# Patient Record
Sex: MALE | Race: WHITE | NOT HISPANIC OR LATINO | Employment: OTHER | URBAN - METROPOLITAN AREA
[De-identification: names, ages, dates, MRNs, and addresses within clinical notes are randomized per-mention and may not be internally consistent; named-entity substitution may affect disease eponyms.]

---

## 2017-01-06 ENCOUNTER — ALLSCRIPTS OFFICE VISIT (OUTPATIENT)
Dept: OTHER | Facility: OTHER | Age: 62
End: 2017-01-06

## 2017-01-06 DIAGNOSIS — D49.7 NEOPLASM OF UNSPECIFIED BEHAVIOR OF ENDOCRINE GLANDS AND OTHER PARTS OF NERVOUS SYSTEM: ICD-10-CM

## 2017-04-08 ENCOUNTER — HOSPITAL ENCOUNTER (EMERGENCY)
Facility: HOSPITAL | Age: 62
Discharge: HOME | End: 2017-04-08
Attending: EMERGENCY MEDICINE | Admitting: EMERGENCY MEDICINE
Payer: COMMERCIAL

## 2017-04-08 ENCOUNTER — APPOINTMENT (EMERGENCY)
Dept: CT IMAGING | Facility: HOSPITAL | Age: 62
End: 2017-04-08
Payer: COMMERCIAL

## 2017-04-08 VITALS
WEIGHT: 209 LBS | TEMPERATURE: 97.5 F | HEART RATE: 88 BPM | SYSTOLIC BLOOD PRESSURE: 157 MMHG | OXYGEN SATURATION: 96 % | DIASTOLIC BLOOD PRESSURE: 85 MMHG | RESPIRATION RATE: 16 BRPM | BODY MASS INDEX: 29.99 KG/M2

## 2017-04-08 DIAGNOSIS — K29.70 GASTRITIS: Primary | ICD-10-CM

## 2017-04-08 LAB
ANION GAP SERPL CALCULATED.3IONS-SCNC: 8 MMOL/L (ref 4–13)
BASOPHILS # BLD AUTO: 0.09 THOUSANDS/ΜL (ref 0–0.1)
BASOPHILS NFR BLD AUTO: 1 % (ref 0–1)
BUN SERPL-MCNC: 14 MG/DL (ref 5–25)
CALCIUM SERPL-MCNC: 9.6 MG/DL (ref 8.3–10.1)
CHLORIDE SERPL-SCNC: 103 MMOL/L (ref 100–108)
CO2 SERPL-SCNC: 32 MMOL/L (ref 21–32)
CREAT SERPL-MCNC: 1.28 MG/DL (ref 0.6–1.3)
DEPRECATED D DIMER PPP: 589 NG/ML (FEU) (ref 0–424)
EOSINOPHIL # BLD AUTO: 0.35 THOUSAND/ΜL (ref 0–0.61)
EOSINOPHIL NFR BLD AUTO: 2 % (ref 0–6)
ERYTHROCYTE [DISTWIDTH] IN BLOOD BY AUTOMATED COUNT: 12.9 % (ref 11.6–15.1)
GFR SERPL CREATININE-BSD FRML MDRD: 56.9 ML/MIN/1.73SQ M
GLUCOSE SERPL-MCNC: 143 MG/DL (ref 65–140)
HCT VFR BLD AUTO: 46.1 % (ref 36.5–49.3)
HGB BLD-MCNC: 16 G/DL (ref 12–17)
LYMPHOCYTES # BLD AUTO: 2.18 THOUSANDS/ΜL (ref 0.6–4.47)
LYMPHOCYTES NFR BLD AUTO: 14 % (ref 14–44)
MCH RBC QN AUTO: 30.4 PG (ref 26.8–34.3)
MCHC RBC AUTO-ENTMCNC: 34.7 G/DL (ref 31.4–37.4)
MCV RBC AUTO: 88 FL (ref 82–98)
MONOCYTES # BLD AUTO: 0.91 THOUSAND/ΜL (ref 0.17–1.22)
MONOCYTES NFR BLD AUTO: 6 % (ref 4–12)
NEUTROPHILS # BLD AUTO: 12.44 THOUSANDS/ΜL (ref 1.85–7.62)
NEUTS SEG NFR BLD AUTO: 77 % (ref 43–75)
PLATELET # BLD AUTO: 235 THOUSANDS/UL (ref 149–390)
PMV BLD AUTO: 10.3 FL (ref 8.9–12.7)
POTASSIUM SERPL-SCNC: 4.5 MMOL/L (ref 3.5–5.3)
RBC # BLD AUTO: 5.26 MILLION/UL (ref 3.88–5.62)
SODIUM SERPL-SCNC: 143 MMOL/L (ref 136–145)
TROPONIN I SERPL-MCNC: <0.02 NG/ML
WBC # BLD AUTO: 15.97 THOUSAND/UL (ref 4.31–10.16)

## 2017-04-08 PROCEDURE — 36415 COLL VENOUS BLD VENIPUNCTURE: CPT | Performed by: EMERGENCY MEDICINE

## 2017-04-08 PROCEDURE — 85379 FIBRIN DEGRADATION QUANT: CPT | Performed by: EMERGENCY MEDICINE

## 2017-04-08 PROCEDURE — 71275 CT ANGIOGRAPHY CHEST: CPT

## 2017-04-08 PROCEDURE — 99284 EMERGENCY DEPT VISIT MOD MDM: CPT

## 2017-04-08 PROCEDURE — 80048 BASIC METABOLIC PNL TOTAL CA: CPT | Performed by: EMERGENCY MEDICINE

## 2017-04-08 PROCEDURE — 84484 ASSAY OF TROPONIN QUANT: CPT | Performed by: EMERGENCY MEDICINE

## 2017-04-08 PROCEDURE — 74175 CTA ABDOMEN W/CONTRAST: CPT

## 2017-04-08 PROCEDURE — 93005 ELECTROCARDIOGRAM TRACING: CPT | Performed by: EMERGENCY MEDICINE

## 2017-04-08 PROCEDURE — 85025 COMPLETE CBC W/AUTO DIFF WBC: CPT | Performed by: EMERGENCY MEDICINE

## 2017-04-08 RX ORDER — MAGNESIUM HYDROXIDE/ALUMINUM HYDROXICE/SIMETHICONE 120; 1200; 1200 MG/30ML; MG/30ML; MG/30ML
30 SUSPENSION ORAL ONCE
Status: COMPLETED | OUTPATIENT
Start: 2017-04-08 | End: 2017-04-08

## 2017-04-08 RX ADMIN — IOHEXOL 100 ML: 350 INJECTION, SOLUTION INTRAVENOUS at 20:22

## 2017-04-08 RX ADMIN — LIDOCAINE HYDROCHLORIDE 15 ML: 20 SOLUTION ORAL; TOPICAL at 21:43

## 2017-04-08 RX ADMIN — ALUMINUM HYDROXIDE, MAGNESIUM HYDROXIDE, AND SIMETHICONE 30 ML: 200; 200; 20 SUSPENSION ORAL at 21:43

## 2017-04-10 LAB
ATRIAL RATE: 66 BPM
P AXIS: 69 DEGREES
PR INTERVAL: 186 MS
QRS AXIS: 56 DEGREES
QRSD INTERVAL: 92 MS
QT INTERVAL: 388 MS
QTC INTERVAL: 406 MS
T WAVE AXIS: 68 DEGREES
VENTRICULAR RATE: 66 BPM

## 2017-06-26 ENCOUNTER — HOSPITAL ENCOUNTER (OUTPATIENT)
Dept: MRI IMAGING | Facility: HOSPITAL | Age: 62
Discharge: HOME | End: 2017-06-26
Attending: NEUROLOGICAL SURGERY
Payer: COMMERCIAL

## 2017-06-26 DIAGNOSIS — D49.7 NEOPLASM OF UNSPECIFIED BEHAVIOR OF ENDOCRINE GLANDS AND OTHER PARTS OF NERVOUS SYSTEM: ICD-10-CM

## 2017-06-26 PROCEDURE — 72158 MRI LUMBAR SPINE W/O & W/DYE: CPT

## 2017-06-26 PROCEDURE — A9585 GADOBUTROL INJECTION: HCPCS | Performed by: NEUROLOGICAL SURGERY

## 2017-06-26 RX ADMIN — GADOBUTROL 9 ML: 604.72 INJECTION INTRAVENOUS at 15:31

## 2017-07-03 ENCOUNTER — ALLSCRIPTS OFFICE VISIT (OUTPATIENT)
Dept: OTHER | Facility: OTHER | Age: 62
End: 2017-07-03

## 2017-07-03 ENCOUNTER — TRANSCRIBE ORDERS (OUTPATIENT)
Dept: ADMINISTRATIVE | Facility: HOSPITAL | Age: 62
End: 2017-07-03

## 2017-07-03 DIAGNOSIS — D49.7 HYPERCORTISOLISM DUE TO ADRENAL NEOPLASM (HCC): Primary | ICD-10-CM

## 2017-07-03 DIAGNOSIS — E24.8 HYPERCORTISOLISM DUE TO ADRENAL NEOPLASM (HCC): Primary | ICD-10-CM

## 2017-07-03 DIAGNOSIS — D49.7 NEOPLASM OF UNSPECIFIED BEHAVIOR OF ENDOCRINE GLANDS AND OTHER PARTS OF NERVOUS SYSTEM: ICD-10-CM

## 2017-07-27 ENCOUNTER — HOSPITAL ENCOUNTER (EMERGENCY)
Facility: HOSPITAL | Age: 62
Discharge: HOME | End: 2017-07-27
Attending: EMERGENCY MEDICINE | Admitting: EMERGENCY MEDICINE
Payer: COMMERCIAL

## 2017-07-27 ENCOUNTER — APPOINTMENT (EMERGENCY)
Dept: CT IMAGING | Facility: HOSPITAL | Age: 62
End: 2017-07-27
Payer: COMMERCIAL

## 2017-07-27 ENCOUNTER — APPOINTMENT (EMERGENCY)
Dept: RADIOLOGY | Facility: HOSPITAL | Age: 62
End: 2017-07-27
Payer: COMMERCIAL

## 2017-07-27 VITALS
BODY MASS INDEX: 27.58 KG/M2 | WEIGHT: 192.24 LBS | DIASTOLIC BLOOD PRESSURE: 78 MMHG | TEMPERATURE: 97.5 F | HEART RATE: 72 BPM | SYSTOLIC BLOOD PRESSURE: 126 MMHG | RESPIRATION RATE: 18 BRPM | OXYGEN SATURATION: 98 %

## 2017-07-27 DIAGNOSIS — R06.02 SHORTNESS OF BREATH: Primary | ICD-10-CM

## 2017-07-27 DIAGNOSIS — M54.50 LOW BACK PAIN: ICD-10-CM

## 2017-07-27 DIAGNOSIS — M54.9 MID BACK PAIN: ICD-10-CM

## 2017-07-27 DIAGNOSIS — K92.2 GASTROINTESTINAL HEMORRHAGE, UNSPECIFIED GASTROINTESTINAL HEMORRHAGE TYPE: ICD-10-CM

## 2017-07-27 LAB
ANION GAP SERPL CALCULATED.3IONS-SCNC: 12 MMOL/L (ref 4–13)
BASOPHILS # BLD AUTO: 0.09 THOUSANDS/ΜL (ref 0–0.1)
BASOPHILS NFR BLD AUTO: 1 % (ref 0–1)
BUN SERPL-MCNC: 16 MG/DL (ref 5–25)
CALCIUM SERPL-MCNC: 10 MG/DL (ref 8.3–10.1)
CHLORIDE SERPL-SCNC: 104 MMOL/L (ref 100–108)
CO2 SERPL-SCNC: 24 MMOL/L (ref 21–32)
CREAT SERPL-MCNC: 1.15 MG/DL (ref 0.6–1.3)
DEPRECATED D DIMER PPP: 2543 NG/ML (FEU) (ref 0–424)
EOSINOPHIL # BLD AUTO: 0.09 THOUSAND/ΜL (ref 0–0.61)
EOSINOPHIL NFR BLD AUTO: 1 % (ref 0–6)
ERYTHROCYTE [DISTWIDTH] IN BLOOD BY AUTOMATED COUNT: 13.5 % (ref 11.6–15.1)
GFR SERPL CREATININE-BSD FRML MDRD: 68 ML/MIN/1.73SQ M
GLUCOSE SERPL-MCNC: 157 MG/DL (ref 65–140)
HCT VFR BLD AUTO: 42.6 % (ref 36.5–49.3)
HGB BLD-MCNC: 14.4 G/DL (ref 12–17)
LYMPHOCYTES # BLD AUTO: 2.6 THOUSANDS/ΜL (ref 0.6–4.47)
LYMPHOCYTES NFR BLD AUTO: 19 % (ref 14–44)
MCH RBC QN AUTO: 30 PG (ref 26.8–34.3)
MCHC RBC AUTO-ENTMCNC: 33.8 G/DL (ref 31.4–37.4)
MCV RBC AUTO: 89 FL (ref 82–98)
MONOCYTES # BLD AUTO: 0.89 THOUSAND/ΜL (ref 0.17–1.22)
MONOCYTES NFR BLD AUTO: 7 % (ref 4–12)
NEUTROPHILS # BLD AUTO: 9.79 THOUSANDS/ΜL (ref 1.85–7.62)
NEUTS SEG NFR BLD AUTO: 72 % (ref 43–75)
PLATELET # BLD AUTO: 249 THOUSANDS/UL (ref 149–390)
PMV BLD AUTO: 10.5 FL (ref 8.9–12.7)
POTASSIUM SERPL-SCNC: 4 MMOL/L (ref 3.5–5.3)
RBC # BLD AUTO: 4.8 MILLION/UL (ref 3.88–5.62)
SODIUM SERPL-SCNC: 140 MMOL/L (ref 136–145)
TROPONIN I SERPL-MCNC: <0.02 NG/ML
WBC # BLD AUTO: 13.46 THOUSAND/UL (ref 4.31–10.16)

## 2017-07-27 PROCEDURE — 93005 ELECTROCARDIOGRAM TRACING: CPT | Performed by: EMERGENCY MEDICINE

## 2017-07-27 PROCEDURE — 99284 EMERGENCY DEPT VISIT MOD MDM: CPT

## 2017-07-27 PROCEDURE — 96361 HYDRATE IV INFUSION ADD-ON: CPT

## 2017-07-27 PROCEDURE — 93005 ELECTROCARDIOGRAM TRACING: CPT

## 2017-07-27 PROCEDURE — 71010 HB CHEST X-RAY 1 VIEW FRONTAL (PORTABLE): CPT

## 2017-07-27 PROCEDURE — 96375 TX/PRO/DX INJ NEW DRUG ADDON: CPT

## 2017-07-27 PROCEDURE — 71275 CT ANGIOGRAPHY CHEST: CPT

## 2017-07-27 PROCEDURE — 80048 BASIC METABOLIC PNL TOTAL CA: CPT | Performed by: EMERGENCY MEDICINE

## 2017-07-27 PROCEDURE — 85379 FIBRIN DEGRADATION QUANT: CPT | Performed by: EMERGENCY MEDICINE

## 2017-07-27 PROCEDURE — 36415 COLL VENOUS BLD VENIPUNCTURE: CPT | Performed by: EMERGENCY MEDICINE

## 2017-07-27 PROCEDURE — 85025 COMPLETE CBC W/AUTO DIFF WBC: CPT | Performed by: EMERGENCY MEDICINE

## 2017-07-27 PROCEDURE — 84484 ASSAY OF TROPONIN QUANT: CPT | Performed by: EMERGENCY MEDICINE

## 2017-07-27 PROCEDURE — 96374 THER/PROPH/DIAG INJ IV PUSH: CPT

## 2017-07-27 RX ORDER — MORPHINE SULFATE 10 MG/ML
8 INJECTION, SOLUTION INTRAMUSCULAR; INTRAVENOUS ONCE
Status: COMPLETED | OUTPATIENT
Start: 2017-07-27 | End: 2017-07-27

## 2017-07-27 RX ORDER — LIDOCAINE 50 MG/G
2 PATCH TOPICAL ONCE
Status: DISCONTINUED | OUTPATIENT
Start: 2017-07-27 | End: 2017-07-27 | Stop reason: HOSPADM

## 2017-07-27 RX ORDER — DIPHENHYDRAMINE HYDROCHLORIDE 50 MG/ML
12.5 INJECTION INTRAMUSCULAR; INTRAVENOUS ONCE
Status: COMPLETED | OUTPATIENT
Start: 2017-07-27 | End: 2017-07-27

## 2017-07-27 RX ORDER — MORPHINE SULFATE 30 MG/1
30 TABLET ORAL EVERY 4 HOURS PRN
Qty: 10 TABLET | Refills: 0 | Status: SHIPPED | OUTPATIENT
Start: 2017-07-27 | End: 2018-01-21 | Stop reason: ALTCHOICE

## 2017-07-27 RX ADMIN — IOHEXOL 85 ML: 350 INJECTION, SOLUTION INTRAVENOUS at 15:42

## 2017-07-27 RX ADMIN — DIPHENHYDRAMINE HYDROCHLORIDE 12.5 MG: 50 INJECTION, SOLUTION INTRAMUSCULAR; INTRAVENOUS at 14:10

## 2017-07-27 RX ADMIN — MORPHINE SULFATE 8 MG: 10 INJECTION, SOLUTION INTRAMUSCULAR; INTRAVENOUS at 14:12

## 2017-07-27 RX ADMIN — SODIUM CHLORIDE 1000 ML: 0.9 INJECTION, SOLUTION INTRAVENOUS at 14:00

## 2017-07-27 RX ADMIN — LIDOCAINE 2 PATCH: 50 PATCH CUTANEOUS at 14:05

## 2017-07-28 ENCOUNTER — ALLSCRIPTS OFFICE VISIT (OUTPATIENT)
Dept: OTHER | Facility: OTHER | Age: 62
End: 2017-07-28

## 2017-07-28 ENCOUNTER — HOSPITAL ENCOUNTER (INPATIENT)
Facility: HOSPITAL | Age: 62
LOS: 4 days | Discharge: HOME | DRG: 751 | End: 2017-08-01
Attending: EMERGENCY MEDICINE | Admitting: PSYCHIATRY & NEUROLOGY
Payer: COMMERCIAL

## 2017-07-28 ENCOUNTER — GENERIC CONVERSION - ENCOUNTER (OUTPATIENT)
Dept: OTHER | Facility: OTHER | Age: 62
End: 2017-07-28

## 2017-07-28 DIAGNOSIS — F32.A DEPRESSION: ICD-10-CM

## 2017-07-28 DIAGNOSIS — R45.851 SUICIDAL IDEATION: Primary | ICD-10-CM

## 2017-07-28 DIAGNOSIS — F90.8 ADULT RESIDUAL TYPE ATTENTION DEFICIT HYPERACTIVITY DISORDER (ADHD): Chronic | ICD-10-CM

## 2017-07-28 DIAGNOSIS — F32.2 SEVERE MAJOR DEPRESSION, SINGLE EPISODE (HCC): ICD-10-CM

## 2017-07-28 LAB
ALBUMIN SERPL BCP-MCNC: 3.6 G/DL (ref 3.5–5)
ALP SERPL-CCNC: 55 U/L (ref 46–116)
ALT SERPL W P-5'-P-CCNC: 23 U/L (ref 12–78)
AMPHETAMINES SERPL QL SCN: NEGATIVE
ANION GAP SERPL CALCULATED.3IONS-SCNC: 8 MMOL/L (ref 4–13)
AST SERPL W P-5'-P-CCNC: 16 U/L (ref 5–45)
ATRIAL RATE: 66 BPM
ATRIAL RATE: 90 BPM
BARBITURATES UR QL: NEGATIVE
BASOPHILS # BLD AUTO: 0.06 THOUSANDS/ΜL (ref 0–0.1)
BASOPHILS NFR BLD AUTO: 0 % (ref 0–1)
BENZODIAZ UR QL: NEGATIVE
BILIRUB SERPL-MCNC: 0.7 MG/DL (ref 0.2–1)
BUN SERPL-MCNC: 12 MG/DL (ref 5–25)
CALCIUM SERPL-MCNC: 9.7 MG/DL (ref 8.3–10.1)
CHLORIDE SERPL-SCNC: 105 MMOL/L (ref 100–108)
CO2 SERPL-SCNC: 28 MMOL/L (ref 21–32)
COCAINE UR QL: NEGATIVE
CREAT SERPL-MCNC: 0.91 MG/DL (ref 0.6–1.3)
EOSINOPHIL # BLD AUTO: 0.23 THOUSAND/ΜL (ref 0–0.61)
EOSINOPHIL NFR BLD AUTO: 2 % (ref 0–6)
ERYTHROCYTE [DISTWIDTH] IN BLOOD BY AUTOMATED COUNT: 13.4 % (ref 11.6–15.1)
GFR SERPL CREATININE-BSD FRML MDRD: 90 ML/MIN/1.73SQ M
GLUCOSE SERPL-MCNC: 100 MG/DL (ref 65–140)
HCT VFR BLD AUTO: 39.5 % (ref 36.5–49.3)
HGB BLD-MCNC: 14 G/DL (ref 12–17)
LYMPHOCYTES # BLD AUTO: 2.07 THOUSANDS/ΜL (ref 0.6–4.47)
LYMPHOCYTES NFR BLD AUTO: 15 % (ref 14–44)
MCH RBC QN AUTO: 31.1 PG (ref 26.8–34.3)
MCHC RBC AUTO-ENTMCNC: 35.4 G/DL (ref 31.4–37.4)
MCV RBC AUTO: 88 FL (ref 82–98)
METHADONE UR QL: NEGATIVE
MONOCYTES # BLD AUTO: 1.1 THOUSAND/ΜL (ref 0.17–1.22)
MONOCYTES NFR BLD AUTO: 8 % (ref 4–12)
NEUTROPHILS # BLD AUTO: 10.59 THOUSANDS/ΜL (ref 1.85–7.62)
NEUTS SEG NFR BLD AUTO: 75 % (ref 43–75)
NRBC BLD AUTO-RTO: 0 /100 WBCS
OPIATES UR QL SCN: POSITIVE
P AXIS: 37 DEGREES
P AXIS: 75 DEGREES
PCP UR QL: NEGATIVE
PLATELET # BLD AUTO: 243 THOUSANDS/UL (ref 149–390)
PMV BLD AUTO: 10 FL (ref 8.9–12.7)
POTASSIUM SERPL-SCNC: 4.1 MMOL/L (ref 3.5–5.3)
PR INTERVAL: 152 MS
PR INTERVAL: 152 MS
PROT SERPL-MCNC: 7.7 G/DL (ref 6.4–8.2)
QRS AXIS: 51 DEGREES
QRS AXIS: 62 DEGREES
QRSD INTERVAL: 86 MS
QRSD INTERVAL: 86 MS
QT INTERVAL: 352 MS
QT INTERVAL: 400 MS
QTC INTERVAL: 419 MS
QTC INTERVAL: 430 MS
RBC # BLD AUTO: 4.5 MILLION/UL (ref 3.88–5.62)
SODIUM SERPL-SCNC: 141 MMOL/L (ref 136–145)
T WAVE AXIS: 41 DEGREES
T WAVE AXIS: 58 DEGREES
THC UR QL: POSITIVE
TSH SERPL DL<=0.05 MIU/L-ACNC: 1.68 UIU/ML (ref 0.36–3.74)
VENTRICULAR RATE: 66 BPM
VENTRICULAR RATE: 90 BPM
WBC # BLD AUTO: 14.11 THOUSAND/UL (ref 4.31–10.16)

## 2017-07-28 PROCEDURE — 86592 SYPHILIS TEST NON-TREP QUAL: CPT | Performed by: EMERGENCY MEDICINE

## 2017-07-28 PROCEDURE — 36415 COLL VENOUS BLD VENIPUNCTURE: CPT | Performed by: EMERGENCY MEDICINE

## 2017-07-28 PROCEDURE — 99285 EMERGENCY DEPT VISIT HI MDM: CPT

## 2017-07-28 PROCEDURE — 93005 ELECTROCARDIOGRAM TRACING: CPT | Performed by: EMERGENCY MEDICINE

## 2017-07-28 PROCEDURE — 85025 COMPLETE CBC W/AUTO DIFF WBC: CPT | Performed by: EMERGENCY MEDICINE

## 2017-07-28 PROCEDURE — 84443 ASSAY THYROID STIM HORMONE: CPT | Performed by: EMERGENCY MEDICINE

## 2017-07-28 PROCEDURE — 80307 DRUG TEST PRSMV CHEM ANLYZR: CPT | Performed by: EMERGENCY MEDICINE

## 2017-07-28 PROCEDURE — 80053 COMPREHEN METABOLIC PANEL: CPT | Performed by: EMERGENCY MEDICINE

## 2017-07-28 PROCEDURE — 82075 ASSAY OF BREATH ETHANOL: CPT | Performed by: EMERGENCY MEDICINE

## 2017-07-29 PROBLEM — F32.2 SEVERE MAJOR DEPRESSION, SINGLE EPISODE (HCC): Status: ACTIVE | Noted: 2017-07-29

## 2017-07-29 LAB
ANION GAP SERPL CALCULATED.3IONS-SCNC: 5 MMOL/L (ref 4–13)
BASOPHILS # BLD AUTO: 0.05 THOUSANDS/ΜL (ref 0–0.1)
BASOPHILS NFR BLD AUTO: 0 % (ref 0–1)
BUN SERPL-MCNC: 10 MG/DL (ref 5–25)
CALCIUM SERPL-MCNC: 9.5 MG/DL (ref 8.3–10.1)
CHLORIDE SERPL-SCNC: 107 MMOL/L (ref 100–108)
CHOLEST SERPL-MCNC: 114 MG/DL (ref 50–200)
CO2 SERPL-SCNC: 29 MMOL/L (ref 21–32)
CREAT SERPL-MCNC: 1 MG/DL (ref 0.6–1.3)
EOSINOPHIL # BLD AUTO: 0.4 THOUSAND/ΜL (ref 0–0.61)
EOSINOPHIL NFR BLD AUTO: 3 % (ref 0–6)
ERYTHROCYTE [DISTWIDTH] IN BLOOD BY AUTOMATED COUNT: 13.3 % (ref 11.6–15.1)
GFR SERPL CREATININE-BSD FRML MDRD: 80 ML/MIN/1.73SQ M
GLUCOSE SERPL-MCNC: 102 MG/DL (ref 65–140)
HCT VFR BLD AUTO: 40.3 % (ref 36.5–49.3)
HDLC SERPL-MCNC: 27 MG/DL (ref 40–60)
HGB BLD-MCNC: 14.1 G/DL (ref 12–17)
LDLC SERPL CALC-MCNC: 67 MG/DL (ref 0–100)
LYMPHOCYTES # BLD AUTO: 2.67 THOUSANDS/ΜL (ref 0.6–4.47)
LYMPHOCYTES NFR BLD AUTO: 22 % (ref 14–44)
MCH RBC QN AUTO: 31.2 PG (ref 26.8–34.3)
MCHC RBC AUTO-ENTMCNC: 35 G/DL (ref 31.4–37.4)
MCV RBC AUTO: 89 FL (ref 82–98)
MONOCYTES # BLD AUTO: 1 THOUSAND/ΜL (ref 0.17–1.22)
MONOCYTES NFR BLD AUTO: 8 % (ref 4–12)
NEUTROPHILS # BLD AUTO: 8.01 THOUSANDS/ΜL (ref 1.85–7.62)
NEUTS SEG NFR BLD AUTO: 67 % (ref 43–75)
NRBC BLD AUTO-RTO: 0 /100 WBCS
PLATELET # BLD AUTO: 245 THOUSANDS/UL (ref 149–390)
PMV BLD AUTO: 10 FL (ref 8.9–12.7)
POTASSIUM SERPL-SCNC: 3.8 MMOL/L (ref 3.5–5.3)
RBC # BLD AUTO: 4.52 MILLION/UL (ref 3.88–5.62)
SODIUM SERPL-SCNC: 141 MMOL/L (ref 136–145)
TRIGL SERPL-MCNC: 100 MG/DL
TSH SERPL DL<=0.05 MIU/L-ACNC: 1.5 UIU/ML (ref 0.36–3.74)
WBC # BLD AUTO: 12.2 THOUSAND/UL (ref 4.31–10.16)

## 2017-07-29 PROCEDURE — 84443 ASSAY THYROID STIM HORMONE: CPT | Performed by: PSYCHIATRY & NEUROLOGY

## 2017-07-29 PROCEDURE — 85025 COMPLETE CBC W/AUTO DIFF WBC: CPT | Performed by: PSYCHIATRY & NEUROLOGY

## 2017-07-29 PROCEDURE — 80061 LIPID PANEL: CPT | Performed by: PSYCHIATRY & NEUROLOGY

## 2017-07-29 PROCEDURE — 80048 BASIC METABOLIC PNL TOTAL CA: CPT | Performed by: PSYCHIATRY & NEUROLOGY

## 2017-07-29 RX ORDER — ACETAMINOPHEN 325 MG/1
650 TABLET ORAL EVERY 6 HOURS PRN
Status: DISCONTINUED | OUTPATIENT
Start: 2017-07-29 | End: 2017-08-01 | Stop reason: HOSPADM

## 2017-07-29 RX ORDER — ZOLPIDEM TARTRATE 5 MG/1
5 TABLET ORAL
Status: DISCONTINUED | OUTPATIENT
Start: 2017-07-29 | End: 2017-08-01 | Stop reason: HOSPADM

## 2017-07-29 RX ORDER — QUETIAPINE FUMARATE 100 MG/1
100 TABLET, FILM COATED ORAL
Status: DISCONTINUED | OUTPATIENT
Start: 2017-07-29 | End: 2017-08-01 | Stop reason: HOSPADM

## 2017-07-29 RX ORDER — MORPHINE SULFATE 15 MG/1
30 TABLET ORAL EVERY 4 HOURS PRN
Status: DISCONTINUED | OUTPATIENT
Start: 2017-07-29 | End: 2017-08-01 | Stop reason: HOSPADM

## 2017-07-29 RX ORDER — BENZTROPINE MESYLATE 1 MG/1
1 TABLET ORAL EVERY 6 HOURS PRN
Status: DISCONTINUED | OUTPATIENT
Start: 2017-07-29 | End: 2017-08-01 | Stop reason: HOSPADM

## 2017-07-29 RX ORDER — LORAZEPAM 2 MG/ML
1 INJECTION INTRAMUSCULAR EVERY 6 HOURS PRN
Status: DISCONTINUED | OUTPATIENT
Start: 2017-07-29 | End: 2017-08-01 | Stop reason: HOSPADM

## 2017-07-29 RX ORDER — BENZTROPINE MESYLATE 1 MG/ML
1 INJECTION INTRAMUSCULAR; INTRAVENOUS EVERY 6 HOURS PRN
Status: DISCONTINUED | OUTPATIENT
Start: 2017-07-29 | End: 2017-08-01 | Stop reason: HOSPADM

## 2017-07-29 RX ORDER — DULOXETIN HYDROCHLORIDE 30 MG/1
30 CAPSULE, DELAYED RELEASE ORAL DAILY
Status: DISCONTINUED | OUTPATIENT
Start: 2017-07-29 | End: 2017-08-01 | Stop reason: HOSPADM

## 2017-07-29 RX ORDER — LORAZEPAM 1 MG/1
1 TABLET ORAL EVERY 6 HOURS PRN
Status: DISCONTINUED | OUTPATIENT
Start: 2017-07-29 | End: 2017-08-01 | Stop reason: HOSPADM

## 2017-07-29 RX ORDER — OLANZAPINE 5 MG/1
5 TABLET ORAL EVERY 6 HOURS PRN
Status: DISCONTINUED | OUTPATIENT
Start: 2017-07-29 | End: 2017-08-01 | Stop reason: HOSPADM

## 2017-07-29 RX ORDER — OLANZAPINE 10 MG/1
5 INJECTION, POWDER, LYOPHILIZED, FOR SOLUTION INTRAMUSCULAR
Status: DISCONTINUED | OUTPATIENT
Start: 2017-07-29 | End: 2017-08-01 | Stop reason: HOSPADM

## 2017-07-29 RX ORDER — MAGNESIUM HYDROXIDE/ALUMINUM HYDROXICE/SIMETHICONE 120; 1200; 1200 MG/30ML; MG/30ML; MG/30ML
30 SUSPENSION ORAL EVERY 4 HOURS PRN
Status: DISCONTINUED | OUTPATIENT
Start: 2017-07-29 | End: 2017-08-01 | Stop reason: HOSPADM

## 2017-07-29 RX ADMIN — QUETIAPINE FUMARATE 100 MG: 100 TABLET, FILM COATED ORAL at 22:01

## 2017-07-29 RX ADMIN — DULOXETINE HYDROCHLORIDE 30 MG: 30 CAPSULE, DELAYED RELEASE ORAL at 14:24

## 2017-07-30 PROBLEM — F90.8 ADULT RESIDUAL TYPE ATTENTION DEFICIT HYPERACTIVITY DISORDER (ADHD): Chronic | Status: ACTIVE | Noted: 2017-07-30

## 2017-07-30 RX ORDER — ATOMOXETINE 25 MG/1
25 CAPSULE ORAL DAILY
Status: DISCONTINUED | OUTPATIENT
Start: 2017-07-30 | End: 2017-08-01 | Stop reason: HOSPADM

## 2017-07-30 RX ADMIN — DULOXETINE HYDROCHLORIDE 30 MG: 30 CAPSULE, DELAYED RELEASE ORAL at 08:38

## 2017-07-30 RX ADMIN — ATOMOXETINE HYDROCHLORIDE 25 MG: 25 CAPSULE ORAL at 13:16

## 2017-07-30 RX ADMIN — QUETIAPINE FUMARATE 100 MG: 100 TABLET, FILM COATED ORAL at 21:27

## 2017-07-31 LAB — RPR SER QL: NORMAL

## 2017-07-31 RX ADMIN — DULOXETINE HYDROCHLORIDE 30 MG: 30 CAPSULE, DELAYED RELEASE ORAL at 08:59

## 2017-07-31 RX ADMIN — ATOMOXETINE HYDROCHLORIDE 25 MG: 25 CAPSULE ORAL at 08:59

## 2017-07-31 RX ADMIN — QUETIAPINE FUMARATE 100 MG: 100 TABLET, FILM COATED ORAL at 21:15

## 2017-08-01 VITALS
DIASTOLIC BLOOD PRESSURE: 77 MMHG | WEIGHT: 189.82 LBS | OXYGEN SATURATION: 93 % | TEMPERATURE: 98.5 F | BODY MASS INDEX: 27.17 KG/M2 | HEART RATE: 82 BPM | RESPIRATION RATE: 18 BRPM | HEIGHT: 70 IN | SYSTOLIC BLOOD PRESSURE: 136 MMHG

## 2017-08-01 PROBLEM — F32.2 SEVERE MAJOR DEPRESSION, SINGLE EPISODE (HCC): Status: RESOLVED | Noted: 2017-07-29 | Resolved: 2017-08-01

## 2017-08-01 RX ORDER — QUETIAPINE FUMARATE 100 MG/1
100 TABLET, FILM COATED ORAL
Qty: 15 TABLET | Refills: 0 | Status: ON HOLD | OUTPATIENT
Start: 2017-08-01 | End: 2018-09-25

## 2017-08-01 RX ORDER — ATOMOXETINE 25 MG/1
25 CAPSULE ORAL DAILY
Qty: 15 CAPSULE | Refills: 0 | Status: SHIPPED | OUTPATIENT
Start: 2017-08-01 | End: 2018-09-20 | Stop reason: ALTCHOICE

## 2017-08-01 RX ORDER — DULOXETIN HYDROCHLORIDE 30 MG/1
30 CAPSULE, DELAYED RELEASE ORAL DAILY
Qty: 15 CAPSULE | Refills: 0 | Status: SHIPPED | OUTPATIENT
Start: 2017-08-01 | End: 2018-09-25 | Stop reason: HOSPADM

## 2017-08-01 RX ADMIN — DULOXETINE HYDROCHLORIDE 30 MG: 30 CAPSULE, DELAYED RELEASE ORAL at 08:45

## 2017-08-01 RX ADMIN — ATOMOXETINE HYDROCHLORIDE 25 MG: 25 CAPSULE ORAL at 08:45

## 2017-08-08 ENCOUNTER — APPOINTMENT (OUTPATIENT)
Dept: LAB | Facility: CLINIC | Age: 62
End: 2017-08-08
Payer: COMMERCIAL

## 2017-08-08 ENCOUNTER — ALLSCRIPTS OFFICE VISIT (OUTPATIENT)
Dept: OTHER | Facility: OTHER | Age: 62
End: 2017-08-08

## 2017-08-08 DIAGNOSIS — E61.1 IRON DEFICIENCY: ICD-10-CM

## 2017-08-08 DIAGNOSIS — M89.9 DISORDER OF BONE: ICD-10-CM

## 2017-08-08 DIAGNOSIS — G25.81 RESTLESS LEGS SYNDROME: ICD-10-CM

## 2017-08-08 DIAGNOSIS — Z13.220 ENCOUNTER FOR SCREENING FOR LIPOID DISORDERS: ICD-10-CM

## 2017-08-08 DIAGNOSIS — Z13.1 ENCOUNTER FOR SCREENING FOR DIABETES MELLITUS: ICD-10-CM

## 2017-08-08 DIAGNOSIS — D49.7 NEOPLASM OF UNSPECIFIED BEHAVIOR OF ENDOCRINE GLANDS AND OTHER PARTS OF NERVOUS SYSTEM: ICD-10-CM

## 2017-08-08 LAB
ANION GAP SERPL CALCULATED.3IONS-SCNC: 3 MMOL/L (ref 4–13)
BUN SERPL-MCNC: 10 MG/DL (ref 5–25)
CALCIUM SERPL-MCNC: 9.6 MG/DL (ref 8.3–10.1)
CHLORIDE SERPL-SCNC: 107 MMOL/L (ref 100–108)
CHOLEST SERPL-MCNC: 121 MG/DL (ref 50–200)
CO2 SERPL-SCNC: 30 MMOL/L (ref 21–32)
CREAT SERPL-MCNC: 1.06 MG/DL (ref 0.6–1.3)
ERYTHROCYTE [DISTWIDTH] IN BLOOD BY AUTOMATED COUNT: 14.1 % (ref 11.6–15.1)
EST. AVERAGE GLUCOSE BLD GHB EST-MCNC: 128 MG/DL
FERRITIN SERPL-MCNC: 232 NG/ML (ref 8–388)
GFR SERPL CREATININE-BSD FRML MDRD: 75 ML/MIN/1.73SQ M
GLUCOSE P FAST SERPL-MCNC: 113 MG/DL (ref 65–99)
HBA1C MFR BLD: 6.1 % (ref 4.2–6.3)
HCT VFR BLD AUTO: 40.4 % (ref 36.5–49.3)
HDLC SERPL-MCNC: 34 MG/DL (ref 40–60)
HGB BLD-MCNC: 13.7 G/DL (ref 12–17)
LDLC SERPL CALC-MCNC: 66 MG/DL (ref 0–100)
MCH RBC QN AUTO: 31.1 PG (ref 26.8–34.3)
MCHC RBC AUTO-ENTMCNC: 33.9 G/DL (ref 31.4–37.4)
MCV RBC AUTO: 92 FL (ref 82–98)
PLATELET # BLD AUTO: 338 THOUSANDS/UL (ref 149–390)
PMV BLD AUTO: 11.6 FL (ref 8.9–12.7)
POTASSIUM SERPL-SCNC: 4.1 MMOL/L (ref 3.5–5.3)
RBC # BLD AUTO: 4.4 MILLION/UL (ref 3.88–5.62)
SODIUM SERPL-SCNC: 140 MMOL/L (ref 136–145)
TRIGL SERPL-MCNC: 104 MG/DL
WBC # BLD AUTO: 12.91 THOUSAND/UL (ref 4.31–10.16)

## 2017-08-08 PROCEDURE — 36415 COLL VENOUS BLD VENIPUNCTURE: CPT

## 2017-08-08 PROCEDURE — 82728 ASSAY OF FERRITIN: CPT

## 2017-08-08 PROCEDURE — 85027 COMPLETE CBC AUTOMATED: CPT

## 2017-08-08 PROCEDURE — 80048 BASIC METABOLIC PNL TOTAL CA: CPT

## 2017-08-08 PROCEDURE — 80061 LIPID PANEL: CPT

## 2017-08-08 PROCEDURE — 83036 HEMOGLOBIN GLYCOSYLATED A1C: CPT

## 2017-08-29 ENCOUNTER — GENERIC CONVERSION - ENCOUNTER (OUTPATIENT)
Dept: OTHER | Facility: OTHER | Age: 62
End: 2017-08-29

## 2017-08-31 ENCOUNTER — ANESTHESIA (OUTPATIENT)
Dept: GASTROENTEROLOGY | Facility: HOSPITAL | Age: 62
End: 2017-08-31
Payer: COMMERCIAL

## 2017-08-31 ENCOUNTER — GENERIC CONVERSION - ENCOUNTER (OUTPATIENT)
Dept: OTHER | Facility: OTHER | Age: 62
End: 2017-08-31

## 2017-08-31 ENCOUNTER — ANESTHESIA EVENT (OUTPATIENT)
Dept: GASTROENTEROLOGY | Facility: HOSPITAL | Age: 62
End: 2017-08-31
Payer: COMMERCIAL

## 2017-08-31 ENCOUNTER — HOSPITAL ENCOUNTER (OUTPATIENT)
Facility: HOSPITAL | Age: 62
Setting detail: OUTPATIENT SURGERY
Discharge: HOME | End: 2017-08-31
Attending: INTERNAL MEDICINE | Admitting: INTERNAL MEDICINE
Payer: COMMERCIAL

## 2017-08-31 VITALS
WEIGHT: 184 LBS | HEIGHT: 70 IN | SYSTOLIC BLOOD PRESSURE: 126 MMHG | OXYGEN SATURATION: 97 % | TEMPERATURE: 96.7 F | HEART RATE: 59 BPM | DIASTOLIC BLOOD PRESSURE: 63 MMHG | RESPIRATION RATE: 16 BRPM | BODY MASS INDEX: 26.34 KG/M2

## 2017-08-31 DIAGNOSIS — Z12.11 ENCOUNTER FOR SCREENING FOR MALIGNANT NEOPLASM OF COLON: ICD-10-CM

## 2017-08-31 PROCEDURE — 88305 TISSUE EXAM BY PATHOLOGIST: CPT | Performed by: INTERNAL MEDICINE

## 2017-08-31 RX ORDER — PROPOFOL 10 MG/ML
INJECTION, EMULSION INTRAVENOUS AS NEEDED
Status: DISCONTINUED | OUTPATIENT
Start: 2017-08-31 | End: 2017-08-31 | Stop reason: SURG

## 2017-08-31 RX ORDER — PROPOFOL 10 MG/ML
INJECTION, EMULSION INTRAVENOUS CONTINUOUS PRN
Status: DISCONTINUED | OUTPATIENT
Start: 2017-08-31 | End: 2017-08-31 | Stop reason: SURG

## 2017-08-31 RX ORDER — SODIUM CHLORIDE 9 MG/ML
50 INJECTION, SOLUTION INTRAVENOUS CONTINUOUS
Status: DISCONTINUED | OUTPATIENT
Start: 2017-08-31 | End: 2017-08-31 | Stop reason: HOSPADM

## 2017-08-31 RX ADMIN — PROPOFOL 100 MG: 10 INJECTION, EMULSION INTRAVENOUS at 12:41

## 2017-08-31 RX ADMIN — SODIUM CHLORIDE: 0.9 INJECTION, SOLUTION INTRAVENOUS at 12:25

## 2017-08-31 RX ADMIN — PROPOFOL 30 MG: 10 INJECTION, EMULSION INTRAVENOUS at 12:48

## 2017-08-31 RX ADMIN — PROPOFOL 150 MCG/KG/MIN: 10 INJECTION, EMULSION INTRAVENOUS at 12:41

## 2017-09-08 ENCOUNTER — GENERIC CONVERSION - ENCOUNTER (OUTPATIENT)
Dept: OTHER | Facility: OTHER | Age: 62
End: 2017-09-08

## 2017-09-25 ENCOUNTER — HOSPITAL ENCOUNTER (EMERGENCY)
Facility: HOSPITAL | Age: 62
Discharge: HOME | End: 2017-09-26
Attending: EMERGENCY MEDICINE | Admitting: EMERGENCY MEDICINE
Payer: COMMERCIAL

## 2017-09-25 DIAGNOSIS — M54.50 ACUTE LOW BACK PAIN: Primary | ICD-10-CM

## 2017-09-25 DIAGNOSIS — R31.9 HEMATURIA: ICD-10-CM

## 2017-09-26 ENCOUNTER — APPOINTMENT (EMERGENCY)
Dept: CT IMAGING | Facility: HOSPITAL | Age: 62
End: 2017-09-26
Payer: COMMERCIAL

## 2017-09-26 VITALS
RESPIRATION RATE: 18 BRPM | SYSTOLIC BLOOD PRESSURE: 153 MMHG | OXYGEN SATURATION: 96 % | DIASTOLIC BLOOD PRESSURE: 86 MMHG | TEMPERATURE: 98.3 F | HEART RATE: 62 BPM

## 2017-09-26 LAB
CLARITY, POC: NORMAL
COLOR, POC: YELLOW
EXT BILIRUBIN, UA: NORMAL
EXT BLOOD URINE: NORMAL
EXT GLUCOSE, UA: NORMAL
EXT KETONES: NORMAL
EXT NITRITE, UA: NORMAL
EXT PH, UA: 6
EXT PROTEIN, UA: NORMAL
EXT SPECIFIC GRAVITY, UA: 1.02
EXT UROBILINOGEN: 0.2
WBC # BLD EST: NORMAL 10*3/UL

## 2017-09-26 PROCEDURE — 81002 URINALYSIS NONAUTO W/O SCOPE: CPT | Performed by: EMERGENCY MEDICINE

## 2017-09-26 PROCEDURE — 72131 CT LUMBAR SPINE W/O DYE: CPT

## 2017-09-26 PROCEDURE — 99284 EMERGENCY DEPT VISIT MOD MDM: CPT

## 2017-09-26 RX ORDER — IBUPROFEN 400 MG/1
400 TABLET ORAL ONCE
Status: COMPLETED | OUTPATIENT
Start: 2017-09-26 | End: 2017-09-26

## 2017-09-26 RX ORDER — HYDROCODONE BITARTRATE AND ACETAMINOPHEN 5; 325 MG/1; MG/1
1 TABLET ORAL ONCE
Status: COMPLETED | OUTPATIENT
Start: 2017-09-26 | End: 2017-09-26

## 2017-09-26 RX ORDER — HYDROCODONE BITARTRATE AND ACETAMINOPHEN 5; 325 MG/1; MG/1
1 TABLET ORAL EVERY 8 HOURS PRN
Qty: 10 TABLET | Refills: 0 | Status: SHIPPED | OUTPATIENT
Start: 2017-09-26 | End: 2018-01-21 | Stop reason: ALTCHOICE

## 2017-09-26 RX ADMIN — HYDROCODONE BITARTRATE AND ACETAMINOPHEN 1 TABLET: 5; 325 TABLET ORAL at 01:12

## 2017-09-26 RX ADMIN — IBUPROFEN 400 MG: 400 TABLET, FILM COATED ORAL at 01:12

## 2017-09-26 NOTE — ED PROVIDER NOTES
History  Chief Complaint   Patient presents with    Back Pain     hx of spinal tumor, pain worse tonight       History provided by:  Patient   used: No    56 y/o male with hx of chronic back pain, spinal tumor around T12 per records presents with moderately worse lower back pain -- sacral area  No radiation into legs, weakness, bladder/bowel dysfunction  Walking normal  No taking meds at home  No fever, chills, new injuries  No urinary sx  On exam some sacral tenderness  No stepoff  Normal neuro function  Plan spine CT, analgesics, re-eval  DDx includes back strain, tumor burden, pathologic fx  Prior to Admission Medications   Prescriptions Last Dose Informant Patient Reported? Taking? DULoxetine (CYMBALTA) 30 mg delayed release capsule   No No   Sig: Take 1 capsule by mouth daily   QUEtiapine (SEROquel) 100 mg tablet   No No   Sig: Take 1 tablet by mouth daily at bedtime   atoMOXetine (STRATTERA) 25 mg capsule   No No   Sig: Take 1 capsule by mouth daily   morphine (MSIR) 30 MG tablet   No No   Sig: Take 1 tablet by mouth every 4 (four) hours as needed for severe pain for up to 5 doses Max Daily Amount: 180 mg   naproxen sodium (ANAPROX) 550 mg tablet   No No   Sig: Take 1 tablet (550 mg total) by mouth 2 (two) times a day with meals  Patient taking differently: Take 550 mg by mouth 2 (two) times a day with meals        Facility-Administered Medications: None       Past Medical History:   Diagnosis Date    Chronic back pain     Chronic pain     Gallstones     Spinal arachnoid cyst        Past Surgical History:   Procedure Laterality Date    ID COLONOSCOPY FLX DX W/COLLJ SPEC WHEN PFRMD N/A 8/31/2017    Procedure: COLONOSCOPY;  Surgeon: Chary aSgastume MD;  Location: [facility]; Service: Gastroenterology    TUMOR REMOVAL         History reviewed  No pertinent family history  I have reviewed and agree with the history as documented      Social History   Substance Use Topics    Smoking status: Former Smoker    Smokeless tobacco: Never Used    Alcohol use No      Comment: occassional         Review of Systems   Constitutional: Negative for activity change, appetite change, fatigue and fever  Respiratory: Negative for cough, chest tightness and shortness of breath  Cardiovascular: Negative for chest pain and palpitations  Gastrointestinal: Negative for abdominal pain, nausea and vomiting  Genitourinary: Negative for decreased urine volume, difficulty urinating, dysuria and urgency  Musculoskeletal: Positive for back pain  Negative for neck pain  Skin: Negative for color change and pallor  Neurological: Negative for dizziness, facial asymmetry, weakness, light-headedness and numbness  Psychiatric/Behavioral: Negative for agitation and confusion  All other systems reviewed and are negative  Physical Exam  ED Triage Vitals [09/25/17 2200]   Temperature Pulse Respirations Blood Pressure SpO2   98 3 °F (36 8 °C) 78 20 143/88 99 %      Temp Source Heart Rate Source Patient Position - Orthostatic VS BP Location FiO2 (%)   Oral Monitor Sitting Left arm --      Pain Score       8           Physical Exam   Constitutional: He is oriented to person, place, and time  He appears well-developed and well-nourished  No distress  HENT:   Head: Normocephalic and atraumatic  Mouth/Throat: Oropharynx is clear and moist    Eyes: Conjunctivae are normal  Pupils are equal, round, and reactive to light  Neck: Normal range of motion  Neck supple  Nontender c-spine  Cardiovascular: Normal rate, regular rhythm, normal heart sounds and intact distal pulses  Abdominal: Soft  He exhibits no distension  There is no tenderness  Musculoskeletal: Normal range of motion  He exhibits no edema  Tenderness of right sacral spine, lower lumbar  Negative straight leg raise  Neurological: He is alert and oriented to person, place, and time  He displays normal reflexes  No sensory deficit   He exhibits normal muscle tone  Coordination normal    Skin: Skin is warm and dry  Psychiatric: He has a normal mood and affect  His behavior is normal    Nursing note and vitals reviewed  ED Medications  Medications   ibuprofen (MOTRIN) tablet 400 mg (400 mg Oral Given 9/26/17 0112)   HYDROcodone-acetaminophen (NORCO) 5-325 mg per tablet 1 tablet (1 tablet Oral Given 9/26/17 0112)       Diagnostic Studies  Labs Reviewed   POCT URINALYSIS DIPSTICK - Normal       Result Value Ref Range Status    Color, UA yellow   Final    Clarity, UA hazy   Final    EXT Glucose, UA neg   Final    EXT Bilirubin, UA (Ref: Negative) neg   Final    EXT Ketones, UA (Ref: Negative) NEG   Final    EXT Spec Grav, UA 1 020   Final    EXT Blood, UA (Ref: Negative) SMALL  TO MOD   Final    EXT pH, UA 6 0   Final    EXT Protein, UA (Ref: Negative) NEG   Final    EXT Urobilinogen, UA (Ref: 0 2- 1 0) 0 2   Final    EXT Leukocytes, UA (Ref: Negative) NEG   Final    EXT Nitrite, UA (Ref: Negative) NEG   Final       CT lumbar spine without contrast   Final Result      Mild annular bulging within the lumbar disc spaces without disc herniation or canal stenosis  Minimal foraminal narrowing at L3-4, left greater than right without discrete nerve impingement  Findings are consistent with the preliminary report from [facility] which was provided shortly after completion of the exam                       Workstation performed: [id number]             Procedures  Procedures      Phone Contacts  ED Phone Contact    ED Course  ED Course                                MDM  Number of Diagnoses or Management Options  Acute low back pain:   Hematuria:   Diagnosis management comments: 56 y/o male with spinal tumor, chronic back pain presented for eval of worsening pain today  Afebrile  CT unchanged  Given analgesics in ED with significant improvement in pain  Normal gait, no neuro deficits  UA remarkable for mild to moderate blood  Asymptomatic  Will have f/u with urology for further work up  Amount and/or Complexity of Data Reviewed  Clinical lab tests: ordered and reviewed  Tests in the radiology section of CPT®: ordered and reviewed  Review and summarize past medical records: yes    Patient Progress  Patient progress: improved    CritCare Time    Disposition  Final diagnoses:   Acute low back pain   Hematuria     ED Disposition     ED Disposition Condition Comment    Discharge  Yesenia Alfaro discharge to home/self care  Condition at discharge: Good        Follow-up Information     Follow up With Specialties Details Why Contact Info Additional Information    [facility] Emergency Department Emergency Medicine  If symptoms worsen [address] [id number] [phone] AN ED, [address], [place], [place], [address] for Urology [place]    [address]  [address]   [phone]         Discharge Medication List as of 9/26/2017  2:06 AM      CONTINUE these medications which have NOT CHANGED    Details   atoMOXetine (STRATTERA) 25 mg capsule Take 1 capsule by mouth daily, Starting Tue 8/1/2017, Print      DULoxetine (CYMBALTA) 30 mg delayed release capsule Take 1 capsule by mouth daily, Starting Tue 8/1/2017, Print      morphine (MSIR) 30 MG tablet Take 1 tablet by mouth every 4 (four) hours as needed for severe pain for up to 5 doses Max Daily Amount: 180 mg, Starting Thu 7/27/2017, Print      naproxen sodium (ANAPROX) 550 mg tablet Take 1 tablet (550 mg total) by mouth 2 (two) times a day with meals  , Starting 8/18/2016, Until Discontinued, Print      QUEtiapine (SEROquel) 100 mg tablet Take 1 tablet by mouth daily at bedtime, Starting Tue 8/1/2017, Print           No discharge procedures on file      ED Provider  Electronically Signed by       Kaden Hdez MD  09/26/17 8055

## 2017-09-26 NOTE — DISCHARGE INSTRUCTIONS
Acute Hematuria   WHAT YOU SHOULD KNOW:   Hematuria is blood in your urine from an injury or medical condition  Acute means the problem starts suddenly, worsens quickly, and lasts a short time  Your urine may be bright red to dark brown  Some common causes of hematuria are bladder infection, kidney stone, trauma to the kidneys or bladder, and some medications  Sometimes blood clots can block the urethra so that you cannot empty your bladder  AFTER YOU LEAVE:   Medicines:  Ask about these or other medicines you may need to treat the cause of your acute hematuria:  · Antibiotics: This medicine is given to fight or prevent an infection caused by bacteria  Always take your antibiotics exactly as ordered by your healthcare provider  Do not stop taking your medicine unless directed by your healthcare provider  Never save antibiotics or take leftover antibiotics that were given to you for another illness  · Take your medicine as directed  Call your healthcare provider if you think your medicine is not helping or if you have side effects  Tell him if you are allergic to any medicine  Keep a list of the medicines, vitamins, and herbs you take  Include the amounts, and when and why you take them  Bring the list or the pill bottles to follow-up visits  Carry your medicine list with you in case of an emergency  Increase the amount of fluid you drink:  Drink clear fluids to help flush the blood from your urinary tract  Follow instructions about how much fluid to drink  Follow up with your healthcare provider as directed: Your healthcare provider will tell you how often to come in for follow-up visits  He may refer you to a specialist, such as a urologist or nephrologist  The specialists may do tests or procedures to find more serious problems with your urinary system  Write down your questions so you remember to ask them during your visits     Contact your healthcare provider if:   · You have a fever that gets worse or does not go away with treatment  · You cannot keep liquids or medicines down  · Your urine gets darker, even after you drink extra liquids  · You have questions or concerns about your condition, treatment, or care  Seek care immediately or call 911 if:   · You have blood in your urine after a new injury, such as a fall  · You are urinating very small amounts or not at all  · You feel like you cannot empty your bladder  · You have severe back or side pain that does not go away with treatment  © 2014 [address] is for End User's use only and may not be sold, redistributed or otherwise used for commercial purposes  All illustrations and images included in CareNotes® are the copyrighted property of A D A M , Inc  or Vasiliy Agarwal  The above information is an  only  It is not intended as medical advice for individual conditions or treatments  Talk to your doctor, nurse or pharmacist before following any medical regimen to see if it is safe and effective for you  Acute Low Back Pain, Ambulatory Care   Chyna Garcia & Babar T: Lumbar stabilization: a review of core concepts and current literature, part 2  Am J Phys Med Rehabil 2007; 86(1):72-80  [place] for Clinical Systems Improvement (ICSI): Health care guideline: Adult low back pain  [place] for Clinical Systems Improvement (ICSI)  [place], [place]  2010  Available from URL: http://[employer]/  As accessed 2010-12-20  [employer] and [address] (NICE): Low back pain: early management of persistent non-specific low back pain  [employer] and [address] (NICE)  [facility]  2009  Available from URL: Security[employer] es  pdf  As accessed 2010-12-20    Toward Optimized Practice: Guideline for the evidence-informed primary care management of low back pain  Toward Optimized Practice  [address], [address] [place]  2009  Available from URL: [facility]  org/informed_practice/clinical_practice_guidelines/complete%20set/Low%20Back%20Pain/backpain_guideline pdf  As accessed 2010-12-20  Mary SAENZ, Rubinstein SM, Rubio AP, et al: Exercise therapy for chronic nonspecific low-back pain  Best Pract Res Clin Rheumatol 2010; 24(2):193-204  American Academy of Orthopaedic Surgeons : GUIDELINE: [facility] (AAOS) clinical guideline on low back pain/sciatica (acute) (phases I and II)    Phase 1 and II  American Academy of Orthopaedic Surgeons   [address] [place] [address]  2002  Available from URL: [employer]  org/wordhtml/pdfs_r/guidelin/chart_06  pdf; [employer]  org/wordhtml/pdfs_r/guidelin/chart_07  pdf; [employer]  org/wordhtml/pdfs_r/guidelin/suprt_06  pdf  As accessed December 14, 2004  Lissette Jaimes AM, et al: A comparison of osteopathic spinal manipulation with standard care for patients with low back pain    [address] [place] [address] Nw; 341(19):5806-7164  Jonathan Pizarro, et al: Delayed presentation of cauda equina syndrome secondary to lumbar disc herniation: functional outcomes and health-related quality of life  Can J Emerg Med 2001; 3(4):285-291  Berkley WANG, Maximiliano Emanuel, et al: Randomized trial comparing traditional [facility] medical acupuncture, therapeutic massage, and self-care education for chronic low back pain  Arch Intern Med 2001; 161(8):6028-5895  [place] DA: Emergency department evaluation and treatment of back pain  [address] [place] [address] South; 81(6):966-618, vi-vii  Moy RANGEL & Erin Solid: Low back pain  N Engl J Med 2001; 344(5):363-370  Tauna Nyhan, & Reina ANDREW: A survey of primary care physician practice patterns and adherence to acute low back problem guidelines  [address] [place] [address] 2000; 9(10):0514-9173    Samuel WRIGHT: Back pain and sciatica  [address] [place]; 318(5):291-300  Brandi Rocha et al: Multidisciplinary bio-psycho-social rehabilitation for chronic low back pain  [place] Database Syst Rev 2002; -(4):QA286153-  Kamila Whitley, Ivan HOLLY, et al: Bed rest for acute low back pain and sciatica  [place] Database Syst Rev 2004; -(2):IO152351  Gisela VASQUEZ & Torsten PJ: Basic pharmacology and advances in emergency medicine  [address] [place] [address] 2005; 23(2):433-465  Rene GRIMALDO & Moy RA: Diagnostic evaluation of low back pain with emphasis on imaging  Jenifer Intern Med 2002; 137(7):586-597  Heydi Estevez et al: Radiography of the lumbar spine in primary care patients with low back pain: randomised controlled trial  BMJ 2001; 322(6825):400-405  Kacey S: Evaluation and treatment of acute low back pain  Am Fam Physician 2007; 75(8):0269-9949  [address] SAENZ: Emergency department management of low back pain  [address] [facility]; 11:11-28  Jorge [employer]itchPoint [employer]pos Medical Dictionary [Internet]: [address] [Internet]  Available at: [employer] , Accessed June 13, 2007  Holly Pickard et al: Pain, disability, and satisfaction outcomes and predictors of outcomes: a practice-based study of chronic low back pain patients attending primary care and chiropractic physicians  J Manipulative Physiol Ther 2001; [id number]  Mario Alberto AT & Jeremy Duque AA: Diagnosis and management of acute low back pain  Am Fam Physician 2000; 61(6):2176-5922   Jannie SAENZ & Rex B: Low back pain (acute)  Clin Evid 2004; -(12):1976-5920  Jannie CARNEY, Radha RJ, Rex MILES, et al: Non-steroidal anti-inflammatory drugs for low back pain  [place] Database Syst Rev 2000; 2000(2):MR669169-  [employer], Department of Defense: Clinical practice guideline for the management of low back pain or sciatica in the primary care setting  Mouna (FELIPE): [address] ([place]); Various p  [253 references] Available https://muñiz info/ , May 1999  © 2014 0[address] Osorio is for End User's use only and may not be sold, redistributed or otherwise used for commercial purposes  All illustrations and images included in CareNotes® are the copyrighted property of A D A M , Inc  or [address]  The above information is an  only  It is not intended as medical advice for individual conditions or treatments  Talk to your doctor, nurse or pharmacist before following any medical regimen to see if it is safe and effective for you

## 2017-10-23 ENCOUNTER — GENERIC CONVERSION - ENCOUNTER (OUTPATIENT)
Dept: OTHER | Facility: OTHER | Age: 62
End: 2017-10-23

## 2018-01-09 ENCOUNTER — TRANSCRIBE ORDERS (OUTPATIENT)
Dept: LAB | Facility: CLINIC | Age: 63
End: 2018-01-09

## 2018-01-09 ENCOUNTER — APPOINTMENT (OUTPATIENT)
Dept: LAB | Facility: CLINIC | Age: 63
End: 2018-01-09
Payer: COMMERCIAL

## 2018-01-09 DIAGNOSIS — E24.8 HYPERCORTISOLISM DUE TO ADRENAL NEOPLASM (HCC): Primary | ICD-10-CM

## 2018-01-09 DIAGNOSIS — D49.7 HYPERCORTISOLISM DUE TO ADRENAL NEOPLASM (HCC): Primary | ICD-10-CM

## 2018-01-09 DIAGNOSIS — E24.8 HYPERCORTISOLISM DUE TO ADRENAL NEOPLASM (HCC): ICD-10-CM

## 2018-01-09 DIAGNOSIS — D49.7 HYPERCORTISOLISM DUE TO ADRENAL NEOPLASM (HCC): ICD-10-CM

## 2018-01-09 LAB
BUN SERPL-MCNC: 13 MG/DL (ref 5–25)
CREAT SERPL-MCNC: 1.05 MG/DL (ref 0.6–1.3)
GFR SERPL CREATININE-BSD FRML MDRD: 75 ML/MIN/1.73SQ M

## 2018-01-09 PROCEDURE — 84520 ASSAY OF UREA NITROGEN: CPT

## 2018-01-09 PROCEDURE — 36415 COLL VENOUS BLD VENIPUNCTURE: CPT

## 2018-01-09 PROCEDURE — 82565 ASSAY OF CREATININE: CPT

## 2018-01-10 NOTE — MISCELLANEOUS
Provider Comments  Provider Comments:   Patient with SI, likely recent attempt  911 called, patient taken to ER        Signatures   Electronically signed by : Carrie Weinberg MD PhD; Jul 28 2017  2:42PM EST                       (Author)

## 2018-01-11 NOTE — RESULT NOTES
Discussion/Summary   3 polyps removed were all precancerous  repeat colonsocpy in 3 years  enter reminder  Verified Results  (1) TISSUE EXAM [id number] 12:50PM Paula Wheeler     Test Name Result Flag Reference   LAB AP CASE REPORT (Report)     Surgical Pathology Report             Case: [id number]                   Authorizing Provider: Radha Arteaga MD       Collected:      08/31/2017 1250        Ordering Location:   [address] [place] [address]   Received:      08/31/2017 David [facility]                               Pathologist:      Jacqueline Bull MD                                 Specimens:  A) - Ileocecal valve, Ileo-cecal valve bx-cold                             B) - Large Intestine, Transverse Colon, Transverse colon dimnutive polyp-cold             C) - Large Intestine, Transverse Colon, Transverse colon polyp-cold snare               D) - Rectum, Rectal polyp-cold bx   LAB AP FINAL DIAGNOSIS (Report)     A  Ileocecal valve (biopsy):    - Colonic mucosa with no significant pathologic abnormalities  - No active inflammation or histologic evidence of a microscopic   (lymphocytic)     or collagenous colitis noted  - No granulomas, dysplasia or neoplasia identified  B  Transverse colon polyp (cold biopsy):    - Portions of polypoid colonic mucosa with focal suggestion of   adenomatous epithelium     - No high-grade dysplasia or malignancy identified  C  Transverse colon polyp (cold snare):    - Tubular adenoma  - No high-grade dysplasia or malignancy identified  D  Rectal polyp (cold biopsy):    - Portions of serrated colon polyp     - No high-grade dysplasia or malignancy identified  Comment:  Serrated polyps from the rectum are more likely to represent hyperplastic   polyp rather than sessile serrated adenoma    Electronically signed by Jacqueline Bull MD on 9/5/2017 at 12:40 PM   LAB AP NOTE      Interpretation performed at [address] [address]    LAB AP SURGICAL ADDITIONAL INFORMATION (Report)     All controls performed with the immunohistochemical stains reported above   reacted appropriately  These tests were developed and their performance   characteristics determined by [facility] or   [facility]  They may not be cleared or approved by the [place]  Food and Drug Administration  The FDA has determined that such clearance   or approval is not necessary  These tests are used for clinical purposes  They should not be regarded as investigational or for research  This   laboratory has been approved by [place] [address], designated as a high-complexity   laboratory and is qualified to perform these tests  LAB AP GROSS DESCRIPTION (Report)     A  The specimen is received in formalin, labeled with the patient's name   and hospital number, and is designated ileocecal valve biopsy  The   specimen consists of several, rubbery, tan-brown tissue fragments   measuring 0 5 x 0 5 x 0 1 cm in aggregate dimension  Entirely submitted  One cassette  B  The specimen is received in formalin, labeled with the patient's name   and hospital number, and is designated transverse colon  The specimen   consists of a single, rubbery, tan-brown tissue fragment measuring up to   0 3 cm in greatest dimension  Entirely submitted  One cassette  C  The specimen is received in formalin, labeled with the patient's name   and hospital number, and is designated transverse colon polyp  The   specimen consists of a single, rubbery, tan-brown tissue fragment   measuring up to 0 1 cm in greatest dimension  Entirely submitted  One   cassette  D  The specimen is received in formalin, labeled with the patient's name   and hospital number, and is designated rectal polyp  The specimen   consists of 2 rubbery, tan-brown tissue fragments measuring from 0 1 up to   0 2 cm in greatest dimension  Entirely submitted  One cassette      Note: The estimated total formalin fixation time based upon information   provided by the submitting clinician and the standard processing schedule   is 15 5 hours      SRS   LAB AP CLINICAL INFORMATION      Encounter for screening for malignant neoplasm of colon   Prominent ileo-cecal valve

## 2018-01-13 VITALS
HEIGHT: 70 IN | TEMPERATURE: 98.4 F | DIASTOLIC BLOOD PRESSURE: 90 MMHG | BODY MASS INDEX: 29.66 KG/M2 | HEART RATE: 90 BPM | SYSTOLIC BLOOD PRESSURE: 136 MMHG | WEIGHT: 207.19 LBS | RESPIRATION RATE: 16 BRPM

## 2018-01-14 ENCOUNTER — HOSPITAL ENCOUNTER (EMERGENCY)
Facility: HOSPITAL | Age: 63
Discharge: HOME | End: 2018-01-14
Attending: EMERGENCY MEDICINE | Admitting: EMERGENCY MEDICINE
Payer: COMMERCIAL

## 2018-01-14 VITALS
RESPIRATION RATE: 16 BRPM | TEMPERATURE: 98.9 F | BODY MASS INDEX: 26.82 KG/M2 | HEART RATE: 88 BPM | DIASTOLIC BLOOD PRESSURE: 86 MMHG | HEIGHT: 70 IN | SYSTOLIC BLOOD PRESSURE: 126 MMHG | WEIGHT: 187.38 LBS

## 2018-01-14 VITALS
RESPIRATION RATE: 18 BRPM | OXYGEN SATURATION: 98 % | WEIGHT: 196 LBS | SYSTOLIC BLOOD PRESSURE: 157 MMHG | TEMPERATURE: 98.3 F | HEART RATE: 98 BPM | DIASTOLIC BLOOD PRESSURE: 89 MMHG | BODY MASS INDEX: 28.12 KG/M2

## 2018-01-14 DIAGNOSIS — M54.50 LUMBAR BACK PAIN: Primary | ICD-10-CM

## 2018-01-14 PROCEDURE — 99283 EMERGENCY DEPT VISIT LOW MDM: CPT

## 2018-01-14 RX ORDER — HYDROCODONE BITARTRATE AND ACETAMINOPHEN 5; 325 MG/1; MG/1
2 TABLET ORAL EVERY 6 HOURS PRN
Qty: 20 TABLET | Refills: 0 | Status: SHIPPED | OUTPATIENT
Start: 2018-01-14 | End: 2018-01-29 | Stop reason: ALTCHOICE

## 2018-01-14 RX ORDER — HYDROCODONE BITARTRATE AND ACETAMINOPHEN 5; 325 MG/1; MG/1
2 TABLET ORAL ONCE
Status: COMPLETED | OUTPATIENT
Start: 2018-01-14 | End: 2018-01-14

## 2018-01-14 RX ADMIN — HYDROCODONE BITARTRATE AND ACETAMINOPHEN 2 TABLET: 5; 325 TABLET ORAL at 16:38

## 2018-01-14 NOTE — ED PROVIDER NOTES
History  Chief Complaint   Patient presents with    Back Pain     Pt  complains of back pain to lower back, states has spinal tumor  Exacerbation started last night  Has been seen previously for same and given dose of pain medication  Scheduled to see back specialist next month  History provided by:  Patient  Back Pain   Location:  Lumbar spine  Quality:  Aching  Radiates to:  Does not radiate  Pain severity:  Moderate  Onset quality:  Gradual  Timing:  Intermittent  Progression:  Waxing and waning  Chronicity:  New  Relieved by:  Nothing  Worsened by:  Nothing  Ineffective treatments:  None tried  Associated symptoms: no abdominal pain, no abdominal swelling, no bladder incontinence, no bowel incontinence, no chest pain, no dysuria, no fever, no headaches, no leg pain, no numbness, no perianal numbness, no tingling and no weakness        Prior to Admission Medications   Prescriptions Last Dose Informant Patient Reported? Taking? DULoxetine (CYMBALTA) 30 mg delayed release capsule   No No   Sig: Take 1 capsule by mouth daily   HYDROcodone-acetaminophen (NORCO) 5-325 mg per tablet   No No   Sig: Take 1 tablet by mouth every 8 (eight) hours as needed for pain Max Daily Amount: 3 tablets   QUEtiapine (SEROquel) 100 mg tablet   No No   Sig: Take 1 tablet by mouth daily at bedtime   atoMOXetine (STRATTERA) 25 mg capsule   No No   Sig: Take 1 capsule by mouth daily   morphine (MSIR) 30 MG tablet   No No   Sig: Take 1 tablet by mouth every 4 (four) hours as needed for severe pain for up to 5 doses Max Daily Amount: 180 mg   naproxen sodium (ANAPROX) 550 mg tablet   No No   Sig: Take 1 tablet (550 mg total) by mouth 2 (two) times a day with meals     Patient taking differently: Take 550 mg by mouth 2 (two) times a day with meals        Facility-Administered Medications: None       Past Medical History:   Diagnosis Date    Chronic back pain     Chronic pain     Gallstones     Spinal arachnoid cyst Past Surgical History:   Procedure Laterality Date    CO COLONOSCOPY FLX DX W/COLLJ SPEC WHEN PFRMD N/A 8/31/2017    Procedure: COLONOSCOPY;  Surgeon: Lyndsay Dorado MD;  Location: [facility]; Service: Gastroenterology    TUMOR REMOVAL         History reviewed  No pertinent family history  I have reviewed and agree with the history as documented  Social History   Substance Use Topics    Smoking status: Former Smoker    Smokeless tobacco: Never Used    Alcohol use No      Comment: occassional         Review of Systems   Constitutional: Negative for chills and fever  HENT: Negative for rhinorrhea, sore throat and trouble swallowing  Eyes: Negative for pain  Respiratory: Negative for cough, shortness of breath, wheezing and stridor  Cardiovascular: Negative for chest pain and leg swelling  Gastrointestinal: Negative for abdominal pain, bowel incontinence, diarrhea and nausea  Endocrine: Negative for polyuria  Genitourinary: Negative for bladder incontinence, dysuria, flank pain and urgency  Musculoskeletal: Positive for back pain  Negative for joint swelling, myalgias and neck stiffness  Skin: Negative for rash  Allergic/Immunologic: Negative for immunocompromised state  Neurological: Negative for dizziness, tingling, syncope, weakness, numbness and headaches  Psychiatric/Behavioral: Negative for confusion and suicidal ideas  All other systems reviewed and are negative  Physical Exam  ED Triage Vitals [01/14/18 1535]   Temperature Pulse Respirations Blood Pressure SpO2   98 3 °F (36 8 °C) 98 18 157/89 98 %      Temp Source Heart Rate Source Patient Position - Orthostatic VS BP Location FiO2 (%)   Oral Monitor Sitting Left arm --      Pain Score       6           Orthostatic Vital Signs  Vitals:    01/14/18 1535   BP: 157/89   Pulse: 98   Patient Position - Orthostatic VS: Sitting       Physical Exam   Constitutional: He is oriented to person, place, and time   He appears well-developed and well-nourished  HENT:   Head: Normocephalic and atraumatic  Eyes: EOM are normal  Pupils are equal, round, and reactive to light  Neck: Normal range of motion  Neck supple  Cardiovascular: Normal rate and regular rhythm  Exam reveals no friction rub  No murmur heard  Pulmonary/Chest: Breath sounds normal  No respiratory distress  He has no wheezes  He has no rales  Abdominal: Soft  Bowel sounds are normal  He exhibits no distension  There is no tenderness  Musculoskeletal: Normal range of motion  He exhibits tenderness  He exhibits no edema  Lumbar back: He exhibits tenderness, bony tenderness, pain and spasm  Neurological: He is alert and oriented to person, place, and time  Skin: Skin is warm  No rash noted  Psychiatric: He has a normal mood and affect  Nursing note and vitals reviewed  ED Medications  Medications   HYDROcodone-acetaminophen (NORCO) 5-325 mg per tablet 2 tablet (2 tablets Oral Given 1/14/18 1638)       Diagnostic Studies  Results Reviewed     None                 No orders to display              Procedures  Procedures       Phone Contacts  ED Phone Contact    ED Course  ED Course                                MDM  Number of Diagnoses or Management Options  Lumbar back pain: new and requires workup  Diagnosis management comments: No spinous process tenderness, hypertonicity paraspinal musculature consistent with acute muscle spasm , +2 patella and Achilles reflex bilaterally  Normal sensation normal muscle strength bilateral lower extremities    Denies history of severe or worsening lower extremity weakness/numbness  Denies history of saddle anesthesia/perineal anesthesia  Denies bowel or bladder incontinence/retention  History does not suggest diagnosis of cauda equina syndrome    Patient denies history of IVDA, denies history of fevers, no recent surgeries or any procedures to suggest a transient bacteremia leading to a diagnosis of subdural abscess  Denies history of blood thinner use with recent history of lumbar puncture or any violation of the epidural space to suggest history of epidural hematoma  Therefore these above diagnoses (cauda equina syndrome, epidural abscess, epidural hematoma) were not pursued with diagnostic imaging  Pt re-examined and evaluated after testing and treatment  Spoke with the patient and feeling improved and sxs have resolved  Will discharge home with close f/u with pcp and instructed to return to the ED if sxs worsen or continue  Pt agrees with the plan for discharge and feels comfortable to go home with proper f/u  Advised to return for worsening or additional problems  Diagnostic tests were reviewed and questions answered  Diagnosis, care plan and treatment options were discussed  The patient understand instructions and will follow up as directed  CritCare Time    Disposition  Final diagnoses:   Lumbar back pain     Time reflects when diagnosis was documented in both MDM as applicable and the Disposition within this note     Time User Action Codes Description Comment    1/14/2018  4:31 PM Bethanie Nguyen Add [M54 5] Lumbar back pain       ED Disposition     ED Disposition Condition Comment    Discharge  Nichelle Chi Sr  discharge to home/self care  Condition at discharge: Stable        Follow-up Information    None       Discharge Medication List as of 1/14/2018  4:32 PM      START taking these medications    Details   !! HYDROcodone-acetaminophen (NORCO) 5-325 mg per tablet Take 2 tablets by mouth every 6 (six) hours as needed for pain Max Daily Amount: 8 tablets, Starting Sun 1/14/2018, Print       !! - Potential duplicate medications found  Please discuss with provider        CONTINUE these medications which have NOT CHANGED    Details   atoMOXetine (STRATTERA) 25 mg capsule Take 1 capsule by mouth daily, Starting Tue 8/1/2017, Print      DULoxetine (CYMBALTA) 30 mg delayed release capsule Take 1 capsule by mouth daily, Starting Tue 8/1/2017, Print      !! HYDROcodone-acetaminophen (NORCO) 5-325 mg per tablet Take 1 tablet by mouth every 8 (eight) hours as needed for pain Max Daily Amount: 3 tablets, Starting Tue 9/26/2017, Print      morphine (MSIR) 30 MG tablet Take 1 tablet by mouth every 4 (four) hours as needed for severe pain for up to 5 doses Max Daily Amount: 180 mg, Starting Thu 7/27/2017, Print      naproxen sodium (ANAPROX) 550 mg tablet Take 1 tablet (550 mg total) by mouth 2 (two) times a day with meals  , Starting 8/18/2016, Until Discontinued, Print      QUEtiapine (SEROquel) 100 mg tablet Take 1 tablet by mouth daily at bedtime, Starting Tue 8/1/2017, Print       !! - Potential duplicate medications found  Please discuss with provider  No discharge procedures on file      ED Provider  Electronically Signed by           Venita Moyer DO  01/15/18 4387

## 2018-01-14 NOTE — ED NOTES
Inquired to Dr Marito Cardenas about two norco tabs to be given to pt  Proceed w/ order, per Dr Marito Antonio, SONG  01/14/18 79

## 2018-01-14 NOTE — DISCHARGE INSTRUCTIONS
Acute Low Back Pain, Ambulatory Care   GENERAL INFORMATION:   Acute low back pain  is discomfort in your lower back area that lasts for less than 12 weeks  The word acute is used to describe pain that starts suddenly, worsens quickly, and lasts for a short time  Common symptoms include the following:   · Back stiffness or spasms    · Pain down the back or side of one leg    · Holding yourself in an unusual position or posture to decrease your back pain    · Not being able to find a sitting position that is comfortable    · Slow increase in your pain for 24 to 48 hours after you stress your back    · Tenderness on your lower back or severe pain when you move your back  Seek immediate care for the following symptoms:   · Severe pain    · Sudden stiffness and heaviness in both buttocks down to both legs    · Numbness or weakness in one leg, or pain in both legs    · Numbness in your genital area or across your lower back    · Unable to control your urine or bowel movements  Treatment for acute low back pain  may include any of the following:  · Medicines:      ¨ NSAIDs  help decrease swelling and pain or fever  This medicine is available with or without a doctor's order  NSAIDs can cause stomach bleeding or kidney problems in certain people  If you take blood thinner medicine, always ask your healthcare provider if NSAIDs are safe for you  Always read the medicine label and follow directions  ¨ Muscle relaxers  help decrease muscle spasms pain  ¨ Prescription pain medicine  may be given  Ask how to take this medicine safely  · Surgery  may be needed if your pain is severe and other treatments do not work  Surgery may be needed for conditions of the lumbar spine, such as herniated disc or spinal stenosis  Manage your symptoms:   · Sleep on a firm mattress  If you do not have a firm mattress, have someone move your mattress to the floor for a few days   A piece of plywood under your mattress can also help make it firmer  · Apply ice  on your lower back for 15 to 20 minutes every hour or as directed  Use an ice pack, or put crushed ice in a plastic bag  Cover it with a towel  Ice helps prevent tissue damage and decreases swelling and pain  You can alternate ice and heat  · Apply heat  on your lower back for 20 to 30 minutes every 2 hours for as many days as directed  Heat helps decrease pain and muscle spasms  · Go to physical therapy  A physical therapist teaches you exercises to help improve movement and strength, and to decrease pain  Prevent acute low back pain:   · Use proper body mechanics  ¨ Bend at the hips and knees when you  objects  Do not bend from the waist  Use your leg muscles as you lift the load  Do not use your back  Keep the object close to your chest as you lift it  Try not to twist or lift anything above your waist     ¨ Change your position often when you stand for long periods of time  Rest one foot on a small box or footrest, and then switch to the other foot often  ¨ Try not to sit for long periods of time  When you do, sit in a straight-backed chair with your feet flat on the floor  Never reach, pull, or push while you are sitting  · Exercise regularly  Warm up before you exercise  Do exercises that strengthen your back muscles  Ask about the best exercise plan for you  · Maintain a healthy weight  Ask your healthcare provider how much you should weigh  Ask him to help you create a weight loss plan if you are overweight  Follow up with your healthcare provider as directed:  Return for a follow-up visit if you still have pain after 1 to 3 weeks of treatment  You may need to visit an orthopedist if your back pain lasts more than 6 to 12 weeks  Write down your questions so you remember to ask them during your visits  CARE AGREEMENT:   You have the right to help plan your care  Learn about your health condition and how it may be treated   Discuss treatment options with your caregivers to decide what care you want to receive  You always have the right to refuse treatment  The above information is an  only  It is not intended as medical advice for individual conditions or treatments  Talk to your doctor, nurse or pharmacist before following any medical regimen to see if it is safe and effective for you  © 2014 [address] is for End User's use only and may not be sold, redistributed or otherwise used for commercial purposes  All illustrations and images included in CareNotes® are the copyrighted property of [employer]  or Vasiliy Agarwal

## 2018-01-17 NOTE — MISCELLANEOUS
Assessment    1  Depression (311) (F32 9)   2  Restless leg (333 94) (G25 81)   3  Brittle nails (703 8) (L60 3)    Plan  Brittle nails    · *1 -  CLINIC PODIATRY Co-Management  *  Status: Hold For - Scheduling  Requested  for: 58Sot8212   Ordered; For: Brittle nails; Ordered By: Cornelia Guillen Performed:  Due: 07GKT3024  Care Summary provided  : Yes  Iron deficiency associated with nonfamilial restless leg syndrome    · (1) CBC/ PLT (NO DIFF); Status:Active; Requested for:52Ncs4416;    Perform:[facility]; CTM:30CQT7350; Ordered; For:Iron deficiency associated with nonfamilial restless leg syndrome; Ordered By:Bakari Garcia;  Iron deficiency associated with nonfamilial restless leg syndrome, Restless leg    · (1) FERRITIN; Status:Active; Requested for:37Vbk1480;    Perform:[facility]; [id number]; Ordered; For:Iron deficiency associated with nonfamilial restless leg syndrome, Restless leg; Ordered By:Bakari Garcia; Discussion/Summary  Discussion Summary:   Impression: Patient with recent admission to psych unit, discharged on Seroquel and Cymbalta, doing well since discharge, denies any more episodes depression, not suicidal, patient is asking for medication Strattera, medication is on his discharge summary but patient was not prescribed, patient was instructed to address this with Psychiatry in 2 weeks  Restless leg syndrome: Patient is complained about back cramps involving his hands during night denies any other symptoms, will check CBC and ferritin  Brittle nails: Patient will be referred to Podiatry  Counseling Documentation With Imm: The patient was counseled regarding importance of compliance with treatment  Medication SE Review and Pt Understands Tx: The treatment plan was reviewed with the patient/guardian   The patient/guardian understands and agrees with the treatment plan      Chief Complaint  Chief Complaint Free Text Note Form: I was admitted to the hospital for depression   Chief Complaint Chronic Condition [facility]: Patient is here today for follow up of chronic conditions described in HPI  History of Present Illness  TCM Communication [facility]: The patient is being contacted for follow-up after hospitalization and Spoke with pt  Pt  ended up in the hospital after pt  and his significant other of 40 some years were arguing and she left him and said she wasn't coming back  Hospital records were reviewed  He was hospitalized at and [address]  The date of admission: 7/28/17, date of discharge: 8/1/17  Diagnosis: Severe major depression, single episode  He was discharged to home  Medications reviewed and updated today  He scheduled a follow up appointment  Follow-up appointments with other specialists: Pt  is aware needs to f/u with [facility] on 8/10 @ 11:30 am  Pt  reports did change insurance and currently has [facility]  Per d/c instructions needed to have [place] removed in order to be seen at [facility]  Pt  did not need f/u with PCP at this time, but pt  requested f/u appt  Pt  scheduled BRAD appt with Dr Majo Arora on 8/8 @ 9:45 am  Pt  is following with neurosurgery  The patient is currently asymptomatic  Pt  denies any thoughts of hurting himself at this time  Counseling was provided to the patient  Topics counseled included instructions for management, activities of daily living and importance of compliance with treatment  Pt  told to bring medication bottles with him to appt  Pt  reports got all meds except 1 and will be going back to pharmacy today  Pt  to be taking seroquel, morphine, naproxen, cymbalta, and strattera     Communication performed and completed by Martha Forbes RN   HPI: 48-year-old male patient with recent admission to behavior Health unit after an episode of severe depression, patient was also found to have attention deficit disorder, on discharge was discharged on Seroquel and Cymbalta, Patient has been doing well after discharge, denies any suicidal ideation this point, is not feeling depressed any more, has been very active and exercising, he is asking for the medication Strattera because was not prescribed on discharge, patient will have an appointment with psych in 2 days  Patient is also complaining about leg spasm overnight, states he is moving his legs all the time   Hospital Based Practices Required Assessment:   Pain Assessment   the patient states they do not have pain  (on a scale of 0 to 10, the patient rates the pain at 0 )   Abuse And Domestic Violence Screen    Yes, the patient is safe at home  The patient states no one is hurting them  Depression And Suicide Screen  No, the patient has not had thoughts of hurting themself  No, the patient has not felt depressed in the past 7 days  Prefered Language is  english  Primary Language is  english  Readiness To Learn: Receptive  Barriers To Learning: none  Preferred Learning: demonstration   Education Completed: disease/condition   Teaching Method: verbal   Person Taught: patient   Evaluation Of Learning: verbalized/demonstrated understanding      Review of Systems  Complete-Male:   Constitutional: No fever or chills, feels well, no tiredness, no recent weight gain or weight loss  Eyes: No complaints of eye pain, no red eyes, no discharge from eyes, no itchy eyes  ENT: no complaints of earache, no hearing loss, no nosebleeds, no nasal discharge, no sore throat, no hoarseness  Cardiovascular: No complaints of slow heart rate, no fast heart rate, no chest pain, no palpitations, no leg claudication, no lower extremity  Respiratory: No complaints of shortness of breath, no wheezing, no cough, no SOB on exertion, no orthopnea or PND  Gastrointestinal: No complaints of abdominal pain, no constipation, no nausea or vomiting, no diarrhea or bloody stools     Genitourinary: No complaints of dysuria, no incontinence, no hesitancy, no nocturia, no genital lesion, no testicular pain  Musculoskeletal: limb pain, but as noted in HPI  Integumentary: No complaints of skin rash or skin lesions, no itching, no skin wound, no dry skin  Neurological: No compliants of headache, no confusion, no convulsions, no numbness or tingling, no dizziness or fainting, no limb weakness, no difficulty walking  Psychiatric: Is not suicidal, no sleep disturbances, no anxiety or depression, no change in personality, no emotional problems  Endocrine: No complaints of proptosis, no hot flashes, no muscle weakness, no erectile dysfunction, no deepening of the voice, no feelings of weakness  Hematologic/Lymphatic: No complaints of swollen glands, no swollen glands in the neck, does not bleed easily, no easy bruising  ROS Reviewed:   ROS reviewed  Active Problems    1  Chronic back pain (724 5,338 29) (M54 9,G89 29)   2  Depression (311) (F32 9)   3  Diabetes mellitus screening (V77 1) (Z13 1)   4  Encounter for screening colonoscopy (V76 51) (Z12 11)   5  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)   6  Need for influenza vaccination (V04 81) (Z23)   7  Screening for lipid disorders (V77 91) (Z13 220)    Surgical History    1  History of Incision And Drainage Of Skin Abscess, Complicated  Surgical History Reviewed: The surgical history was reviewed and updated today  Family History  Mother    1  Family history of Throat cancer  Father    2  Family history of myocardial infarction (V17 3) (Z82 49)  Sister    3  Family history of malignant neoplasm of stomach (V16 0) (Z80 0)  Brother    4  Family history of lung cancer (V16 1) (Z80 1)   5  Family history of malignant neoplasm of stomach (V16 0) (Z80 0)  Family History Reviewed: The family history was reviewed and updated today         Social History    · Alcoholism in recovery (303 90) (F10 20)   · Disabled   · Former smoker (B74 07) (F03 176)   · General equivalency diploma (GED)   · Remotely quit alcohol use   · Single   · Three children   · Uses marijuana (305 20) (F12 90)  Social History Reviewed: The social history was reviewed and updated today  The social history was reviewed and is unchanged  Current Meds   1  Atomoxetine HCl - 25 MG Oral Capsule; take 1 capsule daily; Therapy: 02Aug2017 to Recorded   2  DULoxetine HCl - 30 MG Oral Capsule Delayed Release Particles; take 1 capsule daily; Therapy: 02Aug2017 to Recorded   3  Morphine Sulfate TABS; 30 mg,  1 Q 4hrs prn; Therapy: (Recorded:14Vbo9255) to Recorded   4  Naproxen Sodium 550 MG Oral Tablet; TAKE 1 TABLET TWICE DAILY; Therapy: 02Aug2017 to Recorded   5  QUEtiapine Fumarate 100 MG Oral Tablet; TAKE 1 TABLET AT BEDTIME; Therapy: 02Aug2017 to Recorded  Medication List Reviewed: The medication list was reviewed and updated today  Allergies    1  No Known Drug Allergies    2  No Known Environmental Allergies   3  No Known Food Allergies    Vitals  Signs   Recorded: 08Aug2017 09:54AM   Temperature: 98 1 F  Heart Rate: 80  Systolic: 305  Diastolic: 78  Height: 5 ft 10 in  Weight: 186 lb 1 07 oz  BMI Calculated: 26 7  BSA Calculated: 2 02    Physical Exam    Constitutional   General appearance: No acute distress, well appearing and well nourished  Eyes   Conjunctiva and lids: No swelling, erythema, or discharge  Pupils and irises: Equal, round and reactive to light  Ears, Nose, Mouth, and Throat   External inspection of ears and nose: Normal     Oropharynx: Normal with no erythema, edema, exudate or lesions  Pulmonary   Respiratory effort: No increased work of breathing or signs of respiratory distress  Auscultation of lungs: Clear to auscultation, equal breath sounds bilaterally, no wheezes, no rales, no rhonci  Cardiovascular   Auscultation of heart: Normal rate and rhythm, normal S1 and S2, without murmurs  Abdomen   Abdomen: Non-tender, no masses      Liver and spleen: No hepatomegaly or splenomegaly  Musculoskeletal   Gait and station: Normal     Inspection/palpation of joints, bones, and muscles: Normal     Skin   Skin and subcutaneous tissue: Normal without rashes or lesions  Psychiatric   Orientation to person, place and time: Normal     Mood and affect: Normal          Attending Note  Attending Note: Attending Note: I discussed the case with the Resident and reviewed the Resident's note and I agree with the Resident management plan as it was presented to me  Message   Recorded as Task   Date: 08/01/2017 01:06 PM, Created By: System   Task Name: Hospital BRAD   Assigned To: Ruth Pabon   Regarding Patient: Quintin Matson, Status: Active   Comment:    System - 01 Aug 2017 1:06 PM     Patient discharged from hospital   Patient Name: Aly Mireles  Patient YOB: 1955  Discharge Date: 8/1/2017  Facility: [facility] - 01 Aug 2017 5:10 PM     TASK REASSIGNED: Previously Assigned To Socrates Mcgarry - 24 Aug 2017 11:00 AM     TASK EDITED  Called and spoke with pt  Pt  does not need to f/u with PCP at this time, but is requesting an appt  Scheduled BRAD appt for Tuesday 8/8 @ 9:45 am with Dr Rosana Camara       Future Appointments    Date/Time Provider Specialty Site   08/31/2017 12:30 PM Sajan Mi MD Gastroenterology Adult [address]   02/12/2018 01:00 PM Leobardo Vickers, [facility] Neurosurgery [facility]   02/12/2018 01:15 PM Oleksandr Juarez MD PhD [facility]   08/29/2017 10:00 AM Specialty Clinic, [facility]     Signatures   Electronically signed by : Raj Cardenas, ; Aug  2 2017 11:08AM EST                       (Author)    Electronically signed by : ELTTY Norman ; Aug  8 2017 10:27AM EST                       (Author)    Electronically signed by : LETTY Avila ; Aug  8 2017 10:48AM EST (Co-author)

## 2018-01-18 NOTE — PROGRESS NOTES
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)   2  Chronic back pain (724 5,338 29) (M54 9,G89 29)    Plan    · (1) BUN; Status:Active; Requested for:43Bul8199;    Perform:[facility]; ELMA:84DEW7038; Ordered; For:Intradural extramedullary spinal tumor; Ordered By:Alan Howe;   · (1) CREATININE; Status:Active; Requested for:96Cai1984;    Perform:[facility]; PQQ:77WYP5732; Ordered; For:Intradural extramedullary spinal tumor; Ordered By:Adriel Howe Person;   · * MRI LUMBAR SPINE W WO CONTRAST; Status:Need Information - Financial  Authorization; Requested for:29Pgt6463;    Perform:[facility]; Order Comments:IN 6-9 MONTHS, f/u for IDEM mass at T12 - include up to T10;; Due:52Fxk4702; Last Updated By:Marline Moya; 7/3/2017 1:40:17 PM;Ordered; For:Intradural extramedullary spinal tumor; Ordered By:Alan Howe;   · Follow Up After Tests Complete Evaluation and Treatment  Follow-up - SNPLX in 6-9 mo  after MRI LSpine  Status: Complete  Done: 15BNJ9785   Ordered; For: Intradural extramedullary spinal tumor; Ordered By: Jade Caraballo Performed:  Due: 14MXJ4058; Last Updated By: Betina Ruiz; 7/3/2017 1:44:04 PM    Discussion/Summary    58-year-old male with a T12 (or L1, depending on your counting scheme) intradural extramedullary mass consistent with schwannoma or meningioma  Patient has some very early signs and symptoms that this may be significant  Was seen 1/2017, after exacerbation of back pain  Continues to have back stiffness after walking 3-4 blocks, and he'll need 10:15 management the rest before resuming  Does not have lower extremity symptoms  Denies bladder changes  He also has back stiffness/discomfort in the morning when he wakes up  He does get some relief using his TLSO  All not definitive, this could easily be related to this IDEM mass      The mass has not grown significantly on my preliminary evaluation/comparison of his 3 MRI scans (10/2016 TSpine, 1/2017 LSpine, 6/2016 LSpine)  I reviewed with the patient options for management: Observation, lack of medical option, inappropriate for radiation/radiosurgical option, or surgery  The patient is more inclined toward observation for now  With his young age, I'm concerned will generate neurologic disability in his lifetime, and I expressed this to him  I also counseled him that should neurological disability occur, he may not recover any lost function  He would like to continue with observation  We planned for him to follow-up with a new MRI of the lumbar spine to be done in 6-9 months, which I have ordered  [id number] 05835 or 0 854  VAS 80  Chief Complaint    1  Back Pain    History of Present Illness    SHERRY CAREY presents with complaints of occasional episodes of moderate bilateral mid back no back pain (after walking for a while, takes a break and can continue with the walk)   Associated symptoms include stiffness  Review of Systems    Constitutional: No fever or chills, feels well, no tiredness, no recent weight gain or weight loss  Eyes: No complaints of eye pain, no red eyes, no discharge from eyes, no itchy eyes  ENT: tinnitus, but no complaints of earache, no hearing loss, no nosebleeds, no nasal discharge, no sore throat, no hoarseness  Cardiovascular: No complaints of slow heart rate, no fast heart rate, no chest pain, no palpitations, no leg claudication, no lower extremity  Respiratory: No complaints of shortness of breath, no wheezing, no cough, no SOB on exertion, no orthopnea or PND  Gastrointestinal: No complaints of abdominal pain, no constipation, no nausea or vomiting, no diarrhea or bloody stools  Genitourinary: No complaints of dysuria, no incontinence, no hesitancy, no nocturia, no genital lesion, no testicular pain  Musculoskeletal: No complaints of arthralgia, no myalgias, no joint swelling or stiffness, no limb pain or swelling  Integumentary: No complaints of skin rash or skin lesions, no itching, no skin wound, no dry skin  Psychiatric: Is not suicidal, no sleep disturbances, no anxiety or depression, no change in personality, no emotional problems  Endocrine: No complaints of proptosis, no hot flashes, no muscle weakness, no erectile dysfunction, no deepening of the voice, no feelings of weakness  Hematologic/Lymphatic: No complaints of swollen glands, no swollen glands in the neck, does not bleed easily, no easy bruising  Active Problems    1  Chronic back pain (724 5,338 29) (M54 9,G89 29)   2  Depression (311) (F32 9)   3  Diabetes mellitus screening (V77 1) (Z13 1)   4  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)   5  Neck pain (723 1) (M54 2)   6  Need for influenza vaccination (V04 81) (Z23)   7  Rectal bleed (569 3) (K62 5)   8  Rectal pain (569 42) (K62 89)   9  Screening for lipid disorders (V77 91) (Z13 220)   10  Tinnitus of right ear (388 30) (H93 11)    Surgical History    1  History of Incision And Drainage Of Skin Abscess, Complicated    Family History  Mother    1  Family history of Throat cancer  Father    2  Family history of myocardial infarction (V17 3) (Z82 49)  Sister    3  Family history of malignant neoplasm of stomach (V16 0) (Z80 0)  Brother    4  Family history of lung cancer (V16 1) (Z80 1)   5  Family history of malignant neoplasm of stomach (V16 0) (Z80 0)    Social History    · Alcoholism in recovery (303 90) (F10 20)   · Disabled   · Former smoker (V15 82) (W83 839)   · General equivalency diploma (GED)   · Remotely quit alcohol use   · Single   · Three children   · Uses marijuana (305 20) (F12 90)    Current Meds   1  Naproxen 375 MG Oral Tablet; TAKE 1 TABLET EVERY 12 HOURS AS NEEDED; Therapy: 99PRS7454 to (Evaluate:09Jan2017); Last Rx:26Sep2016 Ordered    Allergies    1  No Known Drug Allergies    2  No Known Environmental Allergies   3   No Known Food Allergies    Vitals  Vital Signs    Recorded: 05SRJ5112 12:54PM   Temperature 97 9 F   Heart Rate 63   Respiration 16   Systolic 005   Diastolic 57   Height 5 ft 10 in   Weight 199 lb 9 6 oz   BMI Calculated 28 64   BSA Calculated 2 09     Physical Exam     Constitutional Patient appears healthy and well developed  No signs of acute distress present  Respiratory Respiratory effort: Normal    Skin Of note, has what appears to be a sebaceous cyst centered over T7-9  Neurologic - Mental Status: Alert and Oriented x3  Mood and affect: Affect is normal   Memory is grossly intact  Attention is WNL  Ion Merle Speech is articulated and fluent  Knowledge and vocabulary consistent with education  Grossly nonfocal   Judgment and insight: Normal     Cranial Nerve Exam:  7th cranial nerve: Face symmetrical at grimace and at rest   Motor System - Lower Extremities: Normal to inspection and palpation  Strength: iliopsoas 5/5 bilaterally  Quadriceps 5/5 bilaterally  Hamstrings 5/5 bilaterally  Gastrocnemius 5/5 bilaterally  Strength examination:   Foot Plantar Flexion: normal and symmetric bilaterally  Foot Dorsiflexion: normal and symmetric bilaterally  Knee Flexion: normal and symmetric bilaterally  Knee Extension: normal and symmetric bilaterally  Hip Flexion: normal and symmetric bilaterally  Reflexes:   Reflexes: Abnormal   Deep tendon reflexes: 1+ right patella and 1+ left patellano ankle clonus on the right  Ceja sign was not present:   Coordination: Involuntary movements: None   Gait and Station: [place] with a normal gait  No aids  Results/Data  Diagnostic Studies Reviewed Neurosurger [facility]:   MRI Review STUDY AS BELOW PERSONALLY REVIEWED, AS WAS REPORT  * MRI LUMBAR SPINE W WO CONTRAST [id number] 01:34PM Matthew Hernandez Order Number: [id number]   Performing Comments: f/u study in 3-6 months; include up to T10   - Patient Instructions: To schedule this appointment, please contact Central Scheduling at [phone]  Test Name Result Flag Reference   MRI LUMBAR SPINE W [address] (Report)     This is a summary report  The complete report is available in the patient's medical record  If you cannot access the medical record, please contact the sending organization for a detailed fax or copy  MRI LUMBAR SPINE WITH AND WITHOUT CONTRAST     INDICATION: Persistent back pain     COMPARISON: MR 1/15/2016     TECHNIQUE: Sagittal T1, sagittal T2, sagittal inversion recovery, axial T1 and axial T2, coronal T2  Sagittal and axial T1 postcontrast      IV Contrast: 9 mL of gadobutrol injection (MULTI-DOSE)      IMAGE QUALITY: Diagnostic     FINDINGS:     ALIGNMENT: Minor straightening of normal lumbar lordosis  MARROW SIGNAL: Normal marrow signal is identified within the visualized bony structures  No discrete marrow lesion  DISTAL CORD AND CONUS: Stable 14 mm in greatest linear dimension dorsal intradural extra medullary mass displacing the cord anteriorly at the caudal half of the T12 vertebral body  There is a tiny enhancing cleft anteriorly extending through the cord    possibly vascular, and minor marginal enhancement of the lesion  Axial images not available from prior study but this appears overtly unchanged  Lesion stable when compared to MR of the thoracic spine 10/13/2016  Lack of contrast would be atypical for   nerve sheath tumor or meningioma yet both are still within the diagnosis  CT of the chest 11/20/2009 demonstrates no calcified mass  Atypical neurenteric cyst or dermoid could be considered as well  Cord is displaced but normal cord signal is    maintained  The conus ends at the L1 level  Segmental, very minor fatty infiltration of the filum  PARASPINAL SOFT TISSUES: Paraspinal soft tissues are unremarkable  Probable large solitary gallstone  SACRUM: Normal signal within the sacrum  No evidence of insufficiency or stress fracture       LOWER THORACIC DISC SPACES: Normal disc height and signal  No disc herniation, canal stenosis or foraminal narrowing  LUMBAR DISC SPACES:        L1-L2: Slight reduction disc height, minor bulge  Small marginal osteophytes     L2-L3: Small marginal osteophytes  L3-L4: Minor bilateral facet arthrosis,     L4-L5: Normal  Bilateral facet     L5-S1: Minor facet arthrosis  POSTCONTRAST IMAGING: No abnormal enhancement  IMPRESSION:     Stable 14 mm intradural extra medullary dorsal mass at the T12 level  Relative lack of enhancement  Etiologic considerations included above  Minor, noncompressive degenerative changes of the lumbar spine  Probable cholelithiasis  Workstation performed: [id number]     Signed by:    Michelle Silva MD   6/27/17     Future Appointments    Date/Time Provider Specialty Site   08/31/2017 12:00 PM Jeannie Oconnor MD Gastroenterology Adult [facility]     Signatures   Electronically signed by : Trent Simon MD PhD; Jul  3 2017  1:45PM EST                       (Author)

## 2018-01-21 ENCOUNTER — APPOINTMENT (EMERGENCY)
Dept: RADIOLOGY | Facility: HOSPITAL | Age: 63
End: 2018-01-21
Payer: COMMERCIAL

## 2018-01-21 ENCOUNTER — HOSPITAL ENCOUNTER (OUTPATIENT)
Facility: HOSPITAL | Age: 63
Setting detail: OBSERVATION
Discharge: HOME | End: 2018-01-23
Attending: EMERGENCY MEDICINE | Admitting: INTERNAL MEDICINE
Payer: COMMERCIAL

## 2018-01-21 DIAGNOSIS — K92.1 BLOODY STOOL: ICD-10-CM

## 2018-01-21 DIAGNOSIS — R77.8 ELEVATED TROPONIN I LEVEL: ICD-10-CM

## 2018-01-21 DIAGNOSIS — R07.9 CHEST PAIN: Primary | ICD-10-CM

## 2018-01-21 PROBLEM — I10 HTN (HYPERTENSION): Status: ACTIVE | Noted: 2018-01-21

## 2018-01-21 PROBLEM — E78.5 HLD (HYPERLIPIDEMIA): Chronic | Status: ACTIVE | Noted: 2018-01-21

## 2018-01-21 LAB
ALBUMIN SERPL BCP-MCNC: 3.6 G/DL (ref 3.5–5)
ALP SERPL-CCNC: 61 U/L (ref 46–116)
ALT SERPL W P-5'-P-CCNC: 15 U/L (ref 12–78)
ANION GAP SERPL CALCULATED.3IONS-SCNC: 5 MMOL/L (ref 4–13)
APTT PPP: 33 SECONDS (ref 23–35)
AST SERPL W P-5'-P-CCNC: 12 U/L (ref 5–45)
ATRIAL RATE: 61 BPM
BASOPHILS # BLD AUTO: 0.08 THOUSANDS/ΜL (ref 0–0.1)
BASOPHILS NFR BLD AUTO: 1 % (ref 0–1)
BILIRUB DIRECT SERPL-MCNC: 0.06 MG/DL (ref 0–0.2)
BILIRUB SERPL-MCNC: 0.4 MG/DL (ref 0.2–1)
BUN SERPL-MCNC: 17 MG/DL (ref 5–25)
CALCIUM SERPL-MCNC: 9.1 MG/DL (ref 8.3–10.1)
CHLORIDE SERPL-SCNC: 103 MMOL/L (ref 100–108)
CO2 SERPL-SCNC: 31 MMOL/L (ref 21–32)
CREAT SERPL-MCNC: 1.22 MG/DL (ref 0.6–1.3)
EOSINOPHIL # BLD AUTO: 0.57 THOUSAND/ΜL (ref 0–0.61)
EOSINOPHIL NFR BLD AUTO: 5 % (ref 0–6)
ERYTHROCYTE [DISTWIDTH] IN BLOOD BY AUTOMATED COUNT: 13.6 % (ref 11.6–15.1)
GFR SERPL CREATININE-BSD FRML MDRD: 63 ML/MIN/1.73SQ M
GLUCOSE SERPL-MCNC: 122 MG/DL (ref 65–140)
HCT VFR BLD AUTO: 45.4 % (ref 36.5–49.3)
HGB BLD-MCNC: 15.2 G/DL (ref 12–17)
INR PPP: 0.92 (ref 0.86–1.16)
LYMPHOCYTES # BLD AUTO: 2.49 THOUSANDS/ΜL (ref 0.6–4.47)
LYMPHOCYTES NFR BLD AUTO: 23 % (ref 14–44)
MCH RBC QN AUTO: 29.9 PG (ref 26.8–34.3)
MCHC RBC AUTO-ENTMCNC: 33.5 G/DL (ref 31.4–37.4)
MCV RBC AUTO: 89 FL (ref 82–98)
MONOCYTES # BLD AUTO: 0.86 THOUSAND/ΜL (ref 0.17–1.22)
MONOCYTES NFR BLD AUTO: 8 % (ref 4–12)
NEUTROPHILS # BLD AUTO: 6.91 THOUSANDS/ΜL (ref 1.85–7.62)
NEUTS SEG NFR BLD AUTO: 63 % (ref 43–75)
NT-PROBNP SERPL-MCNC: 62 PG/ML
P AXIS: 52 DEGREES
PLATELET # BLD AUTO: 240 THOUSANDS/UL (ref 149–390)
PMV BLD AUTO: 9.7 FL (ref 8.9–12.7)
POTASSIUM SERPL-SCNC: 4.6 MMOL/L (ref 3.5–5.3)
PR INTERVAL: 172 MS
PROT SERPL-MCNC: 7.5 G/DL (ref 6.4–8.2)
PROTHROMBIN TIME: 12.6 SECONDS (ref 12.1–14.4)
QRS AXIS: 58 DEGREES
QRSD INTERVAL: 82 MS
QT INTERVAL: 402 MS
QTC INTERVAL: 399 MS
RBC # BLD AUTO: 5.09 MILLION/UL (ref 3.88–5.62)
SODIUM SERPL-SCNC: 139 MMOL/L (ref 136–145)
T WAVE AXIS: 56 DEGREES
TROPONIN I SERPL-MCNC: 0.03 NG/ML
TROPONIN I SERPL-MCNC: 0.06 NG/ML
TROPONIN I SERPL-MCNC: 0.08 NG/ML
VENTRICULAR RATE: 59 BPM
WBC # BLD AUTO: 10.91 THOUSAND/UL (ref 4.31–10.16)

## 2018-01-21 PROCEDURE — 80048 BASIC METABOLIC PNL TOTAL CA: CPT | Performed by: EMERGENCY MEDICINE

## 2018-01-21 PROCEDURE — 85610 PROTHROMBIN TIME: CPT | Performed by: EMERGENCY MEDICINE

## 2018-01-21 PROCEDURE — 85025 COMPLETE CBC W/AUTO DIFF WBC: CPT | Performed by: EMERGENCY MEDICINE

## 2018-01-21 PROCEDURE — 84484 ASSAY OF TROPONIN QUANT: CPT | Performed by: EMERGENCY MEDICINE

## 2018-01-21 PROCEDURE — 85730 THROMBOPLASTIN TIME PARTIAL: CPT | Performed by: EMERGENCY MEDICINE

## 2018-01-21 PROCEDURE — 93005 ELECTROCARDIOGRAM TRACING: CPT

## 2018-01-21 PROCEDURE — 80076 HEPATIC FUNCTION PANEL: CPT | Performed by: EMERGENCY MEDICINE

## 2018-01-21 PROCEDURE — 36415 COLL VENOUS BLD VENIPUNCTURE: CPT | Performed by: EMERGENCY MEDICINE

## 2018-01-21 PROCEDURE — 83880 ASSAY OF NATRIURETIC PEPTIDE: CPT | Performed by: EMERGENCY MEDICINE

## 2018-01-21 PROCEDURE — 93005 ELECTROCARDIOGRAM TRACING: CPT | Performed by: EMERGENCY MEDICINE

## 2018-01-21 PROCEDURE — 99285 EMERGENCY DEPT VISIT HI MDM: CPT

## 2018-01-21 PROCEDURE — 71046 X-RAY EXAM CHEST 2 VIEWS: CPT

## 2018-01-21 PROCEDURE — 96360 HYDRATION IV INFUSION INIT: CPT

## 2018-01-21 RX ORDER — ONDANSETRON 2 MG/ML
4 INJECTION INTRAMUSCULAR; INTRAVENOUS EVERY 6 HOURS PRN
Status: DISCONTINUED | OUTPATIENT
Start: 2018-01-21 | End: 2018-01-23 | Stop reason: HOSPADM

## 2018-01-21 RX ORDER — QUETIAPINE FUMARATE 100 MG/1
100 TABLET, FILM COATED ORAL
Status: DISCONTINUED | OUTPATIENT
Start: 2018-01-21 | End: 2018-01-23 | Stop reason: HOSPADM

## 2018-01-21 RX ORDER — ATOMOXETINE 25 MG/1
25 CAPSULE ORAL DAILY
Status: DISCONTINUED | OUTPATIENT
Start: 2018-01-21 | End: 2018-01-21

## 2018-01-21 RX ORDER — HYDROCODONE BITARTRATE AND ACETAMINOPHEN 5; 325 MG/1; MG/1
2 TABLET ORAL EVERY 6 HOURS PRN
Status: DISCONTINUED | OUTPATIENT
Start: 2018-01-21 | End: 2018-01-23 | Stop reason: HOSPADM

## 2018-01-21 RX ORDER — ASPIRIN 81 MG/1
324 TABLET, CHEWABLE ORAL ONCE
Status: COMPLETED | OUTPATIENT
Start: 2018-01-21 | End: 2018-01-21

## 2018-01-21 RX ORDER — DULOXETIN HYDROCHLORIDE 30 MG/1
30 CAPSULE, DELAYED RELEASE ORAL DAILY
Status: DISCONTINUED | OUTPATIENT
Start: 2018-01-21 | End: 2018-01-23 | Stop reason: HOSPADM

## 2018-01-21 RX ADMIN — HYDROCODONE BITARTRATE AND ACETAMINOPHEN 2 TABLET: 5; 325 TABLET ORAL at 20:02

## 2018-01-21 RX ADMIN — NITROGLYCERIN 1 INCH: 20 OINTMENT TOPICAL at 11:48

## 2018-01-21 RX ADMIN — ASPIRIN 81 MG 324 MG: 81 TABLET ORAL at 11:48

## 2018-01-21 RX ADMIN — DULOXETINE HYDROCHLORIDE 30 MG: 30 CAPSULE, DELAYED RELEASE ORAL at 15:18

## 2018-01-21 RX ADMIN — QUETIAPINE FUMARATE 100 MG: 100 TABLET ORAL at 22:49

## 2018-01-21 RX ADMIN — SODIUM CHLORIDE 1000 ML: 0.9 INJECTION, SOLUTION INTRAVENOUS at 10:29

## 2018-01-21 NOTE — H&P
[address] [facility]  H&P- Anabelle Clark   1955, 60 y o  male MRN: [id number]    Unit/Bed#: ED 05 Encounter: [id number]    Primary Care Provider: Shakira Flor MD   Date and time admitted to hospital: 1/21/2018 10:12 AM        * Chest pain   Assessment & Plan    - Present on admission - started at 11:30 last night, relieved with nitro paste application    - Troponin 0 08 on admission trending to 0 06    - EKG WNL   - Positive risk factors for age, sex, history of HLD, and family history  - Stress test diet ordered   - Trend troponin, EKG in AM, exercise stress test in AM  Consider cardiology evaluation if troponin rises or if stress test shows signs of ischemia        HLD (hyperlipidemia)   Assessment & Plan    - As per patient has not been on statin medication in years  - Check lipid panel         Adult residual type attention deficit hyperactivity disorder (ADHD)   Assessment & Plan    - On Strattera outpatient  - Hold while inpatient, restart at discharge             VTE Prophylaxis: Enoxaparin (Lovenox)  / reason for no mechanical VTE prophylaxis not needed    Code Status: Full code   POLST: POLST form is not discussed and not completed at this time  Discussion with family: Discussed with s/o at bedside     Anticipated Length of Stay:  Patient will be admitted on an Observation basis with an anticipated length of stay of  Less than  2 midnights  Justification for Hospital Stay: ACS R/O     Total Time for Visit, including Counseling / Coordination of Care: 1 hour  Greater than 50% of this total time spent on direct patient counseling and coordination of care  Chief Complaint:   Chest pain     History of Present Illness:    Aleena Anne  is a 60 y o  male with a remote history of HLD  who presents with chest pain that started last night   Patient states that he was sitting on the couch watching TV when he suddenly felt chest pressure across the center of his chest that radiated under his rib cage  States that the pain was persistent and unchanged throughout the night and made him come to the hospital today  He states that nothing made the pain worse and that application of nitro paste made it better  States that his only other symptom was some lightheadedness on the way in  Denied radiation of his chest pain to his arm, neck, or jaw  Denies shortness of breath, coughing  Denies nausea or vomiting  Denies headaches or vision changes  He states that this happened roughly one year ago  States that he has a prior family history of his father having heart attacks at a relatively young age  Denies a smoking history  Review of Systems:    Review of Systems   Constitutional: Negative for appetite change, chills, diaphoresis, fatigue and fever  HENT: Negative for congestion, rhinorrhea and sore throat  Eyes: Negative for visual disturbance  Respiratory: Negative for cough, chest tightness, shortness of breath and wheezing  Cardiovascular: Positive for chest pain  Negative for palpitations and leg swelling  Gastrointestinal: Negative for abdominal pain, constipation, diarrhea, nausea and vomiting  Genitourinary: Negative for dysuria  Musculoskeletal: Negative for arthralgias and myalgias  Neurological: Positive for light-headedness  Negative for dizziness, syncope, weakness, numbness and headaches  All other systems reviewed and are negative  Past Medical and Surgical History:     Past Medical History:   Diagnosis Date    Chronic back pain     Chronic pain     Gallstones     Spinal arachnoid cyst        Past Surgical History:   Procedure Laterality Date    UT COLONOSCOPY FLX DX W/COLLJ SPEC WHEN PFRMD N/A 8/31/2017    Procedure: COLONOSCOPY;  Surgeon: Theresa Martinez MD;  Location: [facility]; Service: Gastroenterology    TUMOR REMOVAL         Meds/Allergies:    Prior to Admission medications    Medication Sig Start Date End Date Taking?  Authorizing Provider atoMOXetine (STRATTERA) 25 mg capsule Take 1 capsule by mouth daily 8/1/17  Yes Kamala Franklin MD   DULoxetine (CYMBALTA) 30 mg delayed release capsule Take 1 capsule by mouth daily 8/1/17  Yes Kamala Franklin MD   NON FORMULARY    Yes Historical Provider, MD   HYDROcodone-acetaminophen (NORCO) 5-325 mg per tablet Take 2 tablets by mouth every 6 (six) hours as needed for pain Max Daily Amount: 8 tablets 1/14/18   Kulwinder Nagy DO   QUEtiapine (SEROquel) 100 mg tablet Take 1 tablet by mouth daily at bedtime 8/1/17   Kamala Franklin MD   HYDROcodone-acetaminophen [facility]) 5-325 mg per tablet Take 1 tablet by mouth every 8 (eight) hours as needed for pain Max Daily Amount: 3 tablets 9/26/17 1/21/18  Brad Ivan MD   morphine (MSIR) 30 MG tablet Take 1 tablet by mouth every 4 (four) hours as needed for severe pain for up to 5 doses Max Daily Amount: 180 mg 7/27/17 1/21/18  Lissy Carvajal MD   naproxen sodium (ANAPROX) 550 mg tablet Take 1 tablet (550 mg total) by mouth 2 (two) times a day with meals  Patient taking differently: Take 550 mg by mouth 2 (two) times a day with meals   8/18/16 1/21/18  Braxton Denney MD     I have reviewed home medications with patient personally  Allergies: No Known Allergies    Social History:     Marital Status: Single   Occupation: Noncontributory  Patient Pre-hospital Living Situation: Home with significant other   Patient Pre-hospital Level of Mobility: Full - ambulatory   Patient Pre-hospital Diet Restrictions: None  Substance Use History:   History   Alcohol Use No     Comment: occassional      History   Smoking Status    Never Smoker   Smokeless Tobacco    Never Used     History   Drug Use    Types: Marijuana       Family History:    History reviewed  No pertinent family history      Physical Exam:     Vitals:   Blood Pressure: 143/87 (01/21/18 1304)  Pulse: 65 (01/21/18 1304)  Temperature: 97 8 °F (36 6 °C) (01/21/18 1153)  Temp Source: Oral (01/21/18 1153)  Respirations: 18 (01/21/18 1304)  Weight - Scale: 90 2 kg (198 lb 13 7 oz) (01/21/18 1019)  SpO2: 97 % (01/21/18 1304)    Physical Exam   Constitutional: He is oriented to person, place, and time  Vital signs are normal  He appears well-developed and well-nourished  Non-toxic appearance  No distress  HENT:   Head: Normocephalic and atraumatic  Mouth/Throat: Mucous membranes are not dry  Eyes: Conjunctivae and EOM are normal  Pupils are equal, round, and reactive to light  Right pupil is round and reactive  Left pupil is round and reactive  Pupils are equal    Neck: Neck supple  No JVD present  Cardiovascular: Normal rate, regular rhythm, S1 normal, S2 normal, normal heart sounds and intact distal pulses  Exam reveals no S3 and no S4  No murmur heard  Pulmonary/Chest: Effort normal and breath sounds normal  No accessory muscle usage  No respiratory distress  He has no decreased breath sounds  He has no wheezes  He has no rhonchi  He has no rales  He exhibits no tenderness  Abdominal: Soft  Bowel sounds are normal  He exhibits no distension and no mass  There is no tenderness  There is no rigidity, no rebound and no guarding  Neurological: He is alert and oriented to person, place, and time  He has normal strength  No cranial nerve deficit or sensory deficit  GCS eye subscore is 4  GCS verbal subscore is 5  GCS motor subscore is 6  Skin: Skin is warm and dry  Vitals reviewed  Additional Data:     Lab Results: I have personally reviewed pertinent reports          Results from last 7 days  Lab Units 01/21/18  1028   WBC Thousand/uL 10 91*   HEMOGLOBIN g/dL 15 2   HEMATOCRIT % 45 4   PLATELETS Thousands/uL 240   NEUTROS PCT % 63   LYMPHS PCT % 23   MONOS PCT % 8   EOS PCT % 5       Results from last 7 days  Lab Units 01/21/18  1028   SODIUM mmol/L 139   POTASSIUM mmol/L 4 6   CHLORIDE mmol/L 103   CO2 mmol/L 31   BUN mg/dL 17   CREATININE mg/dL 1 22   CALCIUM mg/dL 9 1   TOTAL PROTEIN g/dL 7 5   BILIRUBIN TOTAL mg/dL 0 40   ALK PHOS U/L 61   ALT U/L 15   AST U/L 12   GLUCOSE RANDOM mg/dL 122       Results from last 7 days  Lab Units 01/21/18  1028   INR  0 92       Imaging: I have personally reviewed pertinent reports  XR chest 2 views   ED Interpretation by Alexx Pizarro DO (01/21 9194)   No acute pathology      Final Result by Yennifer Roberto MD (01/21 7659)      Left basilar subsegmental atelectasis  Workstation performed: [id number]             EKG, Pathology, and Other Studies Reviewed on Admission:   · EKG: Sinus ramu, 59 BPM     Allscripts / Epic Records Reviewed: Yes     ** Please Note: This note has been constructed using a voice recognition system   **

## 2018-01-21 NOTE — ED PROVIDER NOTES
History  Chief Complaint   Patient presents with    Chest Pain     pt reports b/l lower rib pain that started around 2300 last night  History provided by:  Patient  Chest Pain   Pain location:  R chest  Pain quality: aching    Pain radiates to:  Does not radiate  Pain severity:  Moderate  Onset quality:  Gradual  Duration:  12 hours  Timing:  Intermittent  Progression:  Waxing and waning  Chronicity:  New  Context: not lifting, no movement, not raising an arm and no trauma    Relieved by:  Nothing  Worsened by:  Nothing tried (No worsening with exertion)  Ineffective treatments:  None tried  Associated symptoms: no abdominal pain, no anxiety, no back pain, no cough, no diaphoresis, no dizziness, no dysphagia, no fatigue, no fever, no headache, no heartburn, no lower extremity edema, no nausea, no numbness, no orthopnea, no palpitations, no shortness of breath, no syncope and not vomiting    Risk factors: high cholesterol, hypertension and male sex    Risk factors: no coronary artery disease, no diabetes mellitus and no smoking        Prior to Admission Medications   Prescriptions Last Dose Informant Patient Reported? Taking?    DULoxetine (CYMBALTA) 30 mg delayed release capsule   No No   Sig: Take 1 capsule by mouth daily   HYDROcodone-acetaminophen (NORCO) 5-325 mg per tablet   No No   Sig: Take 1 tablet by mouth every 8 (eight) hours as needed for pain Max Daily Amount: 3 tablets   HYDROcodone-acetaminophen (NORCO) 5-325 mg per tablet   No No   Sig: Take 2 tablets by mouth every 6 (six) hours as needed for pain Max Daily Amount: 8 tablets   QUEtiapine (SEROquel) 100 mg tablet   No No   Sig: Take 1 tablet by mouth daily at bedtime   atoMOXetine (STRATTERA) 25 mg capsule   No No   Sig: Take 1 capsule by mouth daily   morphine (MSIR) 30 MG tablet   No No   Sig: Take 1 tablet by mouth every 4 (four) hours as needed for severe pain for up to 5 doses Max Daily Amount: 180 mg   naproxen sodium (ANAPROX) 550 mg tablet   No No   Sig: Take 1 tablet (550 mg total) by mouth 2 (two) times a day with meals  Patient taking differently: Take 550 mg by mouth 2 (two) times a day with meals        Facility-Administered Medications: None       Past Medical History:   Diagnosis Date    Chronic back pain     Chronic pain     Gallstones     Spinal arachnoid cyst        Past Surgical History:   Procedure Laterality Date    GA COLONOSCOPY FLX DX W/COLLJ SPEC WHEN PFRMD N/A 8/31/2017    Procedure: COLONOSCOPY;  Surgeon: Ap Ayers MD;  Location: [facility]; Service: Gastroenterology    TUMOR REMOVAL         History reviewed  No pertinent family history  I have reviewed and agree with the history as documented  Social History   Substance Use Topics    Smoking status: Never Smoker    Smokeless tobacco: Never Used    Alcohol use No      Comment: occassional         Review of Systems   Constitutional: Negative for activity change, chills, diaphoresis, fatigue and fever  HENT: Negative for congestion, sinus pressure, sore throat and trouble swallowing  Eyes: Negative for pain and visual disturbance  Respiratory: Negative for cough, chest tightness, shortness of breath, wheezing and stridor  Cardiovascular: Positive for chest pain  Negative for palpitations, orthopnea and syncope  Gastrointestinal: Positive for blood in stool ( This is acute on chronic problem is history of hemorrhoids and states this flares every once a while noticed a moderate amount of stool in the bowl and non toilet paper)  Negative for abdominal distention, abdominal pain, constipation, diarrhea, heartburn, nausea and vomiting  Genitourinary: Negative for dysuria and frequency  Musculoskeletal: Negative for back pain, neck pain and neck stiffness  Skin: Negative for rash  Neurological: Negative for dizziness, speech difficulty, light-headedness, numbness and headaches         Physical Exam  ED Triage Vitals [01/21/18 1019]   Temperature Pulse Respirations Blood Pressure SpO2   (!) 97 3 °F (36 3 °C) 65 18 (!) 188/100 100 %      Temp Source Heart Rate Source Patient Position - Orthostatic VS BP Location FiO2 (%)   Oral Monitor -- -- --      Pain Score       6           Orthostatic Vital Signs  Vitals:    01/21/18 1019   BP: (!) 188/100   Pulse: 65       Physical Exam   Constitutional: He is oriented to person, place, and time  He appears well-developed  No distress  HENT:   Head: Normocephalic and atraumatic  Eyes: Pupils are equal, round, and reactive to light  Neck: Normal range of motion  Neck supple  No tracheal deviation present  Cardiovascular: Normal rate, regular rhythm, normal heart sounds and intact distal pulses  No murmur heard  Pulmonary/Chest: Effort normal and breath sounds normal  No stridor  No respiratory distress  Abdominal: Soft  He exhibits no distension  There is no tenderness  There is no rebound and no guarding  Musculoskeletal: Normal range of motion  Neurological: He is alert and oriented to person, place, and time  Skin: Skin is warm and dry  He is not diaphoretic  No erythema  No pallor  Psychiatric: He has a normal mood and affect  Vitals reviewed        ED Medications  Medications   aspirin chewable tablet 324 mg (not administered)   nitroglycerin (NITRO-BID) 2 % TD ointment 1 inch (not administered)   sodium chloride 0 9 % bolus 1,000 mL (1,000 mL Intravenous New Bag 1/21/18 1029)       Diagnostic Studies  Results Reviewed     Procedure Component Value Units Date/Time    Hepatic function panel [[id number]]  (Normal) Collected:  01/21/18 1028    Lab Status:  Final result Specimen:  Blood from Arm, Left Updated:  01/21/18 1101     Total Bilirubin 0 40 mg/dL      Bilirubin, Direct 0 06 mg/dL      Alkaline Phosphatase 61 U/L      AST 12 U/L      ALT 15 U/L      Total Protein 7 5 g/dL      Albumin 3 6 g/dL     B-type natriuretic peptide [[id number]]  (Normal) Collected:  01/21/18 1028    Lab Status:  Final result Specimen:  Blood from Arm, Left Updated:  01/21/18 1101     NT-proBNP 62 pg/mL     Troponin I [[id number]]  (Abnormal) Collected:  01/21/18 1028    Lab Status:  Final result Specimen:  Blood from Arm, Left Updated:  01/21/18 1056     Troponin I 0 08 (H) ng/mL     Narrative:         [employer] Chemistry analyzer 99% cutoff is > 0 04 ng/mL in network labs    o cTnI 99% cutoff is useful only when applied to patients in the clinical setting of myocardial ischemia  o cTnI 99% cutoff should be interpreted in the context of clinical history, ECG findings and possibly cardiac imaging to establish correct diagnosis  o cTnI 99% cutoff may be suggestive but clearly not indicative of a coronary event without the clinical setting of myocardial ischemia  Basic metabolic panel [[id number]] Collected:  01/21/18 1028    Lab Status:  Final result Specimen:  Blood from Arm, Left Updated:  01/21/18 1054     Sodium 139 mmol/L      Potassium 4 6 mmol/L      Chloride 103 mmol/L      CO2 31 mmol/L      Anion Gap 5 mmol/L      BUN 17 mg/dL      Creatinine 1 22 mg/dL      Glucose 122 mg/dL      Calcium 9 1 mg/dL      eGFR 63 ml/min/1 73sq m     Narrative:         National Kidney Disease Education Program recommendations are as follows:  GFR calculation is accurate only with a steady state creatinine  Chronic Kidney disease less than 60 ml/min/1 73 sq  meters  Kidney failure less than 15 ml/min/1 73 sq  meters  Protime-INR [[id number]]  (Normal) Collected:  01/21/18 1028    Lab Status:  Final result Specimen:  Blood from Arm, Left Updated:  01/21/18 1050     Protime 12 6 seconds      INR 0 92    APTT [[id number]]  (Normal) Collected:  01/21/18 1028    Lab Status:  Final result Specimen:  Blood from Arm, Left Updated:  01/21/18 1050     PTT 33 seconds     Narrative:          Therapeutic Heparin Range = 60-90 seconds    CBC and differential [[id number]]  (Abnormal) Collected:  01/21/18 1028    Lab Status:  Final result Specimen:  Blood from Arm, Left Updated:  01/21/18 1037     WBC 10 91 (H) Thousand/uL      RBC 5 09 Million/uL      Hemoglobin 15 2 g/dL      Hematocrit 45 4 %      MCV 89 fL      MCH 29 9 pg      MCHC 33 5 g/dL      RDW 13 6 %      MPV 9 7 fL      Platelets 075 Thousands/uL      Neutrophils Relative 63 %      Lymphocytes Relative 23 %      Monocytes Relative 8 %      Eosinophils Relative 5 %      Basophils Relative 1 %      Neutrophils Absolute 6 91 Thousands/µL      Lymphocytes Absolute 2 49 Thousands/µL      Monocytes Absolute 0 86 Thousand/µL      Eosinophils Absolute 0 57 Thousand/µL      Basophils Absolute 0 08 Thousands/µL                  XR chest 2 views   ED Interpretation by Claudia Adames DO (01/21 1056)   No acute pathology                 Procedures  ECG 12 Lead Documentation  Date/Time: 1/21/2018 10:21 AM  Performed by: Param Delcid  Authorized by: Param Delcid     ECG reviewed by me, the ED Provider: yes    Patient location:  ED  Previous ECG:     Previous ECG:  Compared to current    Comparison ECG info:  7 28 2017    Similarity:  No change  Rate:     ECG rate:  59    ECG rate assessment: bradycardic    Rhythm:     Rhythm: sinus bradycardia    Ectopy:     Ectopy: none    QRS:     QRS axis:  Normal    QRS intervals:  Normal  Conduction:     Conduction: normal    ST segments:     ST segments:  Normal  T waves:     T waves: normal             Phone Contacts  ED Phone Contact    ED Course  ED Course          HEART Risk Score    Flowsheet Row Most Recent Value   History  1 Filed at: 01/21/2018 1136   ECG  0 Filed at: 01/21/2018 1136   Age  1 Filed at: 01/21/2018 1136   Risk Factors  2 Filed at: 01/21/2018 1136   Troponin  1 Filed at: 01/21/2018 1136   Heart Score Risk Calculator   History  1 Filed at: 01/21/2018 1136   ECG  0 Filed at: 01/21/2018 1136   Age  1 Filed at: 01/21/2018 1136   Risk Factors  2 Filed at: 01/21/2018 1136   Troponin  1 Filed at: 01/21/2018 1136   HEART Score  5 Filed at: 01/21/2018 1136 HEART Score  5 Filed at: 01/21/2018 1136                            [facility]  Number of Diagnoses or Management Options  Bloody stool: new and requires workup  Chest pain: new and requires workup  Elevated troponin I level: new and requires workup  Diagnosis management comments: 76-year-old male presents with chest pain, nonexertional no associated diaphoresis located right lateral chest no injury  Initial DDx includes but is not limited to:   Musculoskeletal pain, less likely cardiopulmonary pathology but as patient is 76-year-old will further investigate      Initial ED Plan:   Blood work, chest x-ray, 3 hour delta troponin        Amount and/or Complexity of Data Reviewed  Clinical lab tests: ordered and reviewed  Tests in the radiology section of CPT®: ordered and reviewed  Review and summarize past medical records: yes  Independent visualization of images, tracings, or specimens: yes      CritCare Time    Disposition  Final diagnoses:   Chest pain   Elevated troponin I level   Bloody stool     Time reflects when diagnosis was documented in both MDM as applicable and the Disposition within this note     Time User Action Codes Description Comment    1/21/2018 11:40 AM Yamel Smart Add [R07 9] Chest pain     1/21/2018 11:40 AM Kvng SCOTT Add [R74 8] Elevated troponin I level     1/21/2018 11:40 AM Josue Oleary Add [K92 1] Bloody stool       ED Disposition     ED Disposition Condition Comment    Admit  Case was discussed with Dr Loulou Rizo and the patient's admission status was agreed to be Admission Status: observation status to the service of Dr Loulou Rizo   Follow-up Information    None       Patient's Medications   Discharge Prescriptions    No medications on file     No discharge procedures on file      ED Provider  Electronically Signed by           Tasha Kinney DO  01/21/18 1147

## 2018-01-21 NOTE — PLAN OF CARE
Problem: CARDIOVASCULAR - ADULT  Goal: Maintains optimal cardiac output and hemodynamic stability  INTERVENTIONS:  - Monitor I/O, vital signs and rhythm  - Monitor for S/S and trends of decreased cardiac output i e  bleeding, hypotension  - Administer and titrate ordered vasoactive medications to optimize hemodynamic stability  - Assess quality of pulses, skin color and temperature  - Assess for signs of decreased coronary artery perfusion - ex   Angina  - Instruct patient to report change in severity of symptoms  Outcome: Progressing    Goal: Absence of cardiac dysrhythmias or at baseline rhythm  INTERVENTIONS:  - Continuous cardiac monitoring, monitor vital signs, obtain 12 lead EKG if indicated  - Administer antiarrhythmic and heart rate control medications as ordered  - Monitor electrolytes and administer replacement therapy as ordered  Outcome: Progressing      Problem: GASTROINTESTINAL - ADULT  Goal: Maintains or returns to baseline bowel function  INTERVENTIONS:  - Assess bowel function  - Encourage oral fluids to ensure adequate hydration  - Administer IV fluids as ordered to ensure adequate hydration  - Administer ordered medications as needed  - Encourage mobilization and activity  - Nutrition services referral to assist patient with appropriate food choices  Outcome: Progressing    Goal: Establish and maintain optimal ostomy function  INTERVENTIONS:  - Assess bowel function  - Encourage oral fluids to ensure adequate hydration  - Administer IV fluids as ordered to ensure adequate hydration  - Administer ordered medications as needed  - Encourage mobilization and activity  - Nutrition services referral to assist patient with appropriate food choices  - Assess stoma site  Outcome: Progressing

## 2018-01-22 ENCOUNTER — APPOINTMENT (OUTPATIENT)
Dept: NON INVASIVE DIAGNOSTICS | Facility: HOSPITAL | Age: 63
End: 2018-01-22
Payer: COMMERCIAL

## 2018-01-22 VITALS
TEMPERATURE: 97.9 F | SYSTOLIC BLOOD PRESSURE: 116 MMHG | WEIGHT: 199.6 LBS | BODY MASS INDEX: 28.58 KG/M2 | DIASTOLIC BLOOD PRESSURE: 57 MMHG | RESPIRATION RATE: 16 BRPM | HEIGHT: 70 IN | HEART RATE: 63 BPM

## 2018-01-22 VITALS
DIASTOLIC BLOOD PRESSURE: 78 MMHG | BODY MASS INDEX: 27.46 KG/M2 | TEMPERATURE: 98.1 F | SYSTOLIC BLOOD PRESSURE: 122 MMHG | WEIGHT: 191.8 LBS | HEIGHT: 70 IN | HEART RATE: 80 BPM

## 2018-01-22 VITALS
WEIGHT: 186.07 LBS | SYSTOLIC BLOOD PRESSURE: 116 MMHG | TEMPERATURE: 98.1 F | DIASTOLIC BLOOD PRESSURE: 78 MMHG | BODY MASS INDEX: 26.64 KG/M2 | HEART RATE: 80 BPM | HEIGHT: 70 IN

## 2018-01-22 VITALS
BODY MASS INDEX: 26.32 KG/M2 | SYSTOLIC BLOOD PRESSURE: 122 MMHG | TEMPERATURE: 97.7 F | DIASTOLIC BLOOD PRESSURE: 80 MMHG | HEART RATE: 76 BPM | WEIGHT: 183.86 LBS | HEIGHT: 70 IN

## 2018-01-22 LAB
ANION GAP SERPL CALCULATED.3IONS-SCNC: 3 MMOL/L (ref 4–13)
BUN SERPL-MCNC: 13 MG/DL (ref 5–25)
CALCIUM SERPL-MCNC: 9.2 MG/DL (ref 8.3–10.1)
CHLORIDE SERPL-SCNC: 107 MMOL/L (ref 100–108)
CHOLEST SERPL-MCNC: 185 MG/DL (ref 50–200)
CO2 SERPL-SCNC: 29 MMOL/L (ref 21–32)
CREAT SERPL-MCNC: 1.09 MG/DL (ref 0.6–1.3)
ERYTHROCYTE [DISTWIDTH] IN BLOOD BY AUTOMATED COUNT: 13.5 % (ref 11.6–15.1)
GFR SERPL CREATININE-BSD FRML MDRD: 72 ML/MIN/1.73SQ M
GLUCOSE SERPL-MCNC: 103 MG/DL (ref 65–140)
HCT VFR BLD AUTO: 43.9 % (ref 36.5–49.3)
HDLC SERPL-MCNC: 38 MG/DL (ref 40–60)
HGB BLD-MCNC: 14.6 G/DL (ref 12–17)
LDLC SERPL CALC-MCNC: 111 MG/DL (ref 0–100)
MCH RBC QN AUTO: 29.7 PG (ref 26.8–34.3)
MCHC RBC AUTO-ENTMCNC: 33.3 G/DL (ref 31.4–37.4)
MCV RBC AUTO: 89 FL (ref 82–98)
PLATELET # BLD AUTO: 202 THOUSANDS/UL (ref 149–390)
PMV BLD AUTO: 10.3 FL (ref 8.9–12.7)
POTASSIUM SERPL-SCNC: 4.4 MMOL/L (ref 3.5–5.3)
RBC # BLD AUTO: 4.91 MILLION/UL (ref 3.88–5.62)
SODIUM SERPL-SCNC: 139 MMOL/L (ref 136–145)
TRIGL SERPL-MCNC: 180 MG/DL
WBC # BLD AUTO: 8.35 THOUSAND/UL (ref 4.31–10.16)

## 2018-01-22 PROCEDURE — 85027 COMPLETE CBC AUTOMATED: CPT | Performed by: PHYSICIAN ASSISTANT

## 2018-01-22 PROCEDURE — 80061 LIPID PANEL: CPT | Performed by: PHYSICIAN ASSISTANT

## 2018-01-22 PROCEDURE — 80048 BASIC METABOLIC PNL TOTAL CA: CPT | Performed by: PHYSICIAN ASSISTANT

## 2018-01-22 PROCEDURE — 93306 TTE W/DOPPLER COMPLETE: CPT

## 2018-01-22 RX ORDER — ASPIRIN 81 MG/1
81 TABLET ORAL DAILY
Status: DISCONTINUED | OUTPATIENT
Start: 2018-01-22 | End: 2018-01-23 | Stop reason: HOSPADM

## 2018-01-22 RX ORDER — PRAVASTATIN SODIUM 40 MG
40 TABLET ORAL
Status: DISCONTINUED | OUTPATIENT
Start: 2018-01-22 | End: 2018-01-23 | Stop reason: HOSPADM

## 2018-01-22 RX ADMIN — QUETIAPINE FUMARATE 100 MG: 100 TABLET ORAL at 22:35

## 2018-01-22 RX ADMIN — ASPIRIN 81 MG: 81 TABLET ORAL at 17:30

## 2018-01-22 RX ADMIN — DULOXETINE HYDROCHLORIDE 30 MG: 30 CAPSULE, DELAYED RELEASE ORAL at 08:35

## 2018-01-22 RX ADMIN — PRAVASTATIN SODIUM 40 MG: 40 TABLET ORAL at 16:30

## 2018-01-22 NOTE — ASSESSMENT & PLAN NOTE
· Elevated triglycerides and LDL  · Initiate pravastatin 40 mg p o  q day given CV 10 year risk assessment 10 2%  · Will need repeat lipid panel within 3 months outpatient

## 2018-01-22 NOTE — ASSESSMENT & PLAN NOTE
· Present upon admission denies chest pain, palpitations, diaphoresis nausea vomiting  ·  EKG sinus rhythm  · Troponin I 0 08, 0 06, 0 03  · ZELDA score 2  · Cardiology following appreciate recommendations plan for Myoscan pharmacological stress test in a m  and echocardiogram   · Continue nitro sublingual, EKG p r n , ASA 81 mg  · Add pravastatin due to elevated lipid panel

## 2018-01-22 NOTE — PLAN OF CARE
CARDIOVASCULAR - ADULT     Maintains optimal cardiac output and hemodynamic stability Progressing     Absence of cardiac dysrhythmias or at baseline rhythm Progressing        GASTROINTESTINAL - ADULT     Maintains or returns to baseline bowel function Progressing     Establish and maintain optimal ostomy function Progressing        Nutrition/Hydration-ADULT     Nutrient/Hydration intake appropriate for improving, restoring or maintaining nutritional needs Progressing

## 2018-01-22 NOTE — CASE MANAGEMENT
Initial Clinical Review    Admission: Date/Time/Statement: 01/21/2018 @ 11:44   Observation    Orders Placed This Encounter   Procedures    Place in Observation (expected length of stay for this patient is less than two midnights)     Standing Status:   Standing     Number of Occurrences:   1     Order Specific Question:   Admitting Physician     Answer:   Kayla Herzog     Order Specific Question:   Level of Care     Answer:   Med Surg [16]         ED: Date/Time/Mode of Arrival:   ED Arrival Information     Expected Arrival Acuity Means of Arrival Escorted By Service Admission Type    - 1/21/2018 10:06 Urgent Walk-In Self General Medicine Urgent    Arrival Complaint    Chest Pain          Chief Complaint:   Chief Complaint   Patient presents with    Chest Pain     pt reports b/l lower rib pain that started around 2300 last night  History of Illness:  60 y o  male with a remote history of HLD  who presents with chest pain that started last night  Patient states that he was sitting on the couch watching TV when he suddenly felt chest pressure across the center of his chest that radiated under his rib cage  States that the pain was persistent and unchanged throughout the night and made him come to the hospital today  He states that nothing made the pain worse and that application of nitro paste made it better  States that his only other symptom was some lightheadedness on the way in  Denied radiation of his chest pain to his arm, neck, or jaw  Denies shortness of breath, coughing  Denies nausea or vomiting  Denies headaches or vision changes  He states that this happened roughly one year ago  States that he has a prior family history of his father having heart attacks at a relatively young age   Denies a smoking history          ED Vital Signs:   ED Triage Vitals   Temperature Pulse Respirations Blood Pressure SpO2   01/21/18 1019 01/21/18 1019 01/21/18 1019 01/21/18 1019 01/21/18 1019   (!) 97 3 °F (36 3 °C) 65 18 (!) 188/100 100 %      Temp Source Heart Rate Source Patient Position - Orthostatic VS BP Location FiO2 (%)   01/21/18 1019 01/21/18 1019 01/21/18 1153 01/21/18 1153 --   Oral Monitor Sitting Right arm       Pain Score       01/21/18 1019       6        Wt Readings from Last 1 Encounters:   01/21/18 90 8 kg (200 lb 2 8 oz)       Vital Signs (abnormal):     Date and Time Temp Pulse SpO2 Resp BP   01/21/18 1520 -- 73 95 % 18 129/84   01/21/18 1215 -- 64 97 % 18 165/92   01/21/18 1153 97 8 °F (36 6 °C) 66 98 % 18  175/93       Abnormal Labs/Diagnostic Test Results: Troponin 0 08, WBC 10 91    CXR: Left basilar subsegmental atelectasis     EKG: Sinus bradycardia  ED Treatment:   Medication Administration from 01/21/2018 1006 to 01/21/2018 1618       Date/Time Order Dose Route Action Action by Comments     01/21/2018 1147 sodium chloride 0 9 % bolus 1,000 mL 0 mL Intravenous Stopped Emily Del Castillo RN      01/21/2018 1029 sodium chloride 0 9 % bolus 1,000 mL 1,000 mL Intravenous Rafael 37 Johnathan Allen RN      01/21/2018 1148 aspirin chewable tablet 324 mg 324 mg Oral Given Emily Del Castillo RN      01/21/2018 1148 nitroglycerin (NITRO-BID) 2 % TD ointment 1 inch 1 inch Topical Given Emily Del Castillo RN      01/21/2018 1600 atoMOXetine (STRATTERA) capsule 25 mg 25 mg Oral Not Given Alec Chambers RN      01/21/2018 1518 DULoxetine (CYMBALTA) delayed release capsule 30 mg 30 mg Oral Given Emily Del Castillo RN      01/21/2018 1519 enoxaparin (LOVENOX) subcutaneous injection 40 mg 40 mg Subcutaneous Not Given Emily Del Castillo RN "I don't do shots", encouraged to ambulate        Physical Exam   Constitutional: He is oriented to person, place, and time  He appears well-developed  No distress  Cardiovascular: Normal rate, regular rhythm, normal heart sounds and intact distal pulses  No murmur heard  Pulmonary/Chest: Effort normal and breath sounds normal  No stridor  No respiratory distress       Past Medical/Surgical History: Active Ambulatory Problems     Diagnosis Date Noted    Adult residual type attention deficit hyperactivity disorder (ADHD) 07/30/2017    HTN (hypertension) 01/21/2018     Resolved Ambulatory Problems     Diagnosis Date Noted    Severe major depression, single episode ([place] [address] [address] ) 07/29/2017     Past Medical History:   Diagnosis Date    Chronic back pain     Chronic pain     Gallstones     Spinal arachnoid cyst        Admitting Diagnosis: Chest pain [R07 9]  Bloody stool [K92 1]  Elevated troponin I level [R74 8]    Age/Sex: 60 y o  male    Assessment/Plan:     * Chest pain   Assessment & Plan     - Present on admission - started at 11:30 last night, relieved with nitro paste application    - Troponin 0 08 on admission trending to 0 06    - EKG WNL   - Positive risk factors for age, sex, history of HLD, and family history  - Stress test diet ordered   - Trend troponin, EKG in AM, exercise stress test in AM  Consider cardiology evaluation if troponin rises or if stress test shows signs of ischemia       HLD (hyperlipidemia)   Assessment & Plan     - As per patient has not been on statin medication in years  - Check lipid panel        Adult residual type attention deficit hyperactivity disorder (ADHD)   Assessment & Plan     - On Strattera outpatient  - Hold while inpatient, restart at discharge              VTE Prophylaxis: Enoxaparin (Lovenox)  / reason for no mechanical VTE prophylaxis not needed    Code Status: Full code   POLST: POLST form is not discussed and not completed at this time  Discussion with family: Discussed with s/o at bedside      Anticipated Length of Stay:  Patient will be admitted on an Observation basis with an anticipated length of stay of  Less than  2 midnights     Justification for Hospital Stay: ACS R/O        Admission Orders:  Observation/Tele  Continuous Cardiac Monitoring  Serial Cardiac Enzymes q3h x 3  Exercise Stress Test   Bilateral Sequential Compression Device Scheduled Meds:   DULoxetine 30 mg Oral Daily   enoxaparin 40 mg Subcutaneous Daily   QUEtiapine 100 mg Oral HS     Hydrocodone po x 1 thus far

## 2018-01-22 NOTE — PROGRESS NOTES
Progress Note - Denise Mueller Sr  1955, 60 y o  male MRN: [id number]    Unit/Bed#: -01 Encounter: [id number]    Primary Care Provider: Philomena Littlejohn MD   Date and time admitted to hospital: 1/21/2018 10:12 AM        * Chest pain   Assessment & Plan    · Present upon admission denies chest pain, palpitations, diaphoresis nausea vomiting  ·  EKG sinus rhythm  · Troponin I 0 08, 0 06, 0 03  · ZELDA score 2  · Cardiology following appreciate recommendations plan for Myoscan pharmacological stress test in a m  and echocardiogram   · Continue nitro sublingual, EKG p r n , ASA 81 mg  · Add pravastatin due to elevated lipid panel        HLD (hyperlipidemia)   Assessment & Plan    · Elevated triglycerides and LDL  · Initiate pravastatin 40 mg p o  q day given CV 10 year risk assessment 10 2%  · Will need repeat lipid panel within 3 months outpatient  Adult residual type attention deficit hyperactivity disorder (ADHD)   Assessment & Plan    · Stable moods  · Continue home medications Cymbalta, Seroquel HS  · Hold home medication-Strattera likely restart upon discharge          VTE Pharmacologic Prophylaxis:   Pharmacologic: Enoxaparin (Lovenox)  Mechanical VTE Prophylaxis in Place: Yes    Patient Centered Rounds: I have performed bedside rounds with nursing staff today  Discussions with Specialists or Other Care Team Provider:  RN, cm    Education and Discussions with Family / Patient:  Discussed with patient at the bedside in regards to stress test and echocardiogram likely follow-up outpatient with PCP and Cardiology    Time Spent for Care: 20 minutes  More than 50% of total time spent on counseling and coordination of care as described above      Current Length of Stay: 0 day(s)    Current Patient Status: Observation   Certification Statement: The patient, admitted on an observation basis, will now require > 2 midnight hospital stay due to Awaiting Myoview scan and echocardiogram    Discharge Plan:  Likely discharge within 24 hours pending stress test and echocardiogram results  Disposition-home    Code Status: Level 1 - Full Code      Subjective:   Patient was seen and examined at the bedside with RN jenny Calhoun Patient denied chest pain, shortness breath, palpitations, diaphoresis, abdominal pain, nausea vomiting or diarrhea  Patient stated following nitro patch yesterday improvement  Patient was able to tolerate all meals and able to ambulate to and from bathroom with ease  Objective:     Vitals:   Temp (24hrs), Av 1 °F (36 7 °C), Min:98 °F (36 7 °C), Max:98 4 °F (36 9 °C)    HR:  [69-79] 70  Resp:  [18] 18  BP: (106-131)/(68-88) 106/73  SpO2:  [96 %-97 %] 97 %  Body mass index is 28 72 kg/m²  Input and Output Summary (last 24 hours):     No intake or output data in the 24 hours ending 18 1607    Physical Exam:     Physical Exam   Constitutional: He is oriented to person, place, and time  He appears well-developed  No distress  Sitting upright in bed fairly comfortable in no acute distress   HENT:   Head: Normocephalic and atraumatic  Mouth/Throat: Oropharynx is clear and moist  No oropharyngeal exudate  Eyes: Conjunctivae and EOM are normal  Pupils are equal, round, and reactive to light  Right eye exhibits no discharge  Left eye exhibits no discharge  No scleral icterus  Neck: Normal range of motion  Neck supple  No JVD present  No tracheal deviation present  No thyromegaly present  Cardiovascular: Normal rate, regular rhythm and intact distal pulses  Exam reveals no gallop and no friction rub  No murmur heard  DP pulses present bilaterally, no bilateral lower extremity edema   Pulmonary/Chest: Effort normal  No respiratory distress  He has no wheezes  He has no rales  Abdominal: Bowel sounds are normal  He exhibits no distension  There is no tenderness  There is no rebound and no guarding  Musculoskeletal: Normal range of motion   He exhibits no edema, tenderness or deformity  Lymphadenopathy:     He has no cervical adenopathy  Neurological: He is alert and oriented to person, place, and time  He has normal reflexes  He displays normal reflexes  No cranial nerve deficit  He exhibits normal muscle tone  Coordination normal    Skin: Skin is warm  No rash noted  He is not diaphoretic  No erythema  No pallor  Psychiatric: His behavior is normal  Thought content normal        Additional Data:     Labs:      Results from last 7 days  Lab Units 01/22/18  0508 01/21/18  1028   WBC Thousand/uL 8 35 10 91*   HEMOGLOBIN g/dL 14 6 15 2   HEMATOCRIT % 43 9 45 4   PLATELETS Thousands/uL 202 240   NEUTROS PCT %  --  63   LYMPHS PCT %  --  23   MONOS PCT %  --  8   EOS PCT %  --  5       Results from last 7 days  Lab Units 01/22/18  0508 01/21/18  1028   SODIUM mmol/L 139 139   POTASSIUM mmol/L 4 4 4 6   CHLORIDE mmol/L 107 103   CO2 mmol/L 29 31   BUN mg/dL 13 17   CREATININE mg/dL 1 09 1 22   CALCIUM mg/dL 9 2 9 1   TOTAL PROTEIN g/dL  --  7 5   BILIRUBIN TOTAL mg/dL  --  0 40   ALK PHOS U/L  --  61   ALT U/L  --  15   AST U/L  --  12   GLUCOSE RANDOM mg/dL 103 122       Results from last 7 days  Lab Units 01/21/18  1028   INR  0 92       * I Have Reviewed All Lab Data Listed Above  * Additional Pertinent Lab Tests Reviewed: Porsha 65 Admission Reviewed    Imaging:    Imaging Reports Reviewed Today Include:  Chest x-ray  Imaging Personally Reviewed by Myself Includes:  Chest x-ray    Recent Cultures (last 7 days):           Last 24 Hours Medication List:     aspirin 81 mg Oral Daily   DULoxetine 30 mg Oral Daily   enoxaparin 40 mg Subcutaneous Daily   pravastatin 40 mg Oral Daily With Dinner   QUEtiapine 100 mg Oral HS        Today, Patient Was Seen By: Kourtney Sparks PA-C    ** Please Note: Dictation voice to text software may have been used in the creation of this document   **

## 2018-01-22 NOTE — CONSULTS
Consultation - Cardiology   Samina Gerber Sr  60 y o  male MRN: [id number]  Unit/Bed#: -01 Encounter: [id number]    Assessment/Plan     Principal Problem:    Chest pain  Active Problems:    Adult residual type attention deficit hyperactivity disorder (ADHD)    HLD (hyperlipidemia)      Assessment:  1  Midsternal chest tightness- atypical  2  Abnormal troponin  0 08, 0 06, 0 03  EKG sinus bradycardia  3  ? HLD history/family history CAD  Takes no statin  LDL 11      Plan:  1  Would change exercise stress test to exercise myoview  Pt with atypical CP but mildly abnormal troponin  Addition of nuclear images thus would be beneficial  Pt believes he will be able to ambulate on the treadmill  but at times activity is limited d/t spinal tumor and we may need to proceed with lexiscan   Stress test to be done tomorrow, unable to schedule today  History of Present Illness   Physician Requesting Consult: Leland Frost MD  Reason for Consult / Principal Problem: chest pain    HPI: Guero Gilmore  is a 60y o  year old male with no known CAD who presents with mid sternal chest tightness  2 nights ago while watching TV he had midsternal chest tightness with radiation under lower rib cages  He said the pain continued for almost 24 hours until a NTP was applied  He claims he has been painfree since  He had reproducible pain along rib cage  SLIM evaluated the patient and ordered an exercise stress test  EKG unremarkable  Troponin 0 08, 0 06, 0 03  BP intially elevated at 188/100, down to 106/73 this AM  He walks daily about 1/4-1 mile a day without CP  He said his activity is somewhat limited from his spinal tumor which is being followed by neurosurgery  He doesn't smoke cigarettes  He said his father had MI at a young age ,  in his [de-identified]  Inpatient consult to Cardiology  Consult performed by: Shen Wisdom ordered by: Silvina Grijalva          Review of Systems   Constitutional: Negative  HENT: Negative  Eyes: Negative  Respiratory: Negative for shortness of breath  Cardiovascular: Positive for chest pain  Gastrointestinal: Negative  Genitourinary: Negative  Musculoskeletal: Negative  Neurological: Negative  Hematological: Negative  Psychiatric/Behavioral: Negative  Historical Information   Past Medical History:   Diagnosis Date    Chronic back pain     Chronic pain     Gallstones     Spinal arachnoid cyst      Past Surgical History:   Procedure Laterality Date    RI COLONOSCOPY FLX DX W/COLLJ SPEC WHEN PFRMD N/A 8/31/2017    Procedure: COLONOSCOPY;  Surgeon: Tommy Guthrie MD;  Location: [facility]; Service: Gastroenterology    TUMOR REMOVAL       History   Alcohol Use No     Comment: occassional      History   Drug Use    Types: Marijuana     History   Smoking Status    Never Smoker   Smokeless Tobacco    Never Used     Family History: History reviewed  No pertinent family history  Meds/Allergies   current meds:   Current Facility-Administered Medications   Medication Dose Route Frequency    DULoxetine (CYMBALTA) delayed release capsule 30 mg  30 mg Oral Daily    enoxaparin (LOVENOX) subcutaneous injection 40 mg  40 mg Subcutaneous Daily    HYDROcodone-acetaminophen (NORCO) 5-325 mg per tablet 2 tablet  2 tablet Oral Q6H PRN    ondansetron (ZOFRAN) injection 4 mg  4 mg Intravenous Q6H PRN    pravastatin (PRAVACHOL) tablet 40 mg  40 mg Oral Daily With Dinner    QUEtiapine (SEROquel) tablet 100 mg  100 mg Oral HS    and PTA meds:  Prescriptions Prior to Admission   Medication    atoMOXetine (STRATTERA) 25 mg capsule    DULoxetine (CYMBALTA) 30 mg delayed release capsule    NON FORMULARY    HYDROcodone-acetaminophen (NORCO) 5-325 mg per tablet    QUEtiapine (SEROquel) 100 mg tablet     No Known Allergies    Objective   Vitals: Blood pressure 106/73, pulse 70, temperature 98 °F (36 7 °C), temperature source Oral, resp   rate 18, height 5' 10" (1 778 m), weight 90 8 kg (200 lb 2 8 oz), SpO2 97 %  Orthostatic Blood Pressures    Flowsheet Row Most Recent Value   Blood Pressure  106/73 filed at 01/22/2018 3091   Patient Position - Orthostatic VS  Lying filed at 01/22/2018 1050            Intake/Output Summary (Last 24 hours) at 01/22/18 1018  Last data filed at 01/21/18 1147   Gross per 24 hour   Intake             1000 ml   Output                0 ml   Net             1000 ml       Invasive Devices     Peripheral Intravenous Line            Peripheral IV 01/21/18 Left Antecubital less than 1 day                Physical Exam: /73 (BP Location: Right arm)   Pulse 70   Temp 98 °F (36 7 °C) (Oral)   Resp 18   Ht 5' 10" (1 778 m)   Wt 90 8 kg (200 lb 2 8 oz)   SpO2 97%   BMI 28 72 kg/m²   General Appearance:    Alert, cooperative, no distress, appears stated age   Head:    Normocephalic, no scleral icterus   Eyes:    PERRL   Nose:   Nares normal, septum midline, mucosa normal, no drainage    Throat:   Lips, mucosa, and tongue normal   Neck:   Supple, symmetrical, trachea midline        Back:     Symmetric   Lungs:     Clear to auscultation bilaterally, respirations unlabored   Chest Wall:    No tenderness or deformity    Heart:    Regular rate and rhythm, S1 and S2 normal, no murmur, rub   or gallop   Abdomen:     Soft, non-tender, bowel sounds active all four quadrants,     no masses, no organomegaly   Extremities:   Extremities normal, atraumatic, no cyanosis or edema   Pulses:   2+ and symmetric all extremities   Skin:   Skin color, texture, turgor normal, no rashes or lesions   Neurologic:   Alert and oriented to person place and time         Lab Results:   Recent Results (from the past 72 hour(s))   ECG 12 lead    Collection Time: 01/21/18 10:21 AM   Result Value Ref Range    Ventricular Rate 59 BPM    Atrial Rate 61 BPM    IN Interval 172 ms    QRSD Interval 82 ms    QT Interval 402 ms    QTC Interval 399 ms    P Axis 52 degrees    QRS Axis 58 degrees    T Wave Axis 56 degrees   CBC and differential    Collection Time: 01/21/18 10:28 AM   Result Value Ref Range    WBC 10 91 (H) 4 31 - 10 16 Thousand/uL    RBC 5 09 3 88 - 5 62 Million/uL    Hemoglobin 15 2 12 0 - 17 0 g/dL    Hematocrit 45 4 36 5 - 49 3 %    MCV 89 82 - 98 fL    MCH 29 9 26 8 - 34 3 pg    MCHC 33 5 31 4 - 37 4 g/dL    RDW 13 6 11 6 - 15 1 %    MPV 9 7 8 9 - 12 7 fL    Platelets 559 621 - 686 Thousands/uL    Neutrophils Relative 63 43 - 75 %    Lymphocytes Relative 23 14 - 44 %    Monocytes Relative 8 4 - 12 %    Eosinophils Relative 5 0 - 6 %    Basophils Relative 1 0 - 1 %    Neutrophils Absolute 6 91 1 85 - 7 62 Thousands/µL    Lymphocytes Absolute 2 49 0 60 - 4 47 Thousands/µL    Monocytes Absolute 0 86 0 17 - 1 22 Thousand/µL    Eosinophils Absolute 0 57 0 00 - 0 61 Thousand/µL    Basophils Absolute 0 08 0 00 - 0 10 Thousands/µL   Protime-INR    Collection Time: 01/21/18 10:28 AM   Result Value Ref Range    Protime 12 6 12 1 - 14 4 seconds    INR 0 92 0 86 - 1 16   APTT    Collection Time: 01/21/18 10:28 AM   Result Value Ref Range    PTT 33 23 - 35 seconds   Basic metabolic panel    Collection Time: 01/21/18 10:28 AM   Result Value Ref Range    Sodium 139 136 - 145 mmol/L    Potassium 4 6 3 5 - 5 3 mmol/L    Chloride 103 100 - 108 mmol/L    CO2 31 21 - 32 mmol/L    Anion Gap 5 4 - 13 mmol/L    BUN 17 5 - 25 mg/dL    Creatinine 1 22 0 60 - 1 30 mg/dL    Glucose 122 65 - 140 mg/dL    Calcium 9 1 8 3 - 10 1 mg/dL    eGFR 63 ml/min/1 73sq m   Hepatic function panel    Collection Time: 01/21/18 10:28 AM   Result Value Ref Range    Total Bilirubin 0 40 0 20 - 1 00 mg/dL    Bilirubin, Direct 0 06 0 00 - 0 20 mg/dL    Alkaline Phosphatase 61 46 - 116 U/L    AST 12 5 - 45 U/L    ALT 15 12 - 78 U/L    Total Protein 7 5 6 4 - 8 2 g/dL    Albumin 3 6 3 5 - 5 0 g/dL   B-type natriuretic peptide    Collection Time: 01/21/18 10:28 AM   Result Value Ref Range    NT-proBNP 62 <125 pg/mL   Troponin I Collection Time: 01/21/18 10:28 AM   Result Value Ref Range    Troponin I 0 08 (H) <=0 04 ng/mL   Troponin I    Collection Time: 01/21/18  1:08 PM   Result Value Ref Range    Troponin I 0 06 (H) <=0 04 ng/mL   Troponin I    Collection Time: 01/21/18  6:24 PM   Result Value Ref Range    Troponin I 0 03 <=0 04 ng/mL   Basic metabolic panel    Collection Time: 01/22/18  5:08 AM   Result Value Ref Range    Sodium 139 136 - 145 mmol/L    Potassium 4 4 3 5 - 5 3 mmol/L    Chloride 107 100 - 108 mmol/L    CO2 29 21 - 32 mmol/L    Anion Gap 3 (L) 4 - 13 mmol/L    BUN 13 5 - 25 mg/dL    Creatinine 1 09 0 60 - 1 30 mg/dL    Glucose 103 65 - 140 mg/dL    Calcium 9 2 8 3 - 10 1 mg/dL    eGFR 72 ml/min/1 73sq m   CBC (With Platelets)    Collection Time: 01/22/18  5:08 AM   Result Value Ref Range    WBC 8 35 4 31 - 10 16 Thousand/uL    RBC 4 91 3 88 - 5 62 Million/uL    Hemoglobin 14 6 12 0 - 17 0 g/dL    Hematocrit 43 9 36 5 - 49 3 %    MCV 89 82 - 98 fL    MCH 29 7 26 8 - 34 3 pg    MCHC 33 3 31 4 - 37 4 g/dL    RDW 13 5 11 6 - 15 1 %    Platelets 935 775 - 237 Thousands/uL    MPV 10 3 8 9 - 12 7 fL   Lipid panel    Collection Time: 01/22/18  5:08 AM   Result Value Ref Range    Cholesterol 185 50 - 200 mg/dL    Triglycerides 180 (H) <=150 mg/dL    HDL, Direct 38 (L) 40 - 60 mg/dL    LDL Calculated 111 (H) 0 - 100 mg/dL     Imaging: I have personally reviewed pertinent reports  EKG: sinus bradycardia  VTE Prophylaxis: Enoxaparin (Lovenox)    Code Status: Level 1 - Full Code  Advance Directive and Living Will:      Power of :    POLST:      Counseling / Coordination of Care  Total floor / unit time spent today 45 minutes  Greater than 50% of total time was spent with the patient and / or family counseling and / or coordination of care

## 2018-01-22 NOTE — ASSESSMENT & PLAN NOTE
· Stable moods  · Continue home medications Cymbalta, Seroquel HS  · Hold home medication-Strattera likely restart upon discharge

## 2018-01-23 ENCOUNTER — APPOINTMENT (OUTPATIENT)
Dept: NUCLEAR MEDICINE | Facility: HOSPITAL | Age: 63
End: 2018-01-23
Payer: COMMERCIAL

## 2018-01-23 ENCOUNTER — APPOINTMENT (OUTPATIENT)
Dept: CT IMAGING | Facility: HOSPITAL | Age: 63
End: 2018-01-23
Payer: COMMERCIAL

## 2018-01-23 ENCOUNTER — APPOINTMENT (OUTPATIENT)
Dept: NON INVASIVE DIAGNOSTICS | Facility: HOSPITAL | Age: 63
End: 2018-01-23
Payer: COMMERCIAL

## 2018-01-23 VITALS
BODY MASS INDEX: 28.66 KG/M2 | TEMPERATURE: 98.6 F | DIASTOLIC BLOOD PRESSURE: 78 MMHG | OXYGEN SATURATION: 96 % | RESPIRATION RATE: 18 BRPM | SYSTOLIC BLOOD PRESSURE: 148 MMHG | HEIGHT: 70 IN | HEART RATE: 87 BPM | WEIGHT: 200.18 LBS

## 2018-01-23 LAB
ATRIAL RATE: 96 BPM
CHEST PAIN STATEMENT: NORMAL
MAX DIASTOLIC BP: 90 MMHG
MAX HEART RATE: 160 BPM
MAX PREDICTED HEART RATE: 157 BPM
MAX. SYSTOLIC BP: 146 MMHG
P AXIS: 76 DEGREES
PR INTERVAL: 182 MS
PROTOCOL NAME: NORMAL
QRS AXIS: 58 DEGREES
QRSD INTERVAL: 86 MS
QT INTERVAL: 350 MS
QTC INTERVAL: 442 MS
REASON FOR TERMINATION: NORMAL
T WAVE AXIS: 67 DEGREES
TARGET HR FORMULA: NORMAL
TEST INDICATION: NORMAL
TIME IN EXERCISE PHASE: NORMAL
VENTRICULAR RATE: 96 BPM

## 2018-01-23 PROCEDURE — 93005 ELECTROCARDIOGRAM TRACING: CPT | Performed by: PHYSICIAN ASSISTANT

## 2018-01-23 PROCEDURE — 93017 CV STRESS TEST TRACING ONLY: CPT

## 2018-01-23 PROCEDURE — A9502 TC99M TETROFOSMIN: HCPCS

## 2018-01-23 PROCEDURE — 93005 ELECTROCARDIOGRAM TRACING: CPT

## 2018-01-23 PROCEDURE — 71275 CT ANGIOGRAPHY CHEST: CPT

## 2018-01-23 PROCEDURE — 78452 HT MUSCLE IMAGE SPECT MULT: CPT

## 2018-01-23 RX ORDER — ASPIRIN 81 MG/1
81 TABLET ORAL DAILY
Qty: 30 TABLET | Refills: 0 | Status: SHIPPED | OUTPATIENT
Start: 2018-01-24 | End: 2018-04-22

## 2018-01-23 RX ORDER — PRAVASTATIN SODIUM 40 MG
40 TABLET ORAL
Qty: 30 TABLET | Refills: 0 | Status: SHIPPED | OUTPATIENT
Start: 2018-01-23 | End: 2018-01-30

## 2018-01-23 RX ADMIN — PRAVASTATIN SODIUM 40 MG: 40 TABLET ORAL at 15:42

## 2018-01-23 RX ADMIN — ASPIRIN 81 MG: 81 TABLET ORAL at 09:45

## 2018-01-23 RX ADMIN — REGADENOSON 0.4 MG: 0.08 INJECTION, SOLUTION INTRAVENOUS at 08:49

## 2018-01-23 RX ADMIN — DULOXETINE HYDROCHLORIDE 30 MG: 30 CAPSULE, DELAYED RELEASE ORAL at 09:45

## 2018-01-23 RX ADMIN — IOHEXOL 85 ML: 350 INJECTION, SOLUTION INTRAVENOUS at 15:04

## 2018-01-23 NOTE — PROGRESS NOTES
Cardiology Progress Note - Sharon Freed Sr  60 y o  male MRN: [id number]    Unit/Bed#: -01 Encounter: [id number]    Assessment and plan  1  Atypical chest pain  2  Minimally elevated troponin  3  Family history of CAD  4  Normal left ventricular systolic function    Recommendations:  Patient underwent a Myoview stress test this morning will review if within normal limits stable for discharge with outpatient follow-up  Subjective:    No significant events overnight  Feels well with no further complaints  ROS    Objective:   Vitals: Blood pressure 121/77, pulse 82, temperature 98 5 °F (36 9 °C), temperature source Oral, resp  rate 18, height 5' 10" (1 778 m), weight 90 8 kg (200 lb 2 8 oz), SpO2 97 %  , Body mass index is 28 72 kg/m² , Orthostatic Blood Pressures    Flowsheet Row Most Recent Value   Blood Pressure  121/77 filed at 01/23/2018 0718   Patient Position - Orthostatic VS  Lying filed at 01/23/2018 8253         Systolic (07ABR), WOA:068 , Min:121 , XQJ:262     Diastolic (48KXE), FFR:00, Min:75, Max:82      Intake/Output Summary (Last 24 hours) at 01/23/18 1018  Last data filed at 01/22/18 1900   Gross per 24 hour   Intake               50 ml   Output                0 ml   Net               50 ml     Weight (last 2 days)     Date/Time   Weight    01/21/18 1620  90 8 (200 18)    01/21/18 1019  90 2 (198 86)                Telemetry Review: No significant arrhythmias seen on telemetry review  EKG personally reviewed by Dayan Ritter DO  Physical Exam   Constitutional: He is oriented to person, place, and time  He appears well-nourished  No distress  HENT:   Head: Atraumatic  Eyes: Conjunctivae are normal  Pupils are equal, round, and reactive to light  Neck: Neck supple  Cardiovascular: Normal rate, regular rhythm and normal heart sounds  Exam reveals no friction rub  No murmur heard  Pulmonary/Chest: Effort normal and breath sounds normal  No respiratory distress  He has no wheezes  He has no rales  Abdominal: Bowel sounds are normal  He exhibits no distension  There is no tenderness  There is no rebound  Musculoskeletal: Normal range of motion  He exhibits no edema  Neurological: He is alert and oriented to person, place, and time  No cranial nerve deficit  Skin: Skin is warm and dry  No erythema  Nursing note and vitals reviewed  Laboratory Results:    Results from last 7 days  Lab Units 01/21/18  1824 01/21/18  1308 01/21/18  1028   TROPONIN I ng/mL 0 03 0 06* 0 08*       CBC with diff:   Results from last 7 days  Lab Units 01/22/18  0508 01/21/18  1028   WBC Thousand/uL 8 35 10 91*   HEMOGLOBIN g/dL 14 6 15 2   HEMATOCRIT % 43 9 45 4   MCV fL 89 89   PLATELETS Thousands/uL 202 240   MCH pg 29 7 29 9   MCHC g/dL 33 3 33 5   RDW % 13 5 13 6   MPV fL 10 3 9 7         CMP:  Results from last 7 days  Lab Units 01/22/18  0508 01/21/18  1028   SODIUM mmol/L 139 139   POTASSIUM mmol/L 4 4 4 6   CHLORIDE mmol/L 107 103   CO2 mmol/L 29 31   ANION GAP mmol/L 3* 5   BUN mg/dL 13 17   CREATININE mg/dL 1 09 1 22   GLUCOSE RANDOM mg/dL 103 122   CALCIUM mg/dL 9 2 9 1   AST U/L  --  12   ALT U/L  --  15   ALK PHOS U/L  --  61   TOTAL PROTEIN g/dL  --  7 5   ALBUMIN g/dL  --  3 6   BILIRUBIN TOTAL mg/dL  --  0 40   EGFR ml/min/1 73sq m 72 63         BMP:  Results from last 7 days  Lab Units 01/22/18  0508 01/21/18  1028   SODIUM mmol/L 139 139   POTASSIUM mmol/L 4 4 4 6   CHLORIDE mmol/L 107 103   CO2 mmol/L 29 31   BUN mg/dL 13 17   CREATININE mg/dL 1 09 1 22   GLUCOSE RANDOM mg/dL 103 122   CALCIUM mg/dL 9 2 9 1       BNP: No results for input(s): BNP in the last 72 hours      Magnesium:       Coags:   Results from last 7 days  Lab Units 01/21/18  1028   PTT seconds 33   INR  0 92       TSH:        Hemoglobin A1C       Lipid Profile:   Results from last 7 days  Lab Units 01/22/18  0508   CHOLESTEROL mg/dL 185   TRIGLYCERIDES mg/dL 180*   HDL mg/dL 38*       Cardiac testing:   Results for orders placed during the hospital encounter of 18   Echo complete with contrast if indicated    Narrative [facility], [address] [place] [address]  [phone]    Transthoracic Echocardiogram  2D, M-mode, Doppler, and Color Doppler    Study date:  2018    Patient: Shell Thornton  MR number: [id number]  Account number: [de-identified]  : 1955  Age: 60 years  Gender: Male  Status: Outpatient  Location: Bedside  Height: 70 in  Weight: 200 lb  BP: 143/ 87 mmHg    Indications: Chest pain    Diagnoses: R07 9 - Chest pain, unspecified    Sonographer:  PAULETTE Galvez, RDCS  Referring Physician:  Jameson Brown DO  Group:  [id number]  Interpreting Physician:  Jameson Brown DO    SUMMARY    LEFT VENTRICLE:  Systolic function was normal  Ejection fraction was estimated in the range of 55 % to 60 %  There were no regional wall motion abnormalities  Doppler parameters were consistent with abnormal left ventricular relaxation (grade 1 diastolic dysfunction)  TRICUSPID VALVE:  There was trace regurgitation  HISTORY: PRIOR HISTORY: Chest pain; Hyperlipidemia    PROCEDURE: The procedure was performed at the bedside  This was a routine study  The transthoracic approach was used  The study included complete 2D imaging, M-mode, complete spectral Doppler, and color Doppler  The heart rate was 78 bpm,  at the start of the study  Images were obtained from the parasternal, apical, subcostal, and suprasternal notch acoustic windows  Intravenous contrast ( 1 0mL/min Definity in NSS) was administered to opacify the left ventricle  Echocardiographic views were limited due to poor acoustic window availability  This was a technically difficult study  LEFT VENTRICLE: Size was normal  Systolic function was normal  Ejection fraction was estimated in the range of 55 % to 60 %  There were no regional wall motion abnormalities  Wall thickness was normal  DOPPLER: Doppler parameters were  consistent with abnormal left ventricular relaxation (grade 1 diastolic dysfunction)  RIGHT VENTRICLE: The size was normal  Systolic function was normal  Wall thickness was normal     LEFT ATRIUM: Size was normal     RIGHT ATRIUM: Size was normal     MITRAL VALVE: Valve structure was normal  There was normal leaflet separation  DOPPLER: The transmitral velocity was within the normal range  There was no evidence for stenosis  There was no regurgitation  AORTIC VALVE: The valve was trileaflet  Leaflets exhibited normal thickness and normal cuspal separation  DOPPLER: Transaortic velocity was within the normal range  There was no evidence for stenosis  There was no regurgitation  TRICUSPID VALVE: The valve structure was normal  There was normal leaflet separation  DOPPLER: The transtricuspid velocity was within the normal range  There was no evidence for stenosis  There was trace regurgitation  PULMONIC VALVE: Not well visualized  PERICARDIUM: There was no pericardial effusion  The pericardium was normal in appearance  AORTA: The root exhibited normal size  SYSTEMIC VEINS: IVC: The inferior vena cava was not well visualized      SYSTEM MEASUREMENT TABLES    2D  %FS: 36 25 %  AV Diam: 2 91 cm  EDV(Teich): 105 04 ml  EF(Teich): 65 86 %  ESV(Teich): 35 86 ml  IVSd: 1 14 cm  LA Area: 11 44 cm2  LA Diam: 3 33 cm  LVEDV MOD A4C: 126 06 ml  LVEF MOD A4C: 63 89 %  LVESV MOD A4C: 45 52 ml  LVIDd: 4 75 cm  LVIDs: 3 03 cm  LVLd A4C: 9 3 cm  LVLs A4C: 6 99 cm  LVPWd: 1 05 cm  RA Area: 10 87 cm2  RVIDd: 2 78 cm  SV MOD A4C: 80 55 ml  SV(Teich): 69 18 ml    MM  TAPSE: 2 cm    PW  E': 0 06 m/s  E/E': 14 26  MV A Eduardo: 0 96 m/s  MV Dec Jack: 4 43 m/s2  MV DecT: 188 47 ms  MV E Eduardo: 0 83 m/s  MV E/A Ratio: 0 87  MV PHT: 54 66 ms  MVA By PHT: 4 03 cm2    [employer] Accredited Echocardiography Laboratory    Prepared and electronically signed by    Dayan Ritter DO  Signed 22-Jan-2018 16:45:13       No results found for this or any previous visit  No results found for this or any previous visit  No results found for this or any previous visit      Meds/Allergies   all current active meds have been reviewed  Prescriptions Prior to Admission   Medication    atoMOXetine (STRATTERA) 25 mg capsule    DULoxetine (CYMBALTA) 30 mg delayed release capsule    NON FORMULARY    HYDROcodone-acetaminophen (NORCO) 5-325 mg per tablet    QUEtiapine (SEROquel) 100 mg tablet          Assessment:  Principal Problem:    Chest pain  Active Problems:    Adult residual type attention deficit hyperactivity disorder (ADHD)    HLD (hyperlipidemia)

## 2018-01-23 NOTE — DISCHARGE INSTRUCTIONS
Chest Pain   WHAT YOU NEED TO KNOW:   Chest pain can be caused by a range of conditions, from not serious to life-threatening  Chest pain can be a symptom of a digestive problem, such as acid reflux or a stomach ulcer  An anxiety attack or a strong emotion, such as anger, can also cause chest pain  Infection, inflammation, or a fracture in the bones or cartilage in your chest can cause pain or discomfort  Sometimes chest pain or pressure is caused by poor blood flow to your heart (angina)  Chest pain may also be caused by life-threatening conditions such as a heart attack or blood clot in your lungs  DISCHARGE INSTRUCTIONS:   Call 911 if:   · You have any of the following signs of a heart attack:      ¨ Squeezing, pressure, or pain in your chest that lasts longer than 5 minutes or returns    ¨ Discomfort or pain in your back, neck, jaw, stomach, or arm     ¨ Trouble breathing    ¨ Nausea or vomiting    ¨ Lightheadedness or a sudden cold sweat, especially with chest pain or trouble breathing    Seek care immediately if:   · You have chest discomfort that gets worse, even with medicine  · You cough or vomit blood  · Your bowel movements are black or bloody  · You cannot stop vomiting, or it hurts to swallow  Contact your healthcare provider if:   · You have questions or concerns about your condition or care  Medicines:   · Medicines  may be given to treat the cause of your chest pain  Examples include pain medicine, anxiety medicine, or medicines to increase blood flow to your heart  · Do not take certain medicines without asking your healthcare provider first   These include NSAIDs, herbal or vitamin supplements, or hormones (estrogen or progestin)  · Take your medicine as directed  Contact your healthcare provider if you think your medicine is not helping or if you have side effects  Tell him or her if you are allergic to any medicine   Keep a list of the medicines, vitamins, and herbs you take  Include the amounts, and when and why you take them  Bring the list or the pill bottles to follow-up visits  Carry your medicine list with you in case of an emergency  Follow up with your healthcare provider within 72 hours, or as directed: You may need to return for more tests to find the cause of your chest pain  You may be referred to a specialist, such as a cardiologist or gastroenterologist  Write down your questions so you remember to ask them during your visits  Healthy living tips: The following are general healthy guidelines  If your chest pain is caused by a heart problem, your healthcare provider will give you specific guidelines to follow  · Do not smoke  Nicotine and other chemicals in cigarettes and cigars can cause lung and heart damage  Ask your healthcare provider for information if you currently smoke and need help to quit  E-cigarettes or smokeless tobacco still contain nicotine  Talk to your healthcare provider before you use these products  · Eat a variety of healthy, low-fat foods  Healthy foods include fruits, vegetables, whole-grain breads, low-fat dairy products, beans, lean meats, and fish  Ask for more information about a heart healthy diet  · Ask about activity  Your healthcare provider will tell you which activities to limit or avoid  Ask when you can drive, return to work, and have sex  Ask about the best exercise plan for you  · Maintain a healthy weight  Ask your healthcare provider how much you should weigh  Ask him or her to help you create a weight loss plan if you are overweight  © 2017 [employer] Information is for End User's use only and may not be sold, redistributed or otherwise used for commercial purposes  All illustrations and images included in CareNotes® are the copyrighted property of [employer] , [employer]  or Vasiliy Agarwal  The above information is an  only   It is not intended as medical advice for individual conditions or treatments  Talk to your doctor, nurse or pharmacist before following any medical regimen to see if it is safe and effective for you  Hyperlipidemia   WHAT YOU NEED TO KNOW:   Hyperlipidemia is a high level of lipids (fats) in your blood  These lipids include cholesterol or triglycerides  Lipids are made by your body  They also come from the foods you eat  Your body needs lipids to work properly, but high levels increase your risk for heart disease, heart attack, and stroke  DISCHARGE INSTRUCTIONS:   Call 911 for any of the following:   · You have any of the following signs of a heart attack:      ¨ Squeezing, pressure, or pain in your chest that lasts longer than 5 minutes or returns    ¨ Discomfort or pain in your back, neck, jaw, stomach, or arm     ¨ Trouble breathing    ¨ Nausea or vomiting    ¨ Lightheadedness or a sudden cold sweat, especially with chest pain or trouble breathing    · You have any of the following signs of a stroke:      ¨ Numbness or drooping on one side of your face     ¨ Weakness in an arm or leg    ¨ Confusion or difficulty speaking    ¨ Dizziness, a severe headache, or vision loss  Contact your healthcare provider if:   · You have questions or concerns about your condition or care  Medicines:   · Medicines  may be given to reduce your cholesterol or triglyceride levels  · Take your medicine as directed  Contact your healthcare provider if you think your medicine is not helping or if you have side effects  Tell him or her if you are allergic to any medicine  Keep a list of the medicines, vitamins, and herbs you take  Include the amounts, and when and why you take them  Bring the list or the pill bottles to follow-up visits  Carry your medicine list with you in case of an emergency  Follow up with your healthcare provider as directed: You may need to return for more tests  Your healthcare provider may refer you to a dietitian   Write down your questions so you remember to ask them during your visits  Manage hyperlipidemia:  Your healthcare provider may first recommend that you make lifestyle changes to help decrease your lipid levels  You may also need to take medicine to lower your lipid levels  Some of the lifestyle changes you may need to make include the following:  · Maintain a healthy weight  Ask your healthcare provider how much you should weigh  Ask him or her to help you create a weight loss plan if you are overweight  Weight loss can decrease your cholesterol and triglyceride levels  · Exercise as directed  Exercise lowers your cholesterol levels and helps you maintain a healthy weight  Get 40 minutes or more of moderate exercise 3 to 4 days each week  You can split your exercise into four 10-minute workouts instead of 40 minutes at one time  Examples of moderate exercises include walking briskly, swimming, or riding a bike  Work with your healthcare provider to plan the best exercise program for you  · Do not smoke  Nicotine and other chemicals in cigarettes and cigars can increase your risk for a heart attack and stroke  Ask your healthcare provider for information if you currently smoke and need help to quit  E-cigarettes or smokeless tobacco still contain nicotine  Talk to your healthcare provider before you use these products  · Eat heart-healthy foods  Talk to your dietitian about a heart-healthy diet  The following will help you manage hyperlipidemia:     ¨ Decrease the total amount of fat you eat  Choose lean meats, fat-free or 1% fat milk, and low-fat dairy products, such as yogurt and cheese  Limit or do not eat red meat  Red meats are high in fat and cholesterol  ¨ Replace unhealthy fats with healthy fats  Unhealthy fats include saturated fat, trans fat, and cholesterol  Choose soft margarines that are low in saturated fat and have little or no trans fat  Monounsaturated fats are healthy fats   These are found in olive oil, canola oil, avocado, and nuts  Polyunsaturated fats are also healthy  These are found in fish, flaxseed, walnuts, and soybeans  ¨ Eat fruits and vegetables every day  They are low in calories and fat and a good source of essential vitamins  Include dark green, red, and orange vegetables  Examples include spinach, kale, broccoli, and carrots  ¨ Eat foods high in fiber  Choose whole grain, high-fiber foods  Good choices include whole-wheat breads or cereals, beans, peas, fruits, and vegetables  · Ask your healthcare provider if it is safe for you to drink alcohol  Alcohol can increase your cholesterol and triglyceride levels  A drink of alcohol is 12 ounces of beer, 5 ounces of wine, or 1½ ounces of liquor  © 2017 [address] [employer] Information is for End User's use only and may not be sold, redistributed or otherwise used for commercial purposes  All illustrations and images included in CareNotes® are the copyrighted property of A D A [employer]" , [employer]  or Vasiliy Agarwal  The above information is an  only  It is not intended as medical advice for individual conditions or treatments  Talk to your doctor, nurse or pharmacist before following any medical regimen to see if it is safe and effective for you

## 2018-01-23 NOTE — PROGRESS NOTES
Pt stayed to get a CTA PE study  No PE was presented so pt ready for discharge  Maria Antonia Walker spoke with patient to follow up outpatient  Pts belongings accounted  Papers and prescriptions given  IV out  VS stable  Tele off with no recent events  NSR 80s  Pt ready for discharge

## 2018-01-26 LAB
ATRIAL RATE: 96 BPM
P AXIS: 76 DEGREES
PR INTERVAL: 182 MS
QRS AXIS: 58 DEGREES
QRSD INTERVAL: 86 MS
QT INTERVAL: 350 MS
QTC INTERVAL: 442 MS
T WAVE AXIS: 67 DEGREES
VENTRICULAR RATE: 96 BPM

## 2018-01-29 RX ORDER — LIDOCAINE HYDROCHLORIDE 30 MG/G
CREAM TOPICAL
Refills: 2 | COMMUNITY
Start: 2018-01-25 | End: 2018-03-16

## 2018-01-29 RX ORDER — ATOMOXETINE 25 MG/1
1 CAPSULE ORAL DAILY
COMMUNITY
Start: 2017-08-02 | End: 2018-01-29 | Stop reason: SDUPTHER

## 2018-01-29 RX ORDER — FLUOXETINE HYDROCHLORIDE 20 MG/1
20 CAPSULE ORAL DAILY
Refills: 1 | COMMUNITY
Start: 2018-01-10 | End: 2018-01-29 | Stop reason: CLARIF

## 2018-01-29 RX ORDER — ALPRAZOLAM 0.5 MG/1
TABLET ORAL
Refills: 1 | COMMUNITY
Start: 2017-11-16 | End: 2018-01-29 | Stop reason: CLARIF

## 2018-01-29 RX ORDER — DULOXETIN HYDROCHLORIDE 30 MG/1
1 CAPSULE, DELAYED RELEASE ORAL DAILY
COMMUNITY
Start: 2017-08-02 | End: 2018-01-29 | Stop reason: SDUPTHER

## 2018-01-29 RX ORDER — MORPHINE SULFATE 30 MG/1
TABLET ORAL
COMMUNITY
End: 2018-03-16

## 2018-01-29 RX ORDER — LORAZEPAM 1 MG/1
1 TABLET ORAL 3 TIMES DAILY
Refills: 1 | Status: ON HOLD | COMMUNITY
Start: 2018-01-10 | End: 2018-09-25

## 2018-01-29 RX ORDER — QUETIAPINE FUMARATE 100 MG/1
1 TABLET, FILM COATED ORAL
COMMUNITY
Start: 2017-08-02 | End: 2018-01-29 | Stop reason: SDUPTHER

## 2018-01-29 RX ORDER — ZOLPIDEM TARTRATE 10 MG/1
TABLET ORAL
Refills: 1 | COMMUNITY
Start: 2018-01-10 | End: 2018-04-22

## 2018-01-29 RX ORDER — NAPROXEN SODIUM 550 MG/1
1 TABLET ORAL 2 TIMES DAILY
COMMUNITY
Start: 2017-08-02 | End: 2018-01-29 | Stop reason: ALTCHOICE

## 2018-01-30 ENCOUNTER — OFFICE VISIT (OUTPATIENT)
Dept: INTERNAL MEDICINE CLINIC | Facility: CLINIC | Age: 63
End: 2018-01-30
Payer: COMMERCIAL

## 2018-01-30 VITALS
DIASTOLIC BLOOD PRESSURE: 76 MMHG | TEMPERATURE: 98 F | WEIGHT: 194.89 LBS | HEIGHT: 70 IN | BODY MASS INDEX: 27.9 KG/M2 | SYSTOLIC BLOOD PRESSURE: 130 MMHG | HEART RATE: 80 BPM

## 2018-01-30 DIAGNOSIS — Z23 NEED FOR PROPHYLACTIC VACCINATION AND INOCULATION AGAINST INFLUENZA: Primary | ICD-10-CM

## 2018-01-30 DIAGNOSIS — K62.1 SESSILE RECTAL POLYP: ICD-10-CM

## 2018-01-30 DIAGNOSIS — N52.8 OTHER MALE ERECTILE DYSFUNCTION: ICD-10-CM

## 2018-01-30 DIAGNOSIS — I10 ESSENTIAL HYPERTENSION: ICD-10-CM

## 2018-01-30 DIAGNOSIS — E78.2 MIXED HYPERLIPIDEMIA: Chronic | ICD-10-CM

## 2018-01-30 DIAGNOSIS — D12.3 ADENOMATOUS POLYP OF TRANSVERSE COLON: ICD-10-CM

## 2018-01-30 PROBLEM — R73.03 PREDIABETES: Status: ACTIVE | Noted: 2018-01-30

## 2018-01-30 PROBLEM — M54.50 CHRONIC LOW BACK PAIN: Status: ACTIVE | Noted: 2018-01-30

## 2018-01-30 PROBLEM — R07.9 CHEST PAIN: Status: RESOLVED | Noted: 2018-01-21 | Resolved: 2018-01-30

## 2018-01-30 PROBLEM — G89.29 CHRONIC LOW BACK PAIN: Status: ACTIVE | Noted: 2018-01-30

## 2018-01-30 PROBLEM — D49.2 LUMBAR SPINE TUMOR: Status: RESOLVED | Noted: 2018-01-30 | Resolved: 2018-01-30

## 2018-01-30 PROBLEM — D49.2 LUMBAR SPINE TUMOR: Status: ACTIVE | Noted: 2018-01-30

## 2018-01-30 PROBLEM — N52.9 ERECTILE DYSFUNCTION: Status: ACTIVE | Noted: 2018-01-30

## 2018-01-30 PROCEDURE — 99495 TRANSJ CARE MGMT MOD F2F 14D: CPT | Performed by: INTERNAL MEDICINE

## 2018-01-30 RX ORDER — ATORVASTATIN CALCIUM 40 MG/1
40 TABLET, FILM COATED ORAL DAILY
Qty: 90 TABLET | Refills: 3 | Status: SHIPPED | OUTPATIENT
Start: 2018-01-30 | End: 2018-04-22

## 2018-01-30 NOTE — PROGRESS NOTES
ASSESSMENT/PLAN:  Problem List Items Addressed This Visit        Digestive    Adenomatous polyp of transverse colon    Sessile rectal polyp     Scheduled for repeat colonoscopy  Cardiovascular and Mediastinum    HTN (hypertension)     Well controlled off of medication  Continue to monitor            Genitourinary    Erectile dysfunction     Possible psychiatric component with increased stress  Patient to discuss with therapist and psychiatrist     Will assess in follow up  Other    HLD (hyperlipidemia) (Chronic)     Patinet unable to use pravastatin due to insurance  ACVD of >10%  Prescribe new statin  Relevant Medications    atorvastatin (LIPITOR) 40 mg tablet      Other Visit Diagnoses     Need for prophylactic vaccination and inoculation against influenza    -  Primary          Health Maintenance:  Colonscopy - 8/2017 - 3 diminutive polyps with path: iliocecal normal, transverse colon adenoma, rectal serrated  Repeat colonoscopy in coming weeks  Influenza yearly  With <10 pack year smoking history, no AAA or CT lung screening  CHIEF COMPLAINT: Hospital follow up - chest pain  HISTORY OF PRESENT ILLNESS:  Patient presents 1 week after discharge from [facility] with chest pain and hypertensive emergency  He had a peak troponin of 0 08  Testing, including nuclear stress and CTA demonstrated no significant coronary stenosis and no pulmonary embolism  He has felt generally well  He denies symptoms of infection  Since discharge he has only had one episode of chest pain involving movment of his body and not physical exertion  Review of Systems   Constitutional: Negative for activity change, appetite change, chills, diaphoresis, fatigue and fever  Eyes: Negative for visual disturbance  Respiratory: Negative for cough, chest tightness and shortness of breath  Cardiovascular: Positive for chest pain  Negative for palpitations and leg swelling  Gastrointestinal: Negative for abdominal pain, constipation, diarrhea, nausea and vomiting  Endocrine: Negative for polyuria  Genitourinary: Negative for difficulty urinating  Skin: Negative for color change, pallor and rash  Neurological: Negative for dizziness, light-headedness and headaches  Hematological: Negative for adenopathy  Does not bruise/bleed easily  Psychiatric/Behavioral: The patient is nervous/anxious  All other systems reviewed and are negative  OBJECTIVE:  Vitals:    01/30/18 1506   BP: 130/76   Pulse: 80   Temp: 98 °F (36 7 °C)   Weight: 88 4 kg (194 lb 14 2 oz)   Height: 5' 10" (1 778 m)     Physical Exam   Constitutional: He appears well-developed and well-nourished  No distress  HENT:   Head: Normocephalic and atraumatic  Mouth/Throat: Oropharynx is clear and moist  No oropharyngeal exudate  Eyes: Conjunctivae are normal  No scleral icterus  Neck: No JVD present  No tracheal deviation present  Cardiovascular: Normal rate, regular rhythm and intact distal pulses  Pulmonary/Chest: Effort normal and breath sounds normal  No respiratory distress  Abdominal: Soft  There is no tenderness  Musculoskeletal: He exhibits no edema  Patient wearing lumbar brace     Lymphadenopathy:     He has no cervical adenopathy  Skin: Skin is warm and dry  No rash noted  He is not diaphoretic  No erythema  Psychiatric: He has a normal mood and affect  His behavior is normal    Vitals reviewed          Current Outpatient Prescriptions:     aspirin (ECOTRIN LOW STRENGTH) 81 mg EC tablet, Take 1 tablet by mouth daily for 30 days, Disp: 30 tablet, Rfl: 0    DULoxetine (CYMBALTA) 30 mg delayed release capsule, Take 1 capsule by mouth daily, Disp: 15 capsule, Rfl: 0    Lidocaine HCl 3 % CREA, USE TOPICALLY UTD, Disp: , Rfl: 2    QUEtiapine (SEROquel) 100 mg tablet, Take 1 tablet by mouth daily at bedtime, Disp: 15 tablet, Rfl: 0    zolpidem (AMBIEN) 10 mg tablet, TK 1 T PO QD HS, Disp: , Rfl: 1    atoMOXetine (STRATTERA) 25 mg capsule, Take 1 capsule by mouth daily, Disp: 15 capsule, Rfl: 0    atorvastatin (LIPITOR) 40 mg tablet, Take 1 tablet (40 mg total) by mouth daily, Disp: 90 tablet, Rfl: 3    LORazepam (ATIVAN) 1 mg tablet, Take 1 mg by mouth 3 (three) times a day, Disp: , Rfl: 1    morphine (MSIR) 30 MG tablet, Take by mouth, Disp: , Rfl:     Past Medical History:   Diagnosis Date    Chronic back pain     Chronic pain     Gallstones     Spinal arachnoid cyst      Past Surgical History:   Procedure Laterality Date    INCISION AND DRAINAGE ABSCESS / HEMATOMA OF [place] / [place] Guile / THIGH      outer right leg and center of upper back   complicated  resolved status    MS COLONOSCOPY FLX DX W/COLLJ SPEC WHEN PFRMD N/A 8/31/2017    Procedure: COLONOSCOPY;  Surgeon: Olesya Gómez MD;  Location: [facility];   Service: Gastroenterology    TUMOR REMOVAL       Social History     Social History    Marital status: Single     Spouse name: N/A    Number of children: 1    Years of education: N/A     Occupational History          disabled     Social History Main Topics    Smoking status: Never Smoker    Smokeless tobacco: Never Used      Comment: all scripts says former smoker quit at [employer] Alcohol use No      Comment: QUIT 26 SEP 2016 REMOTELY    Drug use: Yes     Types: Marijuana    Sexual activity: Yes     Other Topics Concern    Not on file     Social History Narrative    Disabled    GED     Family History   Problem Relation Age of Onset    Throat cancer Mother     Heart attack Father      at [de-identified]    Stomach cancer Sister     Lung cancer Brother     Stomach cancer Brother

## 2018-01-30 NOTE — ASSESSMENT & PLAN NOTE
· Present upon admission denies chest pain, palpitations, diaphoresis nausea vomiting atypical chest pain likely secondary to anxiety  ·  EKG sinus rhythm  · Troponin I 0 08, 0 06, 0 03  · ZELDA score 2  · Myoscan stress test negative for ischemia no reproducible chest pain  ·  [id number] LVEF with a grade 1 diastolic dysfunction  · CTA chest-negative for PE  · Cardiology recommended close follow-up outpatient   continue ASA 81 mg and pravastatin

## 2018-01-30 NOTE — PATIENT INSTRUCTIONS
Take your statin medication    Use bowel preparation for colonoscopy    Follow up in 4 months with PCP, Dr Shikha Edwards

## 2018-01-30 NOTE — DISCHARGE SUMMARY
Discharge- Chelsey Hussein   1955, 60 y o  male MRN: [id number]    Unit/Bed#: -01 Encounter: [id number]    Primary Care Provider: Kate Garcia MD   Date and time admitted to hospital: 1/21/2018 10:12 AM        * Chest pain   Assessment & Plan    · Present upon admission denies chest pain, palpitations, diaphoresis nausea vomiting atypical chest pain likely secondary to anxiety  ·  EKG sinus rhythm  · Troponin I 0 08, 0 06, 0 03  · ZELDA score 2  · Myoscan stress test negative for ischemia no reproducible chest pain  ·  [id number] LVEF with a grade 1 diastolic dysfunction  · CTA chest-negative for PE  · Cardiology recommended close follow-up outpatient  continue ASA 81 mg and pravastatin        HLD (hyperlipidemia)   Assessment & Plan    · Elevated triglycerides and LDL  · Continue pravastatin 40 mg p o  q day given CV 10 year risk assessment 10 2%  · Will need repeat lipid panel within 3 months outpatient            Adult residual type attention deficit hyperactivity disorder (ADHD)   Assessment & Plan    · Stable moods  · Continue home medications Cymbalta, Seroquel HS  · Hold home medication-Strattera likely restart upon discharge                Consultations During Hospital Stay:  · Cardiology    Procedures Performed:     · Nuclear medicine myocardial perfusion pharmacological stress test 01/23/2018-normal study after pharmacological vasodilation no ischemia scar tachycardia baseline correlating with activity    Significant Findings / Test Results:     · Echocardiogram-LVEF 55-60% with a grade 1 diastolic dysfunction  · CTA chest-no PE subsegmental atelectasis of bilateral bases cholelithiasis  · Chest x-ray-left basilar subsegmental atelectasis    Incidental Findings:   · none    Test Results Pending at Discharge (will require follow up):   · none     Outpatient Tests Requested:  · Repeat lipid panel within 3 months with PCP    Complications:  None    Reason for Admission:  Atypical chest pain secondary to anxiety    Hospital Course:     Donnald Runner Sr  is a 60 y o  male patient with past medical history of hyperlipidemia who originally presented to the hospital on 1/21/2018 due to chest pain X 1 day  Patient stated while sitting on a couch watching TV he felt a sudden onset of chest pressure across the center of his chest that radiated to bilateral rib cage persisted throughout the night and awoke with lightheadedness and proceeded to seek medical attention through the emergency department for further evaluation  Patient denied nausea, vomiting, headache or vision changes, radiation of chest pain to bilateral upper extremities neck or jaw, shortness of breath  Patient noted 1 episode of chest pain similar roughly 1 year ago  In the ED, patient was found to be hypertensive at 188/100 and chest x-ray revealed a left basilar atelectasis with a mildly elevated WBC at 10 91 initial troponin at 0 08 EKG revealing sinus bradycardia without ST elevation or depression and admitted to medical-surgical floor for a 2 day stay for further evaluation of chest pain  On the medical-surgical floor, patient remained asymptomatic without lightheadedness or dizziness chest pain or shortness of breath palpitations or diaphoresis  Troponins trended downward 0 08, 0 06, 0 03 overnight and Cardiology was consulted in regards to appropriate management recommended echocardiogram which revealed an LVEF of 55-60% and a grade 1 diastolic dysfunction in addition to of nuclear medicine myocardial perfusion pharmacological stress test performed on 01/23/2018 which revealed a normal study after pharmacological vasodilation without ischemia or scarring in addition to tachycardia baseline correlating with activity    Patient remained tachycardic 110-120 is following my 0 scan without chest pain, shortness of breath, palpitations however given no cardiac etiology noted a CTA chest was performed to rule out pulmonary embolism which was negative and revealed bibasilar atelectasis with cholelithiasis  Patient was initiated on ASA 81 mg and pravastatin 40 mg after lipid panel revealed elevations  Patient was heavily counseled in regards to close follow-up with PCP and Cardiology within 7-10 days for repeat blood work which includes a lipid panel  All questions answered to the best my abilities and patient was discharged home with wife  Please see above list of diagnoses and related plan for additional information  Condition at Discharge: good     Discharge Day Visit / Exam:     Subjective:  Patient was seen and examined at the bedside with RN jenny Calderon Patient denied chest pain, palpitations, diaphoresis, shortness of breath, felt to, nausea vomiting or diarrhea  Patient states he feels slightly anxious in asked when he would be able to be discharged later today  Vitals: Blood Pressure: 148/78 (01/23/18 1526)  Pulse: 87 (01/23/18 1526)  Temperature: 98 6 °F (37 °C) (01/23/18 1526)  Temp Source: Oral (01/23/18 1526)  Respirations: 18 (01/23/18 1526)  Height: 5' 10" (177 8 cm) (01/21/18 1620)  Weight - Scale: 90 8 kg (200 lb 2 8 oz) (01/21/18 1620)  SpO2: 96 % (01/23/18 1526)  Exam:   Physical Exam   Constitutional: He is oriented to person, place, and time  He appears well-developed  No distress  Sitting upright in chair fairly comfortable in no acute distress   HENT:   Head: Normocephalic and atraumatic  Mouth/Throat: Oropharynx is clear and moist  No oropharyngeal exudate  Eyes: Conjunctivae and EOM are normal  Pupils are equal, round, and reactive to light  Right eye exhibits no discharge  Left eye exhibits no discharge  No scleral icterus  Neck: Normal range of motion  Neck supple  No JVD present  No tracheal deviation present  No thyromegaly present  Cardiovascular: Regular rhythm  Exam reveals no gallop and no friction rub  No murmur heard    Tachycardic, DP pulses present bilaterally, no bilateral lower extremity   Pulmonary/Chest: Effort normal and breath sounds normal  No respiratory distress  He has no wheezes  He has no rales  Abdominal: Soft  Bowel sounds are normal  He exhibits no distension and no mass  There is no rebound  Musculoskeletal: Normal range of motion  He exhibits no edema, tenderness or deformity  Lymphadenopathy:     He has no cervical adenopathy  Neurological: He is alert and oriented to person, place, and time  No cranial nerve deficit  Coordination normal    Skin: Skin is warm and dry  No rash noted  He is not diaphoretic  Psychiatric: His behavior is normal  Thought content normal          Discharge instructions/Information to patient and family:   See after visit summary for information provided to patient and family  Provisions for Follow-Up Care:  See after visit summary for information related to follow-up care and any pertinent home health orders  Disposition:     Home    For Discharges to [facility]:   · Not Applicable to this Patient - Not Applicable to this Patient    Planned Readmission: None     Discharge Statement:  I spent 30 minutes discharging the patient  This time was spent on the day of discharge  I had direct contact with the patient on the day of discharge  Greater than 50% of the total time was spent examining patient, answering all patient questions, arranging and discussing plan of care with patient as well as directly providing post-discharge instructions  Additional time then spent on discharge activities  Discharge Medications:  See after visit summary for reconciled discharge medications provided to patient and family        ** Please Note: This note has been constructed using a voice recognition system **

## 2018-01-30 NOTE — ASSESSMENT & PLAN NOTE
· Elevated triglycerides and LDL  · Continue pravastatin 40 mg p o  q day given CV 10 year risk assessment 10 2%  · Will need repeat lipid panel within 3 months outpatient

## 2018-01-30 NOTE — ASSESSMENT & PLAN NOTE
Possible psychiatric component with increased stress  Patient to discuss with therapist and psychiatrist     Will assess in follow up

## 2018-02-02 ENCOUNTER — OFFICE VISIT (OUTPATIENT)
Dept: MULTI SPECIALTY CLINIC | Facility: CLINIC | Age: 63
End: 2018-02-02
Payer: COMMERCIAL

## 2018-02-02 ENCOUNTER — TRANSCRIBE ORDERS (OUTPATIENT)
Dept: INTERNAL MEDICINE CLINIC | Facility: CLINIC | Age: 63
End: 2018-02-02

## 2018-02-02 VITALS
BODY MASS INDEX: 28.03 KG/M2 | SYSTOLIC BLOOD PRESSURE: 112 MMHG | HEIGHT: 70 IN | WEIGHT: 195.77 LBS | HEART RATE: 72 BPM | DIASTOLIC BLOOD PRESSURE: 82 MMHG | TEMPERATURE: 98.4 F

## 2018-02-02 DIAGNOSIS — R07.9 CHEST PAIN, UNSPECIFIED TYPE: ICD-10-CM

## 2018-02-02 DIAGNOSIS — R07.9 CHEST PAIN, UNSPECIFIED TYPE: Primary | ICD-10-CM

## 2018-02-02 DIAGNOSIS — E78.5 HYPERLIPIDEMIA, UNSPECIFIED HYPERLIPIDEMIA TYPE: ICD-10-CM

## 2018-02-02 PROCEDURE — 99212 OFFICE O/P EST SF 10 MIN: CPT | Performed by: INTERNAL MEDICINE

## 2018-02-02 NOTE — PATIENT INSTRUCTIONS
Lifestyle Reccomendations- Please consume a diet rich in vegetables, fruits, and whole grains; including low-fat dairy products, poultry, fish, legumes, nontropical vegetable oils and nuts if not allergic; and limit intake of sweets, sugar-sweetened beverages and red meats  Aerobic physical activity 3-4 times per week lasting 40 minutes per session is recommended if tolerated

## 2018-02-02 NOTE — PROGRESS NOTES
Cardiology Follow Up    Fela Brizuela    1955  [id number]  [address] [facility] [address]    Discussion/Summary:  1  Atypical chest pain- Izzy Thacker was recently hospitalized and worked up with a pharmacological nuclear stress test which was negative for ischemia  His symptoms are mostly on palpation over his sternum  The minimally elevated troponin was in the setting of accelerated hypertension  Echocardiography was done and revealed normal LV systolic function with no regional wall motion abnormalities  He is on aspirin and atorvastatin  Would advise to continue this with no other further workup or intervention at this time     2  Hyperlipidemia-ASCVD risk is 7 6% continue atorvastatin    3  T12 extramedullary mass-follows with Neurosurgery    History of Present Illness:   Izzy Thacker is a very pleasant 77-year-old man with no significant prior cardiac history who presents to establish care after recent hospitalization to rule out acute coronary syndrome  He initially presented to the ED with persistent mid sternal chest discomfort at rest  The pain was reportedly reproducible along his rib cage  Evaluation in the hospital revealed and elevated troponin that peaked at 0 08  This was in the setting of accelerated hypertension with a blood pressure of 188/100  EKGs in the hospital were unremarkable  Given his elevated troponin a nuclear stress test was done as an inpatient which was negative for ischemia or scar  He did have a moderate size fixed perfusion defect of the inferior wall that is likely due to diaphragmatic attenuation artifact  The left ventricular ejection fraction was 75% with no regional wall motion abnormality    Additionally on echocardiography his ejection fraction was noted to be 55-60% with no regional wall motion abnormalities and grade 1 diastolic dysfunction  He was subsequently discharged and instructed to follow up as an outpatient  Today in the office he denies any acute concerns  He still has some residual chest tenderness however this is completely reproducible over his sternum  He is very active at baseline has had no exertional complaints  He denies any shortness of breath or diaphoresis  His blood pressure is also markedly better controlled  He denies any significant alcohol use but does smoke marijuana twice per day  He has a family history of coronary disease in his brother and father        Patient Active Problem List   Diagnosis    Adult residual type attention deficit hyperactivity disorder (ADHD)    HTN (hypertension)    HLD (hyperlipidemia)    Prediabetes    Chronic low back pain    Adenomatous polyp of transverse colon    Sessile rectal polyp    Erectile dysfunction     Past Medical History:   Diagnosis Date    Chronic back pain     Chronic pain     Gallstones     Spinal arachnoid cyst      Social History     Social History    Marital status: Single     Spouse name: N/A    Number of children: 3    Years of education: N/A     Occupational History          disabled     Social History Main Topics    Smoking status: Former Smoker    Smokeless tobacco: Never Used      Comment: all scripts says former smoker quit at [employer] Alcohol use Yes      Comment: once a month    Drug use: Yes     Types: Marijuana    Sexual activity: Yes     Other Topics Concern    Not on file     Social History Narrative    Disabled    GED      Family History   Problem Relation Age of Onset    Throat cancer Mother     Heart attack Father      at [de-identified]    Stomach cancer Sister     Lung cancer Brother     Stomach cancer Brother      Past Surgical History:   Procedure Laterality Date    INCISION AND DRAINAGE ABSCESS / HEMATOMA OF BURSA / Nicky Means / THIGH      outer right leg and center of upper back   complicated  resolved status    KS COLONOSCOPY FLX DX W/COLLJ SPEC WHEN PFRMD N/A 8/31/2017    Procedure: COLONOSCOPY;  Surgeon: Juan Mascorro MD;  Location: [facility];   Service: Gastroenterology    TUMOR REMOVAL         Current Outpatient Prescriptions:     aspirin (ECOTRIN LOW STRENGTH) 81 mg EC tablet, Take 1 tablet by mouth daily for 30 days, Disp: 30 tablet, Rfl: 0    atoMOXetine (STRATTERA) 25 mg capsule, Take 1 capsule by mouth daily, Disp: 15 capsule, Rfl: 0    atorvastatin (LIPITOR) 40 mg tablet, Take 1 tablet (40 mg total) by mouth daily, Disp: 90 tablet, Rfl: 3    DULoxetine (CYMBALTA) 30 mg delayed release capsule, Take 1 capsule by mouth daily, Disp: 15 capsule, Rfl: 0    Lidocaine HCl 3 % CREA, USE TOPICALLY UTD, Disp: , Rfl: 2    LORazepam (ATIVAN) 1 mg tablet, Take 1 mg by mouth 3 (three) times a day, Disp: , Rfl: 1    QUEtiapine (SEROquel) 100 mg tablet, Take 1 tablet by mouth daily at bedtime, Disp: 15 tablet, Rfl: 0    zolpidem (AMBIEN) 10 mg tablet, TK 1 T PO QD HS, Disp: , Rfl: 1    morphine (MSIR) 30 MG tablet, Take by mouth, Disp: , Rfl:   No Known Allergies    Labs:  Lab Results   Component Value Date    WBC 8 35 01/22/2018    HGB 14 6 01/22/2018    HCT 43 9 01/22/2018    MCV 89 01/22/2018     01/22/2018     Lab Results   Component Value Date    GLUCOSE 103 01/22/2018    CALCIUM 9 2 01/22/2018     01/22/2018    K 4 4 01/22/2018    CO2 29 01/22/2018     01/22/2018    BUN 13 01/22/2018    CREATININE 1 09 01/22/2018     Lab Results   Component Value Date    HGBA1C 6 1 08/08/2017     Lab Results   Component Value Date    CHOL 185 01/22/2018    CHOL 121 08/08/2017    CHOL 114 07/29/2017     Lab Results   Component Value Date    HDL 38 (L) 01/22/2018    HDL 34 (L) 08/08/2017    HDL 27 (L) 07/29/2017     Lab Results   Component Value Date    LDLCALC 111 (H) 01/22/2018    LDLCALC 66 08/08/2017    LDLCALC 67 07/29/2017     Lab Results   Component Value Date    TRIG 180 (H) 01/22/2018    TRIG 104 08/08/2017    TRIG 100 07/29/2017         Imaging:       Review of Systems:  Review of Systems   Constitutional: Negative for chills, diaphoresis, fatigue and unexpected weight change  Respiratory: Negative for cough, choking, chest tightness and wheezing  Cardiovascular: Negative for chest pain, palpitations and leg swelling  Gastrointestinal: Negative  Physical Exam:  Physical Exam   Constitutional: He is oriented to person, place, and time  He appears well-developed and well-nourished  No distress  Eyes: Conjunctivae are normal  Pupils are equal, round, and reactive to light  Neck: Normal range of motion  Neck supple  No JVD present  Cardiovascular: Normal rate, regular rhythm and intact distal pulses  Exam reveals no gallop and no friction rub  No murmur heard  Pulmonary/Chest: No respiratory distress  He has no wheezes  He has no rales  He exhibits tenderness  Abdominal: Soft  Musculoskeletal: He exhibits no edema  Neurological: He is alert and oriented to person, place, and time  Skin: Skin is warm and dry  He is not diaphoretic

## 2018-02-07 PROBLEM — K63.5 POLYP OF COLON: Status: ACTIVE | Noted: 2018-02-07

## 2018-02-20 ENCOUNTER — HOSPITAL ENCOUNTER (OUTPATIENT)
Dept: MRI IMAGING | Facility: HOSPITAL | Age: 63
Discharge: HOME | End: 2018-02-20
Attending: NEUROLOGICAL SURGERY
Payer: COMMERCIAL

## 2018-02-20 DIAGNOSIS — E24.8 HYPERCORTISOLISM DUE TO ADRENAL NEOPLASM (HCC): ICD-10-CM

## 2018-02-20 DIAGNOSIS — D49.7 HYPERCORTISOLISM DUE TO ADRENAL NEOPLASM (HCC): ICD-10-CM

## 2018-02-20 PROCEDURE — 72158 MRI LUMBAR SPINE W/O & W/DYE: CPT

## 2018-02-20 PROCEDURE — A9585 GADOBUTROL INJECTION: HCPCS | Performed by: NEUROLOGICAL SURGERY

## 2018-02-20 RX ADMIN — GADOBUTROL 9 ML: 604.72 INJECTION INTRAVENOUS at 21:08

## 2018-03-16 ENCOUNTER — TRANSCRIBE ORDERS (OUTPATIENT)
Dept: NEUROSURGERY | Facility: CLINIC | Age: 63
End: 2018-03-16

## 2018-03-16 ENCOUNTER — OFFICE VISIT (OUTPATIENT)
Dept: NEUROSURGERY | Facility: CLINIC | Age: 63
End: 2018-03-16
Payer: COMMERCIAL

## 2018-03-16 VITALS
HEART RATE: 69 BPM | RESPIRATION RATE: 14 BRPM | DIASTOLIC BLOOD PRESSURE: 75 MMHG | HEIGHT: 70 IN | SYSTOLIC BLOOD PRESSURE: 135 MMHG | BODY MASS INDEX: 27.4 KG/M2 | WEIGHT: 191.4 LBS | TEMPERATURE: 97.8 F

## 2018-03-16 DIAGNOSIS — Z01.818 PRE-PROCEDURAL EXAMINATION: Primary | ICD-10-CM

## 2018-03-16 DIAGNOSIS — G89.29 CHRONIC BILATERAL LOW BACK PAIN WITHOUT SCIATICA: ICD-10-CM

## 2018-03-16 DIAGNOSIS — M54.50 CHRONIC BILATERAL LOW BACK PAIN WITHOUT SCIATICA: ICD-10-CM

## 2018-03-16 DIAGNOSIS — D49.7 INTRADURAL EXTRAMEDULLARY SPINAL TUMOR: Primary | ICD-10-CM

## 2018-03-16 PROCEDURE — 99213 OFFICE O/P EST LOW 20 MIN: CPT | Performed by: PHYSICIAN ASSISTANT

## 2018-03-16 RX ORDER — FLUOXETINE HYDROCHLORIDE 20 MG/1
1 CAPSULE ORAL
Refills: 1 | Status: ON HOLD | COMMUNITY
Start: 2018-02-06 | End: 2018-09-25

## 2018-03-16 NOTE — PATIENT INSTRUCTIONS
Continue all usual activities without restriction  Call in the interim should he have any thoughts about surgical excision of this lesion      Follow-up in 1 year with MRI lumbar spine with without contrast, Dr Ever Lopez, [facility])

## 2018-03-16 NOTE — PROGRESS NOTES
Assessment/Plan:  Very pleasant 76-year-old male, returns for 7 month follow-up, to review MRI lumbar spine 2/20/18, 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, about T12 was again noted on the imaging, the study was compared with prior studies of 6/26/17, and 1/15/16, remains at about 13-14 mm may have had some slight increase in size perhaps 1 mm, balance of the lumbar spine study otherwise reveals chronic degenerative changes without significant central or foraminal stenosis  Clinically the patient remains asymptomatic this lesion although he does have complaint of chronic low back pain which is intermittent and related activities there are no focal neurologic deficits appreciated on examination power in the lower extremities is 5 x 5 reflexes are intact  Management options were reviewed with the patient continue to observe with serial imaging which the patient has chosen at this time verses elective resection  Further follow-up is planned in approximately 1 year with repeat MRI with and without contrast prior to visit  The patient reports he plans to discuss this with his fiancee relative to a resection at this time  And will call us with his ultimate decision  He does understand should he have progressively worsening and consistent back pain he is to return sooner for reassessment  These findings, impressions and recommendations are reviewed in great detail with the patient, he expressed understanding and agreement, his questions were answered completely and to his satisfaction  Follow up has been scheduled  Diagnoses and all orders for this visit:    Intradural extramedullary spinal tumor  -     MRI lumbar spine with and without contrast; Future    Chronic bilateral low back pain without sciatica        Subjective:      Patient ID: Katherin Betancur  is a 60 y o  male      Very pleasant 76-year-old male, returns for 7 month follow-up, history of intradural extramedullary tumor of the spine  He has had updated MRI of the lumbar spine as requested 2/20/18  At this point healing has complaint of intermittent low back pain typically activity related and not associated with leg pain  He denies gait or balance disturbance, motor or sensory difficulties in the upper lower extremities, bowel or bladder incontinence  He presents today with a knee a preemie back brace on which he reports he wears intermittently as he is more comfortable with that on  He has a history of a back injury in 2016 at which time he underwent MRI of the lumbar spine and incidental note was made of this lesion  He reports no known medication allergies  He has a negative past surgical history  He takes no regular medications for diabetes, hypertension or cardiac issues  He reports no interval changes in medical or surgical history since his last visit  The following portions of the patient's history were reviewed and updated as appropriate: allergies, current medications, past family history, past medical history, past social history and past surgical history  Review of Systems   Constitutional: Negative  HENT: Negative  Eyes: Negative  Respiratory: Negative  Cardiovascular: Negative  Gastrointestinal: Positive for constipation (almost all the time)  Negative for abdominal distention, abdominal pain, anal bleeding, blood in stool, diarrhea, nausea, rectal pain and vomiting  Endocrine: Negative  Genitourinary: Negative  Musculoskeletal: Positive for back pain and gait problem  Negative for arthralgias, joint swelling, myalgias, neck pain and neck stiffness  Skin: Negative  Allergic/Immunologic: Negative  Neurological: Positive for dizziness (from blood pressure) and light-headedness  Negative for tremors, seizures, syncope, facial asymmetry, speech difficulty, weakness, numbness and headaches  Hematological: Negative  Psychiatric/Behavioral: Negative  Objective:    Physical Exam   Constitutional: He appears well-developed and well-nourished  HENT:   Head: Normocephalic  Eyes: Pupils are equal, round, and reactive to light  Neck: Normal range of motion  Cardiovascular: Normal rate, regular rhythm and normal heart sounds  Pulmonary/Chest: Effort normal and breath sounds normal    Musculoskeletal: Normal range of motion  Back exam;    Straight leg raise 90° bilaterally negative  Full range of motion in all planes  Motor and sensory examination intact  Reflexes 2+ and symmetric (patella and Achilles)   Neurological: He is alert  He has a normal Romberg Test  Gait normal    Reflex Scores:       Patellar reflexes are 2+ on the right side and 2+ on the left side  Neurologic Exam     Mental Status   Level of consciousness: alert    Cranial Nerves     CN III, IV, VI   Pupils are equal, round, and reactive to light  Motor Exam   Muscle bulk: normal    Strength   Right quadriceps: 5/5  Left quadriceps: 5/5  Right hamstrin/5  Left hamstrin/5  Right glutei: 5/5  Left glutei: 5/5  Right anterior tibial: 5/5  Left anterior tibial: 5/5    Gait, Coordination, and Reflexes     Gait  Gait: normal    Coordination   Romberg: negative    Reflexes   Right patellar: 2+  Left patellar: 2+       MRI LUMBAR SPINE WITH AND WITHOUT CONTRAST  (18)     INDICATION: 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 8: Other Cushing's syndrome  D49 7: Neoplasm of unspecified behavior of endocrine glands and other parts of nervous system  History taken directly from the electronic ordering system      COMPARISON:  MR 2017, CT 2017     TECHNIQUE:  Sagittal T1, sagittal T2, sagittal inversion recovery, axial T1 and axial T2, coronal T2  Sagittal and axial T1 postcontrast      IV Contrast:  9 mL of gadobutrol injection (MULTI-DOSE)      IMAGE QUALITY:  Diagnostic     FINDINGS:     ALIGNMENT:  Very slight retrolisthesis of L3 on L4    No fracture      MARROW SIGNAL:  Normal marrow signal is identified within the visualized bony structures  No discrete marrow lesion      DISTAL CORD AND CONUS:  Normal size and signal of the distal cord and conus  The conus ends at the L1 level  Alignment for difference in slice selection there is no change in size of the intradural extra medullary mass noted dorsally at the mid T12   level  This displaces the cord anteriorly  Visualized cord signal remains normal   On CT this is noncalcified  Despite absence of any significant enhancement, findings most consistent with nerve sheath tumor      PARASPINAL SOFT TISSUES:  Paraspinal soft tissues are unremarkable      SACRUM:  Normal signal within the sacrum  No evidence of insufficiency or stress fracture      LOWER THORACIC DISC SPACES:  Normal disc height and signal   No disc herniation, canal stenosis or foraminal narrowing      LUMBAR DISC SPACES:     L1-L2:  Minor bulge      L2-L3:  Small marginal osteophytes, minor facet changes     L3-L4:  Circumferential bulge, minor facet changes      L4-L5:  Circumferential bulge, mild facet arthrosis     L5-S1:  Minor facet arthrosis, mildly ectatic bifid left S1 root      POSTCONTRAST IMAGING:  No abnormal enhancement      IMPRESSION:     Stable MR appearance of the lumbar spine, mild, noncompressive degenerative changes  14-16 mm intradural extra medullary mass located dorsally at the mid T12 level is unchanged  This is not calcified on CT and does not enhance to any significant   degree  Findings most consistent with nerve sheath tumor

## 2018-03-16 NOTE — LETTER
March 16, 2018     Preeti Simpler, DO  [address] [facility] [address]    Patient: Bernardo Dutton Sr  YOB: 1955   Date of Visit: 3/16/2018       Dear Dr Rylie Hernandez:    Thank you for referring Zoey Kolb to me for evaluation  Below are my notes for this consultation  If you have questions, please do not hesitate to call me  I look forward to following your patient along with you  Sincerely,        Justa Beck MS, KAROLINE for Yennifer Bowling MD, PhD        CC: No Recipients  Justa Beck PA-C  3/16/2018 11:52 AM  Sign at close encounter  Assessment/Plan:  Very pleasant 69-year-old male, returns for 7 month follow-up, to review MRI lumbar spine 2/20/18, 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, about T12 was again noted on the imaging, the study was compared with prior studies of 6/26/17, and 1/15/16, remains at about 13-14 mm may have had some slight increase in size perhaps 1 mm, balance of the lumbar spine study otherwise reveals chronic degenerative changes without significant central or foraminal stenosis  Clinically the patient remains asymptomatic this lesion although he does have complaint of chronic low back pain which is intermittent and related activities there are no focal neurologic deficits appreciated on examination power in the lower extremities is 5 x 5 reflexes are intact  Management options were reviewed with the patient continue to observe with serial imaging which the patient has chosen at this time verses elective resection  Further follow-up is planned in approximately 1 year with repeat MRI with and without contrast prior to visit  The patient reports he plans to discuss this with his fiancee relative to a resection at this time  And will call us with his ultimate decision  He does understand should he have progressively worsening and consistent back pain he is to return sooner for reassessment      These findings, impressions and recommendations are reviewed in great detail with the patient, he expressed understanding and agreement, his questions were answered completely and to his satisfaction  Follow up has been scheduled  Diagnoses and all orders for this visit:    Intradural extramedullary spinal tumor  -     MRI lumbar spine with and without contrast; Future    Chronic bilateral low back pain without sciatica        Subjective:      Patient ID: Ernst Fernandez  is a 60 y o  male  Very pleasant 48-year-old male, returns for 7 month follow-up, history of intradural extramedullary tumor of the spine  He has had updated MRI of the lumbar spine as requested 2/20/18  At this point healing has complaint of intermittent low back pain typically activity related and not associated with leg pain  He denies gait or balance disturbance, motor or sensory difficulties in the upper lower extremities, bowel or bladder incontinence  He presents today with a knee a preemie back brace on which he reports he wears intermittently as he is more comfortable with that on  He has a history of a back injury in 2016 at which time he underwent MRI of the lumbar spine and incidental note was made of this lesion  He reports no known medication allergies  He has a negative past surgical history  He takes no regular medications for diabetes, hypertension or cardiac issues  He reports no interval changes in medical or surgical history since his last visit  The following portions of the patient's history were reviewed and updated as appropriate: {history reviewed:20406}  Review of Systems   Constitutional: Negative  HENT: Negative  Eyes: Negative  Respiratory: Negative  Cardiovascular: Negative  Gastrointestinal: Positive for constipation (almost all the time)  Negative for abdominal distention, abdominal pain, anal bleeding, blood in stool, diarrhea, nausea, rectal pain and vomiting  Endocrine: Negative  Genitourinary: Negative  Musculoskeletal: Positive for back pain and gait problem  Negative for arthralgias, joint swelling, myalgias, neck pain and neck stiffness  Skin: Negative  Allergic/Immunologic: Negative  Neurological: Positive for dizziness (from blood pressure) and light-headedness  Negative for tremors, seizures, syncope, facial asymmetry, speech difficulty, weakness, numbness and headaches  Hematological: Negative  Psychiatric/Behavioral: Negative  Objective:    Physical Exam   Constitutional: He appears well-developed and well-nourished  HENT:   Head: Normocephalic  Eyes: Pupils are equal, round, and reactive to light  Neck: Normal range of motion  Cardiovascular: Normal rate, regular rhythm and normal heart sounds  Pulmonary/Chest: Effort normal and breath sounds normal    Musculoskeletal: Normal range of motion  Back exam;    Straight leg raise 90° bilaterally negative  Full range of motion in all planes  Motor and sensory examination intact  Reflexes 2+ and symmetric (patella and Achilles)   Neurological: He is alert  He has a normal Romberg Test  Gait normal    Reflex Scores:       Patellar reflexes are 2+ on the right side and 2+ on the left side  Neurologic Exam     Mental Status   Level of consciousness: alert    Cranial Nerves     CN III, IV, VI   Pupils are equal, round, and reactive to light      Motor Exam   Muscle bulk: normal    Strength   Right quadriceps: 5/5  Left quadriceps: 5/5  Right hamstrin/5  Left hamstrin/5  Right glutei: 5/5  Left glutei: 5/5  Right anterior tibial: 5/5  Left anterior tibial: 5/5    Gait, Coordination, and Reflexes     Gait  Gait: normal    Coordination   Romberg: negative    Reflexes   Right patellar: 2+  Left patellar: 2+       MRI LUMBAR SPINE WITH AND WITHOUT CONTRAST  (18)     INDICATION: 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 8: Other Cushing's syndrome  D49 7: Neoplasm of unspecified behavior of endocrine glands and other parts of nervous system  History taken directly from the electronic ordering system      COMPARISON:  MR 6/26/2017, CT 9/26/2017     TECHNIQUE:  Sagittal T1, sagittal T2, sagittal inversion recovery, axial T1 and axial T2, coronal T2  Sagittal and axial T1 postcontrast      IV Contrast:  9 mL of gadobutrol injection (MULTI-DOSE)      IMAGE QUALITY:  Diagnostic     FINDINGS:     ALIGNMENT:  Very slight retrolisthesis of L3 on L4  No fracture      MARROW SIGNAL:  Normal marrow signal is identified within the visualized bony structures  No discrete marrow lesion      DISTAL CORD AND CONUS:  Normal size and signal of the distal cord and conus  The conus ends at the L1 level  Alignment for difference in slice selection there is no change in size of the intradural extra medullary mass noted dorsally at the mid T12   level  This displaces the cord anteriorly  Visualized cord signal remains normal   On CT this is noncalcified  Despite absence of any significant enhancement, findings most consistent with nerve sheath tumor      PARASPINAL SOFT TISSUES:  Paraspinal soft tissues are unremarkable      SACRUM:  Normal signal within the sacrum  No evidence of insufficiency or stress fracture      LOWER THORACIC DISC SPACES:  Normal disc height and signal   No disc herniation, canal stenosis or foraminal narrowing      LUMBAR DISC SPACES:     L1-L2:  Minor bulge      L2-L3:  Small marginal osteophytes, minor facet changes     L3-L4:  Circumferential bulge, minor facet changes      L4-L5:  Circumferential bulge, mild facet arthrosis     L5-S1:  Minor facet arthrosis, mildly ectatic bifid left S1 root      POSTCONTRAST IMAGING:  No abnormal enhancement      IMPRESSION:     Stable MR appearance of the lumbar spine, mild, noncompressive degenerative changes  14-16 mm intradural extra medullary mass located dorsally at the mid T12 level is unchanged    This is not calcified on CT and does not enhance to any significant   degree  Findings most consistent with nerve sheath tumor

## 2018-03-16 NOTE — LETTER
March 16, 2018     Raul Stuart DO  [facility] [address]    Patient: Anabelle Clark Sr  YOB: 1955   Date of Visit: 3/16/2018       Dear Dr Parag Aparicio:    Thank you for referring Taryn Rudolph to me for evaluation  Below are my notes for this consultation  If you have questions, please do not hesitate to call me  I look forward to following your patient along with you  Sincerely,        Marj Solorzano MD        CC: No Recipients  Marie Woo PA-C  3/16/2018 11:57 AM  Sign at close encounter  Assessment/Plan:  Very pleasant 58-year-old male, returns for 7 month follow-up, to review MRI lumbar spine 2/20/18, 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, about T12 was again noted on the imaging, the study was compared with prior studies of 6/26/17, and 1/15/16, remains at about 13-14 mm may have had some slight increase in size perhaps 1 mm, balance of the lumbar spine study otherwise reveals chronic degenerative changes without significant central or foraminal stenosis  Clinically the patient remains asymptomatic this lesion although he does have complaint of chronic low back pain which is intermittent and related activities there are no focal neurologic deficits appreciated on examination power in the lower extremities is 5 x 5 reflexes are intact  Management options were reviewed with the patient continue to observe with serial imaging which the patient has chosen at this time verses elective resection  Further follow-up is planned in approximately 1 year with repeat MRI with and without contrast prior to visit  The patient reports he plans to discuss this with his fiancee relative to a resection at this time  And will call us with his ultimate decision  He does understand should he have progressively worsening and consistent back pain he is to return sooner for reassessment      These findings, impressions and recommendations are reviewed in great detail with the patient, he expressed understanding and agreement, his questions were answered completely and to his satisfaction  Follow up has been scheduled  Diagnoses and all orders for this visit:    Intradural extramedullary spinal tumor  -     MRI lumbar spine with and without contrast; Future    Chronic bilateral low back pain without sciatica        Subjective:      Patient ID: Neal Garcia  is a 60 y o  male  Very pleasant 76-year-old male, returns for 7 month follow-up, history of intradural extramedullary tumor of the spine  He has had updated MRI of the lumbar spine as requested 2/20/18  At this point healing has complaint of intermittent low back pain typically activity related and not associated with leg pain  He denies gait or balance disturbance, motor or sensory difficulties in the upper lower extremities, bowel or bladder incontinence  He presents today with a knee a preemie back brace on which he reports he wears intermittently as he is more comfortable with that on  He has a history of a back injury in 2016 at which time he underwent MRI of the lumbar spine and incidental note was made of this lesion  He reports no known medication allergies  He has a negative past surgical history  He takes no regular medications for diabetes, hypertension or cardiac issues  He reports no interval changes in medical or surgical history since his last visit  The following portions of the patient's history were reviewed and updated as appropriate: allergies, current medications, past family history, past medical history, past social history and past surgical history  Review of Systems   Constitutional: Negative  HENT: Negative  Eyes: Negative  Respiratory: Negative  Cardiovascular: Negative  Gastrointestinal: Positive for constipation (almost all the time)   Negative for abdominal distention, abdominal pain, anal bleeding, blood in stool, diarrhea, nausea, rectal pain and vomiting  Endocrine: Negative  Genitourinary: Negative  Musculoskeletal: Positive for back pain and gait problem  Negative for arthralgias, joint swelling, myalgias, neck pain and neck stiffness  Skin: Negative  Allergic/Immunologic: Negative  Neurological: Positive for dizziness (from blood pressure) and light-headedness  Negative for tremors, seizures, syncope, facial asymmetry, speech difficulty, weakness, numbness and headaches  Hematological: Negative  Psychiatric/Behavioral: Negative  Objective:    Physical Exam   Constitutional: He appears well-developed and well-nourished  HENT:   Head: Normocephalic  Eyes: Pupils are equal, round, and reactive to light  Neck: Normal range of motion  Cardiovascular: Normal rate, regular rhythm and normal heart sounds  Pulmonary/Chest: Effort normal and breath sounds normal    Musculoskeletal: Normal range of motion  Back exam;    Straight leg raise 90° bilaterally negative  Full range of motion in all planes  Motor and sensory examination intact  Reflexes 2+ and symmetric (patella and Achilles)   Neurological: He is alert  He has a normal Romberg Test  Gait normal    Reflex Scores:       Patellar reflexes are 2+ on the right side and 2+ on the left side  Neurologic Exam     Mental Status   Level of consciousness: alert    Cranial Nerves     CN III, IV, VI   Pupils are equal, round, and reactive to light      Motor Exam   Muscle bulk: normal    Strength   Right quadriceps: 5/5  Left quadriceps: 5/5  Right hamstrin/5  Left hamstrin/5  Right glutei: 5/5  Left glutei: 5/5  Right anterior tibial: 5/5  Left anterior tibial: 5/5    Gait, Coordination, and Reflexes     Gait  Gait: normal    Coordination   Romberg: negative    Reflexes   Right patellar: 2+  Left patellar: 2+       MRI LUMBAR SPINE WITH AND WITHOUT CONTRAST  (18)     INDICATION: 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 8: Other Cushing's syndrome  D49 7: Neoplasm of unspecified behavior of endocrine glands and other parts of nervous system  History taken directly from the electronic ordering system      COMPARISON:  MR 6/26/2017, CT 9/26/2017     TECHNIQUE:  Sagittal T1, sagittal T2, sagittal inversion recovery, axial T1 and axial T2, coronal T2  Sagittal and axial T1 postcontrast      IV Contrast:  9 mL of gadobutrol injection (MULTI-DOSE)      IMAGE QUALITY:  Diagnostic     FINDINGS:     ALIGNMENT:  Very slight retrolisthesis of L3 on L4  No fracture      MARROW SIGNAL:  Normal marrow signal is identified within the visualized bony structures  No discrete marrow lesion      DISTAL CORD AND CONUS:  Normal size and signal of the distal cord and conus  The conus ends at the L1 level  Alignment for difference in slice selection there is no change in size of the intradural extra medullary mass noted dorsally at the mid T12   level  This displaces the cord anteriorly  Visualized cord signal remains normal   On CT this is noncalcified  Despite absence of any significant enhancement, findings most consistent with nerve sheath tumor      PARASPINAL SOFT TISSUES:  Paraspinal soft tissues are unremarkable      SACRUM:  Normal signal within the sacrum  No evidence of insufficiency or stress fracture      LOWER THORACIC DISC SPACES:  Normal disc height and signal   No disc herniation, canal stenosis or foraminal narrowing      LUMBAR DISC SPACES:     L1-L2:  Minor bulge      L2-L3:  Small marginal osteophytes, minor facet changes     L3-L4:  Circumferential bulge, minor facet changes      L4-L5:  Circumferential bulge, mild facet arthrosis     L5-S1:  Minor facet arthrosis, mildly ectatic bifid left S1 root      POSTCONTRAST IMAGING:  No abnormal enhancement      IMPRESSION:     Stable MR appearance of the lumbar spine, mild, noncompressive degenerative changes    14-16 mm intradural extra medullary mass located dorsally at the mid T12 level is unchanged  This is not calcified on CT and does not enhance to any significant   degree  Findings most consistent with nerve sheath tumor

## 2018-03-23 ENCOUNTER — ANESTHESIA EVENT (OUTPATIENT)
Dept: GASTROENTEROLOGY | Facility: HOSPITAL | Age: 63
End: 2018-03-23
Payer: COMMERCIAL

## 2018-03-23 ENCOUNTER — ANESTHESIA (OUTPATIENT)
Dept: GASTROENTEROLOGY | Facility: HOSPITAL | Age: 63
End: 2018-03-23
Payer: COMMERCIAL

## 2018-03-23 ENCOUNTER — HOSPITAL ENCOUNTER (OUTPATIENT)
Facility: HOSPITAL | Age: 63
Setting detail: OUTPATIENT SURGERY
Discharge: HOME | End: 2018-03-23
Attending: INTERNAL MEDICINE | Admitting: INTERNAL MEDICINE
Payer: COMMERCIAL

## 2018-03-23 VITALS
WEIGHT: 191 LBS | TEMPERATURE: 97.5 F | OXYGEN SATURATION: 98 % | RESPIRATION RATE: 20 BRPM | HEIGHT: 70 IN | DIASTOLIC BLOOD PRESSURE: 67 MMHG | BODY MASS INDEX: 27.35 KG/M2 | SYSTOLIC BLOOD PRESSURE: 93 MMHG | HEART RATE: 138 BPM

## 2018-03-23 DIAGNOSIS — K63.5 POLYP OF COLON, UNSPECIFIED PART OF COLON, UNSPECIFIED TYPE: ICD-10-CM

## 2018-03-23 PROCEDURE — 45385 COLONOSCOPY W/LESION REMOVAL: CPT | Performed by: INTERNAL MEDICINE

## 2018-03-23 PROCEDURE — 45380 COLONOSCOPY AND BIOPSY: CPT | Performed by: INTERNAL MEDICINE

## 2018-03-23 PROCEDURE — 88305 TISSUE EXAM BY PATHOLOGIST: CPT | Performed by: PATHOLOGY

## 2018-03-23 RX ORDER — SODIUM CHLORIDE 9 MG/ML
50 INJECTION, SOLUTION INTRAVENOUS CONTINUOUS
Status: DISCONTINUED | OUTPATIENT
Start: 2018-03-23 | End: 2018-03-23 | Stop reason: HOSPADM

## 2018-03-23 RX ORDER — PROPOFOL 10 MG/ML
INJECTION, EMULSION INTRAVENOUS AS NEEDED
Status: DISCONTINUED | OUTPATIENT
Start: 2018-03-23 | End: 2018-03-23 | Stop reason: SURG

## 2018-03-23 RX ORDER — PROPOFOL 10 MG/ML
INJECTION, EMULSION INTRAVENOUS CONTINUOUS PRN
Status: DISCONTINUED | OUTPATIENT
Start: 2018-03-23 | End: 2018-03-23 | Stop reason: SURG

## 2018-03-23 RX ORDER — LIDOCAINE HYDROCHLORIDE 10 MG/ML
INJECTION, SOLUTION INFILTRATION; PERINEURAL AS NEEDED
Status: DISCONTINUED | OUTPATIENT
Start: 2018-03-23 | End: 2018-03-23 | Stop reason: SURG

## 2018-03-23 RX ADMIN — SODIUM CHLORIDE 50 ML/HR: 0.9 INJECTION, SOLUTION INTRAVENOUS at 10:01

## 2018-03-23 RX ADMIN — PROPOFOL 100 MCG/KG/MIN: 10 INJECTION, EMULSION INTRAVENOUS at 10:49

## 2018-03-23 RX ADMIN — LIDOCAINE HYDROCHLORIDE 50 MG: 10 INJECTION, SOLUTION INFILTRATION; PERINEURAL at 10:49

## 2018-03-23 RX ADMIN — PROPOFOL 100 MG: 10 INJECTION, EMULSION INTRAVENOUS at 10:49

## 2018-03-23 RX ADMIN — PROPOFOL 20 MG: 10 INJECTION, EMULSION INTRAVENOUS at 10:50

## 2018-03-23 NOTE — ANESTHESIA POSTPROCEDURE EVALUATION
Post-Op Assessment Note      CV Status:  Stable    Mental Status:  Lethargic    Hydration Status:  Euvolemic    PONV Controlled:  Controlled    Airway Patency:  Patent    Post Op Vitals Reviewed: Yes          Staff: CRNA           /67 (03/23/18 1123)    Temp      Pulse 60 (03/23/18 1123)   Resp 20 (03/23/18 1123)    SpO2 96 % (03/23/18 1123)

## 2018-03-23 NOTE — ANESTHESIA PREPROCEDURE EVALUATION
Review of Systems/Medical History      No history of anesthetic complications     Cardiovascular  Negative cardio ROS Exercise tolerance: good,  Hyperlipidemia, Hypertension ,    Pulmonary  Negative pulmonary ROS Smoker ex-smoker  ,        GI/Hepatic      Comment: gallstones     Negative  ROS        Endo/Other     GYN  Negative gynecology ROS          Hematology   Musculoskeletal       Neurology      Comment: Spinal tumor Psychology   Anxiety,              Physical Exam    Airway       Dental       Cardiovascular  Comment: Negative ROS,     Pulmonary      Other Findings        Anesthesia Plan  ASA Score- 3     Anesthesia Type- IV sedation with anesthesia with ASA Monitors  Additional Monitors:   Airway Plan:     Comment: IV sedation,  standard ASA monitors  Risks and benefits discussed with patient; patient consented and agrees to proceed  I saw and evaluated the patient  If seen with CRNA, we have discussed the anesthetic plan and I am in agreement that the plan is appropriate for the patient        Plan Factors-    Induction- intravenous  Postoperative Plan-     Informed Consent- Anesthetic plan and risks discussed with patient  I personally reviewed this patient with the CRNA  Discussed and agreed on the Anesthesia Plan with the CRNA  Mani Zapata

## 2018-03-23 NOTE — H&P
Progress Note - Ninoska Lopez Sr  60 y o  male MRN: [id number]    Unit/Bed#: ENDO POOL Encounter: [id number]        HPI:  Previous colon polyps with fair prep  Objective:     Vitals: Blood pressure 164/83, pulse 70, temperature 97 5 °F (36 4 °C), temperature source Tympanic, resp  rate 18, height 5' 10" (1 778 m), weight 86 6 kg (191 lb), SpO2 98 %  ,Body mass index is 27 41 kg/m²  Intake/Output Summary (Last 24 hours) at 03/23/18 1122  Last data filed at 03/23/18 1115   Gross per 24 hour   Intake              450 ml   Output                0 ml   Net              450 ml       Physical Exam:     General Appearance: Alert, appears stated age and cooperative  Lungs: Clear to auscultation bilaterally, no rales or rhonchi, no labored breathing/accessory muscle use  Heart: Regular rate and rhythm, S1, S2 normal, no murmur, click, rub or gallop  Abdomen: Soft, non-tender, non-distended; bowel sounds normal; no masses or no organomegaly  Extremities: No cyanosis, edema    Invasive Devices     Peripheral Intravenous Line            Peripheral IV 03/23/18 Right Hand less than 1 day                Lab Results:              Invalid input(s): LABALBU            Imaging Studies: I have personally reviewed pertinent imaging studies  Mri Lumbar Spine W Wo Contrast    Result Date: 2/21/2018  Impression: Stable MR appearance of the lumbar spine, mild, noncompressive degenerative changes  14-16 mm intradural extra medullary mass located dorsally at the mid T12 level is unchanged  This is not calcified on CT and does not enhance to any significant degree  Findings most consistent with nerve sheath tumor   Workstation performed: [id number]       ASSESMENT/ PLAN:   Principal Problem:    Polyp of colon      Colonoscopy today

## 2018-03-23 NOTE — OP NOTE
COLONOSCOPY    PROCEDURE: Colonoscopy/ Polypectomy (Snare Cautery)    INDICATIONS: Surveillance    POST-OP DIAGNOSIS: See the impression below    SEDATION: Monitored anesthesia care, check anesthesia records    PHYSICAL EXAM:    /67   Pulse 60   Temp 97 5 °F (36 4 °C) (Tympanic)   Resp 20   Ht 5' 10" (1 778 m)   Wt 86 6 kg (191 lb)   SpO2 96%   BMI 27 41 kg/m²   Body mass index is 27 41 kg/m²  General: NAD  Heart: S1 & S2 normal, RRR  Lungs: CTA, No rales or rhonchi  Abdomen: Soft, nontender, nondistended, good bowel sounds    CONSENT:  Informed consent was obtained for the procedure, including sedation after explaining the risks and benefits of the procedure  Risks including but not limited to bleeding, perforation, infection, aspiration were discussed in detail  Also explained about less than 100%$ sensitivity with the exam and other alternatives  PREPARATION:   EKG tracing, pulse oximetry, blood pressure were monitored throughout the procedure  Patient was identified by myself both verbally and by visual inspection of ID band  DESCRIPTION:   Patient was placed in the left lateral decubitus position and was sedated with the above medication  Digital rectal examination was performed  The colonoscope was introduced in to the anal canal and advanced up to cecum, which was identified by the appendiceal orifice and IC valve  A careful inspection was made as the colonoscope was withdrawn, including a retroflexed view of the rectum; findings and interventions are described below  Appropriate photodocumentation was obtained  The quality of the colonic preparation was fair  FINDINGS:    - One diminutive polyp was removed from the transverse colon with cold forceps biopsies   - Two small polyps measuring 5 to 10 mm was found in rectosigmoid area  The polyps were removed with snare cauterization  - 2 diminutive broad-based polyps were removed from the rectum with cold forceps biopsies    - moderate diverticulosis seen in the sigmoid colon  - small internal hemorrhoids seen during retroflexed  - solid food residue found in ascending colon  Visualization was not good  IMPRESSIONS:      Small polyps removed  RECOMMENDATIONS:    Follow up biopsy result  Repeat colonoscopy based on pathology results  COMPLICATIONS:  None; patient tolerated the procedure well      DISPOSITION: PACU           CONDITION: Stable

## 2018-04-02 ENCOUNTER — TELEPHONE (OUTPATIENT)
Dept: GASTROENTEROLOGY | Facility: MEDICAL CENTER | Age: 63
End: 2018-04-02

## 2018-04-02 NOTE — TELEPHONE ENCOUNTER
----- Message from Ana Muir MD sent at 4/1/2018  5:36 PM EDT -----  Among the 5 polyps removed, one of them was precancerous  Repeat colonoscopy in 3 years due to fair prep  Enter reminder

## 2018-04-22 ENCOUNTER — HOSPITAL ENCOUNTER (OUTPATIENT)
Facility: HOSPITAL | Age: 63
Setting detail: OBSERVATION
Discharge: HOME | End: 2018-04-23
Attending: EMERGENCY MEDICINE | Admitting: INTERNAL MEDICINE
Payer: COMMERCIAL

## 2018-04-22 ENCOUNTER — APPOINTMENT (EMERGENCY)
Dept: RADIOLOGY | Facility: HOSPITAL | Age: 63
End: 2018-04-22
Payer: COMMERCIAL

## 2018-04-22 DIAGNOSIS — K20.90 ESOPHAGITIS: ICD-10-CM

## 2018-04-22 DIAGNOSIS — R07.9 CHEST PAIN: Primary | ICD-10-CM

## 2018-04-22 DIAGNOSIS — F41.9 ANXIETY: ICD-10-CM

## 2018-04-22 PROBLEM — F31.9 BIPOLAR 1 DISORDER (HCC): Status: ACTIVE | Noted: 2018-04-22

## 2018-04-22 PROBLEM — R07.2 PRECORDIAL PAIN: Status: ACTIVE | Noted: 2018-01-21

## 2018-04-22 LAB
ALBUMIN SERPL BCP-MCNC: 4.1 G/DL (ref 3.5–5)
ALP SERPL-CCNC: 58 U/L (ref 46–116)
ALT SERPL W P-5'-P-CCNC: 21 U/L (ref 12–78)
ANION GAP SERPL CALCULATED.3IONS-SCNC: 16 MMOL/L (ref 4–13)
AST SERPL W P-5'-P-CCNC: 18 U/L (ref 5–45)
ATRIAL RATE: 89 BPM
BASOPHILS # BLD AUTO: 0.09 THOUSANDS/ΜL (ref 0–0.1)
BASOPHILS NFR BLD AUTO: 1 % (ref 0–1)
BILIRUB SERPL-MCNC: 0.9 MG/DL (ref 0.2–1)
BUN SERPL-MCNC: 14 MG/DL (ref 5–25)
CALCIUM SERPL-MCNC: 10.6 MG/DL (ref 8.3–10.1)
CHLORIDE SERPL-SCNC: 104 MMOL/L (ref 100–108)
CO2 SERPL-SCNC: 24 MMOL/L (ref 21–32)
CREAT SERPL-MCNC: 1.1 MG/DL (ref 0.6–1.3)
EOSINOPHIL # BLD AUTO: 0.41 THOUSAND/ΜL (ref 0–0.61)
EOSINOPHIL NFR BLD AUTO: 3 % (ref 0–6)
ERYTHROCYTE [DISTWIDTH] IN BLOOD BY AUTOMATED COUNT: 14.1 % (ref 11.6–15.1)
GFR SERPL CREATININE-BSD FRML MDRD: 71 ML/MIN/1.73SQ M
GLUCOSE SERPL-MCNC: 112 MG/DL (ref 65–140)
HCT VFR BLD AUTO: 44.4 % (ref 36.5–49.3)
HGB BLD-MCNC: 15.3 G/DL (ref 12–17)
LYMPHOCYTES # BLD AUTO: 3.84 THOUSANDS/ΜL (ref 0.6–4.47)
LYMPHOCYTES NFR BLD AUTO: 25 % (ref 14–44)
MCH RBC QN AUTO: 30.2 PG (ref 26.8–34.3)
MCHC RBC AUTO-ENTMCNC: 34.5 G/DL (ref 31.4–37.4)
MCV RBC AUTO: 88 FL (ref 82–98)
MONOCYTES # BLD AUTO: 1.09 THOUSAND/ΜL (ref 0.17–1.22)
MONOCYTES NFR BLD AUTO: 7 % (ref 4–12)
NEUTROPHILS # BLD AUTO: 9.75 THOUSANDS/ΜL (ref 1.85–7.62)
NEUTS SEG NFR BLD AUTO: 64 % (ref 43–75)
P AXIS: 76 DEGREES
PLATELET # BLD AUTO: 199 THOUSANDS/UL (ref 149–390)
PLATELET # BLD AUTO: 250 THOUSANDS/UL (ref 149–390)
PMV BLD AUTO: 9.8 FL (ref 8.9–12.7)
PMV BLD AUTO: 9.9 FL (ref 8.9–12.7)
POTASSIUM SERPL-SCNC: 4 MMOL/L (ref 3.5–5.3)
PR INTERVAL: 172 MS
PROT SERPL-MCNC: 8.1 G/DL (ref 6.4–8.2)
QRS AXIS: 65 DEGREES
QRSD INTERVAL: 84 MS
QT INTERVAL: 370 MS
QTC INTERVAL: 443 MS
RBC # BLD AUTO: 5.07 MILLION/UL (ref 3.88–5.62)
SODIUM SERPL-SCNC: 144 MMOL/L (ref 136–145)
T WAVE AXIS: 68 DEGREES
TROPONIN I SERPL-MCNC: <0.02 NG/ML
VENTRICULAR RATE: 86 BPM
WBC # BLD AUTO: 15.18 THOUSAND/UL (ref 4.31–10.16)

## 2018-04-22 PROCEDURE — 84484 ASSAY OF TROPONIN QUANT: CPT | Performed by: INTERNAL MEDICINE

## 2018-04-22 PROCEDURE — 36415 COLL VENOUS BLD VENIPUNCTURE: CPT | Performed by: EMERGENCY MEDICINE

## 2018-04-22 PROCEDURE — 93010 ELECTROCARDIOGRAM REPORT: CPT | Performed by: INTERNAL MEDICINE

## 2018-04-22 PROCEDURE — 99285 EMERGENCY DEPT VISIT HI MDM: CPT

## 2018-04-22 PROCEDURE — 84484 ASSAY OF TROPONIN QUANT: CPT | Performed by: EMERGENCY MEDICINE

## 2018-04-22 PROCEDURE — 71046 X-RAY EXAM CHEST 2 VIEWS: CPT

## 2018-04-22 PROCEDURE — 93005 ELECTROCARDIOGRAM TRACING: CPT

## 2018-04-22 PROCEDURE — 85049 AUTOMATED PLATELET COUNT: CPT | Performed by: INTERNAL MEDICINE

## 2018-04-22 PROCEDURE — 99219 PR INITIAL OBSERVATION CARE/DAY 50 MINUTES: CPT | Performed by: INTERNAL MEDICINE

## 2018-04-22 PROCEDURE — 80053 COMPREHEN METABOLIC PANEL: CPT | Performed by: EMERGENCY MEDICINE

## 2018-04-22 PROCEDURE — 85025 COMPLETE CBC W/AUTO DIFF WBC: CPT | Performed by: EMERGENCY MEDICINE

## 2018-04-22 RX ORDER — QUETIAPINE FUMARATE 100 MG/1
100 TABLET, FILM COATED ORAL
Status: DISCONTINUED | OUTPATIENT
Start: 2018-04-22 | End: 2018-04-23 | Stop reason: HOSPADM

## 2018-04-22 RX ORDER — MAGNESIUM HYDROXIDE/ALUMINUM HYDROXICE/SIMETHICONE 120; 1200; 1200 MG/30ML; MG/30ML; MG/30ML
30 SUSPENSION ORAL EVERY 4 HOURS PRN
Status: DISCONTINUED | OUTPATIENT
Start: 2018-04-22 | End: 2018-04-23 | Stop reason: HOSPADM

## 2018-04-22 RX ORDER — ATORVASTATIN CALCIUM 40 MG/1
40 TABLET, FILM COATED ORAL DAILY
Status: DISCONTINUED | OUTPATIENT
Start: 2018-04-23 | End: 2018-04-23 | Stop reason: HOSPADM

## 2018-04-22 RX ORDER — MAGNESIUM HYDROXIDE/ALUMINUM HYDROXICE/SIMETHICONE 120; 1200; 1200 MG/30ML; MG/30ML; MG/30ML
30 SUSPENSION ORAL ONCE
Status: COMPLETED | OUTPATIENT
Start: 2018-04-22 | End: 2018-04-22

## 2018-04-22 RX ORDER — LORAZEPAM 1 MG/1
1 TABLET ORAL 3 TIMES DAILY
Status: DISCONTINUED | OUTPATIENT
Start: 2018-04-22 | End: 2018-04-23 | Stop reason: HOSPADM

## 2018-04-22 RX ORDER — ASPIRIN 81 MG/1
81 TABLET ORAL DAILY
Status: DISCONTINUED | OUTPATIENT
Start: 2018-04-23 | End: 2018-04-23 | Stop reason: HOSPADM

## 2018-04-22 RX ORDER — ZOLPIDEM TARTRATE 5 MG/1
5 TABLET ORAL
Status: DISCONTINUED | OUTPATIENT
Start: 2018-04-22 | End: 2018-04-23 | Stop reason: HOSPADM

## 2018-04-22 RX ORDER — ACETAMINOPHEN 325 MG/1
650 TABLET ORAL EVERY 4 HOURS PRN
Status: DISCONTINUED | OUTPATIENT
Start: 2018-04-22 | End: 2018-04-23 | Stop reason: HOSPADM

## 2018-04-22 RX ORDER — DULOXETIN HYDROCHLORIDE 30 MG/1
30 CAPSULE, DELAYED RELEASE ORAL DAILY
Status: DISCONTINUED | OUTPATIENT
Start: 2018-04-23 | End: 2018-04-23 | Stop reason: HOSPADM

## 2018-04-22 RX ORDER — FLUOXETINE HYDROCHLORIDE 20 MG/1
20 CAPSULE ORAL
Status: DISCONTINUED | OUTPATIENT
Start: 2018-04-23 | End: 2018-04-22

## 2018-04-22 RX ORDER — ONDANSETRON 2 MG/ML
4 INJECTION INTRAMUSCULAR; INTRAVENOUS EVERY 6 HOURS PRN
Status: DISCONTINUED | OUTPATIENT
Start: 2018-04-22 | End: 2018-04-23 | Stop reason: HOSPADM

## 2018-04-22 RX ADMIN — ALUMINUM HYDROXIDE, MAGNESIUM HYDROXIDE, AND SIMETHICONE 30 ML: 200; 200; 20 SUSPENSION ORAL at 18:23

## 2018-04-22 RX ADMIN — QUETIAPINE FUMARATE 100 MG: 100 TABLET ORAL at 22:12

## 2018-04-22 RX ADMIN — LORAZEPAM 1 MG: 1 TABLET ORAL at 20:18

## 2018-04-22 RX ADMIN — NITROGLYCERIN 1 INCH: 20 OINTMENT TOPICAL at 16:28

## 2018-04-22 NOTE — ED NOTES
Report phoned to savanna Rafi [phone]  SONG Rodriguez, telemetry box available     Tonya Rodriguez RN  04/22/18 5030

## 2018-04-22 NOTE — H&P
H&P- Olivia Pappas Sr  1955, 60 y o  male MRN: [id number]    Unit/Bed#: ED 07 Encounter: [id number]    Primary Care Provider: Janneth Davis DO   Date and time admitted to hospital: 4/22/2018  2:39 PM        Bipolar 1 disorder [facility])   Assessment & Plan    Restart cymbalta, seroquel, prozac, ativan  Has appointment with his therapist tomorrow        Precordial pain   Assessment & Plan    Rather atypical with chest wall tenderness and dysphagia  Add maalox  Check barium esophogram  Check troponin x2, recent stress test was WNL            History of Present Illness     HPI:  Alexander Menezes  is a 60 y o  male who presents with chest pain for the past two days, continuous, states we awoke with it  Better when he walks  Pain is inferior sternal/epigastric  No shortness of breath  Mild cough with sputum  Has pain with swallowing  Denies N/V/D  States that he has not ate for multiple days due to poor appetite but is asking now for a meal stating he is very hungry  He is unsure which medications she should be taking and states he is not taking them  Reports poor sleep as well  He states he is homeless and living with friends or in a garage  Review of Systems   All other systems reviewed and are negative  Historical Information   Past Medical History:   Diagnosis Date    Anxiety     Chronic back pain     Chronic pain     Gallstones     Psychiatric disorder     bipolar disorder    Spinal arachnoid cyst      Past Surgical History:   Procedure Laterality Date    COLONOSCOPY      INCISION AND DRAINAGE ABSCESS / HEMATOMA OF BURSA / KNEE / THIGH      outer right leg and center of upper back   complicated  resolved status    [place] COLONOSCOPY FLX DX W/COLLJ SPEC WHEN PFRMD N/A 8/31/2017    Procedure: COLONOSCOPY;  Surgeon: Aurora Kohler MD;  [facility];   Service: Gastroenterology    [place] COLONOSCOPY FLX DX W/COLLJ [facility] WHEN PFRMD N/A 3/23/2018    Procedure: COLONOSCOPY;  Surgeon: Annalee Aviles Autumn Christina MD;  [facility]; Service: Gastroenterology    TUMOR REMOVAL       Social History   History   Alcohol Use    Yes     Comment: once a month     History   Drug Use    Types: Marijuana     History   Smoking Status    Former Smoker   Smokeless Tobacco    Never Used     Comment: all scripts says former smoker quit at [facility]  [employer]     Family History: non-contributory    Meds/Allergies   all medications and allergies reviewed  No Known Allergies    Objective   Vitals: Blood pressure 133/78, pulse 86, resp  rate 16, weight 83 7 kg (184 lb 8 4 oz), SpO2 97 %  No intake or output data in the 24 hours ending 04/22/18 1751    Invasive Devices     Peripheral Intravenous Line            Peripheral IV 04/22/18 Forearm less than 1 day                Physical Exam   Constitutional: He is oriented to person, place, and time  He appears well-developed and well-nourished  No distress  HENT:   Head: Normocephalic and atraumatic  Eyes: EOM are normal  Pupils are equal, round, and reactive to light  No scleral icterus  Neck: Neck supple  No JVD present  Cardiovascular: Normal rate and regular rhythm  Pulmonary/Chest: Effort normal  He has no wheezes  He has no rales  He exhibits tenderness  Abdominal: Soft  He exhibits no distension and no mass  There is tenderness  There is no rebound and no guarding  Musculoskeletal: Normal range of motion  He exhibits no edema or tenderness  Neurological: He is alert and oriented to person, place, and time  Skin: Skin is warm and dry  Lab Results: I have personally reviewed pertinent reports  Imaging: I have personally reviewed pertinent reports  EKG, Pathology, and Other Studies: I have personally reviewed pertinent films in PACS    Code Status: Prior  Advance Directive and Living Will:      Power of :    POLST:      Counseling / Coordination of Care  Total floor / unit time spent today 45 minutes    Greater than 50% of total time was spent with the patient and / or family counseling and / or coordination of care  A description of the counseling / coordination of care: discussed plan of care with the patient and his wife at the bedside

## 2018-04-22 NOTE — ED PROVIDER NOTES
History  Chief Complaint   Patient presents with    Chest Pain     chest pain X 2 days , pressure in nature, intermittant had radiation of pain down left arm and leg yesterday     Patient presents to the emergency department with intermittent chest discomfort described as heaviness over the last 2 days  He was here in January for similar presentation and a negative pharmaceutical stress test   He has not followed up with Cardiology since  He also had an normal cardiac echo  He states sometimes the pressure travels down his left arm  He states right now his pain is mild  Patient denies fever chills cough  Denies trauma injury or new activity  Denies back or belly pain  Prior to Admission Medications   Prescriptions Last Dose Informant Patient Reported? Taking?    DULoxetine (CYMBALTA) 30 mg delayed release capsule Past Week at Unknown time Self No Yes   Sig: Take 1 capsule by mouth daily   FLUoxetine (PROzac) 20 mg capsule Past Week at Unknown time  Yes Yes   Sig: Take 1 capsule by mouth daily in the early morning   LORazepam (ATIVAN) 1 mg tablet Past Week at Unknown time Self Yes Yes   Sig: Take 1 mg by mouth 3 (three) times a day   QUEtiapine (SEROquel) 100 mg tablet Past Week at Unknown time Self No Yes   Sig: Take 1 tablet by mouth daily at bedtime   atoMOXetine (STRATTERA) 25 mg capsule Past Week at Unknown time Self No Yes   Sig: Take 1 capsule by mouth daily      Facility-Administered Medications: None       Past Medical History:   Diagnosis Date    Anxiety     Chronic back pain     Chronic pain     Gallstones     Psychiatric disorder     bipolar disorder    Spinal arachnoid cyst        Past Surgical History:   Procedure Laterality Date    COLONOSCOPY      INCISION AND DRAINAGE ABSCESS / HEMATOMA OF BURSA / KNEE / THIGH      outer right leg and center of upper back   complicated  resolved status    [place] COLONOSCOPY FLX DX W/COLLJ SPEC WHEN PFRMD N/A 8/31/2017    Procedure: COLONOSCOPY;  Surgeon: Chary Sagastume MD;  [facility]; Service: Gastroenterology    [place] COLONOSCOPY FLX DX W/COLLJ [facility] REHABILIT[facility] WHEN PFRMD N/A 3/23/2018    Procedure: COLONOSCOPY;  Surgeon: Chary Sagastume MD;  [facility]; Service: Gastroenterology    TUMOR REMOVAL         Family History   Problem Relation Age of Onset    Throat cancer Mother     Heart attack Father      at [de-identified]    Stomach cancer Sister     Lung cancer Brother     Stomach cancer Brother      I have reviewed and agree with the history as documented  Social History   Substance Use Topics    Smoking status: Former Smoker    Smokeless tobacco: Never Used      Comment: [email or web address] use Yes      Comment: once a month        Review of Systems   Constitutional: Negative  Negative for activity change, appetite change, chills, diaphoresis, fatigue and fever  HENT: Negative  Negative for congestion and drooling  Eyes: Negative  Negative for photophobia and visual disturbance  Respiratory: Positive for chest tightness and shortness of breath  Negative for cough, choking, wheezing and stridor  Cardiovascular: Positive for chest pain  Negative for palpitations and leg swelling  Gastrointestinal: Negative  Negative for abdominal pain, diarrhea, nausea and vomiting  Endocrine: Negative  Genitourinary: Negative  Musculoskeletal: Negative  Negative for back pain, neck pain and neck stiffness  Skin: Negative  Negative for rash and wound  Allergic/Immunologic: Negative  Neurological: Negative  Negative for dizziness, tremors, seizures, syncope, facial asymmetry, speech difficulty, weakness, light-headedness, numbness and headaches  Hematological: Negative  Psychiatric/Behavioral: The patient is nervous/anxious          Physical Exam  ED Triage Vitals   Temp Pulse Respirations Blood Pressure SpO2   -- 04/22/18 1450 04/22/18 1450 04/22/18 1450 04/22/18 1450    89 18 132/79 100 % Temp src Heart Rate Source Patient Position - Orthostatic VS BP Location FiO2 (%)   -- 04/22/18 1800 04/22/18 1800 04/22/18 1800 --    Monitor Lying Left arm       Pain Score       04/22/18 1450       7           Orthostatic Vital Signs  Vitals:    04/22/18 1450 04/22/18 1500 04/22/18 1630 04/22/18 1800   BP: 132/79 132/79 133/78 141/79   Pulse: 89  86 79   Patient Position - Orthostatic VS:    Lying       Physical Exam   Constitutional: He is oriented to person, place, and time  He appears well-developed and well-nourished  Nontoxic appearance without respiratory distress  Patient has anxious affect but looks comfortable sitting upright on the stretcher  HENT:   Head: Normocephalic and atraumatic  Right Ear: External ear normal    Left Ear: External ear normal    Mouth/Throat: Oropharynx is clear and moist    Eyes: Conjunctivae and EOM are normal  Pupils are equal, round, and reactive to light  Neck: Normal range of motion  Neck supple  Cardiovascular: Normal rate, regular rhythm, normal heart sounds and intact distal pulses  Pulmonary/Chest: Effort normal and breath sounds normal  No respiratory distress  He has no wheezes  He has no rales  He exhibits no tenderness  Abdominal: Soft  Bowel sounds are normal  He exhibits no distension and no mass  There is no tenderness  There is no rebound and no guarding  No hernia  Musculoskeletal: Normal range of motion  He exhibits no edema or deformity  Neurological: He is alert and oriented to person, place, and time  He has normal reflexes  He displays normal reflexes  No cranial nerve deficit or sensory deficit  He exhibits normal muscle tone  Coordination normal    Skin: Skin is warm and dry  No rash noted  No erythema  No pallor  Psychiatric: He has a normal mood and affect  His behavior is normal  Judgment and thought content normal    Nursing note and vitals reviewed        ED Medications  Medications   nitroglycerin (NITRO-BID) 2 % TD ointment 1 inch (1 inch Topical Given 4/22/18 1628)   aluminum-magnesium hydroxide-simethicone (MYLANTA) 200-200-20 mg/5 mL oral suspension 30 mL (30 mL Oral Given 4/22/18 1823)       Diagnostic Studies  Results Reviewed     Procedure Component Value Units Date/Time    Troponin I [[id number]] Collected:  04/22/18 1819    Lab Status:  No result Specimen:  Blood from Arm, Right     Comprehensive metabolic panel [[id number]]  (Abnormal) Collected:  04/22/18 1515    Lab Status:  Final result Specimen:  Blood from Arm, Left Updated:  04/22/18 1557     Sodium 144 mmol/L      Potassium 4 0 mmol/L      Chloride 104 mmol/L      CO2 24 mmol/L      Anion Gap 16 (H) mmol/L      BUN 14 mg/dL      Creatinine 1 10 mg/dL      Glucose 112 mg/dL      Calcium 10 6 (H) mg/dL      AST 18 U/L      ALT 21 U/L      Alkaline Phosphatase 58 U/L      Total Protein 8 1 g/dL      Albumin 4 1 g/dL      Total Bilirubin 0 90 mg/dL      eGFR 71 ml/min/1 73sq m     Narrative:         National Kidney Disease Education Program recommendations are as follows:  GFR calculation is accurate only with a steady state creatinine  Chronic Kidney disease less than 60 ml/min/1 73 sq  meters  Kidney failure less than 15 ml/min/1 73 sq  meters  Troponin I [[id number]]  (Normal) Collected:  04/22/18 1515    Lab Status:  Final result Specimen:  Blood from Arm, Left Updated:  04/22/18 1547     Troponin I <0 02 ng/mL     Narrative:         [employer] Chemistry analyzer 99% cutoff is > 0 04 ng/mL in network labs    o cTnI 99% cutoff is useful only when applied to patients in the clinical setting of myocardial ischemia  o cTnI 99% cutoff should be interpreted in the context of clinical history, ECG findings and possibly cardiac imaging to establish correct diagnosis  o cTnI 99% cutoff may be suggestive but clearly not indicative of a coronary event without the clinical setting of myocardial ischemia      CBC and differential [[id number]]  (Abnormal) Collected:  04/22/18 1515    Lab Status:  Final result Specimen:  Blood from Arm, Left Updated:  04/22/18 1522     WBC 15 18 (H) Thousand/uL      RBC 5 07 Million/uL      Hemoglobin 15 3 g/dL      Hematocrit 44 4 %      MCV 88 fL      MCH 30 2 pg      MCHC 34 5 g/dL      RDW 14 1 %      MPV 9 9 fL      Platelets 561 Thousands/uL      Neutrophils Relative 64 %      Lymphocytes Relative 25 %      Monocytes Relative 7 %      Eosinophils Relative 3 %      Basophils Relative 1 %      Neutrophils Absolute 9 75 (H) Thousands/µL      Lymphocytes Absolute 3 84 Thousands/µL      Monocytes Absolute 1 09 Thousand/µL      Eosinophils Absolute 0 41 Thousand/µL      Basophils Absolute 0 09 Thousands/µL                  X-ray chest 2 views   ED Interpretation by Saran Taylor MD (04/22 1551)   NAD      FL barium swallow    (Results Pending)              Procedures  ECG 12 Lead Documentation  Date/Time: 4/22/2018 3:28 PM  Performed by: Antonietta Huang by: Elena Moralez     ECG reviewed by me, the ED Provider: yes    Patient location:  ED  Previous ECG:     Previous ECG:  Compared to current    Similarity:  No change  Interpretation:     Interpretation: normal    Rate:     ECG rate:  86    ECG rate assessment: normal    Rhythm:     Rhythm: sinus rhythm    Ectopy:     Ectopy: none    QRS:     QRS axis:  Normal    QRS intervals:  Normal  Conduction:     Conduction: normal    ST segments:     ST segments:  Normal  T waves:     T waves: normal             Phone Contacts  ED Phone Contact    ED Course  ED Course as of Apr 22 1833   Sun Apr 22, 2018   1624 Case discussed with Day Byrd of the hospitalist service was accepted this patient to his service  The patient has a worrisome story but with recent stress test in January unlikely that this is a coronary presentation  He still has pain and was given nitropaste  Took aspirin this morning  His troponin chest x-ray and EKG are unremarkable  -                                ROHITH Watters Time    Disposition  Final diagnoses:   Chest pain   Anxiety     Time reflects when diagnosis was documented in both MDM as applicable and the Disposition within this note     Time User Action Codes Description Comment    4/22/2018  3:53 PM Maria De Jesus Dickey Add [R07 9] Chest pain     4/22/2018  3:53 PM Fred Hernández Add [F41 9] Anxiety       ED Disposition     ED Disposition Condition Comment    Admit  Case was discussed with Dr Anayeli Calderon and the patient's admission status was agreed to be Admission Status: observation status to the service of Dr Dante Gaona    None       Patient's Medications   Discharge Prescriptions    No medications on file     No discharge procedures on file      ED Provider  Electronically Signed by           Rabia May MD  04/22/18 CLARENCE Trevizo MD  04/22/18 9837

## 2018-04-23 VITALS
RESPIRATION RATE: 16 BRPM | DIASTOLIC BLOOD PRESSURE: 74 MMHG | SYSTOLIC BLOOD PRESSURE: 125 MMHG | HEIGHT: 70 IN | WEIGHT: 184.5 LBS | HEART RATE: 74 BPM | BODY MASS INDEX: 26.41 KG/M2 | TEMPERATURE: 98.1 F | OXYGEN SATURATION: 96 %

## 2018-04-23 PROBLEM — K20.90 ESOPHAGITIS: Status: ACTIVE | Noted: 2018-01-21

## 2018-04-23 PROCEDURE — 99217 PR OBSERVATION CARE DISCHARGE MANAGEMENT: CPT | Performed by: INTERNAL MEDICINE

## 2018-04-23 RX ORDER — OMEPRAZOLE 40 MG/1
40 CAPSULE, DELAYED RELEASE ORAL DAILY
Qty: 30 CAPSULE | Refills: 0 | Status: SHIPPED | OUTPATIENT
Start: 2018-04-23 | End: 2018-06-04

## 2018-04-23 RX ADMIN — ASPIRIN 81 MG: 81 TABLET, COATED ORAL at 08:40

## 2018-04-23 RX ADMIN — ATORVASTATIN CALCIUM 40 MG: 40 TABLET, FILM COATED ORAL at 08:40

## 2018-04-23 RX ADMIN — DULOXETINE HYDROCHLORIDE 30 MG: 30 CAPSULE, DELAYED RELEASE ORAL at 08:40

## 2018-04-23 RX ADMIN — LORAZEPAM 1 MG: 1 TABLET ORAL at 08:40

## 2018-04-23 RX ADMIN — LORAZEPAM 1 MG: 1 TABLET ORAL at 16:55

## 2018-04-23 NOTE — CASE MANAGEMENT
Initial Clinical Review    Admission: Date/Time/Statement: 4/22/18 @ 1629 OBSERVATION    Orders Placed This Encounter   Procedures    Place in Observation (expected length of stay for this patient is less than two midnights)     Standing Status:   Standing     Number of Occurrences:   1     Order Specific Question:   Admitting Physician     Answer:   Supriya Urbano [[id number]]     Order Specific Question:   Level of Care     Answer:   Med Surg [16]       ED: Date/Time/Mode of Arrival:   ED Arrival Information     Expected Arrival Acuity Means of Arrival Escorted By Service Admission Type    - 4/22/2018 14:36 Emergent [facility] Emergency    Arrival Complaint    chest pain          Chief Complaint:   Chief Complaint   Patient presents with    Chest Pain     chest pain X 2 days , pressure in nature, intermittant had radiation of pain down left arm and leg yesterday       History of Illness:    Vasyl August  is a 60 y o  male who presents with chest pain for the past two days, continuous, states we awoke with it  Better when he walks  Pain is inferior sternal/epigastric  Mild cough with sputum  Has pain with swallowing  He states he is homeless and living with friends or in a garage        ED Vital Signs:   ED Triage Vitals   Temperature Pulse Respirations Blood Pressure SpO2   04/22/18 1855 04/22/18 1450 04/22/18 1450 04/22/18 1450 04/22/18 1450   98 2 °F (36 8 °C) 89 18 132/79 100 %      Temp Source Heart Rate Source Patient Position - Orthostatic VS BP Location FiO2 (%)   04/22/18 1855 04/22/18 1800 04/22/18 1800 04/22/18 1800 --   Oral Monitor Lying Left arm       Pain Score       04/22/18 1450       7        Wt Readings from Last 1 Encounters:   04/22/18 83 7 kg (184 lb 8 oz)       Vital Signs: WNL    Abnormal Labs/Diagnostic Test Results:     Troponin <0 2, <0 2, <0 2  EKG: NSR    Pending: Barium swallow      ED Treatment:   Medication Administration from 04/22/2018 1436 to 04/22/2018 1900       Date/Time Order Dose Route Action     04/22/2018 1628 nitroglycerin (NITRO-BID) 2 % TD ointment 1 inch 1 inch Topical Given     04/22/2018 1823 aluminum-magnesium hydroxide-simethicone (MYLANTA) 200-200-20 mg/5 mL oral suspension 30 mL 30 mL Oral Given          Past Medical/Surgical History: Active Ambulatory Problems     Diagnosis Date Noted    Adult residual type attention deficit hyperactivity disorder (ADHD) 07/30/2017    Precordial pain 01/21/2018    HTN (hypertension) 01/21/2018    HLD (hyperlipidemia) 01/21/2018    Prediabetes 01/30/2018    Chronic low back pain 01/30/2018    Adenomatous polyp of transverse colon 01/30/2018    Sessile rectal polyp 01/30/2018    Erectile dysfunction 01/30/2018    Polyp of colon 02/07/2018     Resolved Ambulatory Problems     Diagnosis Date Noted    Severe major depression, single episode ([facility] [address] ) 07/29/2017    Lumbar spine tumor 01/30/2018     Past Medical History:   Diagnosis Date    Anxiety     Chronic back pain     Chronic pain     Gallstones     Psychiatric disorder     Spinal arachnoid cyst        Admitting Diagnosis: Anxiety [F41 9]  Chest pain [R07 9]    Age/Sex: 60 y o  male    Assessment/Plan:     Bipolar 1 disorder (HCC)   Assessment & Plan     Restart cyanastasiya seroquel, prozac, ativan  Has appointment with his therapist tomorrow          Precordial pain   Assessment & Plan     Rather atypical with chest wall tenderness and dysphagia    Add maalox  Check barium esophogram  Check troponin x2, recent stress test was WNL          Admission Orders:    Barium swallow  Continuous cardiac monitoring  Continuous pulse oximetry  Sequential compression deivce  OOB as tolerated      Scheduled Meds:   Current Facility-Administered Medications:  acetaminophen 650 mg Oral Q4H PRN   aluminum-magnesium hydroxide-simethicone 30 mL Oral Q4H PRN   aspirin 81 mg Oral Daily   atorvastatin 40 mg Oral Daily   DULoxetine 30 mg Oral Daily   enoxaparin 40 mg Subcutaneous Daily   LORazepam 1 mg Oral TID   ondansetron 4 mg Intravenous Q6H PRN   QUEtiapine 100 mg Oral HS   zolpidem 5 mg Oral HS PRN                   Thank you,  [facility] in the [facility] by Vasiliy Agarwal for 2017  Network Utilization Review Department  Phone: [phone]; Fax [phone]  ATTENTION: The Network Utilization Review Department is now centralized for our 7 Facilities  Make a note that we have a new phone and fax numbers for our Department  Please call with any questions or concerns to [phone] and carefully follow the prompts so that you are directed to the right person  All voicemails are confidential  Fax any determinations, approvals, denials, and requests for initial or continue stay review clinical to [phone]  Due to HIGH CALL volume, it would be easier if you could please send faxed requests to expedite your requests and in part, help us provide discharge notifications faster

## 2018-04-24 NOTE — DISCHARGE SUMMARY
Discharge Summary - [facility]    Patient Information: John Jansen  60 y o  male MRN: [id number]  Unit/Bed#: -01 Encounter: [id number]    Discharging Physician / Practitioner: Liza Chaney MD  PCP: Chang Oliver DO  Admission Date: 4/22/2018  Discharge Date: 04/24/18    Disposition:     Home    Reason for Admission: chest pain    Discharge Diagnoses:     Principal Problem:    Esophagitis  Active Problems:    Bipolar 1 disorder ([address] )  Resolved Problems:    * No resolved hospital problems  *      Consultations During Hospital Stay:  · None    Procedures Performed:     · None    Significant Findings / Test Results:     · None    Incidental Findings:   · None     Test Results Pending at Discharge (will require follow up): · None     Outpatient Tests Requested:  · None    Complications:  None    Hospital Course:     Carmine Villanueva Sr  is a 60 y o  male patient who originally presented to the hospital on 4/22/2018 due to persistent chest pain for 2 days  Upon arrival EKG was nonischemic and troponin was negative  Despite a recent normal stress test he was referral for admission by the Er  He was observed on telemetry and serial cardiac enzymes were negative  He was treated with maalox with improvement of his pain  He later tolerated his diet and was discharged with a PPI  HE was counseled to avoid alcohol NSAIDS, spicy foods, fatty foods, peppermint, chocolate, and caffeine  He was counseled to follow up with his PCP upon discharge  Condition at Discharge: stable     Discharge Day Visit / Exam:     Subjective:  Chest pain resolved     Vitals: Blood Pressure: 125/74 (04/23/18 1456)  Pulse: 74 (04/23/18 1456)  Temperature: 98 1 °F (36 7 °C) (04/23/18 1456)  Temp Source: Oral (04/23/18 1456)  Respirations: 16 (04/23/18 1456)  Height: 5' 10" (177 8 cm) (04/22/18 1855)  Weight - Scale: 83 7 kg (184 lb 8 oz) (04/22/18 1855)  SpO2: 96 % (04/23/18 1456)  Exam:   Physical Exam Constitutional: He appears well-developed and well-nourished  Cardiovascular: Normal rate and regular rhythm  Pulmonary/Chest: Effort normal  He has no wheezes  He has no rales  Abdominal: Soft  He exhibits no distension  There is no tenderness  Discharge instructions/Information to patient and family:   See after visit summary for information provided to patient and family  Provisions for Follow-Up Care:  See after visit summary for information related to follow-up care and any pertinent home health orders  Planned Readmission: None     Discharge Statement:  I spent 35 minutes discharging the patient  This time was spent on the day of discharge  I had direct contact with the patient on the day of discharge  Greater than 50% of the total time was spent examining patient, answering all patient questions, arranging and discussing plan of care with patient as well as directly providing post-discharge instructions  Additional time then spent on discharge activities  Discharge Medications:  See after visit summary for reconciled discharge medications provided to patient and family        ** Please Note: This note has been constructed using a voice recognition system **

## 2018-04-30 ENCOUNTER — OFFICE VISIT (OUTPATIENT)
Dept: INTERNAL MEDICINE CLINIC | Facility: CLINIC | Age: 63
End: 2018-04-30
Payer: COMMERCIAL

## 2018-04-30 VITALS
TEMPERATURE: 97.9 F | BODY MASS INDEX: 27.21 KG/M2 | WEIGHT: 190.04 LBS | SYSTOLIC BLOOD PRESSURE: 140 MMHG | HEIGHT: 70 IN | DIASTOLIC BLOOD PRESSURE: 70 MMHG | HEART RATE: 72 BPM

## 2018-04-30 DIAGNOSIS — D12.6 TUBULAR ADENOMA OF COLON: ICD-10-CM

## 2018-04-30 DIAGNOSIS — R73.03 PREDIABETES: ICD-10-CM

## 2018-04-30 DIAGNOSIS — Z23 NEED FOR TDAP VACCINATION: Primary | ICD-10-CM

## 2018-04-30 DIAGNOSIS — F31.9 BIPOLAR 1 DISORDER (HCC): ICD-10-CM

## 2018-04-30 DIAGNOSIS — I10 ESSENTIAL HYPERTENSION: ICD-10-CM

## 2018-04-30 DIAGNOSIS — K20.90 ESOPHAGITIS: ICD-10-CM

## 2018-04-30 PROCEDURE — 99213 OFFICE O/P EST LOW 20 MIN: CPT | Performed by: INTERNAL MEDICINE

## 2018-04-30 PROCEDURE — 3725F SCREEN DEPRESSION PERFORMED: CPT | Performed by: INTERNAL MEDICINE

## 2018-04-30 NOTE — PROGRESS NOTES
CLINIC VISIT NOTE  Jai Ordonez Sr  60 y o  male   MRN: [id number]    ASSESSMENT/PLAN:  Problem List Items Addressed This Visit        Digestive    Esophagitis    Tubular adenoma of colon       Cardiovascular and Mediastinum    HTN (hypertension)       Other    Prediabetes    Bipolar 1 disorder ([facility] )      Other Visit Diagnoses     Need for Tdap vaccination    -  Primary        Bipolar disease -patient follows up with therapist and psychiatrist, continue psych meds    Chest pain -recent hospitalization for chest pain 4/23/2018, noncardiac etiology, etiology most likely related to acid reflux  -continue omeprazole 40 mg daily  -patient is currently denying acid reflux symptoms  -agent was counseled on avoiding alcohol, NSAIDs, spicy foods, fried food, peppermint, chocolate, caffeine    Pre diabetes-patient was advised on diabetic diet and exercise    Hypertension-stable, currently off any medications    Health Maintenance:  Patient is up-to-date with colonoscopy, patient had colonoscopy in March 2018 finding of tubular adenoma -follow-up in 3 years   -up-to-date with immunizations    Schedule a follow-up appointment in 3 months    Chief Complaint:  None    History of Present Illness:  Patient is a 60years-old male who comes for follow-up after hospitalization on 04/22/2018 for persistent chest pain for 2 days  Patient had cardiac evaluation that was negative  Patient's chest pain was most likely related to acid reflux as his symptoms improved with maalox  Currently patient has no acute complaints  Review of Systems   Constitutional: Negative for chills, fatigue and fever  HENT: Negative for congestion, postnasal drip and sore throat  Eyes: Negative for pain  Respiratory: Negative for cough and shortness of breath  Cardiovascular: Negative for chest pain and palpitations     Gastrointestinal: Negative for abdominal distention, abdominal pain, blood in stool, constipation, diarrhea, nausea and vomiting  Genitourinary: Negative for dysuria  Musculoskeletal: Negative for arthralgias and back pain  Neurological: Negative for dizziness, light-headedness and headaches  Psychiatric/Behavioral: Negative for confusion  The patient is not nervous/anxious and is not hyperactive  Objective:  Vitals:    04/30/18 1526   BP: 140/70   BP Location: Left arm   Patient Position: Sitting   Cuff Size: Adult   Pulse: 72   Temp: 97 9 °F (36 6 °C)   TempSrc: Oral   Weight: 86 2 kg (190 lb 0 6 oz)   Height: 5' 10" (1 778 m)     Physical Exam   Constitutional: He appears well-developed and well-nourished  HENT:   Head: Normocephalic and atraumatic  Mouth/Throat: No oropharyngeal exudate  Eyes: Conjunctivae are normal    Neck: Neck supple  Cardiovascular: Normal rate and regular rhythm  No murmur heard  Pulmonary/Chest: Effort normal and breath sounds normal  No respiratory distress  Abdominal: Soft  He exhibits no distension  There is no tenderness  Musculoskeletal: He exhibits no edema  Neurological: He is alert  Skin: Skin is warm and dry  Psychiatric: He has a normal mood and affect           Current Outpatient Prescriptions:     atoMOXetine (STRATTERA) 25 mg capsule, Take 1 capsule by mouth daily, Disp: 15 capsule, Rfl: 0    DULoxetine (CYMBALTA) 30 mg delayed release capsule, Take 1 capsule by mouth daily, Disp: 15 capsule, Rfl: 0    FLUoxetine (PROzac) 20 mg capsule, Take 1 capsule by mouth daily in the early morning, Disp: , Rfl: 1    LORazepam (ATIVAN) 1 mg tablet, Take 1 mg by mouth 3 (three) times a day, Disp: , Rfl: 1    omeprazole (PriLOSEC) 40 MG capsule, Take 1 capsule (40 mg total) by mouth daily, Disp: 30 capsule, Rfl: 0    QUEtiapine (SEROquel) 100 mg tablet, Take 1 tablet by mouth daily at bedtime, Disp: 15 tablet, Rfl: 0    Past Medical History:   Diagnosis Date    Anxiety     Chronic back pain     Chronic pain     Gallstones     Psychiatric disorder     bipolar disorder    Spinal arachnoid cyst      Past Surgical History:   Procedure Laterality Date    COLONOSCOPY      INCISION AND DRAINAGE ABSCESS / HEMATOMA OF BURSA / KNEE / THIGH      outer right leg and center of upper back   complicated  resolved status    MI COLONOSCOPY FLX DX W/COLLJ SPEC WHEN PFRMD N/A 8/31/2017    Procedure: COLONOSCOPY;  Surgeon: Barby Fontana MD;  [facility]; Service: Gastroenterology    MI COLONOSCOPY FLX DX W/COLLJ [facility] WHEN PFRMD N/A 3/23/2018    Procedure: COLONOSCOPY;  Surgeon: Barby Fontana MD;  [facility]; Service: Gastroenterology    TUMOR REMOVAL       Social History     Social History    Marital status: Single     Spouse name: N/A    Number of children: 3    Years of education: N/A     Occupational History          disabled     Social History Main Topics    Smoking status: Former Smoker    Smokeless tobacco: Never Used      Comment: all scripts says former smoker quit at [employer] Alcohol use Yes      Comment: pt "sometimes; not like before"    Drug use: Yes     Types: Marijuana    Sexual activity: Yes     Partners: Female     Birth control/ protection: None     Other Topics Concern    Not on file     Social History Narrative    Disabled    GED     Family History   Problem Relation Age of Onset    Throat cancer Mother     Heart attack Father      at [de-identified]    Stomach cancer Sister     Lung cancer Brother     Stomach cancer Brother        ==  Lou Samson MD  PGY2    [facility]  [address] [facility] , [address] [address] [facility] [address] [facility]  Office: [phone]  Fax: [phone]

## 2018-05-29 ENCOUNTER — APPOINTMENT (EMERGENCY)
Dept: RADIOLOGY | Facility: HOSPITAL | Age: 63
End: 2018-05-29
Payer: COMMERCIAL

## 2018-05-29 ENCOUNTER — HOSPITAL ENCOUNTER (EMERGENCY)
Facility: HOSPITAL | Age: 63
Discharge: HOME | End: 2018-05-29
Attending: EMERGENCY MEDICINE | Admitting: EMERGENCY MEDICINE
Payer: COMMERCIAL

## 2018-05-29 VITALS
RESPIRATION RATE: 18 BRPM | BODY MASS INDEX: 27.98 KG/M2 | OXYGEN SATURATION: 97 % | SYSTOLIC BLOOD PRESSURE: 132 MMHG | WEIGHT: 195 LBS | HEART RATE: 59 BPM | DIASTOLIC BLOOD PRESSURE: 82 MMHG | TEMPERATURE: 98.1 F

## 2018-05-29 DIAGNOSIS — M72.2 PLANTAR FASCIITIS: Primary | ICD-10-CM

## 2018-05-29 PROCEDURE — 73630 X-RAY EXAM OF FOOT: CPT

## 2018-05-29 PROCEDURE — 99283 EMERGENCY DEPT VISIT LOW MDM: CPT

## 2018-05-29 RX ORDER — PREDNISONE 20 MG/1
60 TABLET ORAL DAILY
Qty: 15 TABLET | Refills: 0 | Status: SHIPPED | OUTPATIENT
Start: 2018-05-29 | End: 2018-06-03

## 2018-05-29 RX ORDER — HYDROCODONE BITARTRATE AND ACETAMINOPHEN 5; 325 MG/1; MG/1
1 TABLET ORAL ONCE
Status: COMPLETED | OUTPATIENT
Start: 2018-05-29 | End: 2018-05-29

## 2018-05-29 RX ORDER — HYDROCODONE BITARTRATE AND ACETAMINOPHEN 5; 325 MG/1; MG/1
1 TABLET ORAL EVERY 6 HOURS PRN
Qty: 15 TABLET | Refills: 0 | Status: SHIPPED | OUTPATIENT
Start: 2018-05-29 | End: 2018-09-20 | Stop reason: ALTCHOICE

## 2018-05-29 RX ORDER — PREDNISONE 20 MG/1
60 TABLET ORAL ONCE
Status: COMPLETED | OUTPATIENT
Start: 2018-05-29 | End: 2018-05-29

## 2018-05-29 RX ADMIN — HYDROCODONE BITARTRATE AND ACETAMINOPHEN 1 TABLET: 5; 325 TABLET ORAL at 12:46

## 2018-05-29 RX ADMIN — PREDNISONE 60 MG: 20 TABLET ORAL at 12:46

## 2018-05-29 NOTE — ED NOTES
D/C instructions discussed  Educated on f/u, medication, surgical shoe use, and s/s of when to return to the ED  All questions answered  Ambulatory off unit with steady gait and no s/s of acute distress        Carlie Canales RN  05/29/18 8176

## 2018-05-29 NOTE — ED NOTES
Pt understands he may not drive  Family at bedside to drive pt home        Liborio Funez RN  05/29/18 0385

## 2018-05-29 NOTE — DISCHARGE INSTRUCTIONS
Plantar Fasciitis   WHAT YOU NEED TO KNOW:   Rest, ice, and foot supports can help your plantar fascia heal  You may need medicines to decrease swelling and pain  A physical therapist can teach you exercises to help improve movement and strength, and to decrease pain  DISCHARGE INSTRUCTIONS:   Contact your healthcare provider or podiatrist if:   · Your pain and swelling increase  · You develop knee, hip, or back pain  · You have questions or concerns about your condition or care  Medicines: You may  need any of the following:  · Acetaminophen  decreases pain and fever  It is available without a doctor's order  Ask how much to give your child and how often to give it  Follow directions  Read the labels of all other medicines your child uses to see if they also contain acetaminophen, or ask your child's doctor or pharmacist  Acetaminophen can cause liver damage if not taken correctly  · NSAIDs , such as ibuprofen, help decrease swelling, pain, and fever  NSAIDs can cause stomach bleeding or kidney problems in certain people  If you take blood thinner medicine, always ask your healthcare provider if NSAIDs are safe for you  Always read the medicine label and follow directions  · Take your medicine as directed  Contact your healthcare provider if you think your medicine is not helping or if you have side effects  Tell him of her if you are allergic to any medicine  Keep a list of the medicines, vitamins, and herbs you take  Include the amounts, and when and why you take them  Bring the list or the pill bottles to follow-up visits  Carry your medicine list with you in case of an emergency  Self-care:   · Wear your splint or shoe inserts as directed  You may need to wear a splint at night to keep your foot stretched while you sleep  This will help prevent sharp pain first thing in the morning  Shoe inserts will help decrease stress on your plantar fascia when you walk or exercise       · Rest as directed  Rest as much as possible to decrease swelling and prevent more damage  Ask your healthcare provider when you can return to your normal activities  · Apply ice on your plantar fascia  Ice helps prevent tissue damage and decreases swelling and pain  Fill a water bottle with water and freeze it  Wrap a towel around the bottle or cover it with a pillow case  Roll the water bottle under your foot for 10 minutes in the morning and after work  · Massage your plantar fascia as directed  This may help decrease swelling and pain  Roll a golf ball under your foot for 10 minutes  Repeat 3 times each day  · Go to physical therapy as directed  A physical therapist teaches you exercises to help improve movement and strength, and to decrease pain  Prevent plantar fasciitis:   · Maintain a healthy weight  This will help decrease stress on your feet  Ask your healthcare provider how much you should weigh  Ask him to help you create a weight loss plan if you are overweight  · Do low-impact exercises  Low-impact exercises decrease stress on your plantar fascia  Examples include swimming or bicycling  · Start new activities slowly  Increase the intensity and time gradually  · Wear shoes that fit well and support your arch  Replace your shoes before the padding or shock absorption wears out  Do not walk or  bare feet or sandals for long periods of time  · Stretch before you exercise  Ask your healthcare provider how to stretch your plantar fascia and calf muscles  Follow up with your healthcare provider or podiatrist as directed:  Write down your questions so you remember to ask them during your visits  © 2017 [address] Information is for End User's use only and may not be sold, redistributed or otherwise used for commercial purposes  All illustrations and images included in CareNotes® are the copyrighted property of A [employer]  or Vasiliy Agarwal    The above information is an  only  It is not intended as medical advice for individual conditions or treatments  Talk to your doctor, nurse or pharmacist before following any medical regimen to see if it is safe and effective for you

## 2018-05-29 NOTE — ED PROVIDER NOTES
History  Chief Complaint   Patient presents with    Foot Pain     pt c/o right heel pain, started yesterday, denies any injury to area  69-year-old male presents with chief complaint of nontraumatic right heel pain  Started yesterday afternoon  Patient reports he was raking leaves when he 1st felt it  No significant deformity or swelling  No skin color changes  No changes in sensation  No similar symptoms in the past   X-ray done prior to my evaluation reveals no significant pathology  History provided by:  Patient   used: No    Medical Problem   Location:  Right heel   Quality:  Pain  Severity:  Moderate  Onset quality:  Gradual  Duration:  2 days  Timing:  Constant  Progression:  Worsening  Chronicity:  New  Context:  Spontaneous, first felt it while raking leaves  Relieved by:  Rest  Worsened by:  Palpation, ambulation  Ineffective treatments:  Nothing tried   Associated symptoms: rash (chronic left anterior shin)    Associated symptoms: no chest pain, no diarrhea, no fever, no nausea, no shortness of breath and no vomiting        Prior to Admission Medications   Prescriptions Last Dose Informant Patient Reported? Taking?    DULoxetine (CYMBALTA) 30 mg delayed release capsule  Self No Yes   Sig: Take 1 capsule by mouth daily   FLUoxetine (PROzac) 20 mg capsule  Self Yes Yes   Sig: Take 1 capsule by mouth daily in the early morning   LORazepam (ATIVAN) 1 mg tablet  Self Yes Yes   Sig: Take 1 mg by mouth 3 (three) times a day   QUEtiapine (SEROquel) 100 mg tablet  Self No Yes   Sig: Take 1 tablet by mouth daily at bedtime   atoMOXetine (STRATTERA) 25 mg capsule  Self No Yes   Sig: Take 1 capsule by mouth daily   omeprazole (PriLOSEC) 40 MG capsule  Self No Yes   Sig: Take 1 capsule (40 mg total) by mouth daily      Facility-Administered Medications: None       Past Medical History:   Diagnosis Date    Anxiety     Chronic back pain     Chronic pain     Gallstones     Psychiatric disorder     bipolar disorder    Spinal arachnoid cyst        Past Surgical History:   Procedure Laterality Date    COLONOSCOPY      INCISION AND DRAINAGE ABSCESS / HEMATOMA OF BURSA / KNEE / THIGH      outer right leg and center of upper back   complicated  resolved status    RI COLONOSCOPY FLX DX W/COLLJ SPEC WHEN PFRMD N/A 8/31/2017    Procedure: COLONOSCOPY;  Surgeon: Henry Valdez MD;  [facility]; Service: Gastroenterology    RI COLONOSCOPY FLX DX W/COLLJ [facility] WHEN PFRMD N/A 3/23/2018    Procedure: COLONOSCOPY;  Surgeon: Henry Valdez MD;  [facility]; Service: Gastroenterology    TUMOR REMOVAL         Family History   Problem Relation Age of Onset    Throat cancer Mother     Heart attack Father      at [de-identified]    Stomach cancer Sister     Lung cancer Brother     Stomach cancer Brother      I have reviewed and agree with the history as documented  Social History   Substance Use Topics    Smoking status: Former Smoker    Smokeless tobacco: Never Used      Comment: all scripts says former smoker quit at [employer] Alcohol use Yes      Comment: pt "sometimes; not like before"        Review of Systems   Constitutional: Negative for chills, diaphoresis and fever  Respiratory: Negative for shortness of breath  Cardiovascular: Negative for chest pain and palpitations  Gastrointestinal: Negative for diarrhea, nausea and vomiting  Genitourinary: Negative for dysuria and frequency  Skin: Positive for rash (chronic left anterior shin)  All other systems reviewed and are negative  Physical Exam  Physical Exam   Constitutional: He is oriented to person, place, and time  He appears well-developed and well-nourished  He appears distressed (mild)  HENT:   Head: Normocephalic and atraumatic  Right Ear: External ear normal    Left Ear: External ear normal    Nose: Nose normal    Eyes: EOM and lids are normal  Pupils are equal, round, and reactive to light     Neck: Normal range of motion  Neck supple  No JVD present  Cardiovascular: Normal rate, regular rhythm, normal heart sounds and intact distal pulses  Exam reveals no gallop and no friction rub  No murmur heard  Pulmonary/Chest: Effort normal and breath sounds normal  No respiratory distress  He has no wheezes  He has no rales  He exhibits no tenderness  Musculoskeletal: Normal range of motion  He exhibits tenderness (right heel)  He exhibits no deformity  onychomycosis of toenails      Neurological: He is alert and oriented to person, place, and time  Skin: Skin is warm and dry  Rash (dry scaling rash to lateral aspect of left shin) noted  Psychiatric: He has a normal mood and affect  His behavior is normal  Judgment and thought content normal    Nursing note and vitals reviewed  Vital Signs  ED Triage Vitals [05/29/18 1153]   Temperature Pulse Respirations Blood Pressure SpO2   98 1 °F (36 7 °C) 73 18 132/80 98 %      Temp Source Heart Rate Source Patient Position - Orthostatic VS BP Location FiO2 (%)   Oral Monitor Sitting Left arm --      Pain Score       9           Vitals:    05/29/18 1153 05/29/18 1304   BP: 132/80 132/82   Pulse: 73 59   Patient Position - Orthostatic VS: Sitting Sitting       Visual Acuity      ED Medications  Medications   HYDROcodone-acetaminophen (NORCO) 5-325 mg per tablet 1 tablet (1 tablet Oral Given 5/29/18 1246)   predniSONE tablet 60 mg (60 mg Oral Given 5/29/18 1246)       Diagnostic Studies  Results Reviewed     None                 XR foot 3+ views RIGHT   ED Interpretation by Jaun Cody MD (05/29 1225)   This film was interpreted independently by me  No fracture or dislocation           by Carmine Sanders (05/29 1214)                 Procedures  Procedures       Phone Contacts  ED Phone Contact    ED Course                               MDM  Number of Diagnoses or Management Options  Plantar fasciitis: new and requires workup  Diagnosis management comments: Background: 60 y o  male presents with foot pain    Differential DX includes but is not limited to: plantar fasciitis, contusion, less likely calcaneus fracture    Plan: xray, symptom control          Amount and/or Complexity of Data Reviewed  Tests in the radiology section of CPT®: ordered and reviewed  Independent visualization of images, tracings, or specimens: yes    Patient Progress  Patient progress: stable    CritCare Time    Disposition  Final diagnoses:   Plantar fasciitis - acute suspected right foot     Time reflects when diagnosis was documented in both MDM as applicable and the Disposition within this note     Time User Action Codes Description Comment    5/29/2018 12:51 PM Yuniel CHAVEZ Add [M72 2] Plantar fasciitis     5/29/2018 12:51 PM Yuniel CHAVEZ Modify [M72 2] Plantar fasciitis acute suspected right foot      ED Disposition     ED Disposition Condition Comment    Discharge  [address]  discharge to home/self care  Condition at discharge: Good        Follow-up Information     Follow up With Specialties Details Why [facility] Medicine Call for podiatry appointment  [address] [place], [place] [id number] [phone]            Patient's Medications   Discharge Prescriptions    HYDROCODONE-ACETAMINOPHEN (NORCO) 5-325 MG PER TABLET    Take 1 tablet by mouth every 6 (six) hours as needed for pain for up to 15 doses Max Daily Amount: 4 tablets       Start Date: 5/29/2018 End Date: --       Order Dose: 1 tablet       Quantity: 15 tablet    Refills: 0    PREDNISONE 20 MG TABLET    Take 3 tablets (60 mg total) by mouth daily for 5 days       Start Date: 5/29/2018 End Date: 6/3/2018       Order Dose: 60 mg       Quantity: 15 tablet    Refills: 0     No discharge procedures on file      ED Provider  Electronically Signed by           Eriberto Jenkins MD  05/29/18 [id number] Alexi Canas MD  05/29/18 5969

## 2018-06-04 ENCOUNTER — OFFICE VISIT (OUTPATIENT)
Dept: INTERNAL MEDICINE CLINIC | Facility: CLINIC | Age: 63
End: 2018-06-04
Payer: COMMERCIAL

## 2018-06-04 VITALS
HEIGHT: 70 IN | SYSTOLIC BLOOD PRESSURE: 108 MMHG | TEMPERATURE: 98.1 F | BODY MASS INDEX: 28.09 KG/M2 | WEIGHT: 196.21 LBS | HEART RATE: 72 BPM | DIASTOLIC BLOOD PRESSURE: 78 MMHG

## 2018-06-04 DIAGNOSIS — D12.6 TUBULAR ADENOMA OF COLON: ICD-10-CM

## 2018-06-04 DIAGNOSIS — R42 DIZZINESS: Primary | ICD-10-CM

## 2018-06-04 DIAGNOSIS — G89.29 CHRONIC LOW BACK PAIN, UNSPECIFIED BACK PAIN LATERALITY, WITH SCIATICA PRESENCE UNSPECIFIED: ICD-10-CM

## 2018-06-04 DIAGNOSIS — M54.5 CHRONIC LOW BACK PAIN, UNSPECIFIED BACK PAIN LATERALITY, WITH SCIATICA PRESENCE UNSPECIFIED: ICD-10-CM

## 2018-06-04 PROCEDURE — 99214 OFFICE O/P EST MOD 30 MIN: CPT | Performed by: INTERNAL MEDICINE

## 2018-06-04 RX ORDER — MECLIZINE HCL 12.5 MG/1
12.5 TABLET ORAL EVERY 8 HOURS PRN
Qty: 30 TABLET | Refills: 0 | Status: SHIPPED | OUTPATIENT
Start: 2018-06-04 | End: 2018-09-20 | Stop reason: ALTCHOICE

## 2018-06-04 NOTE — ASSESSMENT & PLAN NOTE
Patient had a recent colonoscopy in March found 1 tubular adenoma less than 1 cm, rest of that polyps were hyperplastic, patient need colonoscopy in 5-10 years, will monitor

## 2018-06-04 NOTE — ASSESSMENT & PLAN NOTE
Patient currently complain about dizziness that started 1 week ago after starting steroids, dizziness is related this switching positions, with no associated motor or sensory deficit, will prescribe meclizine and monitor

## 2018-06-04 NOTE — PROGRESS NOTES
INTERNAL MEDICINE FOLLOW-UP OFFICE VISIT  [facility]  [address] [place] [address]    NAME: Gregory Delgado Sr   AGE: 60 y o  SEX: male    DATE OF ENCOUNTER: 6/4/2018    Assessment and Plan     Problem List Items Addressed This Visit        Digestive    Tubular adenoma of colon     Patient had a recent colonoscopy in March found 1 tubular adenoma less than 1 cm, rest of that polyps were hyperplastic, patient need colonoscopy in 5-10 years, will monitor            Other    Chronic low back pain     Patient chronic opiates, has a schwannoma, currently following with Neurosurgery, in the process of scheduling resection         Dizziness - Primary     Patient currently complain about dizziness that started 1 week ago after starting steroids, dizziness is related this switching positions, with no associated motor or sensory deficit, will prescribe meclizine and monitor         Relevant Medications    meclizine (ANTIVERT) 12 5 MG tablet          No orders of the defined types were placed in this encounter       - Counseling Documentation: patient was counseled regarding: diagnostic results, instructions for management, risk factor reductions, prognosis, patient and family education, impressions, risks and benefits of treatment options and importance of compliance with treatment  - Medication Side Effects: Adverse side effects of medications were reviewed with the patient/guardian today      Chief Complaint     Chief Complaint   Patient presents with    Follow-up    Dizziness     about one week       History of Present Illness     HPI  28-year-old male patient with past medical history significant for ADD, low back pain on chronic opiates, patient is currently complain about dizziness that started around the time he started steroids, patient complaining about dizziness that is more pronounced after switching positions, denies any weakness or abnormal sensation, patient also has a history of schwannoma on the lumbar spine and is following with Neurosurgery     The following portions of the patient's history were reviewed and updated as appropriate: allergies, current medications, past family history, past medical history, past social history, past surgical history and problem list     Review of Systems     Review of Systems   Constitutional: Negative  Negative for chills and fever  HENT: Negative  Eyes: Negative  Respiratory: Negative  Negative for cough and shortness of breath  Cardiovascular: Negative  Negative for chest pain  Gastrointestinal: Negative  Negative for abdominal pain  Endocrine: Negative  Genitourinary: Negative  Musculoskeletal: Negative  Skin: Negative  Allergic/Immunologic: Negative  Neurological: Positive for dizziness  Negative for weakness and headaches  Hematological: Negative  Psychiatric/Behavioral: Negative  All other systems reviewed and are negative  Active Problem List     Patient Active Problem List   Diagnosis    Adult residual type attention deficit hyperactivity disorder (ADHD)    Esophagitis    HTN (hypertension)    HLD (hyperlipidemia)    Prediabetes    Chronic low back pain    Adenomatous polyp of transverse colon    Sessile rectal polyp    Erectile dysfunction    Polyp of colon    Bipolar 1 disorder (HCC)    Tubular adenoma of colon    Dizziness       Objective     /78   Pulse 72   Temp 98 1 °F (36 7 °C)   Ht 5' 10" (1 778 m)   Wt 89 kg (196 lb 3 4 oz)   BMI 28 15 kg/m²     Physical Exam   Constitutional: He is oriented to person, place, and time  He appears well-developed and well-nourished  HENT:   Head: Normocephalic and atraumatic  Mouth/Throat: Oropharynx is clear and moist    Eyes: EOM are normal  Pupils are equal, round, and reactive to light  Neck: Normal range of motion  Neck supple  Cardiovascular: Normal rate and regular rhythm      Pulmonary/Chest: Effort normal and breath sounds normal    Abdominal: Soft  Bowel sounds are normal  He exhibits no mass  There is no tenderness  There is no rebound, no guarding and no CVA tenderness  Neurological: He is alert and oriented to person, place, and time  Skin: Skin is warm and dry  Psychiatric: He has a normal mood and affect  His behavior is normal    Nursing note and vitals reviewed        Pertinent Laboratory/Diagnostic Studies:  CBC:   Lab Results   Component Value Date/Time    WBC 15 18 (H) 04/22/2018 03:15 PM    RBC 5 07 04/22/2018 03:15 PM    HGB 15 3 04/22/2018 03:15 PM    HCT 44 4 04/22/2018 03:15 PM    MCV 88 04/22/2018 03:15 PM    MCH 30 2 04/22/2018 03:15 PM    MCHC 34 5 04/22/2018 03:15 PM    RDW 14 1 04/22/2018 03:15 PM    MPV 9 8 04/22/2018 09:20 PM     04/22/2018 09:20 PM    NRBC 0 07/29/2017 06:21 AM    NEUTOPHILPCT 64 04/22/2018 03:15 PM    LYMPHOPCT 25 04/22/2018 03:15 PM    MONOPCT 7 04/22/2018 03:15 PM    EOSPCT 3 04/22/2018 03:15 PM    BASOPCT 1 04/22/2018 03:15 PM    NEUTROABS 9 75 (H) 04/22/2018 03:15 PM    LYMPHSABS 3 84 04/22/2018 03:15 PM    MONOSABS 1 09 04/22/2018 03:15 PM    EOSABS 0 41 04/22/2018 03:15 PM     Chemistry Profile:   Lab Results   Component Value Date/Time     04/22/2018 03:15 PM     09/30/2016 11:06 AM    K 4 0 04/22/2018 03:15 PM    K 4 4 09/30/2016 11:06 AM     04/22/2018 03:15 PM     09/30/2016 11:06 AM    CO2 24 04/22/2018 03:15 PM    CO2 28 09/30/2016 11:06 AM    ANIONGAP 16 (H) 04/22/2018 03:15 PM    BUN 14 04/22/2018 03:15 PM    BUN 17 09/30/2016 11:06 AM    CREATININE 1 10 04/22/2018 03:15 PM    CREATININE 1 20 09/30/2016 11:06 AM    GLUCOSE 112 04/22/2018 03:15 PM    CALCIUM 10 6 (H) 04/22/2018 03:15 PM    CALCIUM 9 7 09/30/2016 11:06 AM    AST 18 04/22/2018 03:15 PM    ALT 21 04/22/2018 03:15 PM    ALKPHOS 58 04/22/2018 03:15 PM    PROT 8 1 04/22/2018 03:15 PM    BILITOT 0 90 04/22/2018 03:15 PM    EGFR 71 04/22/2018 03:15 PM Coagulation Studies:   Lab Results   Component Value Date/Time    PROTIME 12 6 01/21/2018 10:28 AM    INR 0 92 01/21/2018 10:28 AM    PTT 33 01/21/2018 10:28 AM     Cardiac Studies:   Lab Results   Component Value Date/Time    NTBNP 62 01/21/2018 10:28 AM    TROPONINI <0 02 04/22/2018 09:20 PM     Hepatology: No results found for: LIPASE, AMMONIA, AFP  Endocrine Studies:   Lab Results   Component Value Date/Time    HGBA1C 6 1 08/08/2017 10:40 AM    HGBA1C 6 4 (H) 09/30/2016 11:06 AM    [id number] 1 500 07/29/2017 06:21 AM    TRIG 180 (H) 01/22/2018 05:08 AM    TRIG 246 (H) 09/30/2016 11:06 AM    CHOL 185 01/22/2018 05:08 AM    CHOL 150 09/30/2016 11:06 AM    HDL 38 (L) 01/22/2018 05:08 AM    HDL 22 (L) 09/30/2016 11:06 AM    LDLCALC 111 (H) 01/22/2018 05:08 AM    LDLCHOLEST 79 09/30/2016 11:06 AM    NONHDLC 128 09/30/2016 11:06 AM     CBC:     Results from last 6 Months  Lab Units 04/22/18  2120 04/22/18  1515   WBC Thousand/uL  --  15 18*   RBC Million/uL  --  5 07   HEMOGLOBIN g/dL  --  15 3   HEMATOCRIT %  --  44 4   MCV fL  --  88   MCH pg  --  30 2   MCHC g/dL  --  34 5   RDW %  --  14 1   MPV fL 9 8 9 9   PLATELETS Thousands/uL 199 250   NEUTROS PCT %  --  64   LYMPHS PCT %  --  25   MONOS PCT %  --  7   EOS PCT %  --  3   BASOS PCT %  --  1   NEUTROS ABS Thousands/µL  --  9 75*   LYMPHS ABS Thousands/µL  --  3 84   MONOS ABS Thousand/µL  --  1 09   EOS ABS Thousand/µL  --  0 41     Chemistry Profile:     Results from last 6 Months  Lab Units 04/22/18  1515   SODIUM mmol/L 144   POTASSIUM mmol/L 4 0   CHLORIDE mmol/L 104   CO2 mmol/L 24   ANION GAP mmol/L 16*   BUN mg/dL 14   CREATININE mg/dL 1 10   GLUCOSE RANDOM mg/dL 112   CALCIUM mg/dL 10 6*   AST U/L 18   ALT U/L 21   ALK PHOS U/L 58   TOTAL PROTEIN g/dL 8 1   BILIRUBIN TOTAL mg/dL 0 90   EGFR ml/min/1 73sq m 71     Coagulation Studies:     Results from last 6 Months  Lab Units 01/21/18  1028   PROTIME seconds 12 6   INR  0 92   PTT seconds 33 Cardiac Studies:   Results from last 6 Months  Lab Units 04/22/18 2120  01/21/18  1028   NT-PRO BNP pg/mL  --   --  62   TROPONIN I ng/mL <0 02  < > 0 08*   < > = values in this interval not displayed  Hepatology:     Endocrine Studies:     Results from last 6 Months  Lab Units 01/22/18  0508   TRIGLYCERIDES mg/dL 180*   CHOLESTEROL mg/dL 185   HDL mg/dL 38*   LDL CALC mg/dL 111*       Current Medications     Current Outpatient Prescriptions:     atoMOXetine (STRATTERA) 25 mg capsule, Take 1 capsule by mouth daily, Disp: 15 capsule, Rfl: 0    DULoxetine (CYMBALTA) 30 mg delayed release capsule, Take 1 capsule by mouth daily, Disp: 15 capsule, Rfl: 0    FLUoxetine (PROzac) 20 mg capsule, Take 1 capsule by mouth daily in the early morning, Disp: , Rfl: 1    HYDROcodone-acetaminophen (NORCO) 5-325 mg per tablet, Take 1 tablet by mouth every 6 (six) hours as needed for pain for up to 15 doses Max Daily Amount: 4 tablets, Disp: 15 tablet, Rfl: 0    LORazepam (ATIVAN) 1 mg tablet, Take 1 mg by mouth 3 (three) times a day, Disp: , Rfl: 1    omeprazole (PriLOSEC) 40 MG capsule, Take 1 capsule (40 mg total) by mouth daily, Disp: 30 capsule, Rfl: 0    QUEtiapine (SEROquel) 100 mg tablet, Take 1 tablet by mouth daily at bedtime, Disp: 15 tablet, Rfl: 0    meclizine (ANTIVERT) 12 5 MG tablet, Take 1 tablet (12 5 mg total) by mouth every 8 (eight) hours as needed for dizziness, Disp: 30 tablet, Rfl: 0    Health Maintenance     Health Maintenance   Topic Date Due    HIV SCREENING  1955    Hepatitis C Screening  1955    INFLUENZA VACCINE  01/30/2019 (Originally 9/1/2018)    DTaP,Tdap,and Td Vaccines (1 - Tdap) 04/30/2019 (Originally 12/23/1975)    Depression Screening PHQ-9  04/30/2019    CRC Screening: Colonoscopy  03/23/2028       There is no immunization history on file for this patient      Lorena Babin MD  Internal Medicine PGY-3  6/4/2018 2:11 PM

## 2018-06-04 NOTE — ASSESSMENT & PLAN NOTE
Patient chronic opiates, has a schwannoma, currently following with Neurosurgery, in the process of scheduling resection

## 2018-06-18 ENCOUNTER — OFFICE VISIT (OUTPATIENT)
Dept: MULTI SPECIALTY CLINIC | Facility: CLINIC | Age: 63
End: 2018-06-18
Payer: COMMERCIAL

## 2018-06-18 VITALS
DIASTOLIC BLOOD PRESSURE: 68 MMHG | HEIGHT: 70 IN | WEIGHT: 190.92 LBS | SYSTOLIC BLOOD PRESSURE: 112 MMHG | TEMPERATURE: 98.2 F | HEART RATE: 64 BPM | BODY MASS INDEX: 27.33 KG/M2

## 2018-06-18 DIAGNOSIS — B35.1 ONYCHOMYCOSIS: Primary | ICD-10-CM

## 2018-06-18 PROCEDURE — 99213 OFFICE O/P EST LOW 20 MIN: CPT | Performed by: PODIATRIST

## 2018-06-18 RX ORDER — ZOLPIDEM TARTRATE 10 MG/1
TABLET ORAL
Refills: 1 | COMMUNITY
Start: 2018-05-22 | End: 2018-09-25 | Stop reason: HOSPADM

## 2018-06-18 RX ORDER — ATORVASTATIN CALCIUM 40 MG/1
40 TABLET, FILM COATED ORAL
Refills: 3 | Status: ON HOLD | COMMUNITY
Start: 2018-05-14 | End: 2018-09-25

## 2018-06-18 NOTE — PROGRESS NOTES
Podiatry Clinic Visit  Bridgette Woodard Sr  60 y o  male MRN: [id number]  Encounter: [id number]    Assessment/Plan     Assessment:  1  Onychomycosis    Plan:  - Patient was seen/examined  All questions and concerns addressed  - bilateral hallucal nails were sharply trimmed to normal length and thickness with a large nail Nipper  -RTC PRN      History of Present Illness     HPI:  Bridgette Woodard Sr  is a 60 y o  male who presents with fungal toenails bilaterally  He states that he went to the emergency room a couple weeks ago with right heel pain, but he states the right heel pain has completely resolved  He states that the right heel pain resolved after taking oral steroids that was given to him by the emergency department  He states that his bilateral 1st digit toenails are elongated and causing discomfort when wearing sneakers  The patient denies any nausea, vomiting, fever, chills, shortness of breath, or chest pains  Consults  Review of Systems   Constitutional: Negative  HENT: Negative  Eyes: Negative  Respiratory: Negative  Cardiovascular: Negative  Gastrointestinal: Negative  Musculoskeletal: Negative   Skin: elongated toenails  Neurological: Negative  Historical Information   Past Medical History:   Diagnosis Date    Anxiety     Chronic back pain     Chronic pain     Gallstones     Psychiatric disorder     bipolar disorder    Spinal arachnoid cyst      Past Surgical History:   Procedure Laterality Date    COLONOSCOPY      INCISION AND DRAINAGE ABSCESS / HEMATOMA OF BURSA / KNEE / THIGH      outer right leg and center of upper back   complicated  resolved status    [place] COLONOSCOPY FLX DX W/COLLJ SPEC WHEN PFRMD N/A 8/31/2017    Procedure: COLONOSCOPY;  Surgeon: Charlene Avila MD;  [facility];   Service: Gastroenterology    [place] COLONOSCOPY FLX DX W/COLLJ [facility] WHEN PFRMD N/A 3/23/2018    Procedure: COLONOSCOPY;  Surgeon: Charlene Avila MD;  [facility]; Service: Gastroenterology    TUMOR REMOVAL       Social History   History   Alcohol Use    Yes     Comment: pt "sometimes; not like before"     History   Drug Use    Types: Marijuana     History   Smoking Status    Former Smoker   Smokeless Tobacco    Never Used     Comment: all scripts says former smoker quit at `16     Family History:   Family History   Problem Relation Age of Onset    Throat cancer Mother     Heart attack Father         at [de-identified]    Stomach cancer Sister     Lung cancer Brother     Stomach cancer Brother        Meds/Allergies     (Not in a hospital admission)  No Known Allergies    Objective     Current Vitals:   Blood Pressure: 112/68 (06/18/18 1357)  Pulse: 64 (06/18/18 1357)  Temperature: 98 2 °F (36 8 °C) (06/18/18 1357)  Temp Source: Oral (06/18/18 1357)  Height: 5' 9 75" (177 2 cm) (06/18/18 1357)  Weight - Scale: 86 6 kg (190 lb 14 7 oz) (06/18/18 1357)        /68 (BP Location: Right arm, Patient Position: Sitting, Cuff Size: Standard)   Pulse 64   Temp 98 2 °F (36 8 °C) (Oral)   Ht 5' 9 75" (1 772 m)   Wt 86 6 kg (190 lb 14 7 oz)   BMI 27 59 kg/m²       Lower Extremity Exam:    Musculoskeletal:  MMT is 5/5 to all compartments of the LE, +0/4 edema B/L, Digital ROM is intact, no pain on palpation noted bilaterally  No pain noted on the right heel  Pulses:   R DP is +2/4, R PT is +2/4, L DP is +2/4, L PT is +2/4, CFT< 3sec to all digits  Pedal hair is Present     Skin:  Bilateral hallucal nails are thickened, and elongated  No open Lesions  Skin of the LE is normal texture, turgor  Neurologic:  Gross sensation is intact   Protective sensation is Intact

## 2018-08-01 ENCOUNTER — APPOINTMENT (EMERGENCY)
Dept: RADIOLOGY | Facility: HOSPITAL | Age: 63
End: 2018-08-01
Payer: MEDICARE

## 2018-08-01 ENCOUNTER — HOSPITAL ENCOUNTER (EMERGENCY)
Facility: HOSPITAL | Age: 63
Discharge: HOME | End: 2018-08-01
Attending: EMERGENCY MEDICINE | Admitting: EMERGENCY MEDICINE
Payer: MEDICARE

## 2018-08-01 VITALS
HEART RATE: 84 BPM | TEMPERATURE: 98.3 F | WEIGHT: 195 LBS | SYSTOLIC BLOOD PRESSURE: 110 MMHG | DIASTOLIC BLOOD PRESSURE: 71 MMHG | BODY MASS INDEX: 28.88 KG/M2 | RESPIRATION RATE: 18 BRPM | HEIGHT: 69 IN | OXYGEN SATURATION: 96 %

## 2018-08-01 DIAGNOSIS — S39.012A STRAIN OF LUMBAR PARASPINAL MUSCLE, INITIAL ENCOUNTER: Primary | ICD-10-CM

## 2018-08-01 LAB
BACTERIA UR QL AUTO: ABNORMAL /HPF
BILIRUB UR QL STRIP: ABNORMAL
CAOX CRY URNS QL MICRO: ABNORMAL /HPF
CLARITY UR: CLEAR
COLOR UR: ABNORMAL
GLUCOSE UR STRIP-MCNC: NEGATIVE MG/DL
HGB UR QL STRIP.AUTO: ABNORMAL
KETONES UR STRIP-MCNC: NEGATIVE MG/DL
LEUKOCYTE ESTERASE UR QL STRIP: NEGATIVE
MUCOUS THREADS UR QL AUTO: ABNORMAL
NITRITE UR QL STRIP: NEGATIVE
NON-SQ EPI CELLS URNS QL MICRO: ABNORMAL /HPF
PH UR STRIP.AUTO: 6 [PH] (ref 4.5–8)
PROT UR STRIP-MCNC: ABNORMAL MG/DL
RBC #/AREA URNS AUTO: ABNORMAL /HPF
SP GR UR STRIP.AUTO: >=1.03 (ref 1–1.03)
UROBILINOGEN UR QL STRIP.AUTO: 1 E.U./DL
WBC #/AREA URNS AUTO: ABNORMAL /HPF

## 2018-08-01 PROCEDURE — 72100 X-RAY EXAM L-S SPINE 2/3 VWS: CPT

## 2018-08-01 PROCEDURE — 99283 EMERGENCY DEPT VISIT LOW MDM: CPT

## 2018-08-01 PROCEDURE — 81001 URINALYSIS AUTO W/SCOPE: CPT

## 2018-08-01 RX ORDER — METHOCARBAMOL 500 MG/1
1000 TABLET, FILM COATED ORAL 3 TIMES DAILY PRN
Qty: 20 TABLET | Refills: 0 | Status: SHIPPED | OUTPATIENT
Start: 2018-08-01 | End: 2018-09-20 | Stop reason: ALTCHOICE

## 2018-08-01 RX ORDER — DIAZEPAM 5 MG/1
5 TABLET ORAL ONCE
Status: COMPLETED | OUTPATIENT
Start: 2018-08-01 | End: 2018-08-01

## 2018-08-01 RX ORDER — KETOROLAC TROMETHAMINE 30 MG/ML
30 INJECTION, SOLUTION INTRAMUSCULAR; INTRAVENOUS ONCE
Status: DISCONTINUED | OUTPATIENT
Start: 2018-08-01 | End: 2018-08-01 | Stop reason: HOSPADM

## 2018-08-01 RX ADMIN — DIAZEPAM 5 MG: 5 TABLET ORAL at 13:16

## 2018-08-01 NOTE — ED PROVIDER NOTES
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 and monitored by neurosurgery  He denies lower extremity weakness  Denies bowel or bladder dysfunction  Is able to ambulate  History provided by:  Patient  Back Pain   Location:  Lumbar spine  Quality:  Aching  Radiates to: right buttock  Pain severity:  Severe  Pain is: Worse during the day  Onset quality:  Gradual  Duration:  5 days  Progression:  Waxing and waning  Chronicity:  Recurrent  Relieved by:  None tried  Worsened by: Movement  Ineffective treatments:  None tried  Associated symptoms: no abdominal pain, no bladder incontinence, no bowel incontinence, no leg pain, no paresthesias, no perianal numbness, no weakness and no weight loss    Risk factors: no hx of osteoporosis and no steroid use        Prior to Admission Medications   Prescriptions Last Dose Informant Patient Reported? Taking?    DULoxetine (CYMBALTA) 30 mg delayed release capsule  Self No No   Sig: Take 1 capsule by mouth daily   FLUoxetine (PROzac) 20 mg capsule  Self Yes No   Sig: Take 1 capsule by mouth daily in the early morning   HYDROcodone-acetaminophen (NORCO) 5-325 mg per tablet   No No   Sig: Take 1 tablet by mouth every 6 (six) hours as needed for pain for up to 15 doses Max Daily Amount: 4 tablets   LORazepam (ATIVAN) 1 mg tablet  Self Yes No   Sig: Take 1 mg by mouth 3 (three) times a day   QUEtiapine (SEROquel) 100 mg tablet Self No No   Sig: Take 1 tablet by mouth daily at bedtime   atoMOXetine (STRATTERA) 25 mg capsule  Self No No   Sig: Take 1 capsule by mouth daily   atorvastatin (LIPITOR) 40 mg tablet   Yes No   meclizine (ANTIVERT) 12 5 MG tablet   No No   Sig: Take 1 tablet (12 5 mg total) by mouth every 8 (eight) hours as needed for dizziness   zolpidem (AMBIEN) 10 mg tablet   Yes No   Sig: TK 1 T PO QHS      Facility-Administered Medications: None       Past Medical History:   Diagnosis Date    Anxiety     Chronic back pain     Chronic pain     Gallstones     Psychiatric disorder     bipolar disorder    Spinal arachnoid cyst        Past Surgical History:   Procedure Laterality Date    COLONOSCOPY      INCISION AND DRAINAGE ABSCESS / HEMATOMA OF BURSA / KNEE / THIGH      outer right leg and center of upper back   complicated  resolved status    [place] COLONOSCOPY FLX DX W/COLLJ SPEC WHEN PFRMD N/A 8/31/2017    Procedure: COLONOSCOPY;  Surgeon: Michael Bashir MD;  [facility]; Service: Gastroenterology    [place] COLONOSCOPY FLX DX W/COLLJ [facility]IL[facility] WHEN PFRMD N/A 3/23/2018    Procedure: COLONOSCOPY;  Surgeon: Michael Bashir MD;  [facility]; Service: Gastroenterology    TUMOR REMOVAL         Family History   Problem Relation Age of Onset    Throat cancer Mother     Heart attack Father         at [de-identified]    Stomach cancer Sister     Lung cancer Brother     Stomach cancer Brother      I have reviewed and agree with the history as documented  Social History   Substance Use Topics    Smoking status: Former Smoker    Smokeless tobacco: Never Used      Comment: [facility]      Comment: pt "sometimes; not like before"        Review of Systems   Constitutional: Negative for chills, diaphoresis, fatigue, unexpected weight change and weight loss  HENT: Negative for congestion  Eyes: Negative for visual disturbance     Respiratory: Negative for chest tightness and shortness of breath  Gastrointestinal: Negative for abdominal pain and bowel incontinence  Genitourinary: Negative for bladder incontinence, difficulty urinating and flank pain  Musculoskeletal: Positive for back pain  Skin: Negative for pallor, rash and wound  Allergic/Immunologic: Negative for immunocompromised state  Neurological: Negative for weakness and paresthesias  Psychiatric/Behavioral: Negative for confusion  Physical Exam  Physical Exam   Constitutional: He is oriented to person, place, and time  He appears well-developed and well-nourished  Appears uncomfortable, lying on left side  Is able to sit up on his own without assistance  HENT:   Head: Normocephalic and atraumatic  Eyes: EOM are normal  Pupils are equal, round, and reactive to light  Neck: Normal range of motion  Neck supple  Cardiovascular: Normal rate and regular rhythm  Pulmonary/Chest: Effort normal and breath sounds normal    Abdominal: Soft  Bowel sounds are normal  There is no tenderness  There is no rebound and no guarding  Musculoskeletal: Normal range of motion  He exhibits no edema  Lumbar back: He exhibits spasm (moderate, right paralumbar musculature)  He exhibits no bony tenderness  Neurological: He is alert and oriented to person, place, and time  No neurologic deficits  Speech is clear and not slurred  No word finding issues  Strength equal upper extremities b/l  Strength equal in lower extremities b/l  Able to hold extremities against gravity x 10 seconds without drift x 4  No pronator drift  No sensory deficit  Skin: Skin is warm and dry  Capillary refill takes less than 2 seconds  No rash noted  Psychiatric: He has a normal mood and affect  Nursing note and vitals reviewed        Vital Signs  ED Triage Vitals [08/01/18 1233]   Temperature Pulse Respirations Blood Pressure SpO2   98 3 °F (36 8 °C) 84 18 110/71 96 %      Temp Source Heart Rate Source Patient Position - Orthostatic VS BP Location FiO2 (%)   Oral Monitor Sitting Right arm --      Pain Score       9           Vitals:    08/01/18 1233   BP: 110/71   Pulse: 84   Patient Position - Orthostatic VS: Sitting       Visual Acuity      ED Medications  Medications   ketorolac (TORADOL) injection 30 mg (30 mg Intramuscular Not Given 8/1/18 1315)   diazepam (VALIUM) tablet 5 mg (5 mg Oral Given 8/1/18 1316)       Diagnostic Studies  Results Reviewed     Procedure Component Value Units Date/Time    Urine Microscopic [[id number]] Collected:  08/01/18 1410    Lab Status: In process Specimen:  Urine Updated:  08/01/18 1407    ED Urine Macroscopic [[id number]]  (Abnormal) Collected:  08/01/18 1410    Lab Status:  Final result Specimen:  Urine Updated:  08/01/18 1402     Color, UA Kendra     Clarity, UA Clear     pH, UA 6 0     Leukocytes, UA Negative     Nitrite, UA Negative     Protein, UA 30 (1+) (A) mg/dl      Glucose, UA Negative mg/dl      Ketones, UA Negative mg/dl      Urobilinogen, UA 1 0 E U /dl      Bilirubin, UA Interference- unable to analyze (A)     Blood, UA Trace (A)     Specific Gravity, UA >=1 030    Narrative:       CLINITEK RESULT                 XR lumbar spine 2 or 3 views   Final Result by Ana Tillman MD (08/01 1344)      No acute findings  Patient appears to have 6 lumbar vertebrae         Workstation performed: [id number]                    Procedures  Procedures       Phone Contacts  ED Phone Contact    ED Course                               MDM  Number of Diagnoses or Management Options  Strain of lumbar paraspinal muscle, initial encounter: new and requires workup  Diagnosis management comments: 1:15 PM  Inquiry completed on State of [address] [place] [address] prescription monitoring database  Patient information reveals several controlled substance prescriptions written by 2 providers over the preceding 6 months     Given this information in combination with the clinical scenario, I opted not to prescribe controlled substances taking into account an abundance of concern for the patient's safety and appropriateness of the clinical management  He states he's had vicodin before which didn't help  I suspect he will do better with an antiinflammatory and a muscle relaxant  Recommend f/u with PCP and neurosurgery as outpatient  RTED instructions reviewed  1:57 PM  Xray negative by my read  Waiting for urine  Feeling better after valium  Likely muscle spasm from lumbar spine osteoarthritis  Amount and/or Complexity of Data Reviewed  Clinical lab tests: ordered and reviewed  Tests in the radiology section of CPT®: ordered and reviewed  Review and summarize past medical records: yes    Risk of Complications, Morbidity, and/or Mortality  Presenting problems: moderate  Diagnostic procedures: moderate  Management options: moderate    Patient Progress  Patient progress: stable    CritCare Time    Disposition  Final diagnoses:   Strain of lumbar paraspinal muscle, initial encounter     Time reflects when diagnosis was documented in both MDM as applicable and the Disposition within this note     Time User Action Codes Description Comment    8/1/2018  1:58 PM Cordell Mir Add [[id number] 012A] Strain of lumbar paraspinal muscle, initial encounter       ED Disposition     ED Disposition Condition Comment    Discharge  [address]  discharge to home/self care      Condition at discharge: Stable        Follow-up Information     Follow up With Specialties Details Why Contact Info Additional [address], DO Internal Medicine   [facility]       Devorah Hayden MD Neurosurgery Schedule an appointment as soon as possible for a visit  [address] [address] [phone]       [place] [address] Emergency Department Emergency Medicine  If symptoms worsen [address] [address]  [phone] [facility], [address], [facility], [address]          Patient's Medications   Discharge Prescriptions    METHOCARBAMOL (ROBAXIN) 500 MG TABLET    Take 2 tablets (1,000 mg total) by mouth 3 (three) times a day as needed for muscle spasms       Start Date: 8/1/2018  End Date: --       Order Dose: 1,000 mg       Quantity: 20 tablet    Refills: 0     No discharge procedures on file      ED Provider  Electronically Signed by           Kam Bennett DO  08/01/18 2400

## 2018-08-01 NOTE — DISCHARGE INSTRUCTIONS
Low Back Strain   WHAT YOU NEED TO KNOW:   Low back strain is an injury to your lower back muscles or tendons  Tendons are strong tissues that connect muscles to bones  The lower back supports most of your body weight and helps you move, twist, and bend  DISCHARGE INSTRUCTIONS:   Return to the emergency department if:   · You hear or feel a pop in your lower back  · You have increased swelling or pain in your lower back  · You have trouble moving your legs  · Your legs are numb  Contact your healthcare provider if:   · You have a fever  · Your pain does not go away, even after treatment  · You have questions or concerns about your condition or care  Medicines: The following medicines may be ordered by your healthcare provider:  · Acetaminophen decreases pain and fever  It is available without a doctor's order  Ask how much to take and how often to take it  Follow directions  Acetaminophen can cause liver damage if not taken correctly  · NSAIDs , such as ibuprofen, help decrease swelling, pain, and fever  This medicine is available with or without a doctor's order  NSAIDs can cause stomach bleeding or kidney problems in certain people  If you take blood thinner medicine, always ask your healthcare provider if NSAIDs are safe for you  Always read the medicine label and follow directions  · Muscle relaxers  help decrease pain and muscle spasms  · Prescription pain medicine  may be given  Ask how to take this medicine safely  · Take your medicine as directed  Contact your healthcare provider if you think your medicine is not helping or if you have side effects  Tell him or her if you are allergic to any medicine  Keep a list of the medicines, vitamins, and herbs you take  Include the amounts, and when and why you take them  Bring the list or the pill bottles to follow-up visits  Carry your medicine list with you in case of an emergency  Self-care:   · Rest  as directed   You may need to rest in bed for a period of time after your injury  Do not lift heavy objects  · Apply ice  on your back for 15 to 20 minutes every hour or as directed  Use an ice pack, or put crushed ice in a plastic bag  Cover it with a towel  Ice helps prevent tissue damage and decreases swelling and pain  · Apply heat  on your lower back for 20 to 30 minutes every 2 hours for as many days as directed  Heat helps decrease pain and muscle spasms  · Slowly start to increase your activity  as the pain decreases, or as directed  Prevent another low back strain:   · Use correct body movements  ¨ Bend at the hips and knees when you  objects  Do not bend from the waist  Use your leg muscles as you lift the load  Do not use your back  Keep the object close to your chest as you lift it  Try not to twist or lift anything above your waist     ¨ Change your position often when you stand for long periods of time  Rest one foot on a small box or footrest, and then switch to the other foot often  ¨ Try not to sit for long periods of time  When you do, sit in a straight-backed chair with your feet flat on the floor  ¨ Never reach, pull, or push while you are sitting  · Warm up before you exercise  Do exercises that strengthen your back muscles  Ask your healthcare provider about the best exercise plan for you  · Maintain a healthy weight  Ask your healthcare provider how much you should weigh  Ask him to help you create a weight loss plan if you are overweight  © 2017 [employer] Information is for End User's use only and may not be sold, redistributed or otherwise used for commercial purposes  All illustrations and images included in CareNotes® are the copyrighted property of [employer]  or Vasiliy Agarwal  The above information is an  only  It is not intended as medical advice for individual conditions or treatments   Talk to your doctor, nurse or pharmacist before following any medical regimen to see if it is safe and effective for you

## 2018-09-19 ENCOUNTER — HOSPITAL ENCOUNTER (EMERGENCY)
Facility: HOSPITAL | Age: 63
End: 2018-09-20
Attending: EMERGENCY MEDICINE | Admitting: EMERGENCY MEDICINE
Payer: MEDICARE

## 2018-09-19 ENCOUNTER — APPOINTMENT (EMERGENCY)
Dept: ULTRASOUND IMAGING | Facility: HOSPITAL | Age: 63
End: 2018-09-19
Payer: MEDICARE

## 2018-09-19 DIAGNOSIS — R45.851 SUICIDAL IDEATION: Primary | ICD-10-CM

## 2018-09-19 DIAGNOSIS — M54.9 CHRONIC MIDLINE BACK PAIN, UNSPECIFIED BACK LOCATION: ICD-10-CM

## 2018-09-19 DIAGNOSIS — G89.29 CHRONIC MIDLINE BACK PAIN, UNSPECIFIED BACK LOCATION: ICD-10-CM

## 2018-09-19 DIAGNOSIS — R10.11 RIGHT UPPER QUADRANT ABDOMINAL PAIN: ICD-10-CM

## 2018-09-19 LAB
ALBUMIN SERPL BCP-MCNC: 4.2 G/DL (ref 3.5–5)
ALP SERPL-CCNC: 81 U/L (ref 46–116)
ALT SERPL W P-5'-P-CCNC: 22 U/L (ref 12–78)
AMPHETAMINES SERPL QL SCN: NEGATIVE
ANION GAP SERPL CALCULATED.3IONS-SCNC: 10 MMOL/L (ref 4–13)
AST SERPL W P-5'-P-CCNC: 15 U/L (ref 5–45)
BARBITURATES UR QL: NEGATIVE
BASOPHILS # BLD AUTO: 0.09 THOUSANDS/ΜL (ref 0–0.1)
BASOPHILS NFR BLD AUTO: 1 % (ref 0–1)
BENZODIAZ UR QL: NEGATIVE
BILIRUB SERPL-MCNC: 1 MG/DL (ref 0.2–1)
BUN SERPL-MCNC: 17 MG/DL (ref 5–25)
CALCIUM SERPL-MCNC: 10 MG/DL (ref 8.3–10.1)
CHLORIDE SERPL-SCNC: 102 MMOL/L (ref 100–108)
CO2 SERPL-SCNC: 27 MMOL/L (ref 21–32)
COCAINE UR QL: NEGATIVE
CREAT SERPL-MCNC: 1.21 MG/DL (ref 0.6–1.3)
EOSINOPHIL # BLD AUTO: 0.22 THOUSAND/ΜL (ref 0–0.61)
EOSINOPHIL NFR BLD AUTO: 2 % (ref 0–6)
ERYTHROCYTE [DISTWIDTH] IN BLOOD BY AUTOMATED COUNT: 12.7 % (ref 11.6–15.1)
ETHANOL EXG-MCNC: 0 MG/DL
GFR SERPL CREATININE-BSD FRML MDRD: 63 ML/MIN/1.73SQ M
GLUCOSE SERPL-MCNC: 79 MG/DL (ref 65–140)
HCT VFR BLD AUTO: 44.1 % (ref 36.5–49.3)
HGB BLD-MCNC: 15.1 G/DL (ref 12–17)
IMM GRANULOCYTES # BLD AUTO: 0.06 THOUSAND/UL (ref 0–0.2)
IMM GRANULOCYTES NFR BLD AUTO: 1 % (ref 0–2)
LIPASE SERPL-CCNC: 147 U/L (ref 73–393)
LYMPHOCYTES # BLD AUTO: 2.76 THOUSANDS/ΜL (ref 0.6–4.47)
LYMPHOCYTES NFR BLD AUTO: 21 % (ref 14–44)
MCH RBC QN AUTO: 30.9 PG (ref 26.8–34.3)
MCHC RBC AUTO-ENTMCNC: 34.2 G/DL (ref 31.4–37.4)
MCV RBC AUTO: 90 FL (ref 82–98)
METHADONE UR QL: NEGATIVE
MONOCYTES # BLD AUTO: 0.83 THOUSAND/ΜL (ref 0.17–1.22)
MONOCYTES NFR BLD AUTO: 6 % (ref 4–12)
NEUTROPHILS # BLD AUTO: 9.09 THOUSANDS/ΜL (ref 1.85–7.62)
NEUTS SEG NFR BLD AUTO: 69 % (ref 43–75)
NRBC BLD AUTO-RTO: 0 /100 WBCS
OPIATES UR QL SCN: NEGATIVE
PCP UR QL: NEGATIVE
PLATELET # BLD AUTO: 261 THOUSANDS/UL (ref 149–390)
PMV BLD AUTO: 9.9 FL (ref 8.9–12.7)
POTASSIUM SERPL-SCNC: 3.8 MMOL/L (ref 3.5–5.3)
PROT SERPL-MCNC: 8.2 G/DL (ref 6.4–8.2)
RBC # BLD AUTO: 4.88 MILLION/UL (ref 3.88–5.62)
SODIUM SERPL-SCNC: 139 MMOL/L (ref 136–145)
THC UR QL: POSITIVE
TSH SERPL DL<=0.05 MIU/L-ACNC: 0.69 UIU/ML (ref 0.36–3.74)
WBC # BLD AUTO: 13.05 THOUSAND/UL (ref 4.31–10.16)

## 2018-09-19 PROCEDURE — 80307 DRUG TEST PRSMV CHEM ANLYZR: CPT | Performed by: EMERGENCY MEDICINE

## 2018-09-19 PROCEDURE — 76705 ECHO EXAM OF ABDOMEN: CPT

## 2018-09-19 PROCEDURE — 83690 ASSAY OF LIPASE: CPT | Performed by: EMERGENCY MEDICINE

## 2018-09-19 PROCEDURE — 84443 ASSAY THYROID STIM HORMONE: CPT | Performed by: EMERGENCY MEDICINE

## 2018-09-19 PROCEDURE — 82075 ASSAY OF BREATH ETHANOL: CPT | Performed by: EMERGENCY MEDICINE

## 2018-09-19 PROCEDURE — 85025 COMPLETE CBC W/AUTO DIFF WBC: CPT | Performed by: EMERGENCY MEDICINE

## 2018-09-19 PROCEDURE — 36415 COLL VENOUS BLD VENIPUNCTURE: CPT | Performed by: EMERGENCY MEDICINE

## 2018-09-19 PROCEDURE — 93005 ELECTROCARDIOGRAM TRACING: CPT

## 2018-09-19 PROCEDURE — 80053 COMPREHEN METABOLIC PANEL: CPT | Performed by: EMERGENCY MEDICINE

## 2018-09-19 NOTE — ED PROVIDER NOTES
History  Chief Complaint   Patient presents with    Psychiatric Evaluation     pt here with multitple medical complaints was with his therapist today  pt was talking about suicidal ideations due to being overwhelemd with some of his medical issues (gallstone and spinal tumor) and therapist wanted to 36 him  Told therapist to call EMS and pt will volunteering  HPI  This patient is a 70-year-old man sent in by his therapist after making suicidal statements  The patient states that he has been dealing with chronic pain issues due to a spinal tumor as well as gallstones, and that he is so tired of the pain that he sometimes just wants to end it  He does endorse that this refers to him committing suicide  He has been having suicidal thoughts today but does not have a specific plan  Has a prior history of hanging attempt  The patient states that he has a history of gallstones but that over the last 2-3 weeks his right upper quadrant abdominal pain has been worsening, it is associated with nausea and decreased appetite but no vomiting  No diarrhea  No fever or chills  The pain is intermittent, worse with eating, and sometimes radiates to the right lower quadrant  The patient also complains of his chronic mid to lower back pain and has a history of spinal tumor  He states he is following with Neurosurgery for this and there is plan for surgery in the future  He denies any worsening of his back pain today  Denies any weakness, numbness, tingling in his legs  Denies urinary retention or incontinence  Denies saddle anesthesia  Prior to Admission Medications   Prescriptions Last Dose Informant Patient Reported? Taking?    DULoxetine (CYMBALTA) 30 mg delayed release capsule  Self No No   Sig: Take 1 capsule by mouth daily   FLUoxetine (PROzac) 20 mg capsule 9/19/2018 at Unknown time Self Yes Yes   Sig: Take 1 capsule by mouth daily in the early morning   HYDROcodone-acetaminophen (NORCO) 5-325 mg per tablet   No No   Sig: Take 1 tablet by mouth every 6 (six) hours as needed for pain for up to 15 doses Max Daily Amount: 4 tablets   LORazepam (ATIVAN) 1 mg tablet  Self Yes No   Sig: Take 1 mg by mouth 3 (three) times a day   QUEtiapine (SEROquel) 100 mg tablet  Self No No   Sig: Take 1 tablet by mouth daily at bedtime   atoMOXetine (STRATTERA) 25 mg capsule  Self No No   Sig: Take 1 capsule by mouth daily   atorvastatin (LIPITOR) 40 mg tablet   Yes No   meclizine (ANTIVERT) 12 5 MG tablet   No No   Sig: Take 1 tablet (12 5 mg total) by mouth every 8 (eight) hours as needed for dizziness   methocarbamol (ROBAXIN) 500 mg tablet   No No   Sig: Take 2 tablets (1,000 mg total) by mouth 3 (three) times a day as needed for muscle spasms   zolpidem (AMBIEN) 10 mg tablet   Yes No   Sig: TK 1 T PO QHS      Facility-Administered Medications: None       Past Medical History:   Diagnosis Date    Anxiety     Chronic back pain     Chronic pain     Gallstones     Psychiatric disorder     bipolar disorder    Spinal arachnoid cyst        Past Surgical History:   Procedure Laterality Date    COLONOSCOPY      INCISION AND DRAINAGE ABSCESS / HEMATOMA OF BURSA / KNEE / THIGH      outer right leg and center of upper back   complicated  resolved status    WI COLONOSCOPY FLX DX W/COLLJ SPEC WHEN PFRMD N/A 8/31/2017    Procedure: COLONOSCOPY;  Surgeon: Chary Sagastume MD;  [facility]; Service: Gastroenterology    WI COLONOSCOPY FLX DX W/COLLJ [facility] WHEN PFRMD N/A 3/23/2018    Procedure: COLONOSCOPY;  Surgeon: Chary Sagastume MD;  [facility]; Service: Gastroenterology    TUMOR REMOVAL         Family History   Problem Relation Age of Onset    Throat cancer Mother     Heart attack Father         at [de-identified]    Stomach cancer Sister     Lung cancer Brother     Stomach cancer Brother      I have reviewed and agree with the history as documented      Social History   Substance Use Topics    Smoking status: Former Smoker    Smokeless tobacco: Never Used      Comment: all scripts says former smoker quit at `16    Alcohol use Yes      Comment: pt "sometimes; not like before"        Review of Systems   Constitutional: Negative for chills and fever  HENT: Negative  Eyes: Negative  Respiratory: Negative for cough and shortness of breath  Cardiovascular: Negative for chest pain  Gastrointestinal: Positive for abdominal pain and nausea  Negative for diarrhea and vomiting  Genitourinary: Negative for difficulty urinating and dysuria  Musculoskeletal:        Per HPI   Skin: Negative for rash  Neurological: Negative for headaches  Psychiatric/Behavioral: Positive for suicidal ideas  Physical Exam  Physical Exam   Constitutional: He appears well-developed and well-nourished  No distress  HENT:   Head: Normocephalic and atraumatic  Mouth/Throat: Oropharynx is clear and moist    Eyes: Conjunctivae are normal  Pupils are equal, round, and reactive to light  Neck: Neck supple  Cardiovascular: Normal rate, regular rhythm and normal heart sounds  Exam reveals no gallop and no friction rub  No murmur heard  Pulmonary/Chest: Effort normal and breath sounds normal  No respiratory distress  Abdominal: Soft  He exhibits no distension    + RUQ tenderness  No rebound or guarding  Musculoskeletal: Normal range of motion  He exhibits no edema  Neurological: He is alert  No focal deficit   Skin: Skin is warm and dry  Psychiatric: He has a normal mood and affect  Nursing note and vitals reviewed        Vital Signs  ED Triage Vitals [09/19/18 1703]   Temperature Pulse Respirations Blood Pressure SpO2   98 1 °F (36 7 °C) (!) 118 18 113/79 97 %      Temp Source Heart Rate Source Patient Position - Orthostatic VS BP Location FiO2 (%)   Oral Monitor Lying Right arm --      Pain Score       5           Vitals:    09/19/18 1703   BP: 113/79   Pulse: (!) 118   Patient Position - Orthostatic VS: Lying       Visual Acuity      ED Medications  Medications - No data to display    Diagnostic Studies  Results Reviewed     Procedure Component Value Units Date/Time    CBC and differential [[id number]]  (Abnormal) Collected:  09/19/18 1903    Lab Status:  Final result Specimen:  Blood from Arm, Left Updated:  09/19/18 1913     WBC 13 05 (H) Thousand/uL      RBC 4 88 Million/uL      Hemoglobin 15 1 g/dL      Hematocrit 44 1 %      MCV 90 fL      MCH 30 9 pg      MCHC 34 2 g/dL      RDW 12 7 %      MPV 9 9 fL      Platelets 249 Thousands/uL      nRBC 0 /100 WBCs      Neutrophils Relative 69 %      Immat GRANS % 1 %      Lymphocytes Relative 21 %      Monocytes Relative 6 %      Eosinophils Relative 2 %      Basophils Relative 1 %      Neutrophils Absolute 9 09 (H) Thousands/µL      Immature Grans Absolute 0 06 Thousand/uL      Lymphocytes Absolute 2 76 Thousands/µL      Monocytes Absolute 0 83 Thousand/µL      Eosinophils Absolute 0 22 Thousand/µL      Basophils Absolute 0 09 Thousands/µL     Comprehensive metabolic panel [[id number]] Collected:  09/19/18 1903    Lab Status: In process Specimen:  Blood from Arm, Left Updated:  09/19/18 1910    Lipase [[id number]] Collected:  09/19/18 1903    Lab Status: In process Specimen:  Blood from Arm, Left Updated:  09/19/18 1910    TSH [[id number]] Collected:  09/19/18 1903    Lab Status: In process Specimen:  Blood from Arm, Left Updated:  09/19/18 1910    Rapid drug screen, urine [[id number]]  (Abnormal) Collected:  09/19/18 1827    Lab Status:  Final result Specimen:  Urine from Urine, Clean Catch Updated:  09/19/18 1847     Amph/Meth UR Negative     Barbiturate Ur Negative     Benzodiazepine Urine Negative     Cocaine Urine Negative     Methadone Urine Negative     Opiate Urine Negative     PCP Ur Negative     THC Urine Positive (A)    Narrative:         Presumptive report  If requested, specimen will be sent to reference lab for confirmation  FOR MEDICAL PURPOSES ONLY     IF CONFIRMATION NEEDED PLEASE CONTACT THE LAB WITHIN 5 DAYS  Drug Screen Cutoff Levels:  AMPHETAMINE/METHAMPHETAMINES  1000 ng/mL  BARBITURATES     200 ng/mL  BENZODIAZEPINES     200 ng/mL  COCAINE      300 ng/mL  METHADONE      300 ng/mL  OPIATES      300 ng/mL  PHENCYCLIDINE     25 ng/mL  THC       50 ng/mL    POCT alcohol breath test [[id number]]  (Normal) Resulted:  09/19/18 1706    Lab Status:  Final result Updated:  09/19/18 1801     EXTBreath Alcohol 0 000                 US gallbladder    (Results Pending)              Procedures  ECG 12 Lead Documentation  Date/Time: 9/19/2018 6:22 PM  Performed by: Tawana Mathew  Authorized by: Tawana Mathew     Indications / Diagnosis:  Medical clearance  ECG reviewed by me, the ED Provider: yes    Patient location:  ED  Previous ECG:     Previous ECG:  Compared to current    Similarity:  No change  Interpretation:     Interpretation comment:  Normal sinus rhythm  No ectopy  Normal axis  Normal intervals  No acute ST abnormality  No significant change when compared to prior EKG on April 22, 2018  Phone Contacts  ED Phone Contact    ED Course                               MDM  Number of Diagnoses or Management Options  Chronic midline back pain, unspecified back location:   Right upper quadrant abdominal pain:   Suicidal ideation:   Diagnosis management comments: 27-year-old male presents for psychiatric evaluation after making suicidal statements to his therapist   The patient does endorse suicidal ideation to me  He has chronic back pain secondary to a tumor and is being followed by Neurosurgery, he does not have any new back pain or any symptoms or exam findings concerning for cord compression  Patient has had worsening right upper quadrant pain over the past few weeks and has right upper quadrant tenderness  Abdominal labs and right upper quadrant ultrasound pending  When the patient is able to be medically cleared he will be evaluated by crisis    Signed out pending US results and labs  Amount and/or Complexity of Data Reviewed  Clinical lab tests: ordered  Tests in the radiology section of CPT®: ordered      CritCare Time    Disposition  Final diagnoses:   Right upper quadrant abdominal pain   Suicidal ideation   Chronic midline back pain, unspecified back location     Time reflects when diagnosis was documented in both MDM as applicable and the Disposition within this note     Time User Action Codes Description Comment    9/19/2018  7:12 PM Wing Pry A Add [R10 11] Right upper quadrant abdominal pain     9/19/2018  7:13 PM Wing Pry A Add [Y48 792] Suicidal ideation     9/19/2018  7:13 PM Wing Pry A Modify [R10 11] Right upper quadrant abdominal pain     9/19/2018  7:13 PM Wing Pry A Modify [L11 072] Suicidal ideation     9/19/2018  7:13 PM Wing Pry A Add [M54 9,  G89 29] Chronic midline back pain, unspecified back location       ED Disposition     None      Follow-up Information    None         Patient's Medications   Discharge Prescriptions    No medications on file     No discharge procedures on file      ED Provider  Electronically Signed by           Jeanne Frias MD  09/19/18 8126

## 2018-09-19 NOTE — ED NOTES
Pt changed into blues no issues or concern pt belongings are logged bagged in locker [address] [facility] [address]  09/19/18 8969

## 2018-09-19 NOTE — ED NOTES
Belongings have been moved to nurses station by room [address]  09/19/18 5621

## 2018-09-19 NOTE — ED NOTES
Pt laying in bed awaiting arrival of significant other  Pt remains calm and collected  Will continue to monitor         Vivien Goodson  09/19/18 1951

## 2018-09-19 NOTE — ED NOTES
Attempted to call significant other at patients request, left message  I took over 1-1 care from [facility] - kadeem BURLESON calm, cooperative and lying on stretcher at this time        Freddy Hancock RN  09/19/18 8862

## 2018-09-20 ENCOUNTER — HOSPITAL ENCOUNTER (INPATIENT)
Facility: HOSPITAL | Age: 63
LOS: 5 days | Discharge: HOME | DRG: 885 | End: 2018-09-25
Attending: PSYCHIATRY & NEUROLOGY | Admitting: PSYCHIATRY & NEUROLOGY
Payer: MEDICARE

## 2018-09-20 VITALS
BODY MASS INDEX: 26.63 KG/M2 | OXYGEN SATURATION: 99 % | WEIGHT: 186 LBS | DIASTOLIC BLOOD PRESSURE: 75 MMHG | SYSTOLIC BLOOD PRESSURE: 126 MMHG | HEART RATE: 91 BPM | TEMPERATURE: 98.4 F | RESPIRATION RATE: 18 BRPM | HEIGHT: 70 IN

## 2018-09-20 DIAGNOSIS — F31.9 BIPOLAR DEPRESSION (HCC): ICD-10-CM

## 2018-09-20 DIAGNOSIS — G89.29 CHRONIC LOW BACK PAIN, UNSPECIFIED BACK PAIN LATERALITY, WITH SCIATICA PRESENCE UNSPECIFIED: Primary | ICD-10-CM

## 2018-09-20 DIAGNOSIS — M54.5 CHRONIC LOW BACK PAIN, UNSPECIFIED BACK PAIN LATERALITY, WITH SCIATICA PRESENCE UNSPECIFIED: Primary | ICD-10-CM

## 2018-09-20 DIAGNOSIS — E78.2 MIXED HYPERLIPIDEMIA: Chronic | ICD-10-CM

## 2018-09-20 DIAGNOSIS — F32.2 SEVERE MAJOR DEPRESSION, SINGLE EPISODE (HCC): ICD-10-CM

## 2018-09-20 DIAGNOSIS — G25.81 RESTLESS LEG SYNDROME: ICD-10-CM

## 2018-09-20 PROBLEM — R00.0 SINUS TACHYCARDIA: Status: ACTIVE | Noted: 2018-09-20

## 2018-09-20 LAB
ATRIAL RATE: 77 BPM
P AXIS: 83 DEGREES
PR INTERVAL: 178 MS
QRS AXIS: 68 DEGREES
QRSD INTERVAL: 84 MS
QT INTERVAL: 368 MS
QTC INTERVAL: 409 MS
T WAVE AXIS: 76 DEGREES
VENTRICULAR RATE: 74 BPM

## 2018-09-20 PROCEDURE — 99221 1ST HOSP IP/OBS SF/LOW 40: CPT | Performed by: INTERNAL MEDICINE

## 2018-09-20 PROCEDURE — 99285 EMERGENCY DEPT VISIT HI MDM: CPT

## 2018-09-20 PROCEDURE — 93010 ELECTROCARDIOGRAM REPORT: CPT | Performed by: INTERNAL MEDICINE

## 2018-09-20 RX ORDER — QUETIAPINE FUMARATE 100 MG/1
100 TABLET, FILM COATED ORAL
Status: DISCONTINUED | OUTPATIENT
Start: 2018-09-20 | End: 2018-09-25 | Stop reason: HOSPADM

## 2018-09-20 RX ORDER — DULOXETIN HYDROCHLORIDE 30 MG/1
30 CAPSULE, DELAYED RELEASE ORAL DAILY
Status: DISCONTINUED | OUTPATIENT
Start: 2018-09-21 | End: 2018-09-21

## 2018-09-20 RX ORDER — QUETIAPINE FUMARATE 25 MG/1
100 TABLET, FILM COATED ORAL
Status: DISCONTINUED | OUTPATIENT
Start: 2018-09-20 | End: 2018-09-20 | Stop reason: HOSPADM

## 2018-09-20 RX ORDER — PRAMIPEXOLE DIHYDROCHLORIDE 0.25 MG/1
0.25 TABLET ORAL
Status: DISCONTINUED | OUTPATIENT
Start: 2018-09-20 | End: 2018-09-25 | Stop reason: HOSPADM

## 2018-09-20 RX ORDER — LORAZEPAM 1 MG/1
1 TABLET ORAL 3 TIMES DAILY
Status: DISCONTINUED | OUTPATIENT
Start: 2018-09-20 | End: 2018-09-25 | Stop reason: HOSPADM

## 2018-09-20 RX ORDER — ZOLPIDEM TARTRATE 10 MG/1
10 TABLET ORAL
Status: DISCONTINUED | OUTPATIENT
Start: 2018-09-20 | End: 2018-09-25 | Stop reason: HOSPADM

## 2018-09-20 RX ORDER — FLUOXETINE HYDROCHLORIDE 20 MG/1
20 CAPSULE ORAL DAILY
Status: DISCONTINUED | OUTPATIENT
Start: 2018-09-21 | End: 2018-09-25 | Stop reason: HOSPADM

## 2018-09-20 RX ORDER — ATORVASTATIN CALCIUM 40 MG/1
40 TABLET, FILM COATED ORAL
Status: DISCONTINUED | OUTPATIENT
Start: 2018-09-20 | End: 2018-09-20 | Stop reason: HOSPADM

## 2018-09-20 RX ORDER — OLANZAPINE 10 MG/1
5 INJECTION, POWDER, LYOPHILIZED, FOR SOLUTION INTRAMUSCULAR EVERY 4 HOURS PRN
Status: DISCONTINUED | OUTPATIENT
Start: 2018-09-20 | End: 2018-09-25 | Stop reason: HOSPADM

## 2018-09-20 RX ORDER — ZOLPIDEM TARTRATE 5 MG/1
10 TABLET ORAL
Status: DISCONTINUED | OUTPATIENT
Start: 2018-09-20 | End: 2018-09-20 | Stop reason: HOSPADM

## 2018-09-20 RX ORDER — OLANZAPINE 5 MG/1
5 TABLET ORAL EVERY 4 HOURS PRN
Status: DISCONTINUED | OUTPATIENT
Start: 2018-09-20 | End: 2018-09-25 | Stop reason: HOSPADM

## 2018-09-20 RX ORDER — LANOLIN ALCOHOL/MO/W.PET/CERES
6 CREAM (GRAM) TOPICAL
Status: DISCONTINUED | OUTPATIENT
Start: 2018-09-20 | End: 2018-09-25 | Stop reason: HOSPADM

## 2018-09-20 RX ORDER — LORAZEPAM 1 MG/1
1 TABLET ORAL 3 TIMES DAILY
Status: DISCONTINUED | OUTPATIENT
Start: 2018-09-20 | End: 2018-09-20 | Stop reason: HOSPADM

## 2018-09-20 RX ORDER — FLUOXETINE 10 MG/1
20 CAPSULE ORAL DAILY
Status: DISCONTINUED | OUTPATIENT
Start: 2018-09-20 | End: 2018-09-20 | Stop reason: HOSPADM

## 2018-09-20 RX ORDER — IBUPROFEN 400 MG/1
400 TABLET ORAL EVERY 6 HOURS PRN
Status: DISCONTINUED | OUTPATIENT
Start: 2018-09-20 | End: 2018-09-25 | Stop reason: HOSPADM

## 2018-09-20 RX ORDER — ATORVASTATIN CALCIUM 40 MG/1
40 TABLET, FILM COATED ORAL
Status: DISCONTINUED | OUTPATIENT
Start: 2018-09-20 | End: 2018-09-25 | Stop reason: HOSPADM

## 2018-09-20 RX ADMIN — FLUOXETINE 20 MG: 10 CAPSULE ORAL at 08:35

## 2018-09-20 RX ADMIN — LORAZEPAM 1 MG: 1 TABLET ORAL at 21:20

## 2018-09-20 RX ADMIN — LORAZEPAM 1 MG: 1 TABLET ORAL at 17:56

## 2018-09-20 RX ADMIN — QUETIAPINE FUMARATE 100 MG: 100 TABLET ORAL at 21:20

## 2018-09-20 RX ADMIN — ATORVASTATIN CALCIUM 40 MG: 40 TABLET, FILM COATED ORAL at 17:56

## 2018-09-20 RX ADMIN — PRAMIPEXOLE DIHYDROCHLORIDE 0.25 MG: 0.25 TABLET ORAL at 21:20

## 2018-09-20 RX ADMIN — ZOLPIDEM TARTRATE 10 MG: 10 TABLET, FILM COATED ORAL at 21:20

## 2018-09-20 RX ADMIN — LORAZEPAM 1 MG: 1 TABLET ORAL at 08:35

## 2018-09-20 NOTE — ED NOTES
Being reviewed at [facility] due to no return call from [facility]  CM will wait for puja to make contact with [facility] regarding information  201 faxed for review    [phone]

## 2018-09-20 NOTE — PLAN OF CARE
Alteration in Thoughts and Perception     Treatment Goal: Gain control of psychotic behaviors/thinking, reduce/eliminate presenting symptoms and demonstrate improved reality functioning upon discharge Progressing     Verbalize thoughts and feelings Progressing     Refrain from acting on delusional thinking/internal stimuli Progressing     Agree to be compliant with medication regime, as prescribed and report medication side effects Progressing     Attend and participate in unit activities, including therapeutic, recreational, and educational groups Progressing     Recognize dysfunctional thoughts, communicate reality-based thoughts at the time of discharge Progressing     Complete daily ADLs, including personal hygiene independently, as able Progressing        Depression     Treatment Goal: Demonstrate behavioral control of depressive symptoms, verbalize feelings of improved mood/affect, and adopt new coping skills prior to discharge Progressing     Verbalize thoughts and feelings Progressing     Refrain from harming self Progressing     Refrain from [address] from self-neglect Progressing     Attend and participate in unit activities, including therapeutic, recreational, and educational groups Progressing     Complete daily ADLs, including personal hygiene independently, as able Progressing        Ineffective Coping     Cooperates with admission process Progressing     Identifies ineffective coping skills Progressing     Identifies healthy coping skills Progressing     Demonstrates healthy coping skills Progressing     Participates in unit activities Progressing     Patient/Family participate in treatment and DC plans Progressing     Patient/Family verbalizes awareness of resources Progressing     Understands least restrictive measures Progressing     Free from restraint events Progressing        Nutrition/Hydration-ADULT     Nutrient/Hydration intake appropriate for improving, restoring or maintaining nutritional needs Progressing        Risk for Self Injury/Neglect     Treatment Goal: Remain safe during length of stay, learn and adopt new coping skills, and be free of self-injurious ideation, impulses and acts at the time of discharge Progressing     Verbalize thoughts and feelings Progressing     Refrain from harming self Progressing     Attend and participate in unit activities, including therapeutic, recreational, and educational groups Progressing     Recognize maladaptive responses and adopt new coping mechanisms Progressing     Complete daily ADLs, including personal hygiene independently, as able Progressing

## 2018-09-20 NOTE — ED NOTES
CM spoke with admission and they may have a bed later today  CM was asked to fax clinical information to [phone] which is [facility]  CM will continue to follow patient for possible discharge later today  CM will also work on additional bed search for older adult bed pending availability  CM will follow patient

## 2018-09-20 NOTE — ED NOTES
Patient has been accept by dr borges  Patient accepted for floor 6  CM will let intake know about transport time  Cm spoke spoke with Kendal Acevedo from [address] who is working on transport time and will call back  No auth need due to medicare

## 2018-09-20 NOTE — ED NOTES
Patient resting comfortably at this time  No new c/o  Ordered patient meal tray       [facility], RN  09/20/18 3692

## 2018-09-20 NOTE — ED NOTES
Patient setup for transport from [facility] to [facility] at 3pm  Everyone aware

## 2018-09-20 NOTE — ED NOTES
Crisis Worker did intake and safety assessment  Patient (Pt) admits to suicidal ideation with plan to hang self  Pt denies homicidal ideation as well as psychosis  Pt admitted to past attempt of hanging several months ago  Pt stated he has increased anxiety and that his current medications are not helping him  Pt is demanding to talk with his girlfriend and stated that she knows he is here as he spoke to his son earlier and he told her  Pt not cooperative and stated that he is not signing 12 until he speaks with his girlfriend  Crisis Worker (CW) discussed patient's rights to him and stated that the DR felt he needs inpatient treament and if he does not sign in then he will be involuntarlily committed  Pt then stated that " the Dr better have a lot of f*cking help to get me out of here then "   Pt became beligerent to CW and stated that everybody if lying to him

## 2018-09-20 NOTE — ED NOTES
Patient w/o needs at this time   Will be picked up at 3pm today for transfer     [facility], [facility]  09/20/18 8077

## 2018-09-20 NOTE — ED NOTES
Reviewed medications list with pt as best as I could  Pt reports taking 5 different pills a day, but unable to confirm if taking Strattera or Cymbalta        [place] SONG Luz  09/20/18 6979

## 2018-09-20 NOTE — ED NOTES
Patient left [facility] area with PERLA Harris  EMS with all belongings from locker and [employer]       Alfred Bhat  09/20/18 8991

## 2018-09-20 NOTE — ASSESSMENT & PLAN NOTE
Likely secondary to underlying anxiety  TSH within limits    EKG performed yesterday normal sinus rhythm

## 2018-09-20 NOTE — ED NOTES
Pt laying in bed lights out door closed by pt pt received his food tray will continue to monitor     Aeltha [facility]  09/20/18 7442

## 2018-09-20 NOTE — EMTALA/ACUTE CARE TRANSFER
[address] [place] [address],[address] [place] [address]  Dept: [phone]      EMTALA TRANSFER CONSENT    NAME Leila Woodson Sr   1955                              MRN [id number]    I have been informed of my rights regarding examination, treatment, and transfer   by Dr Reji Canales MD    Benefits: Specialized equipment and/or services available at the receiving facility (Include comment)________________________    Risks: Potential for delay in receiving treatment      Transfer Request   I acknowledge that my medical condition has been evaluated and explained to me by the emergency department physician or other qualified medical person and/or my attending physician who has recommended and offered to me further medical examination and treatment  I understand the Hospital's obligation with respect to the treatment and stabilization of my emergency medical condition  I nevertheless request to be transferred  I release the Hospital, the doctor, and any other persons caring for me from all responsibility or liability for any injury or ill effects that may result from my transfer and agree to accept all responsibility for the consequences of my choice to transfer, rather than receive stabilizing treatment at the Hospital  I understand that because the transfer is my request, my insurance may not provide reimbursement for the services  The Hospital will assist and direct me and my family in how to make arrangements for transfer, but the hospital is not liable for any fees charged by the transport service  In spite of this understanding, I refuse to consent to further medical examination and treatment which has been offered to me, and request transfer to  Leilani Borjas Name, [address] : [address]    I authorize the performance of emergency medical procedures and treatments upon me in both transit and upon arrival at the receiving facility  Additionally, I authorize the release of any and all medical records to the receiving facility and request they be transported with me, if possible  I authorize the performance of emergency medical procedures and treatments upon me in both transit and upon arrival at the receiving facility  Additionally, I authorize the release of any and all medical records to the receiving facility and request they be transported with me, if possible  I understand that the safest mode of transportation during a medical emergency is an ambulance and that the Hospital advocates the use of this mode of transport  Risks of traveling to the receiving facility by car, including absence of medical control, life sustaining equipment, such as oxygen, and medical personnel has been explained to me and I fully understand them  (RUBY CORRECT BOX BELOW)  [place]  ]  I consent to the stated transfer and to be transported by ambulance/helicopter  [  ]  I consent to the stated transfer, but refuse transportation by ambulance and accept full responsibility for my transportation by car  I understand the risks of non-ambulance transfers and I exonerate the Hospital and its staff from any deterioration in my condition that results from this refusal     X___________________________________________    DATE  18  TIME________  Signature of patient or legally responsible individual signing on patient behalf           RELATIONSHIP TO PATIENT_________________________          Provider Certification    NAME Malon Claude    1955                              MRN [id number]    A medical screening exam was performed on the above named patient  Based on the examination:    Condition Necessitating Transfer The primary encounter diagnosis was Suicidal ideation   Diagnoses of Right upper quadrant abdominal pain and Chronic midline back pain, unspecified back location were also pertinent to this visit  Patient Condition: The patient has been stabilized such that within reasonable medical probability, no material deterioration of the patient condition or the condition of the unborn child(christy) is likely to result from the transfer    Reason for Transfer: Level of Care needed not available at this facility    Transfer Requirements: [facility]    · Space available and qualified personnel available for treatment as acknowledged by PACS  · Agreed to accept transfer and to provide appropriate medical treatment as acknowledged by       DR BREWSTER  · Appropriate medical records of the examination and treatment of the patient are provided at the time of transfer   [facility], [address] _______  · Transfer will be performed by qualified personnel from [address] [place] [address]  and appropriate transfer equipment as required, including the use of necessary and appropriate life support measures  Provider Certification: I have examined the patient and explained the following risks and benefits of being transferred/refusing transfer to the patient/family:  General risk, such as traffic hazards, adverse weather conditions, rough terrain or turbulence, possible failure of equipment (including vehicle or aircraft), or consequences of actions of persons outside the control of the transport personnel      Based on these reasonable risks and benefits to the patient and/or the unborn child(christy), and based upon the information available at the time of the patients examination, I certify that the medical benefits reasonably to be expected from the provision of appropriate medical treatments at another medical facility outweigh the increasing risks, if any, to the individuals medical condition, and in the case of labor to the unborn child, from effecting the transfer      X____________________________________________ DATE 09/20/18        TIME_______      ORIGINAL - SEND TO MEDICAL RECORDS   COPY - SEND WITH PATIENT DURING TRANSFER

## 2018-09-20 NOTE — ED NOTES
Pt laying in bed speaking to girlfriend on the phone  Light on and TV off  Will continue to monitor pt        Shawnee Tolentino  09/19/18 3813

## 2018-09-20 NOTE — PLAN OF CARE
Problem: DISCHARGE PLANNING - CARE MANAGEMENT  Goal: Discharge to post-acute care or home with appropriate resources  INTERVENTIONS:  - Conduct assessment to determine patient/family and health care team treatment goals, and need for post-acute services based on payer coverage, community resources, and patient preferences, and barriers to discharge  - Address psychosocial, clinical, and financial barriers to discharge as identified in assessment in conjunction with the patient/family and health care team  - Arrange appropriate level of post-acute services according to patients   needs and preference and payer coverage in collaboration with the physician and health care team  - Communicate with and update the patient/family, physician, and health care team regarding progress on the discharge plan  - Arrange appropriate transportation to post-acute venues  Outcome: Progressing  Being reviewed at [facility] due to no return call from [facility]  CM will wait for puja to make contact with [facility] regarding information  201 faxed for review    [phone]

## 2018-09-20 NOTE — ED NOTES
Patient resting comfortably on bed with lights and tv off in room  No wants or complaints at this time  Will continue to monitor       Stephanie Ion  09/19/18 1766

## 2018-09-20 NOTE — ED NOTES
Pt  Resting in bed, calm and cooperative  Updated pt  About awaiting bed placement today  Pt  With no questions at this time  Pt  Requested door to be propped at this time  No distress  Will continue monitoring pt   On 1:1       Sandor Del Cid RN  09/20/18 3683

## 2018-09-20 NOTE — PROGRESS NOTES
Patient transfer from [facility] under 201 status  Suicidal ideations d/t being overwhelmed with medical issues, gallstones and spinal tumor diagnosed in 2016  Plan to hang himself, increased anxiety  Lives alone in his garage for a year  Girlfriend left him and he couldn't keep place up by himself  Independent with ADLs and ambulation  Signed 72 hour notice @ 1723  Brightens upon approach  Medication compliant, refused dinner  C/o lower back pain 3/10, didn't want anything for it  Isolative and withdrawn to self in room  No behavior issues  Maintained on SP1 precautions and Q15 min checks  Will continue to monitor for safety and support

## 2018-09-20 NOTE — ED NOTES
Asked pt if he wanted breakfast  Pt stated he does not  Told him to let me know if he changes his mind  Pt laying in bed resting  Will continue to monitor pt        Flavio Coy  09/20/18 7310

## 2018-09-20 NOTE — ED NOTES
Pt continues to be resting in room, no distress noted  ED tech, Aishwarya Escoto, will continue to monitor pt        Jie Arevalo RN  09/20/18 5350

## 2018-09-20 NOTE — ED CARE HANDOFF
Emergency Department Sign Out Note        Sign out and transfer of care from Dr Mya Thacker  al  See Separate Emergency Department note  The patient, Gregory Delgado Sr , was evaluated by the previous provider for suicidal ideation  Workup Completed:  yes    ED Course / Workup Pending (followup):  201 placement as per crisis  Signed out to Dr Lady Seth this AM                           Procedures  MDM  CritCare Time      Disposition  Final diagnoses:   Right upper quadrant abdominal pain   Suicidal ideation   Chronic midline back pain, unspecified back location     Time reflects when diagnosis was documented in both MDM as applicable and the Disposition within this note     Time User Action Codes Description Comment    9/19/2018  7:12 PM Lucius [place] A Add [R10 11] Right upper quadrant abdominal pain     9/19/2018  7:13 PM Lucius [place] A Add [C60 879] Suicidal ideation     9/19/2018  7:13 PM Lucius [place] A Modify [R10 11] Right upper quadrant abdominal pain     9/19/2018  7:13 PM Lucius [place] A Modify [D41 947] Suicidal ideation     9/19/2018  7:13 PM Lucius [place] A Add [M54 9,  G89 29] Chronic midline back pain, unspecified back location       ED Disposition     None      MD Documentation      Most Recent Value   Sending MD Dr Angela Peters    None       Patient's Medications   Discharge Prescriptions    No medications on file     No discharge procedures on file         ED Provider  Electronically Signed by     Andrew Jc MD  09/20/18 0025       Andrew Jc MD  09/20/18 5955

## 2018-09-20 NOTE — ED NOTES
Pt got off the phone with girlfriend  Pt willing to sign 201 at this time  Currently CW speaking with pt        Brett Coats  09/19/18 1644

## 2018-09-20 NOTE — ED NOTES
Pt laying on bed sleeping  No signs of distress at this time  Will continue to monitor pt        Scarlett Gregory  09/20/18 0038

## 2018-09-20 NOTE — CONSULTS
Consult- Blanco Ashby Sr  1955, 60 y o  male MRN: [id number]    Unit/Bed#: [facility] 889-07 Encounter: [id number]    Primary Care Provider: Tramaine Rodriguez DO   Date and time admitted to hospital: 9/20/2018  4:09 PM      Inpatient consult for Medical Clearance for [facility] patient  Consult performed by: Carl Lennon ordered by: Ernestina Nagel PLAN  * Bipolar depression ([facility] [address] )   Assessment & Plan    Suicidal thoughts  Continue duloxetine fluoxetine quetiapine lorazepam per psychiatry  Sinus tachycardia   Assessment & Plan    Likely secondary to underlying anxiety  TSH within limits  EKG performed yesterday normal sinus rhythm        Borderline diabetes mellitus   Assessment & Plan    Diet controlled  Will not need accucheks during hospitalization        Restless leg syndrome   Assessment & Plan    Will trial mirapex q h s  Thank you for this consultation, medicine will sign off  Reconsult if necessary  _____________________________________________________________________________    HPI: Blanco Ashby Sr  is a 60y o  year old male who presents under admission with psychiatry  The patient had told his therapist he was having suicide ideations and overwhelmed with current medical comorbidities including gallstone attacks and spinal tumor  He is scheduled for resection of tumor March 2019  He does have a history of suicide attempt by hanging himself  Currently denies any chest pain or shortness of breath  ALLERGIES  No Known Allergies  HOME MEDICATIONS  Prior to Admission Medications   Prescriptions Last Dose Informant Patient Reported? Taking?    DULoxetine (CYMBALTA) 30 mg delayed release capsule  Self No No   Sig: Take 1 capsule by mouth daily   Patient taking differently: Take 30 mg by mouth daily     FLUoxetine (PROzac) 20 mg capsule  Self Yes No   Sig: Take 1 capsule by mouth daily in the early morning   LORazepam (ATIVAN) 1 mg tablet  Self Yes No   Sig: Take 1 mg by mouth 3 (three) times a day   QUEtiapine (SEROquel) 100 mg tablet  Self No No   Sig: Take 1 tablet by mouth daily at bedtime   atorvastatin (LIPITOR) 40 mg tablet   Yes No   Si mg daily at bedtime     zolpidem (AMBIEN) 10 mg tablet   Yes No   Sig: TK 1 T PO QHS      Facility-Administered Medications: None     CURRENT MEDICATIONS  Current Facility-Administered Medications   Medication Dose Route Frequency Provider Last Rate Last Dose    atorvastatin (LIPITOR) tablet 40 mg  40 mg Oral Daily With   Nehalem Jr  [employer]AROLINE        [START ON 2018] DULoxetine (CYMBALTA) delayed release capsule 30 mg  30 mg Oral Daily Michael Ruiz PA-C        [START ON 2018] FLUoxetine (PROzac) capsule 20 mg  20 mg Oral Daily Michael Ruiz PA-C        ibuprofen (MOTRIN) tablet 400 mg  400 mg Oral Q6H PRN Michael Ruiz PA-C        LORazepam (ATIVAN) tablet 1 mg  1 mg Oral TID Michael Ruiz PA-C        melatonin tablet 6 mg  6 mg Oral HS PRN Michael Ruiz PA-C        nicotine polacrilex (NICORETTE) gum 2 mg  2 mg Oral Q4H PRN Michael Ruiz PA-C        OLANZapine (ZyPREXA) IM injection 5 mg  5 mg Intramuscular Q4H PRN Michael Ruiz PA-C        OLANZapine (ZyPREXA) tablet 5 mg  5 mg Oral Q4H PRN Michael Ruiz PA-C        QUEtiapine (SEROquel) tablet 100 mg  100 mg Oral HS Michael Ruiz PA-C        zolpidem (AMBIEN) tablet 10 mg  10 mg Oral HS Michael Ruiz PA-C         PAST HISTORY  Past Medical History:   Diagnosis Date    Anxiety     Bipolar depression ([facility] [address] )     bipolar disorder    Borderline diabetes mellitus     Chronic back pain     Chronic pain     Gallstones     Restless leg syndrome     Spinal arachnoid cyst      Past Surgical History:   Procedure Laterality Date    COLONOSCOPY      INCISION AND DRAINAGE ABSCESS / HEMATOMA OF BURSA / KNEE / THIGH      outer right leg and center of upper back   complicated  resolved status    OR COLONOSCOPY FLX DX W/COLLJ SPEC WHEN PFRMD N/A 8/31/2017    Procedure: COLONOSCOPY;  Surgeon: Ana Muir MD;  [facility]; Service: Gastroenterology    OR COLONOSCOPY FLX DX W/COLLJ [facility] WHEN PFRMD N/A 3/23/2018    Procedure: COLONOSCOPY;  Surgeon: Ana Muir MD;  Location: [facility];   Service: Gastroenterology    TUMOR REMOVAL       SOCIAL HISTORY  Social History     Social History    Marital status: Single     Spouse name: N/A    Number of children: 3    Years of education: N/A     Occupational History          disabled     Social History Main Topics    Smoking status: Former Smoker    Smokeless tobacco: Never Used      Comment: all scripts says former smoker quit at [employer] Alcohol use Yes      Comment: pt "sometimes; not like before"    Drug use: Yes     Types: Marijuana    Sexual activity: Yes     Partners: Female     Birth control/ protection: None     Other Topics Concern    Not on file     Social History Narrative    Disabled    GED     FAMILY HISTORY  Family History   Problem Relation Age of Onset    Throat cancer Mother     Heart attack Father         at [de-identified]    Stomach cancer Sister     Lung cancer Brother     Stomach cancer Brother        REVIEW OF SYSTEMS  History obtained from chart review and the patient  General ROS: negative for - chills or fever  Psychological ROS: positive for - depression  Ophthalmic ROS: negative for - blurry vision or dry eyes  Respiratory ROS: negative for - cough, pleuritic pain or shortness of breath  Cardiovascular ROS: negative for - chest pain or palpitations  Gastrointestinal ROS: negative for - blood in stools or diarrhea  Genito-Urinary ROS: negative for - dysuria  Musculoskeletal ROS: positive for - pain in lower back  Neurological ROS: positive for - restless leg, insomnia  Otherwise, all other 12 point review of systems normal     OBJECTIVE  Temp:  [96 3 °F (35 7 °C)-98 4 °F (36 9 °C)] 96 3 °F (35 7 °C)  HR:  [] 110  Resp: [18-20] 20  BP: (120-126)/(67-78) 126/78    PHYSICAL EXAM  General appearance: alert, appears stated age and cooperative  Skin: Skin color, texture, turgor normal  No rashes or lesions  Head: Normocephalic, without obvious abnormality, atraumatic  Eyes: conjunctivae/corneas clear  PERRL, EOM's intact  Lungs: clear to auscultation bilaterally  Heart: tachycardic but regular no murmurs  Abdomen: soft, non-tender; bowel sounds normal; no masses,  no organomegaly  Back: range of motion normal, lumbar paraspinal tenderness to palpation  Extremities: extremities normal, atraumatic, no cyanosis or edema  Neurologic: Grossly normal    Lab Results: I have personally reviewed pertinent reports  Labs:    Results from last 7 days  Lab Units 09/19/18  1903   WBC Thousand/uL 13 05*   HEMOGLOBIN g/dL 15 1   HEMATOCRIT % 44 1   MCV fL 90   PLATELETS Thousands/uL 261             Results from last 7 days  Lab Units 09/19/18  1903   SODIUM mmol/L 139   POTASSIUM mmol/L 3 8   CHLORIDE mmol/L 102   CO2 mmol/L 27   ANION GAP mmol/L 10   BUN mg/dL 17   CREATININE mg/dL 1 21   CALCIUM mg/dL 10 0   ALBUMIN g/dL 4 2   TOTAL BILIRUBIN mg/dL 1 00   ALK PHOS U/L 81   ALT U/L 22   AST U/L 15   EGFR ml/min/1 73sq m 63   GLUCOSE RANDOM mg/dL 79                        Results from last 7 days  Lab Units 09/19/18  1903   TSH 3RD GENERATON uIU/mL 0 686                   Cultures:         Invalid input(s): Darlean Forward                Results from last 7 days  Lab Units 09/19/18  1827   AMPH/METH  Negative   BARBITURATE UR  Negative   BENZODIAZEPINE UR  Negative   COCAINE UR  Negative   METHADONE URINE  Negative   OPIATE UR  Negative   PCP UR  Negative   THC UR  Positive*       Imaging: I have personally reviewed pertinent films in PACS  Us Gallbladder  Result Date: 9/19/2018  Impression: Pancreatic tail partially obscured by overlying bowel gas  Cholelithiasis without sonographic evidence of cholecystitis   Small right side simple renal cyst  Workstation performed: [id number]       Total Time for Visit, including Counseling / Coordination of Care: 30 mins  Greater than 50% of this total time spent on direct patient counseling and coordination of care  ** Please Note: This note has been constructed using a voice recognition system   **

## 2018-09-21 PROBLEM — F33.2 SEVERE EPISODE OF RECURRENT MAJOR DEPRESSIVE DISORDER, WITHOUT PSYCHOTIC FEATURES (HCC): Chronic | Status: ACTIVE | Noted: 2018-09-21

## 2018-09-21 PROCEDURE — 97166 OT EVAL MOD COMPLEX 45 MIN: CPT

## 2018-09-21 RX ORDER — DULOXETIN HYDROCHLORIDE 60 MG/1
60 CAPSULE, DELAYED RELEASE ORAL DAILY
Status: DISCONTINUED | OUTPATIENT
Start: 2018-09-21 | End: 2018-09-25 | Stop reason: HOSPADM

## 2018-09-21 RX ADMIN — QUETIAPINE FUMARATE 100 MG: 100 TABLET ORAL at 21:58

## 2018-09-21 RX ADMIN — PRAMIPEXOLE DIHYDROCHLORIDE 0.25 MG: 0.25 TABLET ORAL at 21:58

## 2018-09-21 RX ADMIN — LORAZEPAM 1 MG: 1 TABLET ORAL at 09:13

## 2018-09-21 RX ADMIN — ZOLPIDEM TARTRATE 10 MG: 10 TABLET, FILM COATED ORAL at 21:58

## 2018-09-21 RX ADMIN — ATORVASTATIN CALCIUM 40 MG: 40 TABLET, FILM COATED ORAL at 16:05

## 2018-09-21 RX ADMIN — LORAZEPAM 1 MG: 1 TABLET ORAL at 21:59

## 2018-09-21 RX ADMIN — LORAZEPAM 1 MG: 1 TABLET ORAL at 16:05

## 2018-09-21 RX ADMIN — DULOXETINE HYDROCHLORIDE 60 MG: 60 CAPSULE, DELAYED RELEASE ORAL at 09:13

## 2018-09-21 RX ADMIN — FLUOXETINE 20 MG: 20 CAPSULE ORAL at 09:12

## 2018-09-21 NOTE — PROGRESS NOTES
Patient currently in bed, appears to be sleeping  No signs of distress noted  Respirations even and unlabored  Not voicing any SI's at this time  Maintained on SP1 and fall precautions and Q15 min checks  Will continue to monitor for safety and support

## 2018-09-21 NOTE — OCCUPATIONAL THERAPY NOTE
Occupational Therapy Evaluation      Uatsdindeepa Delgado      9/21/2018    Patient Active Problem List   Diagnosis    Adult residual type attention deficit hyperactivity disorder (ADHD)    Esophagitis    HTN (hypertension)    HLD (hyperlipidemia)    Prediabetes    Chronic low back pain    Adenomatous polyp of transverse colon    Sessile rectal polyp    Erectile dysfunction    Polyp of colon    Bipolar 1 disorder (HCC)    Tubular adenoma of colon    Dizziness    Restless leg syndrome    Bipolar depression (HCC)    Borderline diabetes mellitus    Sinus tachycardia    Severe episode of recurrent major depressive disorder, without psychotic features ([facility] [address] )       Past Medical History:   Diagnosis Date    Anxiety     Bipolar depression (HCC)     bipolar disorder    Borderline diabetes mellitus     Chronic back pain     Chronic pain     Gallstones     Restless leg syndrome     Spinal arachnoid cyst        Past Surgical History:   Procedure Laterality Date    COLONOSCOPY      INCISION AND DRAINAGE ABSCESS / HEMATOMA OF BURSA / KNEE / THIGH      outer right leg and center of upper back   complicated  resolved status    OK COLONOSCOPY FLX DX W/COLLJ SPEC WHEN PFRMD N/A 8/31/2017    Procedure: COLONOSCOPY;  Surgeon: Michael Bashir MD;  [facility]; Service: Gastroenterology    OK COLONOSCOPY FLX DX W/COLLJ [facility] WHEN PFRMD N/A 3/23/2018    Procedure: COLONOSCOPY;  Surgeon: Michael Bashir MD;  [facility];   Service: Gastroenterology    TUMOR REMOVAL          09/21/18 1289   Note Type   Note type Eval/Treat   Restrictions/Precautions   Other Precautions Suicidal;Fall Risk  (behavioral health 15 minute checks)   Pain Assessment   Pain Assessment 0-10   Pain Score 4   Pain Type Chronic pain   Pain Location Back   Pain Orientation Lower   Pain Descriptors Aching   Pain Frequency Constant/continuous   Pain Onset Ongoing   Hospital Pain Intervention(s) Declines  (stated pain is from gall stones, he does not take anything)   Home Living   Type of Home Other (Comment)  (garage)   Additional Comments He stated that he stays in a garage that he rents  He stated that there is no electricity nor running water  He did state that he and his girlfriend are saving up for a place together (his record stated that he and girlfriend had been broken up for a year)  He stated that he does have a friend who lives nearby who lets him use his bathroom as needed  Prior Function   Level of [place] Independent with ADLs and functional mobility; Needs assistance with IADLs  (stated he does laundry, still drives, has a car)   Lives With Alone   Receives Help From Friend(s)  (he stated girlfriend in involved, another friend also helps)   ADL Assistance Independent   IADLs Needs assistance  (he stated he is generally I, but gf assists with money mgmt)   Falls in the last 6 months 1 to 4  (he stated that he tripped over the sidewalk 1 X, fell )   Vocational (he receives social security ([employer] most of this))   Comments He stated that he has been staying in the garage in order that he and his girl friend can save up money to get a place  He stated that she has been handling his money, she brings him meals  He stated that he gets the money at the beginning of the month, it's gone within a week  He did admit that he came to the hospital cue to threats to hurt himself  He stated that he takes his medications as prescribed, but once in awhile they do not work  He did admit to signing a 72 hour notice earlier, he stated that the doctor wanted him to rescind this  Per his record, he had been noted to tell his therapist that he planned to hang himself  He was noted to have increased depression, increased worry over his medical issues  He also admitted to passive suicidal ideation that comes and goes, previous attempts at hanging himself   During the interview with this writer, he stated that on one occasion his girlfriend walked in on him and stopped him in the act  Lifestyle   Autonomy He is currently living in a garage in order to save up money to get a place with his girlfriend   Reciprocal Relationships he stated that his girlfriend is involved  He stated that he also has a friend who lets him use his place as he wants to carry out personal care, laundry needs   Intrinsic Gratification He stated that his hobby is fishing, He also stated that he hates crowds  Psychosocial   Psychosocial (WDL) X   Patient Behaviors/Mood Brightens with approach; Cooperative;Depressed;Pleasant;Verbal  (he was initially somewhat passively resistive to interview)   Needs Expressed (stated that he is planning to get a place with his girlfrien)   Ability to Express Feelings Able to express   Ability to Express Needs Able to express   Ability to Express Thoughts Able to express   Ability to Understand Others Understands   Subjective   Subjective When asked about reason for hospitalization, he stated, "I was threatening to hurt myself  Once in awhile my meds are not working "   ADL   Additional Comments He reports independence in personal care  He has been noted to have refused lunch, but per daily care he is noted to be able to feed himself independently and is independent in hygiene needs   Transfers   Additional Comments he ambulates, transfers independently   Functional Mobility   Additional Comments he ambualtes independently   Activity Tolerance   Activity Tolerance Patient tolerated treatment well  (he did remain resting in bed throughout the interview)   RUE Assessment   RUE Assessment WFL   LUE Assessment   LUE Assessment WFL   Hand Function   Gross Motor Coordination Functional   Fine Motor Coordination Functional   Vision-Basic Assessment   Current Vision Wears glasses all the time   Cognition   Overall Cognitive Status [facility]   Arousal/Participation Alert; Responsive;Arousable; Cooperative   Attention Within functional limits   Orientation Level Oriented X4   Memory Within functional limits   Following Commands Follows multistep commands inconsistently   Comments He was easily able to carry out 1-2 step whip stitch task  He was willing to try multistep single cordovan lace task  He did not notice errors  His overall frustration tolerance was at least fair  Assessment   Limitation Mood limitation  (coping skills, life management skills)   Prognosis Good   Assessment Pt is a 60 y o  male seen for OT evaluation s/p admit to [facility] on [facility]/20/2018 w/ Severe episode of recurrent major depressive disorder, without psychotic features ([facility] [address] )  Comorbidities affecting pt's functional performance at time of assessment include: HTN and sinus tachycardia, chronic low back pain, borderline DM, restless leg syndrome, anxiety, gall stones, ADHD, HLD, spinal arachnoid cyst  Personal factors affecting pt at time of IE include:limited home support, behavioral pattern, limited insight into deficits, health management , environment and decreased coping skills, decreased life management skills  Prior to admission, pt was living in a rented garage  Upon evaluation: Pt requires treatment with consideration of the following deficits impacting occupational performance: impaired problem solving, impulsivity, impaired interpersonal skills, decreased coping skills and decreased life management skills  Pt to benefit from continued skilled OT tx while in the hospital to address deficits as defined above and maximize level of functional independence w ADL's and functional mobility  Occupational Performance areas to address include: medication management, socialization, health maintenance, social participation and coping skills instruction, life management instruction, consistent cooperation with treatment  From OT standpoint, recommendation at time of d/c would be to independent living where he has regular access to electricity, water, to meet basic health needs when he is stable       Goals Patient Goals He stated that he does not have a specific goal while at the hospital  He did state that he does want to eventually get a place to live with his girlfriend   LTG Time Frame (30 days)   Long Term Goal #1 Attend/ participate in 1 OT group offered on the unit to offset admitting behaviors/ symptoms  Long Term Goal #2 Identify and explore strategies to promote improved functioning and wellness  Long Term Goal #3 Identify and explore positive qualities of self  #4 Consistently utilize positive coping skills to deal with daily life stressors without resorting to threats/ attempts at self harm 100% of the time  #5 Consistently utilize positive life management strategies to promote optimum health and wellness 100% of the time  Plan   Treatment Interventions ADL retraining;Continued evaluation; Activityengagement  (coping skills instruction, life management instruction)   Goal Expiration Date 10/21/18   Treatment Day 1   OT Frequency 5x/wk   Marni Harper, OT

## 2018-09-21 NOTE — NURSING NOTE
Patient is compliant with medications and cooperative with staff  Patient reported no pain or discomfort this morning  Patient refused breakfast and is isolative to room  Patient reports still having suicidal thoughts but no plan or intent to act on them  Patient did not want to discuss his issues anymore this morning  Patient advised to notify staff if he wants to talk or to attend group as part of his treatment plan  Patient remains on suicide and fall precautions with q 15 min checks as ordered  No other issues noted at this time

## 2018-09-21 NOTE — NURSING NOTE
Patient remains compliant with medications  Appetite remains poor despite staff encouragement to complete meals and come out of room more frequently  New order from Psychiatry to increase Cymbalta to 60 mg PO daily to assist with current depressive symptoms  Patient denies suicidal ideation and no attempts at self harm noted  No other issues noted at this time

## 2018-09-21 NOTE — PROGRESS NOTES
Patient currently resting in bed, no signs of distress noted  Respirations even and unlabored  Not voicing any SI's at this time  Maintained on SP1 and fall precautions and Q15 min checks  Will continue to monitor for safety and support

## 2018-09-21 NOTE — SOCIAL WORK
YURIY, IMM, and treatment plan signed by patient  1:1 psychosocial assessment completed with patient  Patient is alert and oriented x4  Pleasant and cooperative on approach  Admitted with increased depression and making suicidal statements  Patient currently denies SI/HI, AH/VH  He stated that a fight with his girlfriend was his recent stressor causing him to feel depressed and suicidal  He said he often gets irritable when he is depressed and that he has mood swings often  He also states that he has a tumor in his back that causes him extreme pain that also leads to his depression  Patient has a previous suicide attempt by hanging about a year ago that was not completed because his girlfriend Ulysses Carter found him  He has been with girlfriend Ulysses Carter for 40 years and has three children with her  [facility] and [place]  He states that he lives alone in a rented garage until it gets cold then he goes to a friends house on [address] in [place]  He said that he does not stay there all the time because his friend has three little children  He states that he has no current legal issues  He denies drinking or smoking cigarettes but does admit to smoking marijuana to help his appetite (he says he only eats once every few days d/t lack of money) and ease his pain  He was positive on admission for THC  He states that he receives SSD in the amount of $1,000 00/month and that up until this tumor on his back was discovered in 2013 he worked driving a van delivering magazines  Prior to the discovery of the tumor he states that he had no depressive symptoms or previous psych history  Patient initially signed a 72 hour notice but has since rescinded it and agreed to continue treatment  SW will continue to folllow-up as necessary

## 2018-09-21 NOTE — H&P
Initial Psychiatric Evaluation    Medical Record Number: [id number]  Encounter: [id number]    CC:  "I said something dumb to my therapist"    History:     Vickie Justice  is an 60 y o , male, admitted to the psychiatric unit under a 201 status to Dr Jorge Vila' service with the chief complaint of   increased depression with suicidal ideation with a plan to hang himself  Patient admits that he told his therapist that he was experiencing suicidal ideations with a plan to hang himself  Patient reports that his depression has been worsening over the past several weeks and he has been increasingly worried about his medical conditions  Patient states that he does have passive suicidal ideations that come and go frequently  He feels that it was mistake to tell his therapist   The patient made of previous suicide attempts several months ago via attempting to hang himself  Despite having worsening depression and a previous suicide attempt the patient believes that he is okay for discharge and has signed a 72 hour notice indicating his intent to leave  During the initial evaluation the patient presents as aloof and irritable  He is showing very poor insight and judgment into his current situation  He has been demanding and irritable with staff  While in the emergency department at [facility] the patient became belligerent with the crisis worker and started yelling  In the emergency department the patient told crisis worker if he was committed involuntarily that [facility] doctor better have a lot of fucking help to get me out of here then"  This morning the patient continues with his irritable edge  Patient reports his multiple medical conditions have led to his worsening depression  Patient currently has been living alone in his garage for approximately a year  His girlfriend left him and he could not afford the place by himself    Patient has been focused on having gallstones as well as having a spinal tumor that was diagnosed in 2016  Patient's current symptoms include increased depression and suicidal ideation  His sleep has been disrupted  He is less interested in activities he used to enjoy  He has been quite irritable and restless at times  Becomes agitated easily  He has a very low threshold for frustration  He is demonstrating very poor insight and poor judgment  He expressed suicidal ideations and continues to have suicidal thoughts  Because of the patient's increased depression and suicidal ideation was necessary to keep him in the hospital for further evaluation and treatment  Past Medical History:   Diagnosis Date    Anxiety     Bipolar depression ([facility] [address] )     bipolar disorder    Borderline diabetes mellitus     Chronic back pain     Chronic pain     Gallstones     Restless leg syndrome     Spinal arachnoid cyst        Past surgical history:  Past Surgical History:   Procedure Laterality Date    COLONOSCOPY      INCISION AND DRAINAGE ABSCESS / HEMATOMA OF BURSA / KNEE / THIGH      outer right leg and center of upper back   complicated  resolved status    [place] COLONOSCOPY FLX DX W/COLLJ SPEC WHEN PFRMD N/A 8/31/2017    Procedure: COLONOSCOPY;  Surgeon: Duglas Castro MD;  [facility]; Service: Gastroenterology    [place] COLONOSCOPY FLX DX W/COLLJ [facility]IL[facility] WHEN PFRMD N/A 3/23/2018    Procedure: COLONOSCOPY;  Surgeon: Duglas Castro MD;  [facility];   Service: Gastroenterology    TUMOR REMOVAL         Family history:  Family History   Problem Relation Age of Onset    Throat cancer Mother     Heart attack Father         at [de-identified]    Stomach cancer Sister     Lung cancer Brother     Stomach cancer Brother        Current medications:    Current Facility-Administered Medications:     atorvastatin (LIPITOR) tablet 40 mg, 40 mg, Oral, Daily With Dinner, Steve Godwin PA-C, 40 mg at 09/20/18 6979    DULoxetine (CYMBALTA) delayed release capsule 60 mg, 60 mg, Oral, Daily, MARY ANN WesleyC    FLUoxetine (PROzac) capsule 20 mg, 20 mg, Oral, Daily, CIERRA Chavez-C    ibuprofen (MOTRIN) tablet 400 mg, 400 mg, Oral, Q6H PRN, Oral Nick PA-C    LORazepam (ATIVAN) tablet 1 mg, 1 mg, Oral, TID, Oral Nick, PA-C, 1 mg at 09/20/18 2120    melatonin tablet 6 mg, 6 mg, Oral, HS PRN, Oral Nick PA-C    nicotine polacrilex (NICORETTE) gum 2 mg, 2 mg, Oral, Q4H PRN, CIERRA Chavez-C    OLANZapine (ZyPREXA) IM injection 5 mg, 5 mg, Intramuscular, Q4H PRN, Oral Nick, PA-C    OLANZapine (ZyPREXA) tablet 5 mg, 5 mg, Oral, Q4H PRN, Oral Nick PA-C    pramipexole (MIRAPEX) tablet 0 25 mg, 0 25 mg, Oral, HS, Garrick Sergio, DO, 0 25 mg at 09/20/18 2120    QUEtiapine (SEROquel) tablet 100 mg, 100 mg, Oral, HS, Ethtaia Sandoval, PA-C, 100 mg at 09/20/18 2120    zolpidem (AMBIEN) tablet 10 mg, 10 mg, Oral, HS, Ethelda Sandoval, PA-C, 10 mg at 09/20/18 2120      Allergies:  No Known Allergies    Social History:  Social History     Social History    Marital status: Single     Spouse name: N/A    Number of children: 3    Years of education: N/A     Occupational History          disabled     Social History Main Topics    Smoking status: Former Smoker    Smokeless tobacco: Never Used      Comment: all scripts says former smoker quit at [employer] Alcohol use Yes      Comment: pt "sometimes; not like before"    Drug use: Yes     Types: Marijuana    Sexual activity: Yes     Partners: Female     Birth control/ protection: None     Other Topics Concern    Not on file     Social History Narrative    Disabled    GED         Physical Examination:     Vital Signs:  Vitals:    09/20/18 1623 09/20/18 1640 09/21/18 0727   BP: 126/78  103/64   BP Location: Right arm  Left arm   Pulse: (!) 116 (!) 110 67   Resp: 20  20   Temp: (!) 96 3 °F (35 7 °C)  (!) 97 4 °F (36 3 °C)   TempSrc: Temporal  Temporal   SpO2: 96%  95%   Weight: 79 kg (174 lb 2 6 oz)     Height: 5' 10" (1 778 m)           Appearance:  age appropriate and casually dressed   Behavior:  normal   Speech:  normal volume   Mood:  anxious and depressed   Affect:  constricted   Thought Process:  circumstantial and illogical   Thought Content:  normal   Perceptual Disturbances: None   Risk Potential: Suicidal Ideations with plan to hang himself   Sensorium:  person, place, situation and time   Cognition:  intact   Consciousness:  alert and awake    Attention: attention span and concentration were age appropriate   Intellect: average   Insight:  poor   Judgment: poor      Motor Activity: no abnormal movements           Diagnostic Studies:     Recent Labs:  Results Reviewed     None          I/O Past 24 hours:  No intake/output data recorded  No intake/output data recorded  Impression / Plan:     Se[facility] of recurrent major depressive disorder[facility]    Recommended Treatment:      Medications  1) plan to increase Cymbalta from 30 mg to 60 mg daily  Non-pharmacological treatments  1) Continue with group therapy, milieu therapy and occupational therapy  2) Medical will be consulted to help manage comorbid conditions    Safety  1) Safety/communication plan established targeting dynamic risk factors above  Counseling / Coordination of Care    Total floor / unit time spent today 50 minutes  Greater than 50% of total time was spent with the patient and / or family counseling and / or coordination of care  A description of the counseling / coordination of care  Patient's Rights, confidentiality and exceptions to confidentiality, use of automated medical record, De[facility] Elmore staff access to medical record, and consent to treatment reviewed        Carla Ch PA-C

## 2018-09-21 NOTE — OCCUPATIONAL THERAPY NOTE
Occupational Therapy  Crow Marquez was approached for OT evaluation  He was resting in his bed  He declined interview at this time, he did agree to being approached later at another time  I will again attempt to interview him at a later time as he is receptive to this    Cameron Guidry, OT

## 2018-09-21 NOTE — NURSING NOTE
Patient continues with poor appetite and refusal of lunch noted  Patient encouraged to eat and educated on importance of nutrition and getting OOB and attending group  Patient states "that's not going to happen"  Patient reports he eats every 4-5 days when not in the hospital   Patient was compliant with medications and was smiling during afternoon assessment  Patient denies suicidal or homicidal thoughts this afternoon  No complaints of pain or discomfort  No other issues noted at this time

## 2018-09-21 NOTE — PLAN OF CARE
Problem: OCCUPATIONAL THERAPY ADULT  Goal: Performs self-care activities at highest level of function for planned discharge setting  See evaluation for individualized goals  Treatment Interventions: ADL retraining, Continued evaluation, Activityengagement (coping skills instruction, life management instruction)          See flowsheet documentation for full assessment, interventions and recommendations  Outcome: Progressing  Limitation: Mood limitation (coping skills, life management skills)  Prognosis: Good  Assessment: Pt is a 60 y o  male seen for OT evaluation s/p admit to [facility] on 9/20/2018 w/ Severe episode of recurrent major depressive disorder, without psychotic features ([facility] [address] )  Comorbidities affecting pt's functional performance at time of assessment include: HTN and sinus tachycardia, chronic low back pain, borderline DM, restless leg syndrome, anxiety, gall stones, ADHD, HLD, spinal arachnoid cyst  Personal factors affecting pt at time of IE include:limited home support, behavioral pattern, limited insight into deficits, health management , environment and decreased coping skills, decreased life management skills  Prior to admission, pt was living in a rented garage  Upon evaluation: Pt requires treatment with consideration of the following deficits impacting occupational performance: impaired problem solving, impulsivity, impaired interpersonal skills, decreased coping skills and decreased life management skills  Pt to benefit from continued skilled OT tx while in the hospital to address deficits as defined above and maximize level of functional independence w ADL's and functional mobility  Occupational Performance areas to address include: medication management, socialization, health maintenance, social participation and coping skills instruction, life management instruction, consistent cooperation with treatment   From OT standpoint, recommendation at time of d/c would be to independent living where he has regular access to electricity, water, to meet basic health needs when he is stable

## 2018-09-22 RX ADMIN — QUETIAPINE FUMARATE 100 MG: 100 TABLET ORAL at 22:10

## 2018-09-22 RX ADMIN — LORAZEPAM 1 MG: 1 TABLET ORAL at 22:10

## 2018-09-22 RX ADMIN — FLUOXETINE 20 MG: 20 CAPSULE ORAL at 08:43

## 2018-09-22 RX ADMIN — LORAZEPAM 1 MG: 1 TABLET ORAL at 08:43

## 2018-09-22 RX ADMIN — DULOXETINE HYDROCHLORIDE 60 MG: 60 CAPSULE, DELAYED RELEASE ORAL at 08:43

## 2018-09-22 RX ADMIN — ATORVASTATIN CALCIUM 40 MG: 40 TABLET, FILM COATED ORAL at 15:59

## 2018-09-22 RX ADMIN — LORAZEPAM 1 MG: 1 TABLET ORAL at 15:59

## 2018-09-22 RX ADMIN — PRAMIPEXOLE DIHYDROCHLORIDE 0.25 MG: 0.25 TABLET ORAL at 22:10

## 2018-09-22 RX ADMIN — ZOLPIDEM TARTRATE 10 MG: 10 TABLET, FILM COATED ORAL at 22:10

## 2018-09-22 NOTE — NURSING NOTE
Patient continues to be compliant with medications but isolative to his room  No complaints of pain or discomfort noted  Patient denies suicidal ideation and no attempts at self harm noted  Patient refused dinner despite offering it to him in his room  New order from Dr Ruchi Boland for Ensure Supplements with meals  No other issues noted at this time

## 2018-09-22 NOTE — NURSING NOTE
Patient encouraged this morning to get OOB and come to dayroom for breakfast   Patient refused stating he was not hungry  Patient educated on importance of nutrition and complying with treatment plan and unit routine with no agreement noted  Patient states he only eats every 4-5 days  No complaints of pain or discomfort and patient denies suicidal ideation  Patient agrees he is depressed and does take his medication as ordered  Patient remains on suicide and fall precautions at this time  No other issues noted at this time

## 2018-09-22 NOTE — PROGRESS NOTES
Psychiatry Progress Note    Subjective: Interval History     Patient continues to be quite depressed and withdrawn  Very slow in his verbal responses  Patient continues to be guarded and somewhat evasive during the interview  Patient reports that he tolerated the medication adjustments well without side effect    Feels that his depression anxiety have remained approximately the same      Current medications:    Current Facility-Administered Medications:     atorvastatin (LIPITOR) tablet 40 mg, 40 mg, Oral, Daily With Dinner, Adama Gum, PA-C, 40 mg at 09/21/18 1605    DULoxetine (CYMBALTA) delayed release capsule 60 mg, 60 mg, Oral, Daily, Nurys Shaggy, PA-C, 60 mg at 09/22/18 0843    FLUoxetine (PROzac) capsule 20 mg, 20 mg, Oral, Daily, Adama Gum, PA-C, 20 mg at 09/22/18 0843    ibuprofen (MOTRIN) tablet 400 mg, 400 mg, Oral, Q6H PRN, Adama Gum, PA-C    LORazepam (ATIVAN) tablet 1 mg, 1 mg, Oral, TID, Adama Gum, PA-C, 1 mg at 09/22/18 0843    melatonin tablet 6 mg, 6 mg, Oral, HS PRN, Adama Gum, PA-C    nicotine polacrilex (NICORETTE) gum 2 mg, 2 mg, Oral, Q4H PRN, Adama Gum, PA-C    OLANZapine (ZyPREXA) IM injection 5 mg, 5 mg, Intramuscular, Q4H PRN, Adama Gum, PA-C    OLANZapine (ZyPREXA) tablet 5 mg, 5 mg, Oral, Q4H PRN, Adama Gum, PA-C    pramipexole (MIRAPEX) tablet 0 25 mg, 0 25 mg, Oral, HS, Garrick Sergio, DO, 0 25 mg at 09/21/18 2158    QUEtiapine (SEROquel) tablet 100 mg, 100 mg, Oral, HS, Adama Gum, PA-C, 100 mg at 09/21/18 2158    zolpidem (AMBIEN) tablet 10 mg, 10 mg, Oral, HS, Adama Gum, PA-C, 10 mg at 09/21/18 2158      Objective:     Vital Signs:  Vitals:    09/20/18 1623 09/20/18 1640 09/21/18 0727 09/21/18 1546   BP: 126/78  103/64 106/67   BP Location: Right arm  Left arm Right arm   Pulse: (!) 116 (!) 110 67 66   Resp: 20  20 16   Temp: (!) 96 3 °F (35 7 °C)  (!) 97 4 °F (36 3 °C) (!) 96 8 °F (36 °C)   TempSrc: Temporal  Temporal Temporal   SpO2: 96%  95% 93%   Weight: 79 kg (174 lb 2 6 oz)      Height: 5' 10" (1 778 m)            Appearance:  age appropriate and casually dressed   Behavior:  evasive, guarded and psychomotor retardation   Speech:  normal volume   Mood:  anxious and depressed   Affect:  blunted, constricted and mood-incongruent   Thought Process:  normal   Thought Content:  normal   Perceptual Disturbances: None   Risk Potential: none   Sensorium:  person, place, situation and time   Cognition:  intact   Consciousness:  alert and awake    Attention: attention span and concentration were age appropriate   Intellect: average   Insight:  poor   Judgment: poor      Motor Activity: no abnormal movements           Recent Labs:  Results Reviewed     None          I/O Past 24 hours:  I/O last 3 completed shifts: In: 0   Out: 1 [Stool:1]  No intake/output data recorded  Assessment / Plan:     Severe episode of recurrent major depressive disorder, without psychotic features ([facility] )    Recommended Treatment:      Medication changes:  1) continue current medications    Non-pharmacological treatments  1) Continue with group therapy, milieu therapy and occupational therapy  Safety  1) Safety/communication plan established targeting dynamic risk factors above  2) Risks, benefits, and possible side effects of medications explained to patient and patient verbalizes understanding  Counseling / Coordination of Care    Total floor / unit time spent today 20 minutes  Greater than 50% of total time was spent with the patient and / or family counseling and / or coordination of care  A description of the counseling / coordination of care  Patient's Rights, confidentiality and exceptions to confidentiality, use of automated medical record, [facility] staff access to medical record, and consent to treatment reviewed      Katarina Leiva PA-C

## 2018-09-22 NOTE — PROGRESS NOTES
Patient appears to be sleeping in his bed  When woken, He is easily arousable  He is alert and oriented  He shows signs of harmful behaviors  He remains sp1 for safety and support  He denies any plan at this time  He has flat affect and is depressed  Will continue to monitor for safety and support

## 2018-09-22 NOTE — PROGRESS NOTES
Notified by nursing regarding poor intake over last 6 meals  When approached patient he states that he does not like to eat in large crowds  Would not benefit from appetite stimulant at this point  Nursing to bring food tray into the patient's room offer lunch  Patient is agreeable to ensure strawberry or chocolate not vanilla  Reviewed admission labs with adequate albumin

## 2018-09-22 NOTE — PROGRESS NOTES
Patient appears to be sleeping in his bed  Patient shows no signs of distress or harmful behaviors  Patient remains sp1 for safety  Will continue to monitor for safety and support

## 2018-09-22 NOTE — PROGRESS NOTES
Patient appears to be sleeping in his bed  Patient shows no signs of distress or harmful behaviors  Patient remains sp1 for safety and support  Will continue to monitor for safety and support

## 2018-09-22 NOTE — NURSING NOTE
Patient's appetite improved after speaking with MD about eating in his room  Patient given over bed table and then ate 100% of lunch  No complaints of pain or discomfort  Patient denies suicidal ideation and no attempts at self harm noted  Patient continues to isolate to his room despite staff encouragement to come out of room  No other issues noted at this time

## 2018-09-23 RX ADMIN — DULOXETINE HYDROCHLORIDE 60 MG: 60 CAPSULE, DELAYED RELEASE ORAL at 08:36

## 2018-09-23 RX ADMIN — LORAZEPAM 1 MG: 1 TABLET ORAL at 21:09

## 2018-09-23 RX ADMIN — ATORVASTATIN CALCIUM 40 MG: 40 TABLET, FILM COATED ORAL at 15:32

## 2018-09-23 RX ADMIN — LORAZEPAM 1 MG: 1 TABLET ORAL at 15:32

## 2018-09-23 RX ADMIN — FLUOXETINE 20 MG: 20 CAPSULE ORAL at 08:36

## 2018-09-23 RX ADMIN — ZOLPIDEM TARTRATE 10 MG: 10 TABLET, FILM COATED ORAL at 21:09

## 2018-09-23 RX ADMIN — QUETIAPINE FUMARATE 100 MG: 100 TABLET ORAL at 21:09

## 2018-09-23 RX ADMIN — LORAZEPAM 1 MG: 1 TABLET ORAL at 08:36

## 2018-09-23 RX ADMIN — PRAMIPEXOLE DIHYDROCHLORIDE 0.25 MG: 0.25 TABLET ORAL at 21:09

## 2018-09-23 NOTE — PROGRESS NOTES
Patient appears to be sleeping in bed  Patient shows no signs of harmful behaviors or distress  Patient appears calm and relaxed  Will continue with q15 and sp1

## 2018-09-23 NOTE — PROGRESS NOTES
Patient is isolative to his room  Patient is alert and oriented  He is cooperative with medications  Patient did have a visitor and did seem brighter after visit  Patient denies any si thoughts or intentions  Patient remains sp1 for safety and support

## 2018-09-23 NOTE — PROGRESS NOTES
Psychiatry Progress Note    Subjective: Interval History     Patient continues to be withdrawn isolative and manipulative  Patient recent to 72 hour notice and stated I am going to sleep until i'm discharged  Patient has been refusing meals  He has been withdrawn to his room  He interacts minimally with peers  He continues to minimize his depressive symptoms and his need for being in the hospital   Is currently denying any suicidal ideations however he has been minimizing his symptomatology since he was admitted  Patient's behaviors are not indicative of improvement      Current medications:    Current Facility-Administered Medications:     atorvastatin (LIPITOR) tablet 40 mg, 40 mg, Oral, Daily With Dinner, Shikha Teo, PA-C, 40 mg at 09/22/18 1559    DULoxetine (CYMBALTA) delayed release capsule 60 mg, 60 mg, Oral, Daily, Bria Espino, PA-C, 60 mg at 09/23/18 0836    FLUoxetine (PROzac) capsule 20 mg, 20 mg, Oral, Daily, Shikha Teo, PA-C, 20 mg at 09/23/18 0836    ibuprofen (MOTRIN) tablet 400 mg, 400 mg, Oral, Q6H PRN, Shikha Teo, PA-C    LORazepam (ATIVAN) tablet 1 mg, 1 mg, Oral, TID, Shikha Teo, PA-C, 1 mg at 09/23/18 0836    melatonin tablet 6 mg, 6 mg, Oral, HS PRN, Shikha Teo, PA-C    nicotine polacrilex (NICORETTE) gum 2 mg, 2 mg, Oral, Q4H PRN, Shikha Teo, PA-C    OLANZapine (ZyPREXA) IM injection 5 mg, 5 mg, Intramuscular, Q4H PRN, Shikha Teo, PA-C    OLANZapine (ZyPREXA) tablet 5 mg, 5 mg, Oral, Q4H PRN, Shikha Teo, PA-C    pramipexole (MIRAPEX) tablet 0 25 mg, 0 25 mg, Oral, HS, Garrick Hill DO, 0 25 mg at 09/22/18 2210    QUEtiapine (SEROquel) tablet 100 mg, 100 mg, Oral, HS, Shikha Teo, PA-C, 100 mg at 09/22/18 2210    zolpidem (AMBIEN) tablet 10 mg, 10 mg, Oral, HS, Shikha Teo, PA-C, 10 mg at 09/22/18 2210      Objective:     Vital Signs:  Vitals:    09/21/18 0533 09/21/18 1546 09/22/18 0720 09/23/18 3278 BP: 103/64 106/67 97/64 121/74   BP Location: Left arm Right arm Left arm Left arm   Pulse: 67 66 69 84   Resp: 20 16 17 17   Temp: (!) 97 4 °F (36 3 °C) (!) 96 8 °F (36 °C) (!) 96 5 °F (35 8 °C) (!) 96 8 °F (36 °C)   TempSrc: Temporal Temporal Temporal Temporal   SpO2: 95% 93% 95% 95%   Weight:       Height:             Appearance:  age appropriate and casually dressed   Behavior:  evasive, guarded and psychomotor retardation   Speech:  normal volume   Mood:  anxious and depressed   Affect:  blunted, constricted and mood-incongruent   Thought Process:  normal   Thought Content:  normal   Perceptual Disturbances: None   Risk Potential: none   Sensorium:  person, place, situation and time   Cognition:  intact   Consciousness:  alert and awake    Attention: attention span and concentration were age appropriate   Intellect: average   Insight:  poor   Judgment: poor      Motor Activity: no abnormal movements           Recent Labs:  Results Reviewed     None          I/O Past 24 hours:  I/O last 3 completed shifts: In: 5 [P O :840]  Out: -   I/O this shift:  In: 120 [P O :120]  Out: -         Assessment / Plan:     Severe episode of recurrent major depressive disorder, without psychotic features ([facility] )    Recommended Treatment:      Medication changes:  1) continue current medications    Non-pharmacological treatments  1) Continue with group therapy, milieu therapy and occupational therapy  Safety  1) Safety/communication plan established targeting dynamic risk factors above  2) Risks, benefits, and possible side effects of medications explained to patient and patient verbalizes understanding  Counseling / Coordination of Care    Total floor / unit time spent today 20 minutes  Greater than 50% of total time was spent with the patient and / or family counseling and / or coordination of care  A description of the counseling / coordination of care       Patient's Rights, confidentiality and exceptions to confidentiality, use of automated medical record, [facility] staff access to medical record, and consent to treatment reviewed      Moshe Garcias PA-C

## 2018-09-23 NOTE — NURSING NOTE
Patient remains isolative to his room but did come out and get a shower with staff assist   Patient also did eat 100% of lunch after refusing breakfast   No complaints of pain or discomfort  Patient denies suicidal ideation and no attempts at self harm noted  No other issues noted at this time

## 2018-09-23 NOTE — NURSING NOTE
Patient continues to isolate in his room and refuse meals  Appetite is poor and patient refused breakfast   CIERRA Martinez notified of same  No complaints of pain or discomfort and patient denies suicidal ideation and no attempts at self harm noted  Patient remains on suicide and fall precautions at this time  No new issues noted

## 2018-09-23 NOTE — PLAN OF CARE
Alteration in Thoughts and Perception     Treatment Goal: Gain control of psychotic behaviors/thinking, reduce/eliminate presenting symptoms and demonstrate improved reality functioning upon discharge Progressing     Verbalize thoughts and feelings Progressing     Refrain from acting on delusional thinking/internal stimuli Progressing     Agree to be compliant with medication regime, as prescribed and report medication side effects Progressing     Attend and participate in unit activities, including therapeutic, recreational, and educational groups Progressing     Recognize dysfunctional thoughts, communicate reality-based thoughts at the time of discharge Progressing     Complete daily ADLs, including personal hygiene independently, as able Progressing        Depression     Treatment Goal: Demonstrate behavioral control of depressive symptoms, verbalize feelings of improved mood/affect, and adopt new coping skills prior to discharge Progressing     Verbalize thoughts and feelings Progressing     Refrain from harming self Progressing     Refrain from [address] from self-neglect Progressing     Attend and participate in unit activities, including therapeutic, recreational, and educational groups Progressing     Complete daily ADLs, including personal hygiene independently, as able Progressing        Ineffective Coping     Cooperates with admission process Progressing     Identifies ineffective coping skills Progressing     Identifies healthy coping skills Progressing     Demonstrates healthy coping skills Progressing     Participates in unit activities Progressing     Patient/Family participate in treatment and DC plans Progressing     Patient/Family verbalizes awareness of resources Progressing     Understands least restrictive measures Progressing     Free from restraint events Progressing        Nutrition/Hydration-ADULT     Nutrient/Hydration intake appropriate for improving, restoring or maintaining nutritional needs Progressing        Nutrition/Hydration-ADULT     Nutrient/Hydration intake appropriate for improving, restoring or maintaining nutritional needs Progressing        Prexisting or High Potential for Compromised Skin Integrity     Skin integrity is maintained or improved Progressing        Risk for Self Injury/Neglect     Treatment Goal: Remain safe during length of stay, learn and adopt new coping skills, and be free of self-injurious ideation, impulses and acts at the time of discharge Progressing     Verbalize thoughts and feelings Progressing     Refrain from harming self Progressing     Attend and participate in unit activities, including therapeutic, recreational, and educational groups Progressing     Recognize maladaptive responses and adopt new coping mechanisms Progressing     Complete daily ADLs, including personal hygiene independently, as able Progressing

## 2018-09-23 NOTE — PROGRESS NOTES
Patient appears to be sleeping  No signs of distress noted  l patient remains sp1 for safety  Will continue to monitor

## 2018-09-23 NOTE — NURSING NOTE
Patient continues to isolate to his room and will not come out for meals  Patient given lunch in his room and ate 100% of same but then refused dinner  No complaints of pain or discomfort and patient denies suicidal ideation but admits to being depressed  Patient continue to be complaint with all medications including his anti-depressants  No other issues noted at this time

## 2018-09-24 PROBLEM — D72.829 LEUKOCYTOSIS: Status: ACTIVE | Noted: 2018-09-24

## 2018-09-24 PROCEDURE — 99232 SBSQ HOSP IP/OBS MODERATE 35: CPT | Performed by: NURSE PRACTITIONER

## 2018-09-24 RX ADMIN — DULOXETINE HYDROCHLORIDE 60 MG: 60 CAPSULE, DELAYED RELEASE ORAL at 08:53

## 2018-09-24 RX ADMIN — LORAZEPAM 1 MG: 1 TABLET ORAL at 17:27

## 2018-09-24 RX ADMIN — QUETIAPINE FUMARATE 100 MG: 100 TABLET ORAL at 21:40

## 2018-09-24 RX ADMIN — LORAZEPAM 1 MG: 1 TABLET ORAL at 08:53

## 2018-09-24 RX ADMIN — PRAMIPEXOLE DIHYDROCHLORIDE 0.25 MG: 0.25 TABLET ORAL at 21:40

## 2018-09-24 RX ADMIN — ZOLPIDEM TARTRATE 10 MG: 10 TABLET, FILM COATED ORAL at 21:40

## 2018-09-24 RX ADMIN — FLUOXETINE 20 MG: 20 CAPSULE ORAL at 08:53

## 2018-09-24 RX ADMIN — LORAZEPAM 1 MG: 1 TABLET ORAL at 21:40

## 2018-09-24 RX ADMIN — ATORVASTATIN CALCIUM 40 MG: 40 TABLET, FILM COATED ORAL at 17:27

## 2018-09-24 NOTE — NURSING NOTE
Patient is currently in bed with eyes closed, respirations even and non-labored  No s/s of self-harm or distress noted  SP1 precautions maintained

## 2018-09-24 NOTE — PROGRESS NOTES
Progress Note - Junior Núñez Sr  1955, 60 y o  male MRN: [id number]    Unit/Bed#: [facility]86 Encounter: [id number]    Primary Care Provider: Josue Benton MD   Date and time admitted to hospital: 9/20/2018  4:09 PM        Bipolar depression [facility])   Assessment & Plan    Suicidal thoughts  Management per psychiatry  * Severe episode of recurrent major depressive disorder, without psychotic features [facility])   Assessment & Plan    Management per psych  Currently denies SI and states he feels better  Wants to be discharged  Leukocytosis   Assessment & Plan    No obvious source of infection  No fever, chills  Will repeat cbc in AM         Sinus tachycardia   Assessment & Plan    Likely secondary to underlying anxiety  Resolved  TSH WNL  EKG NSR  Borderline diabetes mellitus   Assessment & Plan    Will check A1c in AM  May benefit from metformin  Restless leg syndrome   Assessment & Plan    Will continue mirapex q h s  HLD (hyperlipidemia)   Assessment & Plan    Will continue statin  HTN (hypertension)   Assessment & Plan    Currently controlled without medications  VTE Pharmacologic Prophylaxis:   Pharmacologic: Enoxaparin (Lovenox)  Mechanical VTE Prophylaxis in Place: No    Patient Centered Rounds: I have performed bedside rounds with nursing staff today  Discussions with Specialists or Other Care Team Provider:     Education and Discussions with Family / Patient: Plan of care discussed with pt  Time Spent for Care: 20 minutes  More than 50% of total time spent on counseling and coordination of care as described above      Current Length of Stay: 4 day(s)    Current Patient Status: Inpatient Psych   Certification Statement: The patient will continue to require additional inpatient hospital stay due to psychiatric illness    Discharge Plan: Per primary team when stable    Code Status: Level 1 - Full Code      Subjective:   States he wants to be discharged  No CP, SOB, N/V/D, constipation, dysuria, dizziness, light-headedness  Denies SI  Wants to go home  Objective:     Vitals:   Temp (24hrs), Av 2 °F (36 8 °C), Min:98 2 °F (36 8 °C), Max:98 2 °F (36 8 °C)    HR:  [81] 81  Resp:  [16] 16  BP: (100)/(70) 100/70  SpO2:  [94 %] 94 %  Body mass index is 24 99 kg/m²  Input and Output Summary (last 24 hours): Intake/Output Summary (Last 24 hours) at 18 1536  Last data filed at 18 1200   Gross per 24 hour   Intake              480 ml   Output                0 ml   Net              480 ml       Physical Exam:     Physical Exam   Constitutional: He is oriented to person, place, and time  He appears well-developed and well-nourished  No distress  HENT:   Head: Normocephalic and atraumatic  Eyes: Right eye exhibits no discharge  Left eye exhibits no discharge  Neck: No JVD present  Cardiovascular: Normal rate, regular rhythm and intact distal pulses  Exam reveals no gallop and no friction rub  No murmur heard  Pulmonary/Chest: Effort normal and breath sounds normal  No stridor  No respiratory distress  He has no wheezes  He has no rales  He exhibits no tenderness  Abdominal: Soft  Bowel sounds are normal  He exhibits no distension  There is no tenderness  There is no rebound and no guarding  Musculoskeletal: He exhibits no edema  Neurological: He is alert and oriented to person, place, and time  Skin: Skin is warm and dry  He is not diaphoretic  Psychiatric: He has a normal mood and affect           Additional Data:     Labs:      Results from last 7 days  Lab Units 18  1903   WBC Thousand/uL 13 05*   HEMOGLOBIN g/dL 15 1   HEMATOCRIT % 44 1   PLATELETS Thousands/uL 261   NEUTROS PCT % 69   LYMPHS PCT % 21   MONOS PCT % 6   EOS PCT % 2       Results from last 7 days  Lab Units 18  1903   SODIUM mmol/L 139   POTASSIUM mmol/L 3 8   CHLORIDE mmol/L 102   CO2 mmol/L 27   BUN mg/dL 17   CREATININE mg/dL 1 21 CALCIUM mg/dL 10 0   ALK PHOS U/L 81   ALT U/L 22   AST U/L 15                     * I Have Reviewed All Lab Data Listed Above  * Additional Pertinent Lab Tests Reviewed:  Most recent labs reviewed    Imaging:    Imaging Reports Reviewed Today Include: none  Imaging Personally Reviewed by Myself Includes:  none    Recent Cultures (last 7 days):           Last 24 Hours Medication List:     Current Facility-Administered Medications:  atorvastatin 40 mg Oral Daily With Dinner Steve Godwin PA-C   DULoxetine 60 mg Oral Daily Chio Bradley PA-C   enoxaparin 40 mg Subcutaneous Q24H [address] JULIO C Greer   FLUoxetine 20 mg Oral Daily Steve Godwin PA-C   ibuprofen 400 mg Oral Q6H PRN Steve Godwin PA-C   LORazepam 1 mg Oral TID Steve Godwin PA-C   melatonin 6 mg Oral HS PRN Steve Godwin PA-C   nicotine polacrilex 2 mg Oral Q4H PRN Steve Godwin PA-C   OLANZapine 5 mg Intramuscular Q4H PRN Steve Godwin PA-C   OLANZapine 5 mg Oral Q4H PRN Steve Godiwn PA-C   pramipexole 0 25 mg Oral HS Garrick Hill DO   QUEtiapine 100 mg Oral HS Steve Godwin PA-C   zolpidem 10 mg Oral HS Steve Godwin PA-C        Today, Patient Was Seen By: JULIO C House

## 2018-09-24 NOTE — NURSING NOTE
Patient is awake, alert, and oriented  Patient is compliant with medications  Patient stated, "Some girl refused to give me my dinner tray in my room that's why I did not eat "  Upon approach, patient was irritable and agitated  Redirection and reassurance provided  Patient denies SI/VH/AHs at this time  SP1 precautions maintained for safety and support

## 2018-09-24 NOTE — PLAN OF CARE
Alteration in Thoughts and Perception     Attend and participate in unit activities, including therapeutic, recreational, and educational groups Not Progressing     Complete daily ADLs, including personal hygiene independently, as able Not Progressing        Depression     Refrain from isolation Not Progressing     Complete daily ADLs, including personal hygiene independently, as able Not Progressing        Ineffective Coping     Participates in unit activities Not Progressing        Nutrition/Hydration-ADULT     Nutrient/Hydration intake appropriate for improving, restoring or maintaining nutritional needs Not Progressing        Nutrition/Hydration-ADULT     Nutrient/Hydration intake appropriate for improving, restoring or maintaining nutritional needs Not Progressing        Risk for Self Injury/Neglect     Attend and participate in unit activities, including therapeutic, recreational, and educational groups Not Progressing     Complete daily ADLs, including personal hygiene independently, as able Not Progressing

## 2018-09-24 NOTE — PROGRESS NOTES
Psychiatry Progress Note    Subjective: Interval History     The patient continues to be very isolative to his room  Patient states he does not like to be out in crowds  He states he does not usually get up for breakfast and just eats lunch and dinner  Patient states he feels less depressed and less anxious with the medication changes  He states he is sleeping much better at night  Patient ate 0, 100, and 0% of meals yesterday  He denies any suicidal or homicidal ideation  He denies any hallucinations      Current medications:    Current Facility-Administered Medications:     atorvastatin (LIPITOR) tablet 40 mg, 40 mg, Oral, Daily With Dinner, Tere Grade, PA-C, 40 mg at 09/23/18 1532    DULoxetine (CYMBALTA) delayed release capsule 60 mg, 60 mg, Oral, Daily, Tadeo [place], PA-C, 60 mg at 09/24/18 0853    FLUoxetine (PROzac) capsule 20 mg, 20 mg, Oral, Daily, Tere Grade, PA-C, 20 mg at 09/24/18 0853    ibuprofen (MOTRIN) tablet 400 mg, 400 mg, Oral, Q6H PRN, [place] Grade, PA-C    LORazepam (ATIVAN) tablet 1 mg, 1 mg, Oral, TID, [place] Grade, PA-C, 1 mg at 09/24/18 0853    melatonin tablet 6 mg, 6 mg, Oral, HS PRN, [place] Grade, PA-C    nicotine polacrilex (NICORETTE) gum 2 mg, 2 mg, Oral, Q4H PRN, Tere Grade, PA-C    OLANZapine (ZyPREXA) IM injection 5 mg, 5 mg, Intramuscular, Q4H PRN, Tere Grade, PA-C    OLANZapine (ZyPREXA) tablet 5 mg, 5 mg, Oral, Q4H PRN, Tere Grade, PA-C    pramipexole (MIRAPEX) tablet 0 25 mg, 0 25 mg, Oral, HS, Garrick [place], DO, 0 25 mg at 09/23/18 2109    QUEtiapine (SEROquel) tablet 100 mg, 100 mg, Oral, HS, [place] Grade, PA-C, 100 mg at 09/23/18 2109    zolpidem (AMBIEN) tablet 10 mg, 10 mg, Oral, HS, [place] Grade, PA-C, 10 mg at 09/23/18 2109      Objective:     Vital Signs:  Vitals:    09/22/18 0720 09/23/18 0737 09/23/18 1523 09/24/18 0709   BP: 97/64 121/74 107/63 100/70   BP Location: Left arm Left arm Left arm Left arm   Pulse: 69 84 87 81   Resp: 17 17 18 16   Temp: (!) 96 5 °F (35 8 °C) (!) 96 8 °F (36 °C) (!) 97 °F (36 1 °C) 98 2 °F (36 8 °C)   TempSrc: Temporal Temporal Temporal Temporal   SpO2: 95% 95% 95% 94%   Weight:       Height:             Appearance:  age appropriate and casually dressed   Behavior:  guarded   Speech:  normal volume   Mood:  anxious and depressed   Affect:  constricted   Thought Process:  normal   Thought Content:  normal   Perceptual Disturbances: None   Risk Potential: none   Sensorium:  person, place, situation and time   Cognition:  intact   Consciousness:  alert and awake    Attention: attention span and concentration were age appropriate   Intellect: average   Insight:  fair   Judgment: fair      Motor Activity: no abnormal movements           Recent Labs:  Results Reviewed     None          I/O Past 24 hours:  I/O last 3 completed shifts: In: 1080 [P O :1080]  Out: -   No intake/output data recorded  Assessment / Plan:     Severe episode of recurrent major depressive disorder, without psychotic features [facility]    Recommended Treatment:      Medication changes:  1) Continue current medication regimen  Non-pharmacological treatments  1) Continue with group therapy, milieu therapy and occupational therapy  Safety  1) Safety/communication plan established targeting dynamic risk factors above  2) Risks, benefits, and possible side effects of medications explained to patient and patient verbalizes understanding  Counseling / Coordination of Care    Total floor / unit time spent today 20 minutes  Greater than 50% of total time was spent with the patient and / or family counseling and / or coordination of care  A description of the counseling / coordination of care       Patient's Rights, confidentiality and exceptions to confidentiality, use of automated medical record, [facility] staff access to medical record, and consent to treatment reviewed      Bridgette Reyes PA-C

## 2018-09-24 NOTE — PLAN OF CARE
Alteration in Thoughts and Perception     Attend and participate in unit activities, including therapeutic, recreational, and educational groups Not Progressing     Complete daily ADLs, including personal hygiene independently, as able Not Progressing        Depression     Refrain from isolation Not Progressing     Attend and participate in unit activities, including therapeutic, recreational, and educational groups Not Progressing     Complete daily ADLs, including personal hygiene independently, as able Not Progressing        Ineffective Coping     Participates in unit activities Not Progressing        Nutrition/Hydration-ADULT     Nutrient/Hydration intake appropriate for improving, restoring or maintaining nutritional needs Not Progressing        Risk for Self Injury/Neglect     Attend and participate in unit activities, including therapeutic, recreational, and educational groups Not Progressing     Complete daily ADLs, including personal hygiene independently, as able Not Progressing          Alteration in Thoughts and Perception     Treatment Goal: Gain control of psychotic behaviors/thinking, reduce/eliminate presenting symptoms and demonstrate improved reality functioning upon discharge Progressing     Verbalize thoughts and feelings Progressing     Refrain from acting on delusional thinking/internal stimuli Progressing     Agree to be compliant with medication regime, as prescribed and report medication side effects Progressing     Recognize dysfunctional thoughts, communicate reality-based thoughts at the time of discharge Progressing        Depression     Treatment Goal: Demonstrate behavioral control of depressive symptoms, verbalize feelings of improved mood/affect, and adopt new coping skills prior to discharge Progressing     Verbalize thoughts and feelings Progressing     Refrain from harming self Progressing     Refrain from self-neglect Progressing        Ineffective Coping     Cooperates with admission process Progressing     Identifies ineffective coping skills Progressing     Identifies healthy coping skills Progressing     Demonstrates healthy coping skills Progressing     Patient/Family participate in treatment and DC plans Progressing     Patient/Family verbalizes awareness of resources Progressing     Understands least restrictive measures Progressing     Free from restraint events Progressing        Nutrition/Hydration-ADULT     Nutrient/Hydration intake appropriate for improving, restoring or maintaining nutritional needs Progressing        Prexisting or High Potential for Compromised Skin Integrity     Skin integrity is maintained or improved Progressing        Risk for Self Injury/Neglect     Treatment Goal: Remain safe during length of stay, learn and adopt new coping skills, and be free of self-injurious ideation, impulses and acts at the time of discharge Progressing     Verbalize thoughts and feelings Progressing     Refrain from harming self Progressing     Recognize maladaptive responses and adopt new coping mechanisms Progressing

## 2018-09-24 NOTE — PROGRESS NOTES
Patient meliza  suicidal ideation  Very isolative to self  states '' I don't eat breakfast , I don't like crowds , I usually eats lunch and dinner ''  Reassurance and mental counseling  provided

## 2018-09-24 NOTE — PROGRESS NOTES
Patient ambulating out from room, irritable, states '' if I didnt go tomorrow I will call my  '' patient made aware to talk to dr borges in the am and agreed  Patient refused Lovenox   States  '' I don't get blood clots '',

## 2018-09-24 NOTE — PLAN OF CARE
Problem: Nutrition/Hydration-ADULT  Goal: Nutrient/Hydration intake appropriate for improving, restoring or maintaining nutritional needs  Monitor and assess patient's nutrition/hydration status for malnutrition (ex- brittle hair, bruises, dry skin, pale skin and conjunctiva, muscle wasting, smooth red tongue, and disorientation)  Collaborate with interdisciplinary team and initiate plan and interventions as ordered  Monitor patient's weight and dietary intake as ordered or per policy  Utilize nutrition screening tool and intervene per policy  Determine patient's food preferences and provide high-protein, high-caloric foods as appropriate       INTERVENTIONS:  - Monitor oral intake, urinary output, labs, and treatment plans  - Assess nutrition and hydration status and recommend course of action  - Evaluate amount of meals eaten  - Assist patient with eating if necessary   - Allow adequate time for meals  - Recommend/ encourage appropriate diets, oral nutritional supplements, and vitamin/mineral supplements  - Order, calculate, and assess calorie counts as needed  - Recommend, monitor, and adjust tube feedings and TPN/PPN based on assessed needs  - Assess need for intravenous fluids  - Provide specific nutrition/hydration education as appropriate  - Include patient/family/caregiver in decisions related to nutrition   Outcome: Progressing      Problem: Alteration in Thoughts and Perception  Goal: Treatment Goal: Gain control of psychotic behaviors/thinking, reduce/eliminate presenting symptoms and demonstrate improved reality functioning upon discharge  Outcome: Progressing    Goal: Verbalize thoughts and feelings  Interventions:  - Promote a nonjudgmental and trusting relationship with the patient through active listening and therapeutic communication  - Assess patient's level of functioning, behavior and potential for risk  - Engage patient in 1 on 1 interactions for a minimum of 15 minutes each session  - Encourage patient to express fears, feelings, frustrations, and discuss symptoms    - [place] patient to reality, help patient recognize reality-based thinking   - Administer medications as ordered and assess for potential side effects  - Provide the patient education related to the signs and symptoms of the illness and desired effects of prescribed medications   Outcome: Progressing    Goal: Refrain from acting on delusional thinking/internal stimuli  Interventions:  - Monitor patient closely, per order   - Utilize least restrictive measures   - Set reasonable limits, give positive feedback for acceptable   - Administer medications as ordered and monitor of potential side effects   Outcome: Progressing    Goal: Agree to be compliant with medication regime, as prescribed and report medication side effects  Interventions:  - Offer appropriate PRN medication and supervise ingestion; conduct aims, as needed    Outcome: Progressing    Goal: Attend and participate in unit activities, including therapeutic, recreational, and educational groups  Interventions:  - Provide therapeutic and educational activities daily, encourage attendance and participation, and document same in the medical record    Outcome: Progressing    Goal: Recognize dysfunctional thoughts, communicate reality-based thoughts at the time of discharge  Interventions:  - Provide medication and psycho-education to assist patient in compliance and developing insight into his/her illness    Outcome: Progressing    Goal: Complete daily ADLs, including personal hygiene independently, as able  Interventions:  - Observe, teach, and assist patient with ADLS  - Monitor and promote a balance of rest/activity, with adequate nutrition and elimination    Outcome: Progressing      Problem: Ineffective Coping  Goal: Cooperates with admission process  Interventions:   - Complete admission process   Outcome: Progressing    Goal: Identifies ineffective coping skills  Outcome: Progressing    Goal: Identifies healthy coping skills  Outcome: Progressing    Goal: Demonstrates healthy coping skills  Outcome: Progressing    Goal: Participates in unit activities  Interventions:  - Provide therapeutic environment   - Provide required programming   - Redirect inappropriate behaviors    Outcome: Progressing    Goal: Patient/Family participate in treatment and DC plans  Interventions:  - Provide therapeutic environment   Outcome: Progressing    Goal: Patient/Family verbalizes awareness of resources  Outcome: Progressing    Goal: Understands least restrictive measures  Interventions:  - Utilize least restrictive behavior   Outcome: Progressing    Goal: Free from restraint events  - Utilize least restrictive measures   - Provide behavioral interventions   - Redirect inappropriate behaviors    Outcome: Progressing      Problem: Risk for Self Injury/Neglect  Goal: Treatment Goal: Remain safe during length of stay, learn and adopt new coping skills, and be free of self-injurious ideation, impulses and acts at the time of discharge  Outcome: Progressing    Goal: Verbalize thoughts and feelings  Interventions:  - Assess and re-assess patient's lethality and potential for self-injury  - Engage patient in 1:1 interactions, daily, for a minimum of 15 minutes  - Encourage patient to express feelings, fears, frustrations, hopes  - Establish rapport/trust with patient    Outcome: Progressing    Goal: Refrain from harming self  Interventions:  - Monitor patient closely, per order  - Develop a trusting relationship  - Supervise medication ingestion, monitor effects and side effects    Outcome: Progressing    Goal: Attend and participate in unit activities, including therapeutic, recreational, and educational groups  Interventions:  - Provide therapeutic and educational activities daily, encourage attendance and participation, and document same in the medical record  - Obtain collateral information, encourage visitation and family involvement in care    Outcome: Progressing    Goal: Recognize maladaptive responses and adopt new coping mechanisms  Outcome: Progressing    Goal: Complete daily ADLs, including personal hygiene independently, as able  Interventions:  - Observe, teach, and assist patient with ADLS  - Monitor and promote a balance of rest/activity, with adequate nutrition and elimination   Outcome: Progressing      Problem: Depression  Goal: Treatment Goal: Demonstrate behavioral control of depressive symptoms, verbalize feelings of improved mood/affect, and adopt new coping skills prior to discharge  Outcome: Progressing    Goal: Verbalize thoughts and feelings  Interventions:  - Assess and re-assess patient's level of risk   - Engage patient in 1:1 interactions, daily, for a minimum of 15 minutes   - Encourage patient to express feelings, fears, frustrations, hopes    Outcome: Progressing    Goal: Refrain from harming self  Interventions:  - Monitor patient closely, per order   - Supervise medication ingestion, monitor effects and side effects    Outcome: Progressing    Goal: Refrain from isolation  Interventions:  - Develop a trusting relationship   - Encourage socialization    Outcome: Progressing    Goal: Refrain from self-neglect  Outcome: Progressing    Goal: Attend and participate in unit activities, including therapeutic, recreational, and educational groups  Interventions:  - Provide therapeutic and educational activities daily, encourage attendance and participation, and document same in the medical record    Outcome: Progressing    Goal: Complete daily ADLs, including personal hygiene independently, as able  Interventions:  - Observe, teach, and assist patient with ADLS  -  Monitor and promote a balance of rest/activity, with adequate nutrition and elimination    Outcome: Progressing      Problem: Nutrition/Hydration-ADULT  Goal: Nutrient/Hydration intake appropriate for improving, restoring or maintaining nutritional needs  Monitor and assess patient's nutrition/hydration status for malnutrition (ex- brittle hair, bruises, dry skin, pale skin and conjunctiva, muscle wasting, smooth red tongue, and disorientation)  Collaborate with interdisciplinary team and initiate plan and interventions as ordered  Monitor patient's weight and dietary intake as ordered or per policy  Utilize nutrition screening tool and intervene per policy  Determine patient's food preferences and provide high-protein, high-caloric foods as appropriate       INTERVENTIONS:  - Monitor oral intake, urinary output, labs, and treatment plans  - Assess nutrition and hydration status and recommend course of action  - Evaluate amount of meals eaten  - Assist patient with eating if necessary   - Allow adequate time for meals  - Recommend/ encourage appropriate diets, oral nutritional supplements, and vitamin/mineral supplements  - Order, calculate, and assess calorie counts as needed  - Recommend, monitor, and adjust tube feedings and TPN/PPN based on assessed needs  - Assess need for intravenous fluids  - Provide specific nutrition/hydration education as appropriate  - Include patient/family/caregiver in decisions related to nutrition   Outcome: Progressing      Problem: Prexisting or High Potential for Compromised Skin Integrity  Goal: Skin integrity is maintained or improved  INTERVENTIONS:  - Identify patients at risk for skin breakdown  - Assess and monitor skin integrity  - Assess and monitor nutrition and hydration status  - Monitor labs (i e  albumin)  - Assess for incontinence   - Turn and reposition patient  - Assist with mobility/ambulation  - Relieve pressure over bony prominences  - Avoid friction and shearing  - Provide appropriate hygiene as needed including keeping skin clean and dry  - Evaluate need for skin moisturizer/barrier cream  - Collaborate with interdisciplinary team (i e  Nutrition, Rehabilitation, etc )   - Patient/family teaching   Outcome: Progressing

## 2018-09-24 NOTE — PLAN OF CARE
Problem: Nutrition/Hydration-ADULT  Goal: Nutrient/Hydration intake appropriate for improving, restoring or maintaining nutritional needs  Monitor and assess patient's nutrition/hydration status for malnutrition (ex- brittle hair, bruises, dry skin, pale skin and conjunctiva, muscle wasting, smooth red tongue, and disorientation)  Collaborate with interdisciplinary team and initiate plan and interventions as ordered  Monitor patient's weight and dietary intake as ordered or per policy  Utilize nutrition screening tool and intervene per policy  Determine patient's food preferences and provide high-protein, high-caloric foods as appropriate       INTERVENTIONS:  - Monitor oral intake, urinary output, labs, and treatment plans  - Assess nutrition and hydration status and recommend course of action  - Evaluate amount of meals eaten  - Assist patient with eating if necessary   - Allow adequate time for meals  - Recommend/ encourage appropriate diets, oral nutritional supplements, and vitamin/mineral supplements  - Order, calculate, and assess calorie counts as needed  - Recommend, monitor, and adjust tube feedings and TPN/PPN based on assessed needs  - Assess need for intravenous fluids  - Provide specific nutrition/hydration education as appropriate  - Include patient/family/caregiver in decisions related to nutrition   Outcome: Progressing      Problem: Alteration in Thoughts and Perception  Goal: Treatment Goal: Gain control of psychotic behaviors/thinking, reduce/eliminate presenting symptoms and demonstrate improved reality functioning upon discharge  Outcome: Progressing    Goal: Verbalize thoughts and feelings  Interventions:  - Promote a nonjudgmental and trusting relationship with the patient through active listening and therapeutic communication  - Assess patient's level of functioning, behavior and potential for risk  - Engage patient in 1 on 1 interactions for a minimum of 15 minutes each session  - Encourage patient to express fears, feelings, frustrations, and discuss symptoms    - [place] patient to reality, help patient recognize reality-based thinking   - Administer medications as ordered and assess for potential side effects  - Provide the patient education related to the signs and symptoms of the illness and desired effects of prescribed medications   Outcome: Progressing    Goal: Refrain from acting on delusional thinking/internal stimuli  Interventions:  - Monitor patient closely, per order   - Utilize least restrictive measures   - Set reasonable limits, give positive feedback for acceptable   - Administer medications as ordered and monitor of potential side effects   Outcome: Progressing    Goal: Agree to be compliant with medication regime, as prescribed and report medication side effects  Interventions:  - Offer appropriate PRN medication and supervise ingestion; conduct aims, as needed    Outcome: Progressing    Goal: Attend and participate in unit activities, including therapeutic, recreational, and educational groups  Interventions:  - Provide therapeutic and educational activities daily, encourage attendance and participation, and document same in the medical record    Outcome: Progressing    Goal: Recognize dysfunctional thoughts, communicate reality-based thoughts at the time of discharge  Interventions:  - Provide medication and psycho-education to assist patient in compliance and developing insight into his/her illness    Outcome: Progressing    Goal: Complete daily ADLs, including personal hygiene independently, as able  Interventions:  - Observe, teach, and assist patient with ADLS  - Monitor and promote a balance of rest/activity, with adequate nutrition and elimination    Outcome: Progressing      Problem: Ineffective Coping  Goal: Cooperates with admission process  Interventions:   - Complete admission process   Outcome: Progressing    Goal: Identifies ineffective coping skills  Outcome: Progressing    Goal: Identifies healthy coping skills  Outcome: Progressing    Goal: Demonstrates healthy coping skills  Outcome: Progressing    Goal: Participates in unit activities  Interventions:  - Provide therapeutic environment   - Provide required programming   - Redirect inappropriate behaviors    Outcome: Progressing    Goal: Patient/Family participate in treatment and DC plans  Interventions:  - Provide therapeutic environment   Outcome: Progressing    Goal: Patient/Family verbalizes awareness of resources  Outcome: Progressing    Goal: Understands least restrictive measures  Interventions:  - Utilize least restrictive behavior   Outcome: Progressing    Goal: Free from restraint events  - Utilize least restrictive measures   - Provide behavioral interventions   - Redirect inappropriate behaviors    Outcome: Progressing      Problem: Risk for Self Injury/Neglect  Goal: Treatment Goal: Remain safe during length of stay, learn and adopt new coping skills, and be free of self-injurious ideation, impulses and acts at the time of discharge  Outcome: Progressing    Goal: Verbalize thoughts and feelings  Interventions:  - Assess and re-assess patient's lethality and potential for self-injury  - Engage patient in 1:1 interactions, daily, for a minimum of 15 minutes  - Encourage patient to express feelings, fears, frustrations, hopes  - Establish rapport/trust with patient    Outcome: Progressing    Goal: Refrain from harming self  Interventions:  - Monitor patient closely, per order  - Develop a trusting relationship  - Supervise medication ingestion, monitor effects and side effects    Outcome: Progressing    Goal: Attend and participate in unit activities, including therapeutic, recreational, and educational groups  Interventions:  - Provide therapeutic and educational activities daily, encourage attendance and participation, and document same in the medical record  - Obtain collateral information, encourage visitation and family involvement in care    Outcome: Progressing    Goal: Recognize maladaptive responses and adopt new coping mechanisms  Outcome: Progressing    Goal: Complete daily ADLs, including personal hygiene independently, as able  Interventions:  - Observe, teach, and assist patient with ADLS  - Monitor and promote a balance of rest/activity, with adequate nutrition and elimination   Outcome: Progressing      Problem: Depression  Goal: Treatment Goal: Demonstrate behavioral control of depressive symptoms, verbalize feelings of improved mood/affect, and adopt new coping skills prior to discharge  Outcome: Progressing    Goal: Verbalize thoughts and feelings  Interventions:  - Assess and re-assess patient's level of risk   - Engage patient in 1:1 interactions, daily, for a minimum of 15 minutes   - Encourage patient to express feelings, fears, frustrations, hopes    Outcome: Progressing    Goal: Refrain from harming self  Interventions:  - Monitor patient closely, per order   - Supervise medication ingestion, monitor effects and side effects    Outcome: Progressing    Goal: Refrain from isolation  Interventions:  - Develop a trusting relationship   - Encourage socialization    Outcome: Progressing    Goal: Refrain from self-neglect  Outcome: Progressing    Goal: Attend and participate in unit activities, including therapeutic, recreational, and educational groups  Interventions:  - Provide therapeutic and educational activities daily, encourage attendance and participation, and document same in the medical record    Outcome: Progressing    Goal: Complete daily ADLs, including personal hygiene independently, as able  Interventions:  - Observe, teach, and assist patient with ADLS  -  Monitor and promote a balance of rest/activity, with adequate nutrition and elimination    Outcome: Progressing      Problem: Nutrition/Hydration-ADULT  Goal: Nutrient/Hydration intake appropriate for improving, restoring or maintaining nutritional needs  Monitor and assess patient's nutrition/hydration status for malnutrition (ex- brittle hair, bruises, dry skin, pale skin and conjunctiva, muscle wasting, smooth red tongue, and disorientation)  Collaborate with interdisciplinary team and initiate plan and interventions as ordered  Monitor patient's weight and dietary intake as ordered or per policy  Utilize nutrition screening tool and intervene per policy  Determine patient's food preferences and provide high-protein, high-caloric foods as appropriate       INTERVENTIONS:  - Monitor oral intake, urinary output, labs, and treatment plans  - Assess nutrition and hydration status and recommend course of action  - Evaluate amount of meals eaten  - Assist patient with eating if necessary   - Allow adequate time for meals  - Recommend/ encourage appropriate diets, oral nutritional supplements, and vitamin/mineral supplements  - Order, calculate, and assess calorie counts as needed  - Recommend, monitor, and adjust tube feedings and TPN/PPN based on assessed needs  - Assess need for intravenous fluids  - Provide specific nutrition/hydration education as appropriate  - Include patient/family/caregiver in decisions related to nutrition   Outcome: Progressing      Problem: Prexisting or High Potential for Compromised Skin Integrity  Goal: Skin integrity is maintained or improved  INTERVENTIONS:  - Identify patients at risk for skin breakdown  - Assess and monitor skin integrity  - Assess and monitor nutrition and hydration status  - Monitor labs (i e  albumin)  - Assess for incontinence   - Turn and reposition patient  - Assist with mobility/ambulation  - Relieve pressure over bony prominences  - Avoid friction and shearing  - Provide appropriate hygiene as needed including keeping skin clean and dry  - Evaluate need for skin moisturizer/barrier cream  - Collaborate with interdisciplinary team (i e  Nutrition, Rehabilitation, etc )   - Patient/family teaching   Outcome: Progressing

## 2018-09-25 VITALS
BODY MASS INDEX: 24.65 KG/M2 | OXYGEN SATURATION: 94 % | WEIGHT: 172.18 LBS | HEART RATE: 84 BPM | DIASTOLIC BLOOD PRESSURE: 73 MMHG | HEIGHT: 70 IN | SYSTOLIC BLOOD PRESSURE: 106 MMHG | TEMPERATURE: 96.5 F | RESPIRATION RATE: 18 BRPM

## 2018-09-25 LAB
BASOPHILS # BLD AUTO: 0.1 THOUSANDS/ΜL (ref 0–0.1)
BASOPHILS NFR BLD AUTO: 1 % (ref 0–1)
EOSINOPHIL # BLD AUTO: 0.5 THOUSAND/ΜL (ref 0–0.4)
EOSINOPHIL NFR BLD AUTO: 6 % (ref 0–6)
ERYTHROCYTE [DISTWIDTH] IN BLOOD BY AUTOMATED COUNT: 13.5 %
EST. AVERAGE GLUCOSE BLD GHB EST-MCNC: 114 MG/DL
HBA1C MFR BLD: 5.6 % (ref 4.2–6.3)
HCT VFR BLD AUTO: 43.8 % (ref 41–53)
HGB BLD-MCNC: 14.9 G/DL (ref 13.5–17.5)
LYMPHOCYTES # BLD AUTO: 2.6 THOUSANDS/ΜL (ref 0.5–4)
LYMPHOCYTES NFR BLD AUTO: 31 % (ref 20–50)
MCH RBC QN AUTO: 31.6 PG (ref 26–34)
MCHC RBC AUTO-ENTMCNC: 34 G/DL (ref 31–36)
MCV RBC AUTO: 93 FL (ref 80–100)
MONOCYTES # BLD AUTO: 0.6 THOUSAND/ΜL (ref 0.2–0.9)
MONOCYTES NFR BLD AUTO: 7 % (ref 1–10)
NEUTROPHILS # BLD AUTO: 4.6 THOUSANDS/ΜL (ref 1.8–7.8)
NEUTS SEG NFR BLD AUTO: 55 % (ref 45–65)
PLATELET # BLD AUTO: 195 THOUSANDS/UL (ref 150–450)
PMV BLD AUTO: 10 FL (ref 8.9–12.7)
RBC # BLD AUTO: 4.72 MILLION/UL (ref 4.5–5.9)
WBC # BLD AUTO: 8.5 THOUSAND/UL (ref 4.5–11)

## 2018-09-25 PROCEDURE — 85025 COMPLETE CBC W/AUTO DIFF WBC: CPT | Performed by: NURSE PRACTITIONER

## 2018-09-25 PROCEDURE — 83036 HEMOGLOBIN GLYCOSYLATED A1C: CPT | Performed by: NURSE PRACTITIONER

## 2018-09-25 RX ORDER — FLUOXETINE HYDROCHLORIDE 20 MG/1
20 CAPSULE ORAL
Qty: 30 CAPSULE | Refills: 0 | Status: SHIPPED | OUTPATIENT
Start: 2018-09-25 | End: 2019-04-29 | Stop reason: ALTCHOICE

## 2018-09-25 RX ORDER — LORAZEPAM 1 MG/1
1 TABLET ORAL 3 TIMES DAILY
Qty: 30 TABLET | Refills: 0 | Status: SHIPPED | OUTPATIENT
Start: 2018-09-25 | End: 2019-04-29 | Stop reason: ALTCHOICE

## 2018-09-25 RX ORDER — PRAMIPEXOLE DIHYDROCHLORIDE 0.25 MG/1
0.25 TABLET ORAL
Qty: 30 TABLET | Refills: 0 | Status: SHIPPED | OUTPATIENT
Start: 2018-09-25 | End: 2019-04-29 | Stop reason: ALTCHOICE

## 2018-09-25 RX ORDER — DULOXETIN HYDROCHLORIDE 60 MG/1
60 CAPSULE, DELAYED RELEASE ORAL DAILY
Qty: 30 CAPSULE | Refills: 0 | Status: SHIPPED | OUTPATIENT
Start: 2018-09-26 | End: 2019-04-29 | Stop reason: ALTCHOICE

## 2018-09-25 RX ORDER — ATORVASTATIN CALCIUM 40 MG/1
40 TABLET, FILM COATED ORAL
Qty: 30 TABLET | Refills: 0 | Status: SHIPPED | OUTPATIENT
Start: 2018-09-25 | End: 2019-04-29 | Stop reason: ALTCHOICE

## 2018-09-25 RX ORDER — ZOLPIDEM TARTRATE 10 MG/1
10 TABLET ORAL
Qty: 10 TABLET | Refills: 0 | Status: SHIPPED | OUTPATIENT
Start: 2018-09-25 | End: 2019-04-29 | Stop reason: ALTCHOICE

## 2018-09-25 RX ORDER — QUETIAPINE FUMARATE 100 MG/1
100 TABLET, FILM COATED ORAL
Qty: 30 TABLET | Refills: 0 | Status: SHIPPED | OUTPATIENT
Start: 2018-09-25 | End: 2019-04-29 | Stop reason: ALTCHOICE

## 2018-09-25 RX ADMIN — LORAZEPAM 1 MG: 1 TABLET ORAL at 08:35

## 2018-09-25 RX ADMIN — FLUOXETINE 20 MG: 20 CAPSULE ORAL at 08:35

## 2018-09-25 RX ADMIN — DULOXETINE HYDROCHLORIDE 60 MG: 60 CAPSULE, DELAYED RELEASE ORAL at 08:35

## 2018-09-25 NOTE — PLAN OF CARE
Alteration in Thoughts and Perception     Attend and participate in unit activities, including therapeutic, recreational, and educational groups Not Progressing        Depression     Attend and participate in unit activities, including therapeutic, recreational, and educational groups Not Progressing        Ineffective Coping     Participates in unit activities Not Progressing        Risk for Self Injury/Neglect     Attend and participate in unit activities, including therapeutic, recreational, and educational groups Not Progressing          Alteration in Thoughts and Perception     Treatment Goal: Gain control of psychotic behaviors/thinking, reduce/eliminate presenting symptoms and demonstrate improved reality functioning upon discharge Progressing     Verbalize thoughts and feelings Progressing     Refrain from acting on delusional thinking/internal stimuli Progressing     Agree to be compliant with medication regime, as prescribed and report medication side effects Progressing     Recognize dysfunctional thoughts, communicate reality-based thoughts at the time of discharge Progressing     Complete daily ADLs, including personal hygiene independently, as able Progressing        Depression     Treatment Goal: Demonstrate behavioral control of depressive symptoms, verbalize feelings of improved mood/affect, and adopt new coping skills prior to discharge Progressing     Verbalize thoughts and feelings Progressing     Refrain from harming self Progressing     Refrain from isolation Progressing     Refrain from self-neglect Progressing     Complete daily ADLs, including personal hygiene independently, as able Progressing        Ineffective Coping     Cooperates with admission process Progressing     Identifies ineffective coping skills Progressing     Identifies healthy coping skills Progressing     Demonstrates healthy coping skills Progressing     Patient/Family participate in treatment and DC plans Progressing     Patient/Family verbalizes awareness of resources Progressing     Understands least restrictive measures Progressing     Free from restraint events Progressing        Nutrition/Hydration-ADULT     Nutrient/Hydration intake appropriate for improving, restoring or maintaining nutritional needs Progressing        Nutrition/Hydration-ADULT     Nutrient/Hydration intake appropriate for improving, restoring or maintaining nutritional needs Progressing        Prexisting or High Potential for Compromised Skin Integrity     Skin integrity is maintained or improved Progressing        Risk for Self Injury/Neglect     Treatment Goal: Remain safe during length of stay, learn and adopt new coping skills, and be free of self-injurious ideation, impulses and acts at the time of discharge Progressing     Verbalize thoughts and feelings Progressing     Refrain from harming self Progressing     Recognize maladaptive responses and adopt new coping mechanisms Progressing     Complete daily ADLs, including personal hygiene independently, as able Progressing

## 2018-09-25 NOTE — PROGRESS NOTES
Met with pt; he says he is much less depressed and free of SI's; says he is ready for DC and will follow up with [facility] clinic

## 2018-09-25 NOTE — PROGRESS NOTES
Patient pleasant, cooperative , wants to go home today   Talked to Franck NORTON   Med compliant  Refused Breakfast   Med compliant

## 2018-09-25 NOTE — DISCHARGE INSTR - APPOINTMENTS
Follow-up with Aden @ [facility]  Wednesday October 3, 2018 @ 1pm    Make follow-up with Psychiatrist during appt with Justin Garcia

## 2018-09-25 NOTE — PROGRESS NOTES
Patient is isolative and doesn't like interacting with people  Is pleasant with approach but limits conversation to his needs  Denies thoughts of self harm and suicide anxious and depressed  Medication and meal compliant  Encouraged patient to express his thoughts and needs  Will continue to monitor for safety and support

## 2018-09-25 NOTE — PLAN OF CARE

## 2018-09-25 NOTE — DISCHARGE SUMMARY
Psychiatric Discharge Summary    Medical Record Number: [id number]  Encounter: [id number]    Discharge diagnosis:  Severe episode of recurrent major depressive disorder, without psychotic features ([facility] )    Secondary diagnoses:  Problem List     * (Principal)Severe episode of recurrent major depressive disorder, without psychotic features ([facility] ) (Chronic)    Adult residual type attention deficit hyperactivity disorder (ADHD) (Chronic)    Esophagitis    HTN (hypertension)    HLD (hyperlipidemia) (Chronic)    Prediabetes    Chronic low back pain    Adenomatous polyp of transverse colon    Sessile rectal polyp    Erectile dysfunction    Polyp of colon    Overview Signed 2/7/2018  9:14 AM by Roby Garcia MA     Added automatically from request for surgery [id number]         Bipolar 1 disorder ([facility] )    Tubular adenoma of colon    Dizziness    Restless leg syndrome    Bipolar depression ([facility] )    Overview Signed 9/20/2018  5:05 PM by Nhi Carrillo DO     bipolar disorder         Borderline diabetes mellitus    Sinus tachycardia    Leukocytosis          HPI:     Aminah Matos  is an 60 y o , male, admitted to the psychiatric unit under a 201 status to Dr Charlene Metcalf' service with the chief complaint of   increased depression with suicidal ideation with a plan to hang himself  Patient admits that he told his therapist that he was experiencing suicidal ideations with a plan to hang himself  Patient reports that his depression has been worsening over the past several weeks and he has been increasingly worried about his medical conditions  Patient states that he does have passive suicidal ideations that come and go frequently  He feels that it was mistake to tell his therapist   The patient made of previous suicide attempts several months ago via attempting to hang himself    Despite having worsening depression and a previous suicide attempt the patient believes that he is okay for discharge and has signed a 72 hour notice indicating his intent to leave      During the initial evaluation the patient presents as aloof and irritable  He is showing very poor insight and judgment into his current situation  He has been demanding and irritable with staff  While in the emergency department at [facility] the patient became belligerent with the crisis worker and started yelling  In the emergency department the patient told crisis worker if he was committed involuntarily that [facility] doctor better have a lot of fucking help to get me out of here then"  This morning the patient continues with his irritable edge      Patient reports his multiple medical conditions have led to his worsening depression  Patient currently has been living alone in his garage for approximately a year  His girlfriend left him and he could not afford the place by himself  Patient has been focused on having gallstones as well as having a spinal tumor that was diagnosed in 2016      Patient's current symptoms include increased depression and suicidal ideation  His sleep has been disrupted  He is less interested in activities he used to enjoy  He has been quite irritable and restless at times  Becomes agitated easily  He has a very low threshold for frustration  He is demonstrating very poor insight and poor judgment  He expressed suicidal ideations and continues to have suicidal thoughts       Because of the patient's increased depression and suicidal ideation was necessary to keep him in the hospital for further evaluation and treatment  Brief Hospital Course:     After the patient was admitted to the psychiatric unit  the patient had his Cymbalta increased  He was continued on his other medications including Prozac and Seroquel  Patient rescinded his 72 hour notice and agreed to stay for treatment  Following these medication changes, the patient started to stabilize   A meeting was held with Dr Can Deng and finally on 9/25/2018, the patient was found to be stable for discharge  On the day of discharge the patient reported their symptoms as significantly improved  All psychiatric medications were being tolerated without side effect or adverse event  No suicidal or homicidal ideations were present  The patient expressed their readiness and willingness to be discharged and referral to outpatient treatment was made  The case was discussed with Dr Atanacio Sicard and he has deemed the patient stable for discharge    Patient has follow up at [facility] for therapy and psychiatrist        Condition on discharge:     Improved    Medications Upon Discharge:       atorvastatin 40 mg Oral Daily With Dinner Maribel Lainez PA-C   DULoxetine 60 mg Oral Daily Silvana Marie PA-C          FLUoxetine 20 mg Oral Daily Maribel Lainez PA-C   ibuprofen 400 mg Oral Q6H PRN Maribel Lainez PA-C   LORazepam 1 mg Oral TID Maribel Lainez PA-C                               pramipexole 0 25 mg Oral HS Garrick Hill DO   QUEtiapine 100 mg Oral HS Maribel Lainez PA-C   zolpidem 10 mg Oral HS Maribel Lainez PA-C       [place], [place]

## 2018-09-25 NOTE — SOCIAL WORK
Patient is being discharged, no SI/HI, no psychosis or A/V  Patient is in agreement with discharge  No questions verbalized  Patient provided with after care appointment, discharge instructions and prescriptions  IMM, core measures, transition of care, DC instructions, and treatment plan completed   Patient picked up by friend at 11:45,

## 2019-03-06 ENCOUNTER — APPOINTMENT (OUTPATIENT)
Dept: LAB | Facility: CLINIC | Age: 64
End: 2019-03-06
Payer: MEDICARE

## 2019-03-06 ENCOUNTER — TRANSCRIBE ORDERS (OUTPATIENT)
Dept: LAB | Facility: CLINIC | Age: 64
End: 2019-03-06

## 2019-03-06 DIAGNOSIS — Z01.818 PRE-PROCEDURAL EXAMINATION: ICD-10-CM

## 2019-03-06 LAB
BUN SERPL-MCNC: 15 MG/DL (ref 5–25)
CREAT SERPL-MCNC: 1.09 MG/DL (ref 0.6–1.3)
GFR SERPL CREATININE-BSD FRML MDRD: 71 ML/MIN/1.73SQ M

## 2019-03-06 PROCEDURE — 36415 COLL VENOUS BLD VENIPUNCTURE: CPT

## 2019-03-06 PROCEDURE — 82565 ASSAY OF CREATININE: CPT

## 2019-03-06 PROCEDURE — 84520 ASSAY OF UREA NITROGEN: CPT

## 2019-03-13 ENCOUNTER — HOSPITAL ENCOUNTER (OUTPATIENT)
Dept: MRI IMAGING | Facility: HOSPITAL | Age: 64
Discharge: HOME | End: 2019-03-13
Payer: MEDICARE

## 2019-03-13 DIAGNOSIS — D49.7 INTRADURAL EXTRAMEDULLARY SPINAL TUMOR: ICD-10-CM

## 2019-03-13 PROCEDURE — A9585 GADOBUTROL INJECTION: HCPCS | Performed by: PHYSICIAN ASSISTANT

## 2019-03-13 PROCEDURE — 72158 MRI LUMBAR SPINE W/O & W/DYE: CPT

## 2019-03-13 RX ADMIN — GADOBUTROL 7 ML: 604.72 INJECTION INTRAVENOUS at 14:45

## 2019-03-25 ENCOUNTER — TELEPHONE (OUTPATIENT)
Dept: NEUROSURGERY | Facility: CLINIC | Age: 64
End: 2019-03-25

## 2019-03-25 NOTE — TELEPHONE ENCOUNTER
Called patient, no answer, LMOM  Office needed to move appointment time from 345 to 3 pm (was sched for wrong appt type)  Asked Izzy Binning to call office and confirm  Moved appt to 3 pm slot  Provided main office number

## 2019-04-29 ENCOUNTER — OFFICE VISIT (OUTPATIENT)
Dept: NEUROSURGERY | Facility: CLINIC | Age: 64
End: 2019-04-29
Payer: MEDICARE

## 2019-04-29 VITALS
SYSTOLIC BLOOD PRESSURE: 112 MMHG | HEIGHT: 70 IN | TEMPERATURE: 98.4 F | DIASTOLIC BLOOD PRESSURE: 76 MMHG | RESPIRATION RATE: 16 BRPM | WEIGHT: 194 LBS | HEART RATE: 84 BPM | BODY MASS INDEX: 27.77 KG/M2

## 2019-04-29 DIAGNOSIS — D49.7 INTRADURAL EXTRAMEDULLARY SPINAL TUMOR: Primary | ICD-10-CM

## 2019-04-29 DIAGNOSIS — G95.89 MYELOMALACIA (HCC): ICD-10-CM

## 2019-04-29 DIAGNOSIS — G95.20 SPINAL CORD COMPRESSION (HCC): ICD-10-CM

## 2019-04-29 DIAGNOSIS — L70.0 COMEDO: ICD-10-CM

## 2019-04-29 PROCEDURE — 99214 OFFICE O/P EST MOD 30 MIN: CPT | Performed by: PHYSICIAN ASSISTANT

## 2019-04-29 RX ORDER — FLUOXETINE HYDROCHLORIDE 40 MG/1
40 CAPSULE ORAL DAILY
COMMUNITY
End: 2019-08-27

## 2019-07-29 ENCOUNTER — HOSPITAL ENCOUNTER (EMERGENCY)
Facility: HOSPITAL | Age: 64
Discharge: HOME | End: 2019-07-30
Attending: EMERGENCY MEDICINE | Admitting: EMERGENCY MEDICINE
Payer: MEDICARE

## 2019-07-29 DIAGNOSIS — K57.90 DIVERTICULOSIS: ICD-10-CM

## 2019-07-29 DIAGNOSIS — K80.20 GALLSTONE: ICD-10-CM

## 2019-07-29 DIAGNOSIS — R10.9 ABDOMINAL PAIN: ICD-10-CM

## 2019-07-29 DIAGNOSIS — R10.9 LEFT FLANK PAIN: Primary | ICD-10-CM

## 2019-07-29 LAB
ALBUMIN SERPL BCP-MCNC: 3.6 G/DL (ref 3.5–5)
ALP SERPL-CCNC: 53 U/L (ref 46–116)
ALT SERPL W P-5'-P-CCNC: 22 U/L (ref 12–78)
ANION GAP SERPL CALCULATED.3IONS-SCNC: 8 MMOL/L (ref 4–13)
AST SERPL W P-5'-P-CCNC: 13 U/L (ref 5–45)
BASOPHILS # BLD MANUAL: 0.21 THOUSAND/UL (ref 0–0.1)
BASOPHILS NFR MAR MANUAL: 2 % (ref 0–1)
BILIRUB SERPL-MCNC: 0.2 MG/DL (ref 0.2–1)
BUN SERPL-MCNC: 22 MG/DL (ref 5–25)
CALCIUM SERPL-MCNC: 9.7 MG/DL (ref 8.3–10.1)
CHLORIDE SERPL-SCNC: 109 MMOL/L (ref 100–108)
CK SERPL-CCNC: 69 U/L (ref 39–308)
CO2 SERPL-SCNC: 32 MMOL/L (ref 21–32)
CREAT SERPL-MCNC: 1.18 MG/DL (ref 0.6–1.3)
EOSINOPHIL # BLD MANUAL: 0.62 THOUSAND/UL (ref 0–0.4)
EOSINOPHIL NFR BLD MANUAL: 6 % (ref 0–6)
ERYTHROCYTE [DISTWIDTH] IN BLOOD BY AUTOMATED COUNT: 13.7 % (ref 11.6–15.1)
GFR SERPL CREATININE-BSD FRML MDRD: 65 ML/MIN/1.73SQ M
GLUCOSE SERPL-MCNC: 106 MG/DL (ref 65–140)
HCT VFR BLD AUTO: 43 % (ref 36.5–49.3)
HGB BLD-MCNC: 14.3 G/DL (ref 12–17)
LIPASE SERPL-CCNC: 146 U/L (ref 73–393)
LYMPHOCYTES # BLD AUTO: 4.58 THOUSAND/UL (ref 0.6–4.47)
LYMPHOCYTES # BLD AUTO: 44 % (ref 14–44)
MCH RBC QN AUTO: 31 PG (ref 26.8–34.3)
MCHC RBC AUTO-ENTMCNC: 33.3 G/DL (ref 31.4–37.4)
MCV RBC AUTO: 93 FL (ref 82–98)
MONOCYTES # BLD AUTO: 0.73 THOUSAND/UL (ref 0–1.22)
MONOCYTES NFR BLD: 7 % (ref 4–12)
MYELOCYTES NFR BLD MANUAL: 1 % (ref 0–1)
NEUTROPHILS # BLD MANUAL: 3.96 THOUSAND/UL (ref 1.85–7.62)
NEUTS SEG NFR BLD AUTO: 38 % (ref 43–75)
NRBC BLD AUTO-RTO: 0 /100 WBCS
PLATELET # BLD AUTO: 224 THOUSANDS/UL (ref 149–390)
PLATELET BLD QL SMEAR: ADEQUATE
PMV BLD AUTO: 9.7 FL (ref 8.9–12.7)
POTASSIUM SERPL-SCNC: 4.7 MMOL/L (ref 3.5–5.3)
PROT SERPL-MCNC: 7.4 G/DL (ref 6.4–8.2)
RBC # BLD AUTO: 4.61 MILLION/UL (ref 3.88–5.62)
SODIUM SERPL-SCNC: 149 MMOL/L (ref 136–145)
TOTAL CELLS COUNTED SPEC: 100
VARIANT LYMPHS # BLD AUTO: 2 %
WBC # BLD AUTO: 10.41 THOUSAND/UL (ref 4.31–10.16)

## 2019-07-29 PROCEDURE — 80053 COMPREHEN METABOLIC PANEL: CPT | Performed by: EMERGENCY MEDICINE

## 2019-07-29 PROCEDURE — 93005 ELECTROCARDIOGRAM TRACING: CPT

## 2019-07-29 PROCEDURE — 36415 COLL VENOUS BLD VENIPUNCTURE: CPT | Performed by: EMERGENCY MEDICINE

## 2019-07-29 PROCEDURE — 99284 EMERGENCY DEPT VISIT MOD MDM: CPT | Performed by: EMERGENCY MEDICINE

## 2019-07-29 PROCEDURE — 96361 HYDRATE IV INFUSION ADD-ON: CPT

## 2019-07-29 PROCEDURE — 83690 ASSAY OF LIPASE: CPT | Performed by: EMERGENCY MEDICINE

## 2019-07-29 PROCEDURE — 85027 COMPLETE CBC AUTOMATED: CPT | Performed by: EMERGENCY MEDICINE

## 2019-07-29 PROCEDURE — 96374 THER/PROPH/DIAG INJ IV PUSH: CPT

## 2019-07-29 PROCEDURE — 85007 BL SMEAR W/DIFF WBC COUNT: CPT | Performed by: EMERGENCY MEDICINE

## 2019-07-29 PROCEDURE — 82550 ASSAY OF CK (CPK): CPT | Performed by: EMERGENCY MEDICINE

## 2019-07-29 PROCEDURE — 99284 EMERGENCY DEPT VISIT MOD MDM: CPT

## 2019-07-29 RX ORDER — KETOROLAC TROMETHAMINE 30 MG/ML
15 INJECTION, SOLUTION INTRAMUSCULAR; INTRAVENOUS ONCE
Status: COMPLETED | OUTPATIENT
Start: 2019-07-29 | End: 2019-07-29

## 2019-07-29 RX ADMIN — SODIUM CHLORIDE 1000 ML: 0.9 INJECTION, SOLUTION INTRAVENOUS at 23:07

## 2019-07-29 RX ADMIN — KETOROLAC TROMETHAMINE 15 MG: 30 INJECTION, SOLUTION INTRAMUSCULAR at 23:11

## 2019-07-30 ENCOUNTER — APPOINTMENT (EMERGENCY)
Dept: CT IMAGING | Facility: HOSPITAL | Age: 64
End: 2019-07-30
Payer: MEDICARE

## 2019-07-30 VITALS
RESPIRATION RATE: 16 BRPM | OXYGEN SATURATION: 97 % | DIASTOLIC BLOOD PRESSURE: 77 MMHG | TEMPERATURE: 98.7 F | BODY MASS INDEX: 64.91 KG/M2 | SYSTOLIC BLOOD PRESSURE: 135 MMHG | WEIGHT: 315 LBS | HEART RATE: 76 BPM

## 2019-07-30 LAB
ATRIAL RATE: 70 BPM
BILIRUB UR QL STRIP: NEGATIVE
CLARITY UR: NORMAL
COLOR UR: YELLOW
GLUCOSE UR STRIP-MCNC: NEGATIVE MG/DL
HGB UR QL STRIP.AUTO: NEGATIVE
KETONES UR STRIP-MCNC: NEGATIVE MG/DL
LEUKOCYTE ESTERASE UR QL STRIP: NEGATIVE
NITRITE UR QL STRIP: NEGATIVE
P AXIS: 75 DEGREES
PH UR STRIP.AUTO: 7 [PH] (ref 4.5–8)
PR INTERVAL: 186 MS
PROT UR STRIP-MCNC: NEGATIVE MG/DL
QRS AXIS: 65 DEGREES
QRSD INTERVAL: 88 MS
QT INTERVAL: 376 MS
QTC INTERVAL: 406 MS
SP GR UR STRIP.AUTO: 1.02 (ref 1–1.03)
T WAVE AXIS: 71 DEGREES
UROBILINOGEN UR QL STRIP.AUTO: 0.2 E.U./DL
VENTRICULAR RATE: 70 BPM

## 2019-07-30 PROCEDURE — 74176 CT ABD & PELVIS W/O CONTRAST: CPT

## 2019-07-30 PROCEDURE — 93010 ELECTROCARDIOGRAM REPORT: CPT | Performed by: INTERNAL MEDICINE

## 2019-07-30 PROCEDURE — 81003 URINALYSIS AUTO W/O SCOPE: CPT

## 2019-07-30 RX ORDER — NAPROXEN SODIUM 550 MG/1
550 TABLET ORAL 2 TIMES DAILY WITH MEALS
Qty: 20 TABLET | Refills: 0 | Status: SHIPPED | OUTPATIENT
Start: 2019-07-30 | End: 2019-08-27

## 2019-07-30 RX ADMIN — SODIUM CHLORIDE 1000 ML: 0.9 INJECTION, SOLUTION INTRAVENOUS at 02:02

## 2019-07-30 NOTE — ED PROVIDER NOTES
History  Chief Complaint   Patient presents with    Flank Pain     per pt  " has been having some left flank abdominal pain which wraps around to the front for some days now, pt denies any N/V, pt did not take any pain medication prior to ER visit "     Patient is a 58year old male with constant worsening left flank pain with radiation to the abdomen since last night  No fever  No N/V/D  No urinary sx  No abdominal surgery  Has a h/o gallstones but no kidney stones  States he drove here  Was last seen in this ED on 9/19/18 for suicidal ideation  [facility] website checked on this patient and last Rx filled was on 1/1/19 for ativan for 30 day supply  History provided by:  Patient   used: No    Flank Pain   Associated symptoms: no diarrhea, no fever, no nausea and no vomiting        Prior to Admission Medications   Prescriptions Last Dose Informant Patient Reported? Taking? FLUoxetine (PROzac) 40 MG capsule  Self Yes No   Sig: Take 40 mg by mouth daily      Facility-Administered Medications: None       Past Medical History:   Diagnosis Date    Anxiety     Bipolar depression ([facility] [address] )     bipolar disorder    Borderline diabetes mellitus     Chronic back pain     Chronic pain     Gallstones     Restless leg syndrome     Spinal arachnoid cyst        Past Surgical History:   Procedure Laterality Date    COLONOSCOPY      INCISION AND DRAINAGE ABSCESS / HEMATOMA OF BURSA / KNEE / [place]   in 1990's    outer right leg and center of upper back   complicated  resolved status    ME COLONOSCOPY FLX DX W/COLLJ SPEC WHEN PFRMD N/A 8/31/2017    Procedure: COLONOSCOPY;  Surgeon: Ana Muir MD;  Location: [facility]; Service: Gastroenterology    ME COLONOSCOPY FLX DX W/COLLJ [facility] WHEN PFRMD N/A 3/23/2018    Procedure: COLONOSCOPY;  Surgeon: Ana Muir MD;  Location: [facility];   Service: Gastroenterology    TUMOR REMOVAL  4643'A    "Cheese tumor" removed from back       Family History   Problem Relation Age of Onset    Throat cancer Mother     Heart attack Father         at [de-identified]    Stomach cancer Sister     Lung cancer Brother     Stomach cancer Brother     Heart disease Brother      I have reviewed and agree with the history as documented  Social History     Tobacco Use    Smoking status: Former Smoker    Smokeless tobacco: Never Used    Tobacco comment: all scripts says former smoker quit at 14-17 years old   Substance Use Topics    Alcohol use: Yes     Frequency: Monthly or less     Drinks per session: 1 or 2     Binge frequency: Never     Comment: pt "sometimes; not like before"    Drug use: Yes     Types: Marijuana        Review of Systems   Constitutional: Negative for fever  Gastrointestinal: Positive for abdominal pain  Negative for diarrhea, nausea and vomiting  Genitourinary: Positive for flank pain  Negative for difficulty urinating  All other systems reviewed and are negative  Physical Exam  Physical Exam   Constitutional: He is oriented to person, place, and time  He appears well-developed and well-nourished  He appears distressed (moderate)  HENT:   Head: Normocephalic and atraumatic  Mucous membranes somewhat moist     Eyes: No scleral icterus  Neck: No JVD present  No tracheal deviation present  Cardiovascular: Normal rate, regular rhythm and normal heart sounds  No murmur heard  Pulmonary/Chest: Effort normal and breath sounds normal  No respiratory distress  Abdominal: Soft  Bowel sounds are normal  He exhibits no distension  There is no tenderness  There is no rebound and no guarding  L CVAT  Musculoskeletal: He exhibits no edema or deformity  Neurological: He is alert and oriented to person, place, and time  Skin: Skin is warm and dry  No rash noted  Psychiatric: He has a normal mood and affect  Nursing note and vitals reviewed        Vital Signs  ED Triage Vitals [07/29/19 2027]   Temperature Pulse Respirations Blood Pressure SpO2   98 7 °F (37 1 °C) 79 18 144/66 100 %      Temp Source Heart Rate Source Patient Position - Orthostatic VS BP Location FiO2 (%)   Oral Monitor Sitting Left arm --      Pain Score       7           Vitals:    07/29/19 2027 07/29/19 2231 07/30/19 0030   BP: 144/66 152/79 127/88   Pulse: 79 77 60   Patient Position - Orthostatic VS: Sitting Lying Lying         Visual Acuity      ED Medications  Medications   sodium chloride 0 9 % bolus 1,000 mL (1,000 mL Intravenous New Bag 7/30/19 0202)   sodium chloride 0 9 % bolus 1,000 mL (0 mL Intravenous Stopped 7/30/19 0007)   ketorolac (TORADOL) injection 15 mg (15 mg Intravenous Given 7/29/19 2311)       Diagnostic Studies  Results Reviewed     Procedure Component Value Units Date/Time    ED Urine Macroscopic [[id number]] Collected:  07/30/19 0216    Lab Status:  Final result Specimen:  Urine Updated:  07/30/19 0206     Color, UA Yellow     Clarity, UA Slightly Cloudy     pH, UA 7 0     Leukocytes, UA Negative     Nitrite, UA Negative     Protein, UA Negative mg/dl      Glucose, UA Negative mg/dl      Ketones, UA Negative mg/dl      Urobilinogen, UA 0 2 E U /dl      Bilirubin, UA Negative     Blood, UA Negative     Specific Gravity, UA 1 020    Narrative:       CLINITEK RESULT    CBC and differential [[id number]]  (Abnormal) Collected:  07/29/19 2305    Lab Status:  Final result Specimen:  Blood from Arm, Right Updated:  07/29/19 2338     WBC 10 41 Thousand/uL      RBC 4 61 Million/uL      Hemoglobin 14 3 g/dL      Hematocrit 43 0 %      MCV 93 fL      MCH 31 0 pg      MCHC 33 3 g/dL      RDW 13 7 %      MPV 9 7 fL      Platelets 427 Thousands/uL      nRBC 0 /100 WBCs     Narrative: This is an appended report  These results have been appended to a previously verified report      Comprehensive metabolic panel [[id number]]  (Abnormal) Collected:  07/29/19 2305    Lab Status:  Final result Specimen:  Blood from Arm, Right Updated:  07/29/19 2326     Sodium 149 mmol/L      Potassium 4 7 mmol/L      Chloride 109 mmol/L      CO2 32 mmol/L      ANION GAP 8 mmol/L      BUN 22 mg/dL      Creatinine 1 18 mg/dL      Glucose 106 mg/dL      Calcium 9 7 mg/dL      AST 13 U/L      ALT 22 U/L      Alkaline Phosphatase 53 U/L      Total Protein 7 4 g/dL      Albumin 3 6 g/dL      Total Bilirubin 0 20 mg/dL      eGFR 65 ml/min/1 73sq m     Narrative:       [place] guidelines for Chronic Kidney Disease (CKD):     Stage 1 with normal or high GFR (GFR > 90 mL/min/1 73 square meters)    Stage 2 Mild CKD (GFR = 60-89 mL/min/1 73 square meters)    Stage 3A Moderate CKD (GFR = 45-59 mL/min/1 73 square meters)    Stage 3B Moderate CKD (GFR = 30-44 mL/min/1 73 square meters)    Stage 4 Severe CKD (GFR = 15-29 mL/min/1 73 square meters)    Stage 5 End Stage CKD (GFR <15 mL/min/1 73 square meters)  Note: GFR calculation is accurate only with a steady state creatinine    Lipase [[id number]]  (Normal) Collected:  07/29/19 2305    Lab Status:  Final result Specimen:  Blood from Arm, Right Updated:  07/29/19 2326     Lipase 146 u/L     CK Total with Reflex CKMB [[id number]]  (Normal) Collected:  07/29/19 2305    Lab Status:  Final result Specimen:  Blood from Arm, Right Updated:  07/29/19 2326     Total CK 69 U/L                  CT renal stone study abdomen pelvis without contrast   ED Interpretation by Rubén Jameson MD (07/30 0104)   FINDINGS:      RIGHT KIDNEY AND URETER:   No urinary tract calculi   No hydronephrosis or hydroureter  LEFT KIDNEY AND URETER:   No urinary tract calculi   No hydronephrosis or hydroureter  URINARY BLADDER:    Unremarkable  No significant abnormality in the visualized lung bases  Limited low radiation dose noncontrast CT evaluation demonstrates no clinically significant abnormality of liver, spleen, pancreas, or adrenal glands  There are gallstone(s) within the gallbladder, without pericholecystic inflammatory changes     No ascites or bulky lymphadenopathy on this limited noncontrast study  Colonic diverticula are noted, without evidence to suggest acute diverticulitis   Visualized bowel appears otherwise unremarkable  Limited evaluation demonstrates no evidence to suggest acute appendicitis  No acute fracture or destructive osseous lesion is identified  Impression:        No evidence of hydronephrosis or urinary tract calculi  Cholelithiasis without CT evidence of acute cholecystitis             Workstation performed: [id number]         Final Result by Cody Edward DO (07/30 0102)      No evidence of hydronephrosis or urinary tract calculi  Cholelithiasis without CT evidence of acute cholecystitis             Workstation performed: [id number]                    Procedures  ECG 12 Lead Documentation Only  Date/Time: 7/29/2019 11:18 PM  Performed by: Lola Talamantes MD  Authorized by: Lola Talamantes MD     Indications / Diagnosis:  Flank/abdominal pain  ECG reviewed by me, the ED Provider: yes    Patient location:  ED  Previous ECG:     Previous ECG:  Compared to current    Comparison ECG info:  9/9/18    Similarity:  No change  Interpretation:     Interpretation: normal    Rate:     ECG rate:  70    ECG rate assessment: normal    Rhythm:     Rhythm: sinus rhythm    Ectopy:     Ectopy: none    QRS:     QRS axis:  Normal    QRS intervals:  Normal  Conduction:     Conduction: normal    ST segments:     ST segments:  Normal  T waves:     T waves: normal             ED Course  ED Course as of Jul 30 0220   Mon Jul 29, 2019   2319 EKG d/w patient  [place] [address] d/w patient  Tue Jul 30, 2019   0104 CT d/w patient  Patient cannot urinate yet so more IVFs ordered  9264 Urine dip d/w patient and abdomen nontender prior to discharge  Patient states he has a tumor in his spine                                     MDM  Number of Diagnoses or Management Options  Diagnosis management comments: DDx including but not limited to: renal colic, pyelonephritis, UTI, GI etiology, appendicitis, diverticulitis, pancreatitis, cholecystitis, biliary colic, AAA, rhabdomyolysis, tumor, zoster  Doubt cardiac etiology  Amount and/or Complexity of Data Reviewed  Clinical lab tests: ordered and reviewed  Tests in the radiology section of CPT®: ordered and reviewed  Decide to obtain previous medical records or to obtain history from someone other than the patient: yes  Review and summarize past medical records: yes  Independent visualization of images, tracings, or specimens: yes        Disposition  Final diagnoses:   Left flank pain   Abdominal pain   Diverticulosis   Gallstone     Time reflects when diagnosis was documented in both MDM as applicable and the Disposition within this note     Time User Action Codes Description Comment    7/30/2019  1:13 AM Bev Leer Add [R10 9] Left flank pain     7/30/2019  1:13 AM Bev Leer Add [R10 9] Abdominal pain     7/30/2019  1:14 AM Bev Leer Add [K57 90] Diverticulosis     7/30/2019  1:14 AM Bev Leer Add [K80 20] Gallstone       ED Disposition     ED Disposition Condition Date/Time Comment    Discharge Stable Tue Jul 30, 2019  2:18 AM [facility]   discharge to home/self care  Follow-up Information     Follow up With Specialties Details Why [address], DO Internal Medicine Call in 1 day Return sooner if increased pain, fever, vomiting, diarrhea, difficulty breathing or urinating, rash, weakness, numbness, incontinence  [address] [facility]            Patient's Medications   Discharge Prescriptions    NAPROXEN SODIUM (ANAPROX) 550 MG TABLET    Take 1 tablet (550 mg total) by mouth 2 (two) times a day with meals for 10 days       Start Date: 7/30/2019 End Date: 8/9/2019       Order Dose: 550 mg       Quantity: 20 tablet    Refills: 0     No discharge procedures on file      ED Provider  Electronically Signed by           Kyleigh Connors MD  07/30/19 4963

## 2019-08-20 ENCOUNTER — TELEPHONE (OUTPATIENT)
Dept: NEUROSURGERY | Facility: CLINIC | Age: 64
End: 2019-08-20

## 2019-08-20 NOTE — TELEPHONE ENCOUNTER
Regulo Rm message left by wife reporting the pt is having low back pain and would like medication  She also mentions he is interested in getting a Marijuana card  Returned call to pt's number explaining we could not prescribe medication unless in a post-op setting  He reports he has been to the ED and they only prescribe Naproxen which is not effective  Suggested he contact his PCP for eval or possible referral to a pain specialist  Malia Seay him about the status of his dermatology appt suggested prior to surgery with HDM  This was discussed at 4/2019 visit  He reports its scheduled but he has not seen yet  Marijuana card wad not discussed

## 2019-08-27 ENCOUNTER — OFFICE VISIT (OUTPATIENT)
Dept: FAMILY MEDICINE CLINIC | Facility: CLINIC | Age: 64
End: 2019-08-27
Payer: MEDICARE

## 2019-08-27 VITALS
SYSTOLIC BLOOD PRESSURE: 130 MMHG | RESPIRATION RATE: 18 BRPM | WEIGHT: 203 LBS | OXYGEN SATURATION: 98 % | HEART RATE: 71 BPM | HEIGHT: 70 IN | DIASTOLIC BLOOD PRESSURE: 78 MMHG | BODY MASS INDEX: 29.06 KG/M2

## 2019-08-27 DIAGNOSIS — G95.20 SPINAL CORD COMPRESSION (HCC): ICD-10-CM

## 2019-08-27 DIAGNOSIS — D49.7 INTRADURAL EXTRAMEDULLARY SPINAL TUMOR: ICD-10-CM

## 2019-08-27 DIAGNOSIS — F31.9 BIPOLAR 1 DISORDER (HCC): ICD-10-CM

## 2019-08-27 DIAGNOSIS — M54.50 CHRONIC BILATERAL LOW BACK PAIN WITHOUT SCIATICA: Primary | ICD-10-CM

## 2019-08-27 DIAGNOSIS — G89.29 CHRONIC BILATERAL LOW BACK PAIN WITHOUT SCIATICA: Primary | ICD-10-CM

## 2019-08-27 DIAGNOSIS — E78.2 MIXED HYPERLIPIDEMIA: Chronic | ICD-10-CM

## 2019-08-27 DIAGNOSIS — I10 ESSENTIAL HYPERTENSION: ICD-10-CM

## 2019-08-27 DIAGNOSIS — N18.2 STAGE 2 CHRONIC KIDNEY DISEASE: ICD-10-CM

## 2019-08-27 PROCEDURE — 99204 OFFICE O/P NEW MOD 45 MIN: CPT | Performed by: PHYSICIAN ASSISTANT

## 2019-08-27 NOTE — ASSESSMENT & PLAN NOTE
Managed by Neurosurgery  Neurosurgery is awaiting excision of superficial tumor overlaying schwannoma before surgery  Scheduled to see dermatology for excision - per patient  Patient has a history of canceling and rescheduling resection for several years

## 2019-08-27 NOTE — PROGRESS NOTES
Assessment/Plan:    Patient is a pleasant 59 y/o male with significant schwannoma and overlaying tumor extending into epidermis  It has been present for at least 8 years  Patient has repeatedly postponed resectioning  Patient declined any labwork    Intradural extramedullary spinal tumor  Managed by Neurosurgery  Neurosurgery is awaiting excision of superficial tumor overlaying schwannoma before surgery  Scheduled to see dermatology for excision - per patient  Patient has a history of canceling and rescheduling resection for several years    Spinal cord compression ([facility] [address] )  Secondary to schwannoma  Managed by Neurosurgery  - Discussed that this may be the etiology his back pain      Chronic bilateral low back pain without sciatica  May be 2nd to diagnoses above  Directed to avoid use of NSAIDs due to CKD  Pt requesting marijuana card  - Directed use of ice packs, continued activity  - Directed to FU with Physical Therapy  Patient agreed to try again   - Ordered referral to Pain Management  Discussed that they will almost assuredly wait until schwannoma is addressed  - Provided contact information for Marijuana providers in the area    [facility]  Previously managed by [facility] and therapy  Previously on Cymbalta, seroquel, prozac and ativan  Currently not any  - Recommended reestablishing care with Psychiatry  Patient declined at this time  HTN (hypertension)  Currently controlled without medications    Stage 2 chronic kidney disease  GFR of 65, averaging between 65-70 over past year  Recommended to avoid use of NSAIDs  - Discussed starting on ACE/ARB for management  Patient declined    Recommended TDaP  Patient declined today, "and ever"       Directed to FU in 3 months  Patient left with scheduled any FU  Subjective:      Patient ID: Mika Aleman Sr  is a 58 y o  male, here new to the office  He is here to discuss his chronic lower back pain   He has had back pain for several years which radiates bilaterally into buttock  He has numbness and tingling into bilateral legs  His pain has improved while smoking marijuana  He stopped in February at the request of his Neurosurgeon  He would like a list of Marijuana providers in the area to prescribe it to him  2 years ago he went to PT for a few weeks for his back pain  It did not improve the pain but made it worse  Per patient, last PCP visit in 2016  Dr Sedrick Agarwal  Specialists:  Neurosurgery, Dr Jett Reddy  Surgery is pending due to dermatology  He is scheduled to see a dermatologist next month for excision of the tumor in his back  Previously saw [facility] for management of his Bipolar and ADHD  He stopped 6 months prior due to change in insurance coverage  He was previously on multiple medications including Cymbalta, Strattera, Prozac and Xanax  He is currently on disability due to the schwannoma and back pain  Last Colonoscopy in 3/2018 to be repeated in 3 years  Back Pain   The pain is present in the lumbar spine and gluteal  The quality of the pain is described as stabbing  The pain is worse during the night  Associated symptoms include leg pain, numbness and tingling  Pertinent negatives include no chest pain, fever, headaches, paresis, paresthesias, weakness or weight loss  Previous Dental Visit: 4 wears prior, wears dentures  Previous Eye Exam: 5 month prior, wears bifocals    Vaccines  Last Tetanus: uncertain    The following portions of the patient's history were reviewed and updated as appropriate: allergies, current medications, past family history, past medical history, past social history, past surgical history and problem list     Review of Systems   Constitutional: Positive for chills (once weekly)  Negative for activity change, fatigue, fever, unexpected weight change and weight loss  HENT: Negative for rhinorrhea and sore throat      Respiratory: Negative for cough, shortness of breath and wheezing  Cardiovascular: Negative for chest pain, palpitations and leg swelling  Gastrointestinal: Negative for constipation, diarrhea, nausea and vomiting  Heartburn x3 times weekly   Genitourinary: Negative for urgency  Musculoskeletal: Positive for back pain  Negative for neck pain and neck stiffness  Allergic/Immunologic: Negative for environmental allergies and food allergies  Neurological: Positive for tingling and numbness  Negative for dizziness, weakness, headaches and paresthesias  Psychiatric/Behavioral: Positive for dysphoric mood (a few days over the past few weeks) and sleep disturbance (snoring)  The patient is nervous/anxious          Social History     Socioeconomic History    Marital status: Single     Spouse name: Not on file    Number of children: 3    Years of education: 8    Highest education level: Not on file   Occupational History     Comment: disabled   Social Needs    Financial resource strain: Not on file    Food insecurity:     Worry: Not on file     Inability: Not on file   [employer] needs:     Medical: Not on file     Non-medical: Not on file   Tobacco Use    Smoking status: Former Smoker    Smokeless tobacco: Never Used    Tobacco comment: all scripts says former smoker quit at 14-17 years old   Substance and Sexual Activity    Alcohol use: Yes     Frequency: Monthly or less     Drinks per session: 1 or 2     Binge frequency: Never     Comment: pt "sometimes; not like before"    Drug use: Yes     Types: Marijuana    Sexual activity: Yes     Partners: Female     Birth control/protection: Post-menopausal   Lifestyle    Physical activity:     Days per week: Not on file     Minutes per session: Not on file    Stress: Not on file   Relationships    Social connections:     Talks on phone: Not on file     Gets together: Not on file     Attends Restoration service: Not on file     Active member of club or organization: Not on file     Attends meetings of clubs or organizations: Not on file     Relationship status: Not on file    Intimate partner violence:     Fear of current or ex partner: Not on file     Emotionally abused: Not on file     Physically abused: Not on file     Forced sexual activity: Not on file   Other Topics Concern    Not on file   Social History Narrative    Disabled    GED         Objective:  /78 (BP Location: Right arm, Patient Position: Sitting, Cuff Size: Large)   Pulse 71   Resp 18   Ht 5' 10" (1 778 m)   Wt 92 1 kg (203 lb)   SpO2 98%   BMI 29 13 kg/m²      Physical Exam   Constitutional: He appears well-developed and well-nourished  HENT:   Head: Normocephalic and atraumatic  Right Ear: Tympanic membrane and external ear normal    Left Ear: Tympanic membrane and external ear normal    Nose: Nose normal  No rhinorrhea  Mouth/Throat: Oropharynx is clear and moist  No oropharyngeal exudate  Neck: Normal range of motion  No thyromegaly present  Cardiovascular: Normal rate, regular rhythm and normal heart sounds  Exam reveals no gallop and no friction rub  No murmur heard  Pulmonary/Chest: Effort normal and breath sounds normal  He has no wheezes  He has no rales  Abdominal: Soft  Bowel sounds are normal  He exhibits no distension  There is no tenderness  There is no rebound and no guarding  Musculoskeletal: Normal range of motion  Lymphadenopathy:        Head (right side): No submental, no submandibular, no tonsillar, no preauricular and no posterior auricular adenopathy present  Head (left side): No submental, no submandibular, no tonsillar, no preauricular and no posterior auricular adenopathy present  He has no cervical adenopathy  Skin: Skin is warm  Psychiatric: He has a normal mood and affect   His behavior is normal  Thought content normal

## 2019-08-28 NOTE — ASSESSMENT & PLAN NOTE
Secondary to schwannoma  Managed by Neurosurgery  - Discussed that this may be the etiology his back pain

## 2019-08-28 NOTE — ASSESSMENT & PLAN NOTE
GFR of 65, averaging between 65-70 over past year  Recommended to avoid use of NSAIDs  - Discussed starting on ACE/ARB for management   Patient declined

## 2019-08-28 NOTE — ASSESSMENT & PLAN NOTE
May be 2nd to diagnoses above  Directed to avoid use of NSAIDs due to CKD  Pt requesting marijuana card  - Directed use of ice packs, continued activity  - Directed to FU with Physical Therapy  Patient agreed to try again   - Ordered referral to Pain Management  Discussed that they will almost assuredly wait until schwannoma is addressed    - Provided contact information for Marijuana providers in the area

## 2019-12-09 ENCOUNTER — OFFICE VISIT (OUTPATIENT)
Dept: NEUROSURGERY | Facility: CLINIC | Age: 64
End: 2019-12-09
Payer: MEDICARE

## 2019-12-09 VITALS
BODY MASS INDEX: 29.63 KG/M2 | RESPIRATION RATE: 16 BRPM | HEIGHT: 70 IN | HEART RATE: 98 BPM | TEMPERATURE: 97.9 F | DIASTOLIC BLOOD PRESSURE: 83 MMHG | WEIGHT: 207 LBS | SYSTOLIC BLOOD PRESSURE: 142 MMHG

## 2019-12-09 DIAGNOSIS — Z79.01 ADMISSION FOR LONG-TERM (CURRENT) USE OF ANTICOAGULANTS: ICD-10-CM

## 2019-12-09 DIAGNOSIS — D49.7 INTRADURAL EXTRAMEDULLARY SPINAL TUMOR: Primary | ICD-10-CM

## 2019-12-09 DIAGNOSIS — G95.20 SPINAL CORD COMPRESSION (HCC): ICD-10-CM

## 2019-12-09 DIAGNOSIS — Z51.81 ADMISSION FOR LONG-TERM (CURRENT) USE OF ANTICOAGULANTS: ICD-10-CM

## 2019-12-09 DIAGNOSIS — Z79.899 ENCOUNTER FOR LONG-TERM (CURRENT) USE OF MEDICATIONS: ICD-10-CM

## 2019-12-09 PROBLEM — G56.03 BILATERAL CARPAL TUNNEL SYNDROME: Status: ACTIVE | Noted: 2019-12-09

## 2019-12-09 PROCEDURE — 99214 OFFICE O/P EST MOD 30 MIN: CPT | Performed by: NEUROLOGICAL SURGERY

## 2019-12-09 NOTE — PROGRESS NOTES
Neurosurgery Office Note  Gloris Closs Sr  58 y o  male MRN: [id number]      Assessment/Plan      Diagnoses and all orders for this visit:    Intradural extramedullary spinal tumor    Spinal cord compression [facility])          Discussion:    21-year-old male with a T12 intradural extramedullary mass consistent with schwannoma or meningioma  I have followed him for some years, and this mass has not show overt radiographic progression  However, he has had more freequent back and leg pain  Presently he states his back pain is 2/10, and he has occasinoal stabbing pains into his legs, R > L  He mentioned occasional bowel incontinence and urinary urgency with nocturia 2-3x/night       The patient was seen back in 4/2019 and we agreed to proceed with surgery, but at that time he had a very inflamed, infected area in the skin of his back near the planned incision site  That has since been excised and addressed by dermatology, and looks well healed  I reviewed with the patient options for management: Observation, lack of medical option, inappropriate for radiation/radiosurgical option, or surgery  With his young age, I'm concerned will generate neurologic disability in his lifetime, and I expressed this to him  I also counseled him that should disability occur, he may not recover any lost function  He would like to proceed  with surgery  I detailed personally the attendant risks as well as expected benefits  Written consent obtained for : T12 laminectomy for resection of IDEM mass, additional levels as necessary  In addition, the patient did complain of bilateral hand numbness, worst in the morning on waking  He was given a right wrist brace previously, and when he did use it, he would not have symptoms  I suggested he re-try that, and provided him with a left sided brace as well  KPS 70-80, ECOG 1, EQ5D5L 97386, VAS 50         CHIEF COMPLAINT    Chief Complaint   Patient presents with    Follow-up     follow up to re-discuss surgery       HISTORY    History of Present Illness     58y o  year old male     HPI    See Discussion    REVIEW OF SYSTEMS    Review of Systems   Constitutional: Negative  HENT: Negative  Eyes: Negative  Respiratory: Positive for shortness of breath (occasional with activity)  Cardiovascular: Positive for chest pain (believes from tumor)  Gastrointestinal:        Occasional bowel incontinence   Endocrine: Positive for cold intolerance  Genitourinary: Positive for urgency  Nocturia 2-3x   Musculoskeletal: Positive for arthralgias (hips), back pain and gait problem (difficulty with pain, no recent falls)  Skin:        Surgical incision on back is well healed   Allergic/Immunologic: Negative  Neurological: Positive for numbness (hands x1 year)  Hematological: Bruises/bleeds easily  Psychiatric/Behavioral: Positive for agitation, confusion, dysphoric mood and sleep disturbance  The patient is nervous/anxious  Meds/Allergies     No current outpatient medications on file  No current facility-administered medications for this visit  No Known Allergies    PAST HISTORY    Past Medical History:   Diagnosis Date    Anxiety     Bipolar depression ([facility] [address] )     bipolar disorder    Borderline diabetes mellitus     Chronic back pain     Chronic pain     Gallstones     Restless leg syndrome     Spinal arachnoid cyst        Past Surgical History:   Procedure Laterality Date    COLONOSCOPY      INCISION AND DRAINAGE ABSCESS / HEMATOMA OF BURSA / KNEE / THIGH  in 1990's    outer right leg and center of upper back   complicated  resolved status    MD COLONOSCOPY FLX DX W/COLLJ SPEC WHEN PFRMD N/A 8/31/2017    Procedure: COLONOSCOPY;  Surgeon: Rosie Boston MD;  [facility]; Service: Gastroenterology    MD COLONOSCOPY FLX DX W/COLLJ [facility] WHEN PFRMD N/A 3/23/2018    Procedure: COLONOSCOPY;  Surgeon: Rosie Boston MD;  [facility];   Service: Gastroenterology    TUMOR REMOVAL  1990's    "Cheese tumor" removed from back       Social History     Tobacco Use    Smoking status: Former Smoker    Smokeless tobacco: Never Used    Tobacco comment: all scripts says former smoker quit at 14-17 years old   Substance Use Topics    Alcohol use: Yes     Frequency: Monthly or less     Drinks per session: 1 or 2     Binge frequency: Never     Comment: very seldom now    Drug use: Yes     Types: Marijuana       Family History   Problem Relation Age of Onset    Throat cancer Mother     Heart attack Father         at [de-identified]    Stomach cancer Sister     Lung cancer Brother     Stomach cancer Brother     Heart disease Brother          The following portions of the patient's history were reviewed in this encounter and updated as appropriate: Past medical, surgical, family, and social history, as well as medications, allergies, and review of systems  EXAM    Vitals:Blood pressure 142/83, pulse 98, temperature 97 9 °F (36 6 °C), temperature source Tympanic, resp  rate 16, height 5' 10" (1 778 m), weight 93 9 kg (207 lb)  ,Body mass index is 29 7 kg/m²  Physical Exam   Constitutional: He is oriented to person, place, and time  He appears well-developed  HENT:   Head: Normocephalic and atraumatic  Eyes: No scleral icterus  Neck: Neck supple  Cardiovascular: Normal rate  Pulmonary/Chest: Effort normal    Abdominal: Normal appearance  Neurological: He is alert and oriented to person, place, and time  No sensory deficit  Skin: Skin is warm and dry  Psychiatric: He has a normal mood and affect  His speech is normal and behavior is normal    Vitals reviewed  Neurologic Exam     Mental Status   Oriented to person, place, and time  Attention: normal    Speech: speech is normal   Level of consciousness: alert    Cranial Nerves     CN VII   Facial expression full, symmetric       Motor Exam   Muscle bulk: normal  Overall muscle tone: normal  Moves all extremities, grossly normal     Gait, Coordination, and Reflexes     Tremor   Resting tremor: absent  Intention tremor: absent  Action tremor: absent  No aids  MEDICAL DECISION MAKING    No new imaging since 3/2019; This showed little, if any change, from 2017 studies

## 2019-12-09 NOTE — H&P (VIEW-ONLY)
Neurosurgery Office Note  Kailash Fang Sr  58 y o  male MRN: [id number]      Assessment/Plan      Diagnoses and all orders for this visit:    Intradural extramedullary spinal tumor    Spinal cord compression [facility])          Discussion:    65-year-old male with a T12 intradural extramedullary mass consistent with schwannoma or meningioma  I have followed him for some years, and this mass has not show overt radiographic progression  However, he has had more freequent back and leg pain  Presently he states his back pain is 2/10, and he has occasinoal stabbing pains into his legs, R > L  He mentioned occasional bowel incontinence and urinary urgency with nocturia 2-3x/night       The patient was seen back in 4/2019 and we agreed to proceed with surgery, but at that time he had a very inflamed, infected area in the skin of his back near the planned incision site  That has since been excised and addressed by dermatology, and looks well healed  I reviewed with the patient options for management: Observation, lack of medical option, inappropriate for radiation/radiosurgical option, or surgery  With his young age, I'm concerned will generate neurologic disability in his lifetime, and I expressed this to him  I also counseled him that should disability occur, he may not recover any lost function  He would like to proceed  with surgery  I detailed personally the attendant risks as well as expected benefits  Written consent obtained for : T12 laminectomy for resection of IDEM mass, additional levels as necessary  In addition, the patient did complain of bilateral hand numbness, worst in the morning on waking  He was given a right wrist brace previously, and when he did use it, he would not have symptoms  I suggested he re-try that, and provided him with a left sided brace as well  KPS 70-80, ECOG 1, EQ5D5L 66804, VAS 50         CHIEF COMPLAINT    Chief Complaint   Patient presents with    Follow-up     follow up to re-discuss surgery       HISTORY    History of Present Illness     58y o  year old male     HPI    See Discussion    REVIEW OF SYSTEMS    Review of Systems   Constitutional: Negative  HENT: Negative  Eyes: Negative  Respiratory: Positive for shortness of breath (occasional with activity)  Cardiovascular: Positive for chest pain (believes from tumor)  Gastrointestinal:        Occasional bowel incontinence   Endocrine: Positive for cold intolerance  Genitourinary: Positive for urgency  Nocturia 2-3x   Musculoskeletal: Positive for arthralgias (hips), back pain and gait problem (difficulty with pain, no recent falls)  Skin:        Surgical incision on back is well healed   Allergic/Immunologic: Negative  Neurological: Positive for numbness (hands x1 year)  Hematological: Bruises/bleeds easily  Psychiatric/Behavioral: Positive for agitation, confusion, dysphoric mood and sleep disturbance  The patient is nervous/anxious  Meds/Allergies     No current outpatient medications on file  No current facility-administered medications for this visit  No Known Allergies    PAST HISTORY    Past Medical History:   Diagnosis Date    Anxiety     Bipolar depression ([facility] [address] )     bipolar disorder    Borderline diabetes mellitus     Chronic back pain     Chronic pain     Gallstones     Restless leg syndrome     Spinal arachnoid cyst        Past Surgical History:   Procedure Laterality Date    COLONOSCOPY      INCISION AND DRAINAGE ABSCESS / HEMATOMA OF BURSA / KNEE / THIGH  in 1990's    outer right leg and center of upper back   complicated  resolved status    MI COLONOSCOPY FLX DX W/COLLJ SPEC WHEN PFRMD N/A 8/31/2017    Procedure: COLONOSCOPY;  Surgeon: Zabrina Nina MD;  [facility]; Service: Gastroenterology    MI COLONOSCOPY FLX DX W/COLLJ [facility] WHEN PFRMD N/A 3/23/2018    Procedure: COLONOSCOPY;  Surgeon: Zabrina Nina MD;  [facility];   Service: Gastroenterology    TUMOR REMOVAL  1990's    "Cheese tumor" removed from back       Social History     Tobacco Use    Smoking status: Former Smoker    Smokeless tobacco: Never Used    Tobacco comment: all scripts says former smoker quit at 14-17 years old   Substance Use Topics    Alcohol use: Yes     Frequency: Monthly or less     Drinks per session: 1 or 2     Binge frequency: Never     Comment: very seldom now    Drug use: Yes     Types: Marijuana       Family History   Problem Relation Age of Onset    Throat cancer Mother     Heart attack Father         at [de-identified]    Stomach cancer Sister     Lung cancer Brother     Stomach cancer Brother     Heart disease Brother          The following portions of the patient's history were reviewed in this encounter and updated as appropriate: Past medical, surgical, family, and social history, as well as medications, allergies, and review of systems  EXAM    Vitals:Blood pressure 142/83, pulse 98, temperature 97 9 °F (36 6 °C), temperature source Tympanic, resp  rate 16, height 5' 10" (1 778 m), weight 93 9 kg (207 lb)  ,Body mass index is 29 7 kg/m²  Physical Exam   Constitutional: He is oriented to person, place, and time  He appears well-developed  HENT:   Head: Normocephalic and atraumatic  Eyes: No scleral icterus  Neck: Neck supple  Cardiovascular: Normal rate  Pulmonary/Chest: Effort normal    Abdominal: Normal appearance  Neurological: He is alert and oriented to person, place, and time  No sensory deficit  Skin: Skin is warm and dry  Psychiatric: He has a normal mood and affect  His speech is normal and behavior is normal    Vitals reviewed  Neurologic Exam     Mental Status   Oriented to person, place, and time  Attention: normal    Speech: speech is normal   Level of consciousness: alert    Cranial Nerves     CN VII   Facial expression full, symmetric       Motor Exam   Muscle bulk: normal  Overall muscle tone: normal  Moves all extremities, grossly normal     Gait, Coordination, and Reflexes     Tremor   Resting tremor: absent  Intention tremor: absent  Action tremor: absent  No aids  MEDICAL DECISION MAKING    No new imaging since 3/2019; This showed little, if any change, from 2017 studies

## 2019-12-17 ENCOUNTER — APPOINTMENT (EMERGENCY)
Dept: RADIOLOGY | Facility: HOSPITAL | Age: 64
End: 2019-12-17
Payer: MEDICARE

## 2019-12-17 ENCOUNTER — HOSPITAL ENCOUNTER (EMERGENCY)
Facility: HOSPITAL | Age: 64
Discharge: HOME | End: 2019-12-17
Attending: EMERGENCY MEDICINE | Admitting: EMERGENCY MEDICINE
Payer: MEDICARE

## 2019-12-17 VITALS
DIASTOLIC BLOOD PRESSURE: 78 MMHG | TEMPERATURE: 98.3 F | OXYGEN SATURATION: 94 % | HEIGHT: 70 IN | RESPIRATION RATE: 20 BRPM | SYSTOLIC BLOOD PRESSURE: 131 MMHG | HEART RATE: 70 BPM | BODY MASS INDEX: 30.17 KG/M2 | WEIGHT: 210.76 LBS

## 2019-12-17 DIAGNOSIS — M54.50 LUMBAR PAIN: Primary | ICD-10-CM

## 2019-12-17 PROCEDURE — 72100 X-RAY EXAM L-S SPINE 2/3 VWS: CPT

## 2019-12-17 PROCEDURE — 99284 EMERGENCY DEPT VISIT MOD MDM: CPT | Performed by: PHYSICIAN ASSISTANT

## 2019-12-17 PROCEDURE — 99283 EMERGENCY DEPT VISIT LOW MDM: CPT

## 2019-12-17 PROCEDURE — 96372 THER/PROPH/DIAG INJ SC/IM: CPT

## 2019-12-17 RX ORDER — SENNOSIDES 8.6 MG
650 CAPSULE ORAL EVERY 8 HOURS PRN
Qty: 9 TABLET | Refills: 0 | Status: SHIPPED | OUTPATIENT
Start: 2019-12-17 | End: 2019-12-20

## 2019-12-17 RX ORDER — IBUPROFEN 400 MG/1
400 TABLET ORAL EVERY 6 HOURS PRN
Qty: 12 TABLET | Refills: 0 | Status: SHIPPED | OUTPATIENT
Start: 2019-12-17 | End: 2019-12-31

## 2019-12-17 RX ORDER — LIDOCAINE 50 MG/G
1 PATCH TOPICAL ONCE
Status: DISCONTINUED | OUTPATIENT
Start: 2019-12-17 | End: 2019-12-17 | Stop reason: HOSPADM

## 2019-12-17 RX ORDER — METHOCARBAMOL 500 MG/1
500 TABLET, FILM COATED ORAL 4 TIMES DAILY
Qty: 20 TABLET | Refills: 0 | Status: SHIPPED | OUTPATIENT
Start: 2019-12-17 | End: 2019-12-31

## 2019-12-17 RX ORDER — KETOROLAC TROMETHAMINE 30 MG/ML
15 INJECTION, SOLUTION INTRAMUSCULAR; INTRAVENOUS ONCE
Status: COMPLETED | OUTPATIENT
Start: 2019-12-17 | End: 2019-12-17

## 2019-12-17 RX ORDER — ACETAMINOPHEN 325 MG/1
650 TABLET ORAL ONCE
Status: COMPLETED | OUTPATIENT
Start: 2019-12-17 | End: 2019-12-17

## 2019-12-17 RX ADMIN — LIDOCAINE 1 PATCH: 50 PATCH TOPICAL at 05:27

## 2019-12-17 RX ADMIN — KETOROLAC TROMETHAMINE 15 MG: 30 INJECTION, SOLUTION INTRAMUSCULAR at 05:25

## 2019-12-17 RX ADMIN — ACETAMINOPHEN 650 MG: 325 TABLET, FILM COATED ORAL at 05:24

## 2019-12-17 NOTE — ED NOTES
Pt waiting in waiting room for a friend to pick him up within 20 minutes        Clau Powell RN  12/17/19 [id number]

## 2019-12-17 NOTE — ED NOTES
Patient aware he will be discharged once he obtains a ride home  Reports he does not want to call the person because they are at work  Offered to call taxi or lyft for patient, patient reports he does not have any money, reports he will walk home and is 7 miles  Educated that he needs to call for a ride and we cannot let him walk    Patient has cell phone at bedside, offered a phone, reports his cell phone works     Orly Mixon, [address] [facility]  12/17/19 2950

## 2019-12-17 NOTE — ED NOTES
Offered to call emergency contact on chart for patient for a ride home, patient reports the person is a work and he does not want to call her out of work  Educated that he needs to call for a ride home       Orly Mixon RN  12/17/19 [id number]

## 2019-12-17 NOTE — DISCHARGE INSTRUCTIONS
Take Motrin, Tylenol, and Robaxin as indicated  Follow-up with neurology  Follow-up with PCP  Follow up with emergency department symptoms persist or exacerbate

## 2019-12-17 NOTE — ED PROVIDER NOTES
History  Chief Complaint   Patient presents with    Back Injury     Pt presents by ems d/t back pain after he was moving his tv at dinner time around 1800  States he is unable to bend or move +in b/l lower back  Hx tumor in his spine      Patient is a 70-year-old male with history of chronic back pain, anxiety, bipolar depression, spinal arachnoid cyst the presents emergency department with aching persistent worsening non radiating lower back pain 8 hours  Patient denies associated symptoms  Patient denies saddle paresthesias, patient denies bowel and bladder retention  Patient states that he was helping a friend move a "old 32 inch tube television set", when he felt pain in his back while attempting to lift it  Patient is currently followed by Neurology and is scheduled for a T12 laminectomy for resection for intradural extramedullary mass on 12/26/2019  Patient verbalizes ability to self ambulate with no assistance of cane, walker, persons at this time  Patient denies palliative factors with provocative factors of pressure to lumbar spine  Patient denies not effective treatment  Patient denies fevers, chills, nausea, vomiting  Patient denies diarrhea, constipation urinary symptoms  Patient has numbness, tingling, and loss of power  Patient denies recent fall or recent trauma  Patient denies sick contacts or recent travel  Patient denies chest pain, shortness of breath, and abdominal pain        History provided by:  Patient   used: No    Back Pain   Location:  Lumbar spine  Quality:  Aching  Radiates to:  Does not radiate  Pain severity:  Mild  Pain is:  Same all the time  Onset quality:  Gradual  Duration:  8 hours  Timing:  Constant  Progression:  Unchanged  Chronicity:  Chronic  Context: physical stress    Context: not emotional stress, not falling, not jumping from heights, not recent illness, not recent injury and not twisting    Relieved by:  Nothing  Worsened by: Palpation  Ineffective treatments:  None tried  Associated symptoms: no abdominal pain, no abdominal swelling, no bladder incontinence, no bowel incontinence, no chest pain, no dysuria, no fever, no headaches, no leg pain, no numbness, no paresthesias, no pelvic pain, no perianal numbness, no tingling and no weakness    Risk factors: no recent surgery, no steroid use and no vascular disease        None       Past Medical History:   Diagnosis Date    Anxiety     Bipolar depression ([address] )     bipolar disorder    Borderline diabetes mellitus     Chronic back pain     Chronic pain     Gallstones     Restless leg syndrome     Spinal arachnoid cyst        Past Surgical History:   Procedure Laterality Date    COLONOSCOPY      INCISION AND DRAINAGE ABSCESS / HEMATOMA OF BURSA / KNEE / Lorry Jhonny  in 1990's    outer right leg and center of upper back   complicated  resolved status    OTHER SURGICAL HISTORY  11/2019    cyst excision from back by Dermatolgist at office of Dr Sree Butler FLX DX W/[facility] [place] [facility] PFRMD N/A 8/31/2017    Procedure: COLONOSCOPY;  Surgeon: Jeovanny Umana MD;  [facility]; Service: Gastroenterology    [place] COLONOSCOPY FLX DX W/[facility] WHEN PFRMD N/A 3/23/2018    Procedure: COLONOSCOPY;  Surgeon: Jeovanny Umana MD;  [facility]; Service: Gastroenterology    TUMOR REMOVAL  2035'T    "Cheese tumor" removed from back       Family History   Problem Relation Age of Onset    Throat cancer Mother     Heart attack Father         at [de-identified]    Stomach cancer Sister     Lung cancer Brother     Stomach cancer Brother     Heart disease Brother      I have reviewed and agree with the history as documented      Social History     Tobacco Use    Smoking status: Former Smoker    Smokeless tobacco: Never Used    Tobacco comment: all scripts says former smoker quit at 14-17 years old   Substance Use Topics    Alcohol use: Yes     Frequency: Monthly or less     Drinks per session: 1 or 2     Binge frequency: Never     Comment: very seldom now    Drug use: Yes     Types: Marijuana        Review of Systems   Constitutional: Negative for activity change, appetite change, chills and fever  HENT: Negative for congestion, postnasal drip, rhinorrhea, sinus pressure, sinus pain, sore throat and tinnitus  Eyes: Negative for photophobia and visual disturbance  Respiratory: Negative for cough, chest tightness and shortness of breath  Cardiovascular: Negative for chest pain and palpitations  Gastrointestinal: Negative for abdominal pain, bowel incontinence, constipation, diarrhea, nausea and vomiting  Genitourinary: Negative for bladder incontinence, difficulty urinating, dysuria, flank pain, frequency, pelvic pain and urgency  Musculoskeletal: Negative for back pain, gait problem, neck pain and neck stiffness  Skin: Negative for pallor and rash  Allergic/Immunologic: Negative for environmental allergies and food allergies  Neurological: Negative for dizziness, tingling, weakness, numbness, headaches and paresthesias  Psychiatric/Behavioral: Negative for confusion  All other systems reviewed and are negative  Physical Exam  Physical Exam   Constitutional: He is oriented to person, place, and time  He appears well-developed and well-nourished  He is active and cooperative  Non-toxic appearance  He does not have a sickly appearance  He does not appear ill  /78 (BP Location: Right arm)   Pulse 70   Temp 98 3 °F (36 8 °C) (Oral)   Resp 20   Ht 5' 10" (1 778 m)   Wt 95 6 kg (210 lb 12 2 oz)   SpO2 94%   BMI 30 24 kg/m²      HENT:   Head: Normocephalic and atraumatic  Right Ear: Hearing and external ear normal  No drainage, swelling or tenderness  No mastoid tenderness  No decreased hearing is noted  Left Ear: Hearing and external ear normal  No drainage, swelling or tenderness  No mastoid tenderness  No decreased hearing is noted     Nose: Nose normal  Mouth/Throat: Uvula is midline, oropharynx is clear and moist and mucous membranes are normal    Eyes: Pupils are equal, round, and reactive to light  Conjunctivae, EOM and lids are normal  Right eye exhibits no discharge  Left eye exhibits no discharge  Neck: Trachea normal, normal range of motion, full passive range of motion without pain and phonation normal  Neck supple  No JVD present  No tracheal tenderness, no spinous process tenderness and no muscular tenderness present  No neck rigidity  No tracheal deviation and normal range of motion present  Cardiovascular: Normal rate, regular rhythm, normal heart sounds, intact distal pulses and normal pulses  Pulses:       Radial pulses are 2+ on the right side, and 2+ on the left side  Dorsalis pedis pulses are 2+ on the right side, and 2+ on the left side  Posterior tibial pulses are 2+ on the right side, and 2+ on the left side  Pulmonary/Chest: Effort normal and breath sounds normal  No stridor  He has no decreased breath sounds  He has no wheezes  He has no rhonchi  He has no rales  He exhibits no tenderness, no bony tenderness and no crepitus  Abdominal: Soft  Bowel sounds are normal  He exhibits no distension  There is no tenderness  There is no rigidity, no rebound, no guarding and no CVA tenderness  Musculoskeletal:        Lumbar back: He exhibits decreased range of motion, tenderness, bony tenderness, pain and spasm  He exhibits no swelling, no edema, no deformity, no laceration and normal pulse  Back:    Passive ROM intact  Upper and lower extremity 5/5 bilaterally  Neurovascularly intact  No grinding or clicking of joints     Lymphadenopathy:        Head (right side): No submental, no submandibular, no tonsillar, no preauricular, no posterior auricular and no occipital adenopathy present          Head (left side): No submental, no submandibular, no tonsillar, no preauricular, no posterior auricular and no occipital adenopathy present  He has no cervical adenopathy  Right cervical: No superficial cervical, no deep cervical and no posterior cervical adenopathy present  Left cervical: No superficial cervical, no deep cervical and no posterior cervical adenopathy present  Neurological: He is alert and oriented to person, place, and time  He has normal strength and normal reflexes  He displays normal reflexes  No sensory deficit  GCS eye subscore is 4  GCS verbal subscore is 5  GCS motor subscore is 6  Reflex Scores:       Patellar reflexes are 2+ on the right side and 2+ on the left side  Skin: Skin is warm and intact  Capillary refill takes less than 2 seconds  He is not diaphoretic  Psychiatric: He has a normal mood and affect  His speech is normal and behavior is normal  Judgment and thought content normal  Cognition and memory are normal    Nursing note and vitals reviewed        Vital Signs  ED Triage Vitals   Temperature Pulse Respirations Blood Pressure SpO2   12/17/19 0459 12/17/19 0458 12/17/19 0458 12/17/19 0459 12/17/19 0458   98 3 °F (36 8 °C) 92 17 146/75 94 %      Temp Source Heart Rate Source Patient Position - Orthostatic VS BP Location FiO2 (%)   12/17/19 0459 12/17/19 0458 -- 12/17/19 0551 --   Oral Monitor  Right arm       Pain Score       12/17/19 0458       Worst Possible Pain           Vitals:    12/17/19 0458 12/17/19 0459 12/17/19 0551 12/17/19 0621   BP:  146/75 117/68 131/78   Pulse: 92  73 70         Visual Acuity      ED Medications  Medications   lidocaine (LIDODERM) 5 % patch 1 patch (1 patch Topical Medication Applied 12/17/19 0527)   acetaminophen (TYLENOL) tablet 650 mg (650 mg Oral Given 12/17/19 0524)   ketorolac (TORADOL) injection 15 mg (15 mg Intramuscular Given 12/17/19 0525)       Diagnostic Studies  Results Reviewed     None                 XR spine lumbar 2 or 3 views injury   ED Interpretation by Yesi Mccoy PA-C (12/17 0615)   No acute osseous abnormality on initial read in emergency department; mild degenerative changes in lumbar spine                 Procedures  Procedures         ED Course  ED Course as of Dec 17 0713   Tue Dec 17, 2019   4706 Patient denies suicidal ideations or plan        0627 Patient verbalizes improvement of back pain symptoms with self ambulation      6015 Patient verbalized decrease in back pain symptoms status post medication delivery                                  MDM  Number of Diagnoses or Management Options  Lumbar pain: new and does not require workup     Amount and/or Complexity of Data Reviewed  Tests in the radiology section of CPT®: ordered and reviewed  Review and summarize past medical records: yes    Risk of Complications, Morbidity, and/or Mortality  Presenting problems: low  Diagnostic procedures: low  Management options: low    Patient Progress  Patient progress: stable       Patient is a 58-year-old male with history of chronic back pain, anxiety, bipolar depression, spinal arachnoid cyst the presents emergency department with aching persistent worsening non radiating lower back pain 8 hours  Patient denies associated symptoms  Patient denies bowel or bladder retention and saddle paresthesias  Patient is self ambulatory at this time with self ambulation with no difficulty walking x-ray  Lumbar x-ray initial read with no acute osseous abnormality; mild degenerative changes in lumbar spine    Delivered Toradol, Tylenol, and Lidoderm patch the patient verbalized decrease in back pain symptoms status post medication delivery  Prescribed Tylenol, Motrin, and Robaxin and counseled patient medication administration and side effects  Follow-up with neurology  Follow-up with PCP  Follow up with emergency department if symptoms persist or exacerbate  Patient demonstrates verbal understanding of all clinical imaging findings, discharge instructions, follow-up with verbalized agreement current treatment plan  Patient was brought here via ambulance and will call a friend to drive him home  Disposition  Final diagnoses:   Lumbar pain     Time reflects when diagnosis was documented in both MDM as applicable and the Disposition within this note     Time User Action Codes Description Comment    12/17/2019  6:33 AM Renan Fire Add [M54 5] Lumbar pain       ED Disposition     ED Disposition Condition Date/Time Comment    Discharge Stable [place] Dec 17, 2019  6:33 AM Kailash Fang Sr  discharge to home/self care  Follow-up Information     Follow up With Specialties Details Why Contact Info Additional Information    Janelle Travis MD Neurosurgery Call in 1 week for further evaluation of symptoms [address] [address] [address]  , PA-C Family Medicine, Physician Assistant Call in 1 week As needed [address]  [facility] [phone]       [facility] Emergency Department Emergency Medicine Go to  As needed [address] [address]  [phone] AN ED, [address], [place], [place], [address]          Patient's Medications   Discharge Prescriptions    ACETAMINOPHEN (TYLENOL) 650 MG CR TABLET    Take 1 tablet (650 mg total) by mouth every 8 (eight) hours as needed for mild pain for up to 3 days       Start Date: 12/17/2019End Date: 12/20/2019       Order Dose: 650 mg       Quantity: 9 tablet    Refills: 0    IBUPROFEN (MOTRIN) 400 MG TABLET    Take 1 tablet (400 mg total) by mouth every 6 (six) hours as needed for mild pain for up to 3 days       Start Date: 12/17/2019End Date: 12/20/2019       Order Dose: 400 mg       Quantity: 12 tablet    Refills: 0    METHOCARBAMOL (ROBAXIN) 500 MG TABLET    Take 1 tablet (500 mg total) by mouth 4 (four) times a day for 5 days       Start Date: 12/17/2019End Date: 12/22/2019       Order Dose: 500 mg       Quantity: 20 tablet    Refills: 0     No discharge procedures on file      ED Provider  Electronically Signed by           Jay Jay Jimenes PA-C  12/17/19 8400       Jay Jay Jimenes PA-C  12/17/19 9415

## 2019-12-31 ENCOUNTER — APPOINTMENT (OUTPATIENT)
Dept: LAB | Facility: CLINIC | Age: 64
DRG: 029 | End: 2019-12-31
Payer: MEDICARE

## 2019-12-31 ENCOUNTER — OFFICE VISIT (OUTPATIENT)
Dept: FAMILY MEDICINE CLINIC | Facility: CLINIC | Age: 64
End: 2019-12-31
Payer: MEDICARE

## 2019-12-31 VITALS
HEIGHT: 70 IN | WEIGHT: 209.2 LBS | DIASTOLIC BLOOD PRESSURE: 78 MMHG | OXYGEN SATURATION: 98 % | SYSTOLIC BLOOD PRESSURE: 136 MMHG | BODY MASS INDEX: 29.95 KG/M2 | TEMPERATURE: 98.4 F | HEART RATE: 74 BPM | RESPIRATION RATE: 18 BRPM

## 2019-12-31 DIAGNOSIS — E78.2 MIXED HYPERLIPIDEMIA: ICD-10-CM

## 2019-12-31 DIAGNOSIS — Z79.899 ENCOUNTER FOR LONG-TERM (CURRENT) USE OF MEDICATIONS: ICD-10-CM

## 2019-12-31 DIAGNOSIS — Z51.81 ADMISSION FOR LONG-TERM (CURRENT) USE OF ANTICOAGULANTS: ICD-10-CM

## 2019-12-31 DIAGNOSIS — D49.7 INTRADURAL EXTRAMEDULLARY SPINAL TUMOR: ICD-10-CM

## 2019-12-31 DIAGNOSIS — Z11.59 NEED FOR HEPATITIS C SCREENING TEST: Primary | ICD-10-CM

## 2019-12-31 DIAGNOSIS — Z79.01 ADMISSION FOR LONG-TERM (CURRENT) USE OF ANTICOAGULANTS: ICD-10-CM

## 2019-12-31 DIAGNOSIS — Z11.59 NEED FOR HEPATITIS C SCREENING TEST: ICD-10-CM

## 2019-12-31 DIAGNOSIS — G95.20 SPINAL CORD COMPRESSION (HCC): ICD-10-CM

## 2019-12-31 DIAGNOSIS — I10 ESSENTIAL HYPERTENSION: ICD-10-CM

## 2019-12-31 LAB
ABO GROUP BLD: NORMAL
ALBUMIN SERPL BCP-MCNC: 4 G/DL (ref 3.5–5)
ALP SERPL-CCNC: 62 U/L (ref 46–116)
ALT SERPL W P-5'-P-CCNC: 20 U/L (ref 12–78)
ANION GAP SERPL CALCULATED.3IONS-SCNC: 3 MMOL/L (ref 4–13)
APTT PPP: 32 SECONDS (ref 23–37)
AST SERPL W P-5'-P-CCNC: 11 U/L (ref 5–45)
BASOPHILS # BLD AUTO: 0.14 THOUSANDS/ΜL (ref 0–0.1)
BASOPHILS NFR BLD AUTO: 1 % (ref 0–1)
BILIRUB SERPL-MCNC: 0.22 MG/DL (ref 0.2–1)
BILIRUB UR QL STRIP: NEGATIVE
BLD GP AB SCN SERPL QL: NEGATIVE
BUN SERPL-MCNC: 16 MG/DL (ref 5–25)
CALCIUM ALBUM COR SERPL-MCNC: 10.3 MG/DL (ref 8.3–10.1)
CALCIUM SERPL-MCNC: 10.3 MG/DL (ref 8.3–10.1)
CHLORIDE SERPL-SCNC: 107 MMOL/L (ref 100–108)
CLARITY UR: CLEAR
CO2 SERPL-SCNC: 33 MMOL/L (ref 21–32)
COLOR UR: YELLOW
CREAT SERPL-MCNC: 1.14 MG/DL (ref 0.6–1.3)
EOSINOPHIL # BLD AUTO: 0.58 THOUSAND/ΜL (ref 0–0.61)
EOSINOPHIL NFR BLD AUTO: 5 % (ref 0–6)
ERYTHROCYTE [DISTWIDTH] IN BLOOD BY AUTOMATED COUNT: 12.7 % (ref 11.6–15.1)
EST. AVERAGE GLUCOSE BLD GHB EST-MCNC: 128 MG/DL
GFR SERPL CREATININE-BSD FRML MDRD: 67 ML/MIN/1.73SQ M
GLUCOSE P FAST SERPL-MCNC: 112 MG/DL (ref 65–99)
GLUCOSE UR STRIP-MCNC: NEGATIVE MG/DL
HBA1C MFR BLD: 6.1 % (ref 4.2–6.3)
HCT VFR BLD AUTO: 48.6 % (ref 36.5–49.3)
HCV AB SER QL: NORMAL
HGB BLD-MCNC: 15.9 G/DL (ref 12–17)
HGB UR QL STRIP.AUTO: NEGATIVE
IMM GRANULOCYTES # BLD AUTO: 0.06 THOUSAND/UL (ref 0–0.2)
IMM GRANULOCYTES NFR BLD AUTO: 1 % (ref 0–2)
INR PPP: 1.04 (ref 0.84–1.19)
KETONES UR STRIP-MCNC: NEGATIVE MG/DL
LEUKOCYTE ESTERASE UR QL STRIP: NEGATIVE
LYMPHOCYTES # BLD AUTO: 3.58 THOUSANDS/ΜL (ref 0.6–4.47)
LYMPHOCYTES NFR BLD AUTO: 33 % (ref 14–44)
MCH RBC QN AUTO: 30.5 PG (ref 26.8–34.3)
MCHC RBC AUTO-ENTMCNC: 32.7 G/DL (ref 31.4–37.4)
MCV RBC AUTO: 93 FL (ref 82–98)
MONOCYTES # BLD AUTO: 0.82 THOUSAND/ΜL (ref 0.17–1.22)
MONOCYTES NFR BLD AUTO: 8 % (ref 4–12)
NEUTROPHILS # BLD AUTO: 5.69 THOUSANDS/ΜL (ref 1.85–7.62)
NEUTS SEG NFR BLD AUTO: 52 % (ref 43–75)
NITRITE UR QL STRIP: NEGATIVE
NRBC BLD AUTO-RTO: 0 /100 WBCS
PH UR STRIP.AUTO: 6 [PH]
PLATELET # BLD AUTO: 274 THOUSANDS/UL (ref 149–390)
PMV BLD AUTO: 11.9 FL (ref 8.9–12.7)
POTASSIUM SERPL-SCNC: 4.2 MMOL/L (ref 3.5–5.3)
PROT SERPL-MCNC: 8 G/DL (ref 6.4–8.2)
PROT UR STRIP-MCNC: NEGATIVE MG/DL
PROTHROMBIN TIME: 13.2 SECONDS (ref 11.6–14.5)
RBC # BLD AUTO: 5.22 MILLION/UL (ref 3.88–5.62)
RH BLD: POSITIVE
SODIUM SERPL-SCNC: 143 MMOL/L (ref 136–145)
SP GR UR STRIP.AUTO: 1.02 (ref 1–1.03)
SPECIMEN EXPIRATION DATE: NORMAL
UROBILINOGEN UR QL STRIP.AUTO: 0.2 E.U./DL
WBC # BLD AUTO: 10.87 THOUSAND/UL (ref 4.31–10.16)

## 2019-12-31 PROCEDURE — 85610 PROTHROMBIN TIME: CPT

## 2019-12-31 PROCEDURE — 81003 URINALYSIS AUTO W/O SCOPE: CPT | Performed by: NEUROLOGICAL SURGERY

## 2019-12-31 PROCEDURE — 36415 COLL VENOUS BLD VENIPUNCTURE: CPT

## 2019-12-31 PROCEDURE — 85730 THROMBOPLASTIN TIME PARTIAL: CPT

## 2019-12-31 PROCEDURE — 83036 HEMOGLOBIN GLYCOSYLATED A1C: CPT

## 2019-12-31 PROCEDURE — 86901 BLOOD TYPING SEROLOGIC RH(D): CPT

## 2019-12-31 PROCEDURE — 99214 OFFICE O/P EST MOD 30 MIN: CPT | Performed by: PHYSICIAN ASSISTANT

## 2019-12-31 PROCEDURE — 86803 HEPATITIS C AB TEST: CPT

## 2019-12-31 PROCEDURE — 86900 BLOOD TYPING SEROLOGIC ABO: CPT

## 2019-12-31 PROCEDURE — 86850 RBC ANTIBODY SCREEN: CPT

## 2019-12-31 PROCEDURE — 85025 COMPLETE CBC W/AUTO DIFF WBC: CPT

## 2019-12-31 PROCEDURE — 80053 COMPREHEN METABOLIC PANEL: CPT

## 2019-12-31 NOTE — PROGRESS NOTES
PRE-OPERATIVE EVALUATION  [facility]    NAME: Andria Chi Sr   AGE: 71 y o  SEX: male  : 1955     DATE: 2019     Pre-Operative Evaluation      Chief Complaint: Pre-operative Evaluation     Surgery: T12 LAMINECTOMY FOR RESECTION OF INTRADURAL-EXTRAMEDULLARY MASS  Anticipated Date of Surgery: 2020  Referring Provider: Corin Kowalski MD       History of Present Illness:     Tanja Saldaña Sr  is a 71 y o  male who presents to the office today for a preoperative consultation at the request of surgeon, Dr Delphine Michael  Planned anesthesia is general  Patient has a bleeding risk of: no recent abnormal bleeding  Patient does not have objections to receiving blood products if needed  Current anti-platelet/anti-coagulation medications that the patient is prescribed includes: none  Assessment of Chronic Conditions:   - Hypertension: well managed with lifestyle     Assessment of Cardiac Risk:  · Denies unstable or severe angina or MI in the last 6 weeks or history of stent placement in the last year   · Denies decompensated heart failure (e g  New onset heart failure, NYHA functional class IV heart failure, or worsening existing heart failure)  · Denies significant arrhythmias such as high grade AV block, symptomatic ventricular arrhythmia, newly recognized ventricular tachycardia, supraventricular tachycardia with resting heart rate >100, or symptomatic bradycardia  · Denies severe heart valve disease including aortic stenosis or symptomatic mitral stenosis     Exercise Capacity:  · Able to walk 4 blocks without symptoms?: Yes  · Able to walk 2 flights without symptoms?: Yes    Prior Anesthesia Reactions: No     Personal history of venous thromboembolic disease? No    History of steroid use for >2 weeks within last year? No          Review of Systems:     Review of Systems   Constitutional: Negative for activity change, chills, fatigue, fever and unexpected weight change  HENT: Negative for rhinorrhea and sore throat  Respiratory: Negative for cough, shortness of breath and wheezing  Cardiovascular: Negative for chest pain, palpitations and leg swelling  Gastrointestinal: Negative for constipation, diarrhea, nausea and vomiting  Genitourinary: Negative for urgency  Musculoskeletal: Negative for back pain, neck pain and neck stiffness  Skin: Negative for rash  Allergic/Immunologic: Negative for environmental allergies and food allergies  Neurological: Negative for dizziness, weakness and headaches  Psychiatric/Behavioral: Negative for dysphoric mood and sleep disturbance  The patient is not nervous/anxious  Current Problem List:     Patient Active Problem List   Diagnosis    Adult residual type attention deficit hyperactivity disorder (ADHD)    Esophagitis    HTN (hypertension)    HLD (hyperlipidemia)    Prediabetes    Chronic bilateral low back pain without sciatica    Adenomatous polyp of transverse colon    Sessile rectal polyp    Erectile dysfunction    Polyp of colon    Bipolar 1 disorder (HCC)    Tubular adenoma of colon    Dizziness    Restless leg syndrome    [facility]ipolar depression [facility] [address] [facility]    Borderline diabetes mellitus    Sinus tachycardia    Severe episode of recurrent major depressive disorder, without psychotic features ([facility] [address] )    Leukocytosis    Stage 2 chronic kidney disease    Intradural extramedullary spinal tumor    Spinal cord compression (HCC)    Bilateral carpal tunnel syndrome       Allergies:     No Known Allergies    Current Medications:     No current outpatient medications on file      Past Medical History:       Past Medical History:   Diagnosis Date    Anxiety     Bipolar depression ([facility] [address] )     bipolar disorder    Borderline diabetes mellitus     Chronic back pain     Chronic pain     Gallstones     Restless leg syndrome     Spinal arachnoid cyst         Past Surgical History:   Procedure Laterality Date    COLONOSCOPY      INCISION AND DRAINAGE ABSCESS / HEMATOMA OF BURSA / Tomales Pence / [facility]en[facility]arret[facility]  in 1990's    outer right leg and center of upper back   complicated  resolved status    OTHER SURGICAL HISTORY  11/2019    cyst excision from back by Dermatolgist at office of Dr Eileen Car FLX DX W/[facility] [address] 1979 PFRMD N/A 8/31/2017    Procedure: COLONOSCOPY;  Surgeon: Olive Baugh MD;  [facility]; Service: Gastroenterology    [place] COLONOSCOPY FLX DX W/[facility] WHEN PFRMD N/A 3/23/2018    Procedure: COLONOSCOPY;  Surgeon: Olive Baugh MD;  [facility];   Service: Gastroenterology    TUMOR REMOVAL  2019'H    "Cheese tumor" removed from back        Family History   Problem Relation Age of Onset    Throat cancer Mother     Heart attack Father         at [de-identified]    Stomach cancer Sister     Lung cancer Brother     Stomach cancer Brother     Heart disease Brother         Social History     Socioeconomic History    Marital status: Single     Spouse name: Not on file    Number of children: 3    Years of education: 6    Highest education level: Not on file   Occupational History     Comment: disabled   Social Needs    Financial resource strain: Not on file    Food insecurity:     Worry: Not on file     Inability: Not on file    Transportation needs:     Medical: Not on file     Non-medical: Not on file   Tobacco Use    Smoking status: Former Smoker    Smokeless tobacco: Never Used    Tobacco comment: all scripts says former smoker quit at 14-17 years old   Substance and Sexual Activity    Alcohol use: Yes     Frequency: Monthly or less     Drinks per session: 1 or 2     Binge frequency: Never     Comment: very seldom now    Drug use: Yes     Types: Marijuana    Sexual activity: Yes     Partners: Female     Birth control/protection: Post-menopausal   Lifestyle    Physical activity:     Days per week: Not on file     Minutes per session: Not on file    Stress: Not on file   Relationships    Social connections:     Talks on phone: Not on file     Gets together: Not on file     Attends [employer] service: Not on file     Active member of club or organization: Not on file     Attends meetings of clubs or organizations: Not on file     Relationship status: Not on file    Intimate partner violence:     Fear of current or ex partner: Not on file     Emotionally abused: Not on file     Physically abused: Not on file     Forced sexual activity: Not on file   Other Topics Concern    Not on file   Social History Narrative    Disabled    [employer]        Physical Exam:     Physical Exam     Data:     Pre-operative work-up    Laboratory Results: Awaiting results    EKG: I have personally reviewed pertinent reports  Chest x-ray: I have personally reviewed pertinent reports  Assessment & Recommendations:     No diagnosis found  Pre-Op Evaluation Assessment  71 y o  male with planned surgery: T12 LAMINECTOMY FOR RESECTION OF INTRADURAL-EXTRAMEDULLARY MASS  Known risk factors for perioperative complications: None  Cardiac Risk Estimation: per the Revised Cardiac Risk Index (Circ  [id number], 1999), the patient's risk factors for cardiac complications include none, putting him in: RCI RISK CLASS I (0 risk factors, risk of major cardiac compl  appr  0 5%)  Current medications which may produce withdrawal symptoms if withheld perioperatively: none  Pre-Op Evaluation Plan  1  Further preoperative workup as follows:   - None; no further preoperative work-up is required    2  Medication Management/Recommendations:   - None, continue medication regimen including morning of surgery, with sip of water  - Patient has been instructed to avoid aspirin containing medications or non-steroidal anti-inflammatory drugs for the week preceding surgery  3  Prophylaxis for cardiac events with perioperative beta-blockers: not indicated  4  Patient requires further consultation with: None    5   Encouraged Pneumonia vaccine at age 71, Patient declined  Clearance  Patient is CLEARED for surgery without any additional cardiac testing  Epifanio Monteiro PA-C   [address]  [address] [place] [id number]  Phone#  [phone]  Fax#  [phone]      BMI Counseling: Body mass index is 30 02 kg/m²  The BMI is above normal  Nutrition recommendations include reducing portion sizes, decreasing overall calorie intake, reducing intake of saturated fat and trans fat and reducing intake of cholesterol  Exercise recommendations include moderate aerobic physical activity for 150 minutes/week

## 2019-12-31 NOTE — PRE-PROCEDURE INSTRUCTIONS
No outpatient medications have been marked as taking for the 1/2/20 encounter [facility] Encounter)  Patient takes no medications  Call disconnected    Left message for patient to call back  Patient left a message to follow doctors instructions for showering  Unable to finish database

## 2020-01-01 ENCOUNTER — ANESTHESIA EVENT (OUTPATIENT)
Dept: PERIOP | Facility: HOSPITAL | Age: 65
DRG: 029 | End: 2020-01-01
Payer: MEDICARE

## 2020-01-02 ENCOUNTER — HOSPITAL ENCOUNTER (INPATIENT)
Facility: HOSPITAL | Age: 65
LOS: 3 days | Discharge: HOME HEALTH | DRG: 029 | End: 2020-01-05
Attending: NEUROLOGICAL SURGERY | Admitting: NEUROLOGICAL SURGERY
Payer: MEDICARE

## 2020-01-02 ENCOUNTER — ANESTHESIA (OUTPATIENT)
Dept: PERIOP | Facility: HOSPITAL | Age: 65
DRG: 029 | End: 2020-01-02
Payer: MEDICARE

## 2020-01-02 ENCOUNTER — APPOINTMENT (OUTPATIENT)
Dept: RADIOLOGY | Facility: HOSPITAL | Age: 65
DRG: 029 | End: 2020-01-02
Payer: MEDICARE

## 2020-01-02 DIAGNOSIS — Z98.890 POSTOPERATIVE STATE: Primary | ICD-10-CM

## 2020-01-02 DIAGNOSIS — D49.7 INTRADURAL EXTRAMEDULLARY SPINAL TUMOR: ICD-10-CM

## 2020-01-02 DIAGNOSIS — G95.20 SPINAL CORD COMPRESSION (HCC): ICD-10-CM

## 2020-01-02 DIAGNOSIS — G95.89 MYELOMALACIA (HCC): ICD-10-CM

## 2020-01-02 LAB
ABO GROUP BLD: NORMAL
GLUCOSE SERPL-MCNC: 133 MG/DL (ref 65–140)
RH BLD: POSITIVE

## 2020-01-02 PROCEDURE — 72080 X-RAY EXAM THORACOLMB 2/> VW: CPT

## 2020-01-02 PROCEDURE — 88331 PATH CONSLTJ SURG 1 BLK 1SPC: CPT | Performed by: PATHOLOGY

## 2020-01-02 PROCEDURE — 86900 BLOOD TYPING SEROLOGIC ABO: CPT | Performed by: NEUROLOGICAL SURGERY

## 2020-01-02 PROCEDURE — 82948 REAGENT STRIP/BLOOD GLUCOSE: CPT

## 2020-01-02 PROCEDURE — 69990 MICROSURGERY ADD-ON: CPT | Performed by: NEUROLOGICAL SURGERY

## 2020-01-02 PROCEDURE — 63281 BX/EXC IDRL SPINE LESN THRC: CPT | Performed by: NEUROLOGICAL SURGERY

## 2020-01-02 PROCEDURE — 88307 TISSUE EXAM BY PATHOLOGIST: CPT | Performed by: PATHOLOGY

## 2020-01-02 PROCEDURE — 86901 BLOOD TYPING SEROLOGIC RH(D): CPT | Performed by: NEUROLOGICAL SURGERY

## 2020-01-02 PROCEDURE — 00BT0ZZ EXCISION OF SPINAL MENINGES, OPEN APPROACH: ICD-10-PCS | Performed by: NEUROLOGICAL SURGERY

## 2020-01-02 RX ORDER — ONDANSETRON 2 MG/ML
INJECTION INTRAMUSCULAR; INTRAVENOUS AS NEEDED
Status: DISCONTINUED | OUTPATIENT
Start: 2020-01-02 | End: 2020-01-02 | Stop reason: SURG

## 2020-01-02 RX ORDER — FENTANYL CITRATE/PF 50 MCG/ML
25 SYRINGE (ML) INJECTION
Status: DISCONTINUED | OUTPATIENT
Start: 2020-01-02 | End: 2020-01-02 | Stop reason: HOSPADM

## 2020-01-02 RX ORDER — LIDOCAINE HYDROCHLORIDE AND EPINEPHRINE 10; 10 MG/ML; UG/ML
INJECTION, SOLUTION INFILTRATION; PERINEURAL AS NEEDED
Status: DISCONTINUED | OUTPATIENT
Start: 2020-01-02 | End: 2020-01-02 | Stop reason: HOSPADM

## 2020-01-02 RX ORDER — MIDAZOLAM HYDROCHLORIDE 2 MG/2ML
INJECTION, SOLUTION INTRAMUSCULAR; INTRAVENOUS AS NEEDED
Status: DISCONTINUED | OUTPATIENT
Start: 2020-01-02 | End: 2020-01-02 | Stop reason: SURG

## 2020-01-02 RX ORDER — MAGNESIUM HYDROXIDE 1200 MG/15ML
LIQUID ORAL AS NEEDED
Status: DISCONTINUED | OUTPATIENT
Start: 2020-01-02 | End: 2020-01-02 | Stop reason: HOSPADM

## 2020-01-02 RX ORDER — BUPIVACAINE HYDROCHLORIDE AND EPINEPHRINE 2.5; 5 MG/ML; UG/ML
INJECTION, SOLUTION EPIDURAL; INFILTRATION; INTRACAUDAL; PERINEURAL AS NEEDED
Status: DISCONTINUED | OUTPATIENT
Start: 2020-01-02 | End: 2020-01-02 | Stop reason: HOSPADM

## 2020-01-02 RX ORDER — SODIUM CHLORIDE, SODIUM LACTATE, POTASSIUM CHLORIDE, CALCIUM CHLORIDE 600; 310; 30; 20 MG/100ML; MG/100ML; MG/100ML; MG/100ML
125 INJECTION, SOLUTION INTRAVENOUS CONTINUOUS
Status: DISCONTINUED | OUTPATIENT
Start: 2020-01-02 | End: 2020-01-02

## 2020-01-02 RX ORDER — SODIUM CHLORIDE 9 MG/ML
125 INJECTION, SOLUTION INTRAVENOUS CONTINUOUS
Status: DISCONTINUED | OUTPATIENT
Start: 2020-01-02 | End: 2020-01-04

## 2020-01-02 RX ORDER — SUCCINYLCHOLINE/SOD CL,ISO/PF 100 MG/5ML
SYRINGE (ML) INTRAVENOUS AS NEEDED
Status: DISCONTINUED | OUTPATIENT
Start: 2020-01-02 | End: 2020-01-02 | Stop reason: SURG

## 2020-01-02 RX ORDER — CEFAZOLIN SODIUM 2 G/50ML
SOLUTION INTRAVENOUS AS NEEDED
Status: DISCONTINUED | OUTPATIENT
Start: 2020-01-02 | End: 2020-01-02 | Stop reason: SURG

## 2020-01-02 RX ORDER — OXYCODONE HYDROCHLORIDE 10 MG/1
10 TABLET ORAL EVERY 4 HOURS PRN
Status: DISCONTINUED | OUTPATIENT
Start: 2020-01-02 | End: 2020-01-05 | Stop reason: HOSPADM

## 2020-01-02 RX ORDER — HYDROMORPHONE HCL/PF 1 MG/ML
0.2 SYRINGE (ML) INJECTION
Status: DISCONTINUED | OUTPATIENT
Start: 2020-01-02 | End: 2020-01-02 | Stop reason: HOSPADM

## 2020-01-02 RX ORDER — ALBUMIN, HUMAN INJ 5% 5 %
SOLUTION INTRAVENOUS CONTINUOUS PRN
Status: DISCONTINUED | OUTPATIENT
Start: 2020-01-02 | End: 2020-01-02 | Stop reason: SURG

## 2020-01-02 RX ORDER — CHLORHEXIDINE GLUCONATE 0.12 MG/ML
15 RINSE ORAL ONCE
Status: COMPLETED | OUTPATIENT
Start: 2020-01-02 | End: 2020-01-02

## 2020-01-02 RX ORDER — AMOXICILLIN 250 MG
2 CAPSULE ORAL DAILY
Status: DISCONTINUED | OUTPATIENT
Start: 2020-01-02 | End: 2020-01-05 | Stop reason: HOSPADM

## 2020-01-02 RX ORDER — LIDOCAINE HYDROCHLORIDE 10 MG/ML
INJECTION, SOLUTION EPIDURAL; INFILTRATION; INTRACAUDAL; PERINEURAL AS NEEDED
Status: DISCONTINUED | OUTPATIENT
Start: 2020-01-02 | End: 2020-01-02 | Stop reason: SURG

## 2020-01-02 RX ORDER — GLYCOPYRROLATE 0.2 MG/ML
INJECTION INTRAMUSCULAR; INTRAVENOUS AS NEEDED
Status: DISCONTINUED | OUTPATIENT
Start: 2020-01-02 | End: 2020-01-02 | Stop reason: SURG

## 2020-01-02 RX ORDER — LIDOCAINE HYDROCHLORIDE 10 MG/ML
0.5 INJECTION, SOLUTION EPIDURAL; INFILTRATION; INTRACAUDAL; PERINEURAL ONCE AS NEEDED
Status: DISCONTINUED | OUTPATIENT
Start: 2020-01-02 | End: 2020-01-02 | Stop reason: HOSPADM

## 2020-01-02 RX ORDER — DEXAMETHASONE SODIUM PHOSPHATE 10 MG/ML
INJECTION, SOLUTION INTRAMUSCULAR; INTRAVENOUS AS NEEDED
Status: DISCONTINUED | OUTPATIENT
Start: 2020-01-02 | End: 2020-01-02 | Stop reason: SURG

## 2020-01-02 RX ORDER — ONDANSETRON 2 MG/ML
4 INJECTION INTRAMUSCULAR; INTRAVENOUS EVERY 6 HOURS PRN
Status: DISCONTINUED | OUTPATIENT
Start: 2020-01-02 | End: 2020-01-04

## 2020-01-02 RX ORDER — SODIUM CHLORIDE 9 MG/ML
125 INJECTION, SOLUTION INTRAVENOUS CONTINUOUS
Status: DISCONTINUED | OUTPATIENT
Start: 2020-01-02 | End: 2020-01-02

## 2020-01-02 RX ORDER — HYDROMORPHONE HCL/PF 1 MG/ML
0.5 SYRINGE (ML) INJECTION EVERY 2 HOUR PRN
Status: DISCONTINUED | OUTPATIENT
Start: 2020-01-02 | End: 2020-01-04

## 2020-01-02 RX ORDER — PROPOFOL 10 MG/ML
INJECTION, EMULSION INTRAVENOUS CONTINUOUS PRN
Status: DISCONTINUED | OUTPATIENT
Start: 2020-01-02 | End: 2020-01-02

## 2020-01-02 RX ORDER — FENTANYL CITRATE 50 UG/ML
INJECTION, SOLUTION INTRAMUSCULAR; INTRAVENOUS AS NEEDED
Status: DISCONTINUED | OUTPATIENT
Start: 2020-01-02 | End: 2020-01-02 | Stop reason: SURG

## 2020-01-02 RX ORDER — METHOCARBAMOL 750 MG/1
750 TABLET, FILM COATED ORAL EVERY 6 HOURS SCHEDULED
Status: DISCONTINUED | OUTPATIENT
Start: 2020-01-02 | End: 2020-01-05 | Stop reason: HOSPADM

## 2020-01-02 RX ORDER — PROPOFOL 10 MG/ML
INJECTION, EMULSION INTRAVENOUS CONTINUOUS PRN
Status: DISCONTINUED | OUTPATIENT
Start: 2020-01-02 | End: 2020-01-02 | Stop reason: SURG

## 2020-01-02 RX ORDER — HYDROMORPHONE HCL 110MG/55ML
PATIENT CONTROLLED ANALGESIA SYRINGE INTRAVENOUS AS NEEDED
Status: DISCONTINUED | OUTPATIENT
Start: 2020-01-02 | End: 2020-01-02 | Stop reason: SURG

## 2020-01-02 RX ORDER — DEXAMETHASONE SODIUM PHOSPHATE 4 MG/ML
2 INJECTION, SOLUTION INTRA-ARTICULAR; INTRALESIONAL; INTRAMUSCULAR; INTRAVENOUS; SOFT TISSUE EVERY 6 HOURS SCHEDULED
Status: DISCONTINUED | OUTPATIENT
Start: 2020-01-02 | End: 2020-01-04

## 2020-01-02 RX ORDER — HEPARIN SODIUM 5000 [USP'U]/ML
5000 INJECTION, SOLUTION INTRAVENOUS; SUBCUTANEOUS EVERY 8 HOURS SCHEDULED
Status: DISCONTINUED | OUTPATIENT
Start: 2020-01-02 | End: 2020-01-05 | Stop reason: HOSPADM

## 2020-01-02 RX ORDER — DIPHENHYDRAMINE HYDROCHLORIDE 50 MG/ML
12.5 INJECTION INTRAMUSCULAR; INTRAVENOUS ONCE AS NEEDED
Status: DISCONTINUED | OUTPATIENT
Start: 2020-01-02 | End: 2020-01-02 | Stop reason: HOSPADM

## 2020-01-02 RX ORDER — ACETAMINOPHEN 325 MG/1
975 TABLET ORAL EVERY 8 HOURS SCHEDULED
Status: DISCONTINUED | OUTPATIENT
Start: 2020-01-02 | End: 2020-01-05 | Stop reason: HOSPADM

## 2020-01-02 RX ORDER — CEFAZOLIN SODIUM 2 G/50ML
2000 SOLUTION INTRAVENOUS ONCE
Status: DISCONTINUED | OUTPATIENT
Start: 2020-01-02 | End: 2020-01-02 | Stop reason: HOSPADM

## 2020-01-02 RX ORDER — OXYCODONE HYDROCHLORIDE 5 MG/1
5 TABLET ORAL EVERY 4 HOURS PRN
Status: DISCONTINUED | OUTPATIENT
Start: 2020-01-02 | End: 2020-01-05 | Stop reason: HOSPADM

## 2020-01-02 RX ORDER — CEFAZOLIN SODIUM 1 G/50ML
1000 SOLUTION INTRAVENOUS EVERY 8 HOURS
Status: COMPLETED | OUTPATIENT
Start: 2020-01-02 | End: 2020-01-02

## 2020-01-02 RX ORDER — PROPOFOL 10 MG/ML
INJECTION, EMULSION INTRAVENOUS AS NEEDED
Status: DISCONTINUED | OUTPATIENT
Start: 2020-01-02 | End: 2020-01-02 | Stop reason: SURG

## 2020-01-02 RX ORDER — ONDANSETRON 2 MG/ML
4 INJECTION INTRAMUSCULAR; INTRAVENOUS ONCE AS NEEDED
Status: DISCONTINUED | OUTPATIENT
Start: 2020-01-02 | End: 2020-01-02 | Stop reason: HOSPADM

## 2020-01-02 RX ADMIN — CEFAZOLIN SODIUM 2000 MG: 2 SOLUTION INTRAVENOUS at 07:52

## 2020-01-02 RX ADMIN — GLYCOPYRROLATE 0.2 MG: 0.2 INJECTION, SOLUTION INTRAMUSCULAR; INTRAVENOUS at 07:29

## 2020-01-02 RX ADMIN — SODIUM CHLORIDE, SODIUM LACTATE, POTASSIUM CHLORIDE, AND CALCIUM CHLORIDE: .6; .31; .03; .02 INJECTION, SOLUTION INTRAVENOUS at 09:38

## 2020-01-02 RX ADMIN — CEFAZOLIN SODIUM 1000 MG: 1 SOLUTION INTRAVENOUS at 23:03

## 2020-01-02 RX ADMIN — ALBUMIN (HUMAN): 12.5 INJECTION, SOLUTION INTRAVENOUS at 08:26

## 2020-01-02 RX ADMIN — FENTANYL CITRATE 25 MCG: 50 INJECTION, SOLUTION INTRAMUSCULAR; INTRAVENOUS at 11:48

## 2020-01-02 RX ADMIN — CEFAZOLIN SODIUM 1000 MG: 1 SOLUTION INTRAVENOUS at 17:00

## 2020-01-02 RX ADMIN — FENTANYL CITRATE 25 MCG: 50 INJECTION, SOLUTION INTRAMUSCULAR; INTRAVENOUS at 12:11

## 2020-01-02 RX ADMIN — OXYCODONE HYDROCHLORIDE 5 MG: 5 TABLET ORAL at 23:07

## 2020-01-02 RX ADMIN — Medication 100 MG: at 07:45

## 2020-01-02 RX ADMIN — SODIUM CHLORIDE 125 ML/HR: 0.9 INJECTION, SOLUTION INTRAVENOUS at 12:38

## 2020-01-02 RX ADMIN — PROPOFOL 100 MCG/KG/MIN: 10 INJECTION, EMULSION INTRAVENOUS at 08:00

## 2020-01-02 RX ADMIN — PHENYLEPHRINE HYDROCHLORIDE 100 MCG/MIN: 10 INJECTION INTRAVENOUS at 08:23

## 2020-01-02 RX ADMIN — DEXAMETHASONE SODIUM PHOSPHATE 2 MG: 4 INJECTION, SOLUTION INTRAMUSCULAR; INTRAVENOUS at 17:10

## 2020-01-02 RX ADMIN — CHLORHEXIDINE GLUCONATE 0.12% ORAL RINSE 15 ML: 1.2 LIQUID ORAL at 06:09

## 2020-01-02 RX ADMIN — FENTANYL CITRATE 25 MCG: 50 INJECTION, SOLUTION INTRAMUSCULAR; INTRAVENOUS at 11:33

## 2020-01-02 RX ADMIN — DEXAMETHASONE SODIUM PHOSPHATE 2 MG: 4 INJECTION, SOLUTION INTRAMUSCULAR; INTRAVENOUS at 23:03

## 2020-01-02 RX ADMIN — METHOCARBAMOL TABLETS 750 MG: 750 TABLET, COATED ORAL at 23:03

## 2020-01-02 RX ADMIN — REMIFENTANIL HYDROCHLORIDE 0.4 MCG/KG/MIN: 1 INJECTION, POWDER, LYOPHILIZED, FOR SOLUTION INTRAVENOUS at 08:10

## 2020-01-02 RX ADMIN — METHOCARBAMOL TABLETS 750 MG: 750 TABLET, COATED ORAL at 17:10

## 2020-01-02 RX ADMIN — SODIUM CHLORIDE, SODIUM LACTATE, POTASSIUM CHLORIDE, AND CALCIUM CHLORIDE: .6; .31; .03; .02 INJECTION, SOLUTION INTRAVENOUS at 07:15

## 2020-01-02 RX ADMIN — PROPOFOL 200 MG: 10 INJECTION, EMULSION INTRAVENOUS at 07:44

## 2020-01-02 RX ADMIN — OXYCODONE HYDROCHLORIDE 5 MG: 5 TABLET ORAL at 17:12

## 2020-01-02 RX ADMIN — FENTANYL CITRATE 25 MCG: 50 INJECTION, SOLUTION INTRAMUSCULAR; INTRAVENOUS at 11:30

## 2020-01-02 RX ADMIN — FENTANYL CITRATE 50 MCG: 50 INJECTION, SOLUTION INTRAMUSCULAR; INTRAVENOUS at 07:57

## 2020-01-02 RX ADMIN — ACETAMINOPHEN 975 MG: 325 TABLET ORAL at 14:10

## 2020-01-02 RX ADMIN — METHOCARBAMOL TABLETS 750 MG: 750 TABLET, COATED ORAL at 14:10

## 2020-01-02 RX ADMIN — ONDANSETRON 4 MG: 2 INJECTION INTRAMUSCULAR; INTRAVENOUS at 10:20

## 2020-01-02 RX ADMIN — LIDOCAINE HYDROCHLORIDE 100 MG: 10 INJECTION, SOLUTION EPIDURAL; INFILTRATION; INTRACAUDAL; PERINEURAL at 07:44

## 2020-01-02 RX ADMIN — DEXAMETHASONE SODIUM PHOSPHATE 2 MG: 4 INJECTION, SOLUTION INTRAMUSCULAR; INTRAVENOUS at 12:10

## 2020-01-02 RX ADMIN — DEXAMETHASONE SODIUM PHOSPHATE 10 MG: 10 INJECTION, SOLUTION INTRAMUSCULAR; INTRAVENOUS at 07:55

## 2020-01-02 RX ADMIN — MIDAZOLAM 2 MG: 1 INJECTION INTRAMUSCULAR; INTRAVENOUS at 07:29

## 2020-01-02 RX ADMIN — HYDROMORPHONE HYDROCHLORIDE 0.5 MG: 2 INJECTION, SOLUTION INTRAMUSCULAR; INTRAVENOUS; SUBCUTANEOUS at 10:29

## 2020-01-02 RX ADMIN — SODIUM CHLORIDE: 0.9 INJECTION, SOLUTION INTRAVENOUS at 07:51

## 2020-01-02 RX ADMIN — FENTANYL CITRATE 50 MCG: 50 INJECTION, SOLUTION INTRAMUSCULAR; INTRAVENOUS at 07:44

## 2020-01-02 RX ADMIN — HYDROMORPHONE HYDROCHLORIDE 0.5 MG: 2 INJECTION, SOLUTION INTRAMUSCULAR; INTRAVENOUS; SUBCUTANEOUS at 10:33

## 2020-01-02 RX ADMIN — SODIUM CHLORIDE 125 ML/HR: 0.9 INJECTION, SOLUTION INTRAVENOUS at 21:01

## 2020-01-02 NOTE — OP NOTE
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]     * Spinal cord compression ([address] ) [G95 20]     * Myelomalacia ([address] ) [G95 89]    Procedure:  Procedure(s):  T12 LAMINECTOMY FOR RESECTION OF INTRADURAL-EXTRAMEDULLARY MASS    Surgeon: Surgeon(s) and Role:     * Uriel Go MD - Primary     * Phyllis Peres PA-C - Assisting     Anesthesia: General    Staff:   Circulator: Gladis Johnson RN  Radiology Technologist: Gomez Mcpherson Circulator: Concepcion Rae RN  Scrub Person: Solange Savage  No qualified Resident was available  Estimated Blood Loss: Minimal    Specimens:                Order Name Source Comment Collection Info Order Time   ABO/RH Arm, Right  Collected By: Ha Argueta 1/2/2020  6:12 AM   TISSUE EXAM Spinal Cord  Collected By: Uriel Go MD 1/2/2020  9:36 AM       Drains:   Urethral Catheter Latex 16 Fr  (Active)   Collection Container Standard drainage bag 1/2/2020  7:57 AM       Findings:  Beige exudate like material filling thin walled cyst located dorsally to the spinal cord; some small nodules along the dorsal spinal cord at base of cystic mass  Complications:  none    OR note:    Indications    14-year-old male, with a T12 intradural extramedullary mass that I have followed outpatient for some years  It has not shown any overt radiographic progression, but the patient has developed clinically more frequent back and leg pain  He also mention occasional bowel incontinence and urinary urgency with nocturia 2-3 times per night  Three originally planned for surgery back in 04/2019, but he had inflamed superficial infection skin over the incision site, he was referred to Dermatology, who excised treated that area      We discussed options for management specifically regarding surgery  Plan was for a T12 laminectomy for resection of this IDEM mass  Expected benefits included preservation of neurologic status and prevention of worsening in years to come  Attendant risks personally reviewed, including but not limited to bleeding, infection, VTE, CSF leak, need for additional surgery, transient or permanent neurological dysfunction  He wished to proceed and written consent obtained  Details of Procedure    The patient was brought to the OR and induced with GETA  A Fernandes catheter and radial a-line were placed  He was rotated prone onto the Jaquan table, arms in the up position  Flouroscopy was used to count up from the sacrum and localize T12 both A/P and laterally  A midline incision was marked over the T12 lamina  The area was prepped and draped in the standard fashion  A time out was performed  The patient was given IV Abx per protocol as well as 10 mg of Decadron  The incision was infiltrated with 1% lidocaine with epinephrine  Skin incision was made and dissection carried down with the Bovie in a subperiosteal fashion, exposing the inferior T11 spinous process and lamina, all of the T12 spinous process and lamina, and the superior L1 lamina  Care was taken not to violate the facets  Flourographs were taken with a Kocher clamp on T12, again counting up from the sacrum, both AP and lateral, confirming the level and checked with neuroradiology  The Nellene Bussing was used to remove the spinous process of T12, and the Midas used to drill bilateral troughs in the lamina of T12  The lamina was removed and ligamentum flavum resected T11-12 and T12-L1  Ultrasound was then used to confirm the laminectomy was sufficient over the IDEM mass  The operative microscope was brought in to allow better visualization, illumination, and magnification, and micro-instruments were utilized for micro-dissection      The dura was opened midline over the mass and tacked up bilaterally  Arachnoid bands were removed from around the pearly white extramedullary mass  I had y detached the mass circumferentially from the spinal cord on four sides and was working centrally removing arachnoid adhesions, when the mass ruptured  Creamy beige fluid emanated, and I was unable to actually collect any for specimen  The wall of the mass deflated  I continued dissecting around its base,  it from the dorsal spinal cord  There were a few small nodules in this material that was just dorsal to the spinal cord itself, which were excised with the cystic wall  Part of the wall was sent for frozen pathology, and showed no malignant features  The rest was sent in formalin for routine pathology  MEPs, SSEPs remained stable  Hemostasis was confirmed intradural  The spinal cord was deviating prominently posteriorly, but ultrasound did not show any mass ventral to the cord  The dura was closed with a running 6-0 Gortex suture  There remained some leakage, likely from suture holes  A few interrupted Gortex were also placed, and then the area treated with DuraSeal  On top of that, pieces of surgicel were placed and then fresh bleeding induced in the adjacent muscle to create a blood patch  Hemostasis in the muscle was then achieved  The muscle was approximated with 0 Vicryl plus suture  The fascia was closed with interrupted 0 Vicryl pulse suture, and then oversewn with a running 0 Vicryl plus suture  The deep dermis was closed with inverted 2-0 Vicryl plus suture, and the skin closed with 3-0 Monocryl  The incision was blocked with 0 25% marcaine with epinephrine  It was dressed with ActiCoat, 4x4's and Tegaderm  The drapes were withdrawn, and the patient rotated supine on his hospital bed  He was kept flat, and awoken deep from anesthesia to prevent bucking and coughing  He was taken to the PACU in stable condition       No qualified resident was oneyda Cordoba was present for the entire procedure, and provided essential assistance with with proper exposure, retraction, mass resection, hemostasis, and wound closure, which was necessary secondary to the complex nature of this case         Neal Landis MD     Date: 1/2/2020  Time: 10:48 AM

## 2020-01-02 NOTE — ANESTHESIA PREPROCEDURE EVALUATION
Review of Systems/Medical History  Patient summary reviewed  Chart reviewed  No history of anesthetic complications     Cardiovascular  Negative cardio ROS Exercise tolerance (METS): >4,  Hyperlipidemia, Hypertension ,    Pulmonary  Negative pulmonary ROS Smoker ex-smoker  ,        GI/Hepatic  Negative GI/hepatic ROS     Comment: gallstones     Negative  ROS        Endo/Other  Negative endo/other ROS      GYN  Negative gynecology ROS          Hematology  Negative hematology ROS      Musculoskeletal  Negative musculoskeletal ROS        Neurology  Negative neurology ROS     Comment: Spinal tumor Psychology   Negative psychology ROS Anxiety,              Physical Exam    Airway    Mallampati score: II  TM Distance: >3 FB  Neck ROM: full     Dental   No notable dental hx     Cardiovascular  Comment: Negative ROS, Rhythm: regular, Rate: normal, Cardiovascular exam normal    Pulmonary  Pulmonary exam normal Breath sounds clear to auscultation,     Other Findings        Anesthesia Plan  ASA Score- 2     Anesthesia Type- general with ASA Monitors  Additional Monitors: arterial line  Airway Plan: ETT  Comment: Risks/benefits and alternatives discussed with patient including possible PONV, sore throat, and possibility of rare anesthetic and surgical emergencies        Plan Factors- Patient instructed to abstain from smoking on day of procedure  Patient did not smoke on day of surgery  Induction- intravenous  Postoperative Plan- Plan for postoperative opioid use  Planned trial extubation    Informed Consent- Anesthetic plan and risks discussed with patient  I personally reviewed this patient with the CRNA  Discussed and agreed on the Anesthesia Plan with the CRNA  Caden Myers

## 2020-01-02 NOTE — ANESTHESIA POSTPROCEDURE EVALUATION
Post-Op Assessment Note    CV Status:  Stable  Pain Score: 0    Pain management: satisfactory to patient     Mental Status:  Sleepy   PONV Controlled:  None   Airway Patency:  Patent   Post Op Vitals Reviewed: Yes      Staff: Anesthesiologist, with CRNAs           BP   120/82   Temp      Pulse  101   Resp   12   SpO2   98

## 2020-01-02 NOTE — PLAN OF CARE
Problem: PAIN - ADULT  Goal: Verbalizes/displays adequate comfort level or baseline comfort level  Description  Interventions:  - Encourage patient to monitor pain and request assistance  - Assess pain using appropriate pain scale  - Administer analgesics based on type and severity of pain and evaluate response  - Implement non-pharmacological measures as appropriate and evaluate response  - Consider cultural and social influences on pain and pain management  - Notify physician/advanced practitioner if interventions unsuccessful or patient reports new pain  Outcome: Progressing     Problem: INFECTION - ADULT  Goal: Absence or prevention of progression during hospitalization  Description  INTERVENTIONS:  - Assess and monitor for signs and symptoms of infection  - Monitor lab/diagnostic results  - Monitor all insertion sites, i e  indwelling lines, tubes, and drains  - Monitor endotracheal if appropriate and nasal secretions for changes in amount and color  - [place] appropriate cooling/warming therapies per order  - Administer medications as ordered  - Instruct and encourage patient and family to use good hand hygiene technique  - Identify and instruct in appropriate isolation precautions for identified infection/condition  Outcome: Progressing  Goal: Absence of fever/infection during neutropenic period  Description  INTERVENTIONS:  - Monitor WBC    Outcome: Progressing     Problem: SAFETY ADULT  Goal: Patient will remain free of falls  Description  INTERVENTIONS:  - Assess patient frequently for physical needs  -  Identify cognitive and physical deficits and behaviors that affect risk of falls    -  [place] fall precautions as indicated by assessment   - Educate patient/family on patient safety including physical limitations  - Instruct patient to call for assistance with activity based on assessment  - Modify environment to reduce risk of injury  - Consider OT/PT consult to assist with strengthening/mobility  Outcome: Progressing  Goal: Maintain or return to baseline ADL function  Description  INTERVENTIONS:  -  Assess patient's ability to carry out ADLs; assess patient's baseline for ADL function and identify physical deficits which impact ability to perform ADLs (bathing, care of mouth/teeth, toileting, grooming, dressing, etc )  - Assess/evaluate cause of self-care deficits   - Assess range of motion  - Assess patient's mobility; develop plan if impaired  - Assess patient's need for assistive devices and provide as appropriate  - Encourage maximum independence but intervene and supervise when necessary  - Involve family in performance of ADLs  - Assess for home care needs following discharge   - Consider OT consult to assist with ADL evaluation and planning for discharge  - Provide patient education as appropriate  Outcome: Progressing  Goal: Maintain or return mobility status to optimal level  Description  INTERVENTIONS:  - Assess patient's baseline mobility status (ambulation, transfers, stairs, etc )    - Identify cognitive and physical deficits and behaviors that affect mobility  - Identify mobility aids required to assist with transfers and/or ambulation (gait belt, sit-to-stand, lift, walker, cane, etc )  - [place] fall precautions as indicated by assessment  - Record patient progress and toleration of activity level on Mobility SBAR; progress patient to next Phase/Stage  - Instruct patient to call for assistance with activity based on assessment  - Consider rehabilitation consult to assist with strengthening/weightbearing, etc   Outcome: Progressing     Problem: DISCHARGE PLANNING  Goal: Discharge to home or other facility with appropriate resources  Description  INTERVENTIONS:  - Identify barriers to discharge w/patient and caregiver  - Arrange for needed discharge resources and transportation as appropriate  - Identify discharge learning needs (meds, wound care, etc )  - Arrange for interpretive services to assist at discharge as needed  - Refer to Case Management Department for coordinating discharge planning if the patient needs post-hospital services based on physician/advanced practitioner order or complex needs related to functional status, cognitive ability, or social support system  Outcome: Progressing     Problem: Knowledge Deficit  Goal: Patient/family/caregiver demonstrates understanding of disease process, treatment plan, medications, and discharge instructions  Description  Complete learning assessment and assess knowledge base    Interventions:  - Provide teaching at level of understanding  - Provide teaching via preferred learning methods  Outcome: Progressing

## 2020-01-03 LAB
ANION GAP SERPL CALCULATED.3IONS-SCNC: 5 MMOL/L (ref 4–13)
BUN SERPL-MCNC: 14 MG/DL (ref 5–25)
CALCIUM SERPL-MCNC: 9.5 MG/DL (ref 8.3–10.1)
CHLORIDE SERPL-SCNC: 108 MMOL/L (ref 100–108)
CO2 SERPL-SCNC: 27 MMOL/L (ref 21–32)
CREAT SERPL-MCNC: 1.2 MG/DL (ref 0.6–1.3)
ERYTHROCYTE [DISTWIDTH] IN BLOOD BY AUTOMATED COUNT: 12.4 % (ref 11.6–15.1)
GFR SERPL CREATININE-BSD FRML MDRD: 63 ML/MIN/1.73SQ M
GLUCOSE SERPL-MCNC: 154 MG/DL (ref 65–140)
HCT VFR BLD AUTO: 43.7 % (ref 36.5–49.3)
HGB BLD-MCNC: 14.5 G/DL (ref 12–17)
MCH RBC QN AUTO: 30.2 PG (ref 26.8–34.3)
MCHC RBC AUTO-ENTMCNC: 33.2 G/DL (ref 31.4–37.4)
MCV RBC AUTO: 91 FL (ref 82–98)
PLATELET # BLD AUTO: 268 THOUSANDS/UL (ref 149–390)
PMV BLD AUTO: 10.4 FL (ref 8.9–12.7)
POTASSIUM SERPL-SCNC: 4.3 MMOL/L (ref 3.5–5.3)
RBC # BLD AUTO: 4.8 MILLION/UL (ref 3.88–5.62)
SODIUM SERPL-SCNC: 140 MMOL/L (ref 136–145)
WBC # BLD AUTO: 26.72 THOUSAND/UL (ref 4.31–10.16)

## 2020-01-03 PROCEDURE — 80048 BASIC METABOLIC PNL TOTAL CA: CPT | Performed by: PHYSICIAN ASSISTANT

## 2020-01-03 PROCEDURE — 85027 COMPLETE CBC AUTOMATED: CPT | Performed by: PHYSICIAN ASSISTANT

## 2020-01-03 PROCEDURE — 99024 POSTOP FOLLOW-UP VISIT: CPT | Performed by: NEUROLOGICAL SURGERY

## 2020-01-03 RX ADMIN — METHOCARBAMOL TABLETS 750 MG: 750 TABLET, COATED ORAL at 05:05

## 2020-01-03 RX ADMIN — METHOCARBAMOL TABLETS 750 MG: 750 TABLET, COATED ORAL at 23:09

## 2020-01-03 RX ADMIN — SODIUM CHLORIDE 125 ML/HR: 0.9 INJECTION, SOLUTION INTRAVENOUS at 05:05

## 2020-01-03 RX ADMIN — SODIUM CHLORIDE 125 ML/HR: 0.9 INJECTION, SOLUTION INTRAVENOUS at 12:55

## 2020-01-03 RX ADMIN — OXYCODONE HYDROCHLORIDE 10 MG: 10 TABLET ORAL at 05:05

## 2020-01-03 RX ADMIN — DEXAMETHASONE SODIUM PHOSPHATE 2 MG: 4 INJECTION, SOLUTION INTRAMUSCULAR; INTRAVENOUS at 12:49

## 2020-01-03 RX ADMIN — SODIUM CHLORIDE 125 ML/HR: 0.9 INJECTION, SOLUTION INTRAVENOUS at 20:21

## 2020-01-03 RX ADMIN — METHOCARBAMOL TABLETS 750 MG: 750 TABLET, COATED ORAL at 17:11

## 2020-01-03 RX ADMIN — DEXAMETHASONE SODIUM PHOSPHATE 2 MG: 4 INJECTION, SOLUTION INTRAMUSCULAR; INTRAVENOUS at 05:06

## 2020-01-03 RX ADMIN — DEXAMETHASONE SODIUM PHOSPHATE 2 MG: 4 INJECTION, SOLUTION INTRAMUSCULAR; INTRAVENOUS at 23:11

## 2020-01-03 RX ADMIN — OXYCODONE HYDROCHLORIDE 10 MG: 10 TABLET ORAL at 14:29

## 2020-01-03 RX ADMIN — METHOCARBAMOL TABLETS 750 MG: 750 TABLET, COATED ORAL at 12:50

## 2020-01-03 RX ADMIN — ACETAMINOPHEN 975 MG: 325 TABLET ORAL at 14:28

## 2020-01-03 RX ADMIN — OXYCODONE HYDROCHLORIDE 10 MG: 10 TABLET ORAL at 21:48

## 2020-01-03 RX ADMIN — DEXAMETHASONE SODIUM PHOSPHATE 2 MG: 4 INJECTION, SOLUTION INTRAMUSCULAR; INTRAVENOUS at 17:11

## 2020-01-03 NOTE — ASSESSMENT & PLAN NOTE
Likely multifactorial-reactive postoperative and steroid induced    Continue to monitor  Repeat CBC in a m

## 2020-01-03 NOTE — PLAN OF CARE
Problem: PAIN - ADULT  Goal: Verbalizes/displays adequate comfort level or baseline comfort level  Description  Interventions:  - Encourage patient to monitor pain and request assistance  - Assess pain using appropriate pain scale  - Administer analgesics based on type and severity of pain and evaluate response  - Implement non-pharmacological measures as appropriate and evaluate response  - Consider cultural and social influences on pain and pain management  - Notify physician/advanced practitioner if interventions unsuccessful or patient reports new pain  Outcome: Progressing     Problem: INFECTION - ADULT  Goal: Absence or prevention of progression during hospitalization  Description  INTERVENTIONS:  - Assess and monitor for signs and symptoms of infection  - Monitor lab/diagnostic results  - Monitor all insertion sites, i e  indwelling lines, tubes, and drains  - Monitor endotracheal if appropriate and nasal secretions for changes in amount and color  - [place] appropriate cooling/warming therapies per order  - Administer medications as ordered  - Instruct and encourage patient and family to use good hand hygiene technique  - Identify and instruct in appropriate isolation precautions for identified infection/condition  Outcome: Progressing  Goal: Absence of fever/infection during neutropenic period  Description  INTERVENTIONS:  - Monitor WBC    Outcome: Progressing     Problem: SAFETY ADULT  Goal: Patient will remain free of falls  Description  INTERVENTIONS:  - Assess patient frequently for physical needs  -  Identify cognitive and physical deficits and behaviors that affect risk of falls    -  [place] fall precautions as indicated by assessment   - Educate patient/family on patient safety including physical limitations  - Instruct patient to call for assistance with activity based on assessment  - Modify environment to reduce risk of injury  - Consider OT/PT consult to assist with strengthening/mobility  Outcome: Progressing  Goal: Maintain or return to baseline ADL function  Description  INTERVENTIONS:  -  Assess patient's ability to carry out ADLs; assess patient's baseline for ADL function and identify physical deficits which impact ability to perform ADLs (bathing, care of mouth/teeth, toileting, grooming, dressing, etc )  - Assess/evaluate cause of self-care deficits   - Assess range of motion  - Assess patient's mobility; develop plan if impaired  - Assess patient's need for assistive devices and provide as appropriate  - Encourage maximum independence but intervene and supervise when necessary  - Involve family in performance of ADLs  - Assess for home care needs following discharge   - Consider OT consult to assist with ADL evaluation and planning for discharge  - Provide patient education as appropriate  Outcome: Progressing  Goal: Maintain or return mobility status to optimal level  Description  INTERVENTIONS:  - Assess patient's baseline mobility status (ambulation, transfers, stairs, etc )    - Identify cognitive and physical deficits and behaviors that affect mobility  - Identify mobility aids required to assist with transfers and/or ambulation (gait belt, sit-to-stand, lift, walker, cane, etc )  - [place] fall precautions as indicated by assessment  - Record patient progress and toleration of activity level on Mobility SBAR; progress patient to next Phase/Stage  - Instruct patient to call for assistance with activity based on assessment  - Consider rehabilitation consult to assist with strengthening/weightbearing, etc   Outcome: Progressing     Problem: DISCHARGE PLANNING  Goal: Discharge to home or other facility with appropriate resources  Description  INTERVENTIONS:  - Identify barriers to discharge w/patient and caregiver  - Arrange for needed discharge resources and transportation as appropriate  - Identify discharge learning needs (meds, wound care, etc )  - Arrange for interpretive services to assist at discharge as needed  - Refer to Case Management Department for coordinating discharge planning if the patient needs post-hospital services based on physician/advanced practitioner order or complex needs related to functional status, cognitive ability, or social support system  Outcome: Progressing     Problem: Knowledge Deficit  Goal: Patient/family/caregiver demonstrates understanding of disease process, treatment plan, medications, and discharge instructions  Description  Complete learning assessment and assess knowledge base    Interventions:  - Provide teaching at level of understanding  - Provide teaching via preferred learning methods  Outcome: Progressing

## 2020-01-03 NOTE — OCCUPATIONAL THERAPY NOTE
OCCUPATIONAL THERAPY CANCEL NOTE:    ORDERS RECEIVED  CHART REVIEW COMPLETED  PER [facility], PT TO LAY FLAT FOR 48 HOURS  PLEASE UPDATE ACTIVITY ORDERS AS APPROPRIATE       Chisé Diacik, MOT, OTR/L

## 2020-01-03 NOTE — ASSESSMENT & PLAN NOTE
POD1 T12 laminectomy for resection IDEM mass    Plan:   · Continue to monitor neurological status  · Continue strict bed rest until 1/4/18 at 1130am  · Continue Decadron 2 mg every 6 hours  Plan quick taper starting tomorrow  · No brace needed  · No XRays needed  · Mobilize with PT/OT after 1/4/19 1200  · DVT PPX: SCDs and HSQ ordered  Patient refused both SCDs and HSQ  I had a detailed discussion with patient regarding risk of DVT, PE and death  He is agreeable to wearing sequential compression devices much as he can tolerate  Continues to adamantly refuse any subcutaneous injection for pharmacological DVT prophylaxis  I offered to switch to Lovenox which is a daily dose and he continued to adamantly refused

## 2020-01-03 NOTE — PROGRESS NOTES
Progress Note - Zully Alfaro Sr  1955, 71 y o  male MRN: [id number]    Unit/Bed#: [facility] 621-01 Encounter: [id number]    Primary Care Provider: Nadir Kraus PA-C   Date and time admitted to hospital: 1/2/2020  5:17 AM        * Intradural extramedullary spinal tumor  Assessment & Plan  POD1 T12 laminectomy for resection IDEM mass    Plan:   · Continue to monitor neurological status  · Continue strict bed rest until 1/4/18 at 1130am  · Continue Decadron 2 mg every 6 hours  Plan quick taper starting tomorrow  · No brace needed  · No XRays needed  · Mobilize with PT/OT after 1/4/19 1200  · DVT PPX: SCDs and HSQ ordered  Patient refused both SCDs and HSQ  I had a detailed discussion with patient regarding risk of DVT, PE and death  He is agreeable to wearing sequential compression devices much as he can tolerate  Continues to adamantly refuse any subcutaneous injection for pharmacological DVT prophylaxis  I offered to switch to Lovenox which is a daily dose and he continued to adamantly refused  Spinal cord compression [facility])  Assessment & Plan  See plan as above    Leukocytosis  Assessment & Plan  Likely multifactorial-reactive postoperative and steroid induced    Continue to monitor  Repeat CBC in a m  Subjective/Objective   Chief Complaint: postop follow-up    Subjective: pain well controlled  Irritation from jimenez  No weakness, numbness, tingling  Objective: lying in bed  NAD  I/O       01/01 7226 - 01/02 0700 01/02 0701 - 01/03 0700 01/03 0701 - 01/04 0700    P  O   780 360    I V  (mL/kg)  4756 3 (50 2) 977 1 (10 3)    IV Piggyback  250     Total Intake(mL/kg)  5786 3 (61) 1337 1 (14 1)    Urine (mL/kg/hr)  3450 (1 5) 1450 (1 7)    Blood  50     Total Output  3500 1450    Net  +2286 3 -112 9                 Invasive Devices     Peripheral Intravenous Line            Peripheral IV 01/02/20 Left Hand 1 day    Peripheral IV 01/02/20 Right Hand 1 day          Drain Urethral Catheter Latex 16 Fr  1 day                Physical Exam:  Vitals: Blood pressure 146/75, pulse 85, temperature 97 8 °F (36 6 °C), resp  rate 16, height 5' 10" (1 778 m), weight 94 8 kg (209 lb), SpO2 95 %  ,Body mass index is 29 99 kg/m²  General appearance: alert, appears stated age, cooperative and no distress  Head: Normocephalic, without obvious abnormality  Eyes: conjugate gaze, tracks in room  Neck: supple, symmetrical, trachea midline  Back: dressing CDI and flat  Lungs: non labored breathing  Heart: regular heart rate  Neurologic:   Mental status: Alert, oriented, thought content appropriate  Sensory: normal to LT  Motor: moving all extremities without focal weakness, 5/5  Reflexes: no clonus b/l   Coordination: JPS intact b/l hallus    Lab Results:  Results from last 7 days   Lab Units 01/03/20  0519 12/31/19  1354   WBC Thousand/uL 26 72* 10 87*   HEMOGLOBIN g/dL 14 5 15 9   HEMATOCRIT % 43 7 48 6   PLATELETS Thousands/uL 268 274   NEUTROS PCT %  --  52   MONOS PCT %  --  8     Results from last 7 days   Lab Units 01/03/20  0519 12/31/19  1354   POTASSIUM mmol/L 4 3 4 2   CHLORIDE mmol/L 108 107   CO2 mmol/L 27 33*   BUN mg/dL 14 16   CREATININE mg/dL 1 20 1 14   CALCIUM mg/dL 9 5 10 3*   ALK PHOS U/L  --  62   ALT U/L  --  20   AST U/L  --  11             Results from last 7 days   Lab Units 12/31/19  1354   INR  1 04   PTT seconds 32     No results found for: TROPONINT  ABG:No results found for: PHART, GZJ6UWD, PO2ART, UMI3JSU, W0VGZLQL, BEART, SOURCE    Imaging Studies: I have personally reviewed pertinent reports  and I have personally reviewed pertinent films in PACS    Xr Spine Thoracolumbar 2 Vw    Result Date: 1/2/2020  Impression: Fluoroscopic guidance provided for surgical procedure  Please refer to the separate procedure notes for additional details  Workstation performed: [id number]     EKG, Pathology, and Other Studies: I have personally reviewed pertinent reports        VTE Pharmacologic Prophylaxis: Heparin  - refusing     VTE Mechanical Prophylaxis: sequential compression device

## 2020-01-03 NOTE — PHYSICAL THERAPY NOTE
Physical Therapy Cancellation Note      PT orders received and chart reviewed  Per [facility] pt to lay flat for 48 hours  PT will continue to follow  Please update activity orders as appropriate           Joaquín Ordonez, PT

## 2020-01-03 NOTE — SOCIAL WORK
Cm met with patient to review role of cm  Patient presented as alert during conversation  Patient reported that he lives with GF, [phone] in a ranch style home  Patient also provided contact information for children, [phone], [phone] and son [phone]  Cm also stated that there are no steps leading into the home and no steps inside the home  Patient reported that he was independent with ADLS PTA  Patient denied having any inpatient PT/OT, substance abuse treatment or [facility] services  Patient reported that he was at [facility] inpatient psych for bi-polar at least 3 years ago  Patient denied having any outpatient [facility] treatment  Patient denied having any DME in the home  Patient reported that pharmacy of choice is [email or web address] in [place]  Patient's PCP is affiliated with [facility]  CM reviewed d/c planning process including the following: identifying help at home, patient preference for d/c planning needs, Discharge Lounge, [facility] Meds to Bed program, availability of treatment team to discuss questions or concerns patient and/or family may have regarding understanding medications and recognizing signs and symptoms once discharged  CM also encouraged patient to follow up with all recommended appointments after discharge  Patient advised of importance for patient and family to participate in managing patients medical well being

## 2020-01-04 LAB
ERYTHROCYTE [DISTWIDTH] IN BLOOD BY AUTOMATED COUNT: 12.6 % (ref 11.6–15.1)
HCT VFR BLD AUTO: 42.8 % (ref 36.5–49.3)
HGB BLD-MCNC: 14.6 G/DL (ref 12–17)
MCH RBC QN AUTO: 30.6 PG (ref 26.8–34.3)
MCHC RBC AUTO-ENTMCNC: 34.1 G/DL (ref 31.4–37.4)
MCV RBC AUTO: 90 FL (ref 82–98)
PLATELET # BLD AUTO: 222 THOUSANDS/UL (ref 149–390)
PMV BLD AUTO: 9.9 FL (ref 8.9–12.7)
RBC # BLD AUTO: 4.77 MILLION/UL (ref 3.88–5.62)
WBC # BLD AUTO: 20.03 THOUSAND/UL (ref 4.31–10.16)

## 2020-01-04 PROCEDURE — 97163 PT EVAL HIGH COMPLEX 45 MIN: CPT

## 2020-01-04 PROCEDURE — 85027 COMPLETE CBC AUTOMATED: CPT | Performed by: PHYSICIAN ASSISTANT

## 2020-01-04 PROCEDURE — 99024 POSTOP FOLLOW-UP VISIT: CPT | Performed by: PHYSICIAN ASSISTANT

## 2020-01-04 PROCEDURE — 97167 OT EVAL HIGH COMPLEX 60 MIN: CPT

## 2020-01-04 RX ORDER — ONDANSETRON 4 MG/1
4 TABLET, ORALLY DISINTEGRATING ORAL EVERY 6 HOURS PRN
Status: DISCONTINUED | OUTPATIENT
Start: 2020-01-04 | End: 2020-01-05 | Stop reason: HOSPADM

## 2020-01-04 RX ORDER — DEXAMETHASONE 2 MG/1
2 TABLET ORAL EVERY 12 HOURS SCHEDULED
Status: DISCONTINUED | OUTPATIENT
Start: 2020-01-05 | End: 2020-01-05 | Stop reason: HOSPADM

## 2020-01-04 RX ORDER — DEXAMETHASONE 2 MG/1
2 TABLET ORAL EVERY 8 HOURS SCHEDULED
Status: COMPLETED | OUTPATIENT
Start: 2020-01-04 | End: 2020-01-05

## 2020-01-04 RX ORDER — DEXAMETHASONE 2 MG/1
1 TABLET ORAL EVERY 12 HOURS SCHEDULED
Status: DISCONTINUED | OUTPATIENT
Start: 2020-01-06 | End: 2020-01-05 | Stop reason: HOSPADM

## 2020-01-04 RX ADMIN — DEXAMETHASONE 2 MG: 2 TABLET ORAL at 21:13

## 2020-01-04 RX ADMIN — DEXAMETHASONE 2 MG: 2 TABLET ORAL at 14:37

## 2020-01-04 RX ADMIN — SODIUM CHLORIDE 125 ML/HR: 0.9 INJECTION, SOLUTION INTRAVENOUS at 04:57

## 2020-01-04 RX ADMIN — OXYCODONE HYDROCHLORIDE 5 MG: 5 TABLET ORAL at 21:13

## 2020-01-04 RX ADMIN — METHOCARBAMOL TABLETS 750 MG: 750 TABLET, COATED ORAL at 17:22

## 2020-01-04 RX ADMIN — METHOCARBAMOL TABLETS 750 MG: 750 TABLET, COATED ORAL at 05:03

## 2020-01-04 RX ADMIN — ONDANSETRON 4 MG: 4 TABLET, ORALLY DISINTEGRATING ORAL at 15:45

## 2020-01-04 RX ADMIN — OXYCODONE HYDROCHLORIDE 10 MG: 10 TABLET ORAL at 15:52

## 2020-01-04 RX ADMIN — ACETAMINOPHEN 975 MG: 325 TABLET ORAL at 21:13

## 2020-01-04 RX ADMIN — DEXAMETHASONE SODIUM PHOSPHATE 2 MG: 4 INJECTION, SOLUTION INTRAMUSCULAR; INTRAVENOUS at 05:03

## 2020-01-04 RX ADMIN — METHOCARBAMOL TABLETS 750 MG: 750 TABLET, COATED ORAL at 11:22

## 2020-01-04 RX ADMIN — OXYCODONE HYDROCHLORIDE 10 MG: 10 TABLET ORAL at 08:44

## 2020-01-04 NOTE — OCCUPATIONAL THERAPY NOTE
OccupationalTherapy Evaluation     Patient Name: Jacob Welch Sr  Today's Date: 1/4/2020  Problem List  Principal Problem:    Intradural extramedullary spinal tumor  Active Problems:    Leukocytosis    Spinal cord compression [facility])    Past Medical History  Past Medical History:   Diagnosis Date    Anxiety     Bipolar depression ([address] )     bipolar disorder    Borderline diabetes mellitus     Chronic back pain     Chronic pain     Gallstones     Restless leg syndrome     Spinal arachnoid cyst      Past Surgical History  Past Surgical History:   Procedure Laterality Date    COLONOSCOPY      INCISION AND DRAINAGE ABSCESS / HEMATOMA OF BURSA / KNEE / Kassy Meuse  in 1990's    outer right leg and center of upper back   complicated  resolved status    OTHER SURGICAL HISTORY  11/2019    cyst excision from back by Dermatolgist at office of Dr Sim Ramsey BX/ALEJANDRO SPIN [facility] N/A 1/2/2020    Procedure: T12 LAMINECTOMY FOR RESECTION OF INTRADURAL-EXTRAMEDULLARY MASS;  Surgeon: Corrie Lal MD;  Location: [facility] MAIN OR;  Service: Neurosurgery    ID COLONOSCOPY FLX DX W/COLLJ SPEC WHEN PFRMD N/A 8/31/2017    Procedure: COLONOSCOPY;  Surgeon: Saundra Barlow MD;  Location: [facility] GI LAB; Service: Gastroenterology    ID COLONOSCOPY FLX DX W/COLLJ [facility] WHEN PFRMD N/A 3/23/2018    Procedure: COLONOSCOPY;  Surgeon: Saundra Barlow MD;  Location: [facility] GI LAB; Service: Gastroenterology    TUMOR REMOVAL  8270'Z    "Cheese tumor" removed from back           01/04/20 1345   Note Type   Note type Eval/Treat   Restrictions/Precautions   Weight Bearing Precautions Per Order No   Other Precautions Fall Risk;Pain;Spinal precautions   Pain Assessment   Pain Assessment 0-10   Pain Score 6   Pain Type Acute pain;Surgical pain   Pain Location Back;Pelvis   Patient's Stated Pain Goal No pain   Hospital Pain Intervention(s) Repositioned; Ambulation/increased activity; Distraction; Emotional support   Response to Interventions IMPROVED WITH ACTIVITY    Home Living   Type of Home House   Home Layout One level  (0 TYESHA)   Bathroom Shower/Tub Tub/shower unit   Bathroom Toilet Standard   Bathroom Accessibility Accessible   Additional Comments NO USE OF DME AT BASELINE    Prior Function   Level of [place] Independent with ADLs and functional mobility   Lives With Spouse; Son   John Help From Family   ADL Assistance Independent   IADLs Independent   Falls in the last 6 months 0   Vocational On disability   Lifestyle   Autonomy PT REPORTS BEING I WITH ADLS/IADLS/DRIVING PTA    Reciprocal Relationships LIVES WITH SUPPORTIVE SPOUSE AND ADULT SON  PT REPORTS FAMILY IS ABLE TO ASSIST AS NEEDED    Service to Others ON DISABILITY- DELIVERED MAGAZINES    Intrinsic Gratification ENJOYS RIDING MOTOR CYCLES    Psychosocial   Psychosocial (WDL) WDL   ADL   Eating Assistance 7  Independent   Grooming Assistance 5  Supervision/Setup   UB Bathing Assistance 4  Minimal Assistance   LB Bathing Assistance 3  Moderate Assistance   UB Dressing Assistance 4  Minimal Assistance   LB Dressing Assistance 3  Moderate Assistance   Toileting Assistance  3  Moderate Assistance   Functional Assistance 3  Moderate Assistance   Bed Mobility   Supine to Sit 4  Minimal assistance   Additional items Assist x 1; Increased time required;Verbal cues;LE management;HOB elevated   Sit to Supine Unable to assess  (PT LEFT OOB WITH ALL NEEDS IN REACH )   Transfers   Sit to Stand 4  Minimal assistance   Additional items Assist x 1; Increased time required;Verbal cues   Stand to Sit 4  Minimal assistance   Additional items Assist x 1; Increased time required;Verbal cues   Functional Mobility   Functional Mobility 4  Minimal assistance   Additional items Rolling walker   Balance   Static Sitting Fair   Static Standing Fair -   Ambulatory Poor +   Activity Tolerance   Activity Tolerance Patient limited by pain   Medical Staff Made Aware SPOKE TO [address] [facility] [address] FOR OOB ACTIVITY AT THIS TIME    Nurse Made Aware APPROPRIATE TO SEE PER DEBORAH UZLETA Assessment   RUE Assessment WFL   LUE Assessment   LUE Assessment WFL   Hand Function   Gross Motor Coordination Functional   Fine Motor Coordination Functional   Sensation   Light Touch No apparent deficits   Cognition   Overall Cognitive Status WFL   Arousal/Participation Alert; Cooperative   Attention Within functional limits   Orientation Level Oriented X4   Memory Within functional limits   Following Commands Follows all commands and directions without difficulty   Comments PT IS PLEASANT AND COOPERATIVE    Assessment   Limitation Decreased ADL status; Decreased endurance;Decreased self-care trans;Decreased high-level ADLs   Prognosis Good   Assessment 70 YO Male SEEN FOR INITIAL OCCUPATIONAL THERAPY EVALUATION POD2 LAMINECTOMY FOR RESECTION OF INTRADURAL EXTRAMEDULLARY SPINAL TUMOR  PT WITH STRICT BED REST 48 HOURS POST-OP  NO BRACE REQUIRED AT THIS TIME  PROBLEMS LIST INCLUDES SPINAL CORD COMPRESSION, LEUKOCYTOSIS, HTN, HLD, BIPOLAR DISORDER, DEPRESSION, AND CKD  PT IS FROM HOME WITH FAMILY WHERE HE REPORTS BEING INDEPENDENT WITH ADLS/IADLS/DRIVING PTA  PT CURRENTLY REQUIRES OVERALL MIN-MOD A WITH ADLS AND MIN A WITH TRANSFERS /FUNCTIONAL MOBILITY WITH USE OF RW  PT IS LIMITED 2' PAIN, FATIGUE, IMPAIRED BALANCE, FALL RISK , SPINAL PRECAUTIONS and OVERALL LIMITED ACTIVITY TOLERANCE  PT EDUCATED ON SPINAL PRECAUTIONS, DEEP BREATHING TECHNIQUES T/O ACTIVITY, INCREASED FAMILY SUPPORT and CONTINUE PARTICIPATION IN SELF-CARE/MOBILITY WITH STAFF [address] [facility]   FROM AN OCCUPATIONAL THERAPY PERSPECTIVE, PT CAN RETURN HOME WITH INCREASED FAMILY SUPPORT WHEN MEDICALLY CLEARED PENDING PT PROGRESS  DME RECS INCLUDE BSC/SC AND AGREE WITH USE OF RW AT THIS TIME  WILL CONT TO FOLLOW TO ADDRESS THE BELOW DESCRIBED GOALS      Goals   Patient Goals TO GO FOR A WALK    LTG Time Frame 7-10   Long Term Goal #1 SEE BELOW    Plan Treatment Interventions ADL retraining;Functional transfer training; Endurance training;Equipment evaluation/education; Compensatory technique education; Activityengagement   Goal Expiration Date 01/14/20   OT Frequency 3-5x/wk   Recommendation   OT Discharge Recommendation Home with family support   Equipment Recommended Bedside commode  (/SC AND AGREE WITH USE OF RW )   OT - OK to Discharge Yes   Barthel Index   Feeding 10   Bathing 0   Grooming Score 5   Dressing Score 5   Bladder Score 10   Bowels Score 10   Toilet Use Score 5   Transfers (Bed/Chair) Score 10   Mobility (Level Surface) Score 0   Stairs Score 0   Barthel Index Score 55   Modified Bryan Scale   Modified Stuart Scale 4     OCCUPATIONAL THERAPY GOALS TO BE MET WITHIN 7-10 DAYS:    -Pt will increase bed mobility to MOD I to participate in functional activities with G tolerance and balance  -Pt will improve functional mobility and transfers to MOD I on/off all surfaces w/ G balance/safety including toileting   -Pt will participate in lt grooming task with MOD I after set-up standing at sink ~3-5 minutes with G safety and balance  -Pt will increase independence in all ADLS to MOD I with G balance sitting upright in chair   -Pt will improve activity tolerance to G for 30 min txment sessions w/ G carry over of learned energy conservation techniques   -Pt will improve independence in lt homemaking activities to MOD I without requiring cues for safety   -Pt will demonstrate G carryover of learned safety techniques and proper body mechanics in functional and leisure activities with use of DME        Documentation completed by STEPHANY Tavera, OTR/L

## 2020-01-04 NOTE — PLAN OF CARE
Problem: OCCUPATIONAL THERAPY ADULT  Goal: Performs self-care activities at highest level of function for planned discharge setting  See evaluation for individualized goals  Description  Treatment Interventions: ADL retraining, Functional transfer training, Endurance training, Equipment evaluation/education, Compensatory technique education, Activityengagement  Equipment Recommended: Bedside commode(/SC AND AGREE WITH USE OF RW )       See flowsheet documentation for full assessment, interventions and recommendations  Note:   Limitation: Decreased ADL status, Decreased endurance, Decreased self-care trans, Decreased high-level ADLs  Prognosis: Good  Assessment: 70 YO Male SEEN FOR INITIAL OCCUPATIONAL THERAPY EVALUATION POD2 LAMINECTOMY FOR RESECTION OF INTRADURAL EXTRAMEDULLARY SPINAL TUMOR  PT WITH STRICT BED REST 48 HOURS POST-OP  NO BRACE REQUIRED AT THIS TIME  PROBLEMS LIST INCLUDES SPINAL CORD COMPRESSION, LEUKOCYTOSIS, HTN, HLD, BIPOLAR DISORDER, DEPRESSION, AND CKD  PT IS FROM HOME WITH FAMILY WHERE HE REPORTS BEING INDEPENDENT WITH ADLS/IADLS/DRIVING PTA  PT CURRENTLY REQUIRES OVERALL MIN-MOD A WITH ADLS AND MIN A WITH TRANSFERS /FUNCTIONAL MOBILITY WITH USE OF RW  PT IS LIMITED 2' PAIN, FATIGUE, IMPAIRED BALANCE, FALL RISK , SPINAL PRECAUTIONS and OVERALL LIMITED ACTIVITY TOLERANCE  PT EDUCATED ON SPINAL PRECAUTIONS, DEEP BREATHING TECHNIQUES T/O ACTIVITY, INCREASED FAMILY SUPPORT and CONTINUE PARTICIPATION IN SELF-CARE/MOBILITY WITH STAFF [address]   FROM AN OCCUPATIONAL THERAPY PERSPECTIVE, PT CAN RETURN HOME WITH INCREASED FAMILY SUPPORT WHEN MEDICALLY CLEARED PENDING PT PROGRESS  DME RECS INCLUDE BSC/SC AND AGREE WITH USE OF RW AT THIS TIME  WILL CONT TO FOLLOW TO ADDRESS THE BELOW DESCRIBED GOALS  OT Discharge Recommendation: Home with family support  OT - OK to Discharge:  Yes

## 2020-01-04 NOTE — PHYSICAL THERAPY NOTE
Physical Therapy Evaluation     Patient's Name: Ronal Garcia Sr      Admitting Diagnosis  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]  Spinal cord compression (HCC) [G95 20]  Myelomalacia (HCC) [G95 89]    Problem List  Patient Active Problem List   Diagnosis    Adult residual type attention deficit hyperactivity disorder (ADHD)    Esophagitis    HTN (hypertension)    HLD (hyperlipidemia)    Prediabetes    Chronic bilateral low back pain without sciatica    Adenomatous polyp of transverse colon    Sessile rectal polyp    Erectile dysfunction    Polyp of colon    Bipolar 1 disorder (HCC)    Tubular adenoma of colon    Dizziness    Restless leg syndrome    Bipolar depression ([address] )    Borderline diabetes mellitus    Sinus tachycardia    Severe episode of recurrent major depressive disorder, without psychotic features (HCC)    Leukocytosis    Stage 2 chronic kidney disease    Intradural extramedullary spinal tumor    Spinal cord compression ([address] )    Bilateral carpal tunnel syndrome       Past Medical History  Past Medical History:   Diagnosis Date    Anxiety     Bipolar depression (HCC)     bipolar disorder    Borderline diabetes mellitus     Chronic back pain     Chronic pain     Gallstones     Restless leg syndrome     Spinal arachnoid cyst        Past Surgical History  Past Surgical History:   Procedure Laterality Date    COLONOSCOPY      INCISION AND DRAINAGE ABSCESS / HEMATOMA OF BURSA / KNEE / Renea Jacoby  in 1990's    outer right leg and center of upper back   complicated  resolved status    OTHER SURGICAL HISTORY  11/2019    cyst excision from back by Dermatolgist at office of Dr Tessa Samson BX/EXCIS SPIN [facility] N/A 1/2/2020    Procedure: T12 LAMINECTOMY FOR RESECTION OF INTRADURAL-EXTRAMEDULLARY MASS;  Surgeon: Dionisio Burger MD;  Location: [facility] MAIN OR;  Service: Neurosurgery    PA COLONOSCOPY FLX DX W/COLLJ SPEC WHEN PFRMD N/A 8/31/2017    Procedure: COLONOSCOPY;  Surgeon: Rosie Boston MD;  Location: [facility] GI LAB; Service: Gastroenterology    NV COLONOSCOPY FLX DX W/ARNOLDOJ [facility] REHABILITATION WHEN PFRMD N/A 3/23/2018    Procedure: COLONOSCOPY;  Surgeon: Rosie Boston MD;  Location: [facility] GI [facility]; Service: Gastroenterology    TUMOR REMOVAL  [address]    "Cheese tumor" removed from back        01/04/20 1346   Note Type   Note type Eval/Treat   Pain Assessment   Pain Assessment 0-10   Pain Score 6   Pain Type Acute pain   Pain Location Pelvis   Hospital Pain Intervention(s) Repositioned; Ambulation/increased activity   Response to Interventions improved   Home Living   Type of [address] One level  (0 TYESHA)   Bathroom Shower/Tub Tub/shower unit   Bathroom Toilet Standard   Bathroom Accessibility Accessible   Prior Function   Level of [place] Independent with ADLs and functional mobility   Lives With Significant other; Nick Lopez Help From Family   ADL Assistance Independent   IADLs Independent   Falls in the last 6 months 0   Vocational On disability   Restrictions/Precautions   Weight Bearing Precautions Per Order No   Other Precautions Spinal precautions; Fall Risk;Pain;Multiple lines   General   Family/Caregiver Present No   Cognition   Orientation Level Oriented X4   RLE Assessment   RLE Assessment WFL   LLE Assessment   LLE Assessment WFL   Coordination   Movements are Fluid and Coordinated 0   Coordination and Movement Description slow and guarded with pain   Bed Mobility   Supine to Sit 4  Minimal assistance   Additional items Assist x 1; Increased time required   Sit to Supine Unable to assess  (pt left resting in chair as requested, call bell in reach)   Transfers   Sit to Stand 4  Minimal assistance   Additional items Assist x 1; Increased time required   Stand to Sit 4  Minimal assistance   Additional items Assist x 1; Increased time required   Ambulation/Elevation   Gait pattern Excessively slow;Decreased foot clearance   Gait Assistance 4  Minimal assist Additional items Assist x 1   Assistive Device Rolling walker   Distance 360   Balance   Static Sitting Fair   Static Standing Fair -   Ambulatory Poor +   Endurance Deficit   Endurance Deficit Yes   Endurance Deficit Description limited by pain compared to baseline mobility   Activity Tolerance   Activity Tolerance Patient limited by pain   Medical Staff Made Aware OT for D/C planning, Nsx for clearance for OOB   Nurse Made Aware yes, nsg gave clearance to work with pt   Assessment   Prognosis Good   Problem List Decreased strength;Decreased endurance; Impaired balance;Decreased mobility; Decreased coordination;Decreased safety awareness;Pain;Orthopedic restrictions   Assessment Pt is 71 y o  male seen for PT evaluation s/p admit to [facility] on 1/2/2020 w/ Intradural extramedullary spinal tumor  PT consulted to assess pt's functional mobility and d/c needs  Order placed for PT eval and tx, w/ cleared for mobility with nsx post 48 hours of flat  Comorbidities affecting pt's physical performance at time of assessment include: back and pelvis pain, chronic LE weakness  PTA, pt was ambulates household distances and on disability  Personal factors affecting pt at time of IE include: ambulating w/ assistive device, inability to navigate level surfaces w/o external assistance, limited home support, inability to perform IADLs and inability to perform ADLs  Please find objective findings from PT assessment regarding body systems outlined above with impairments and limitations including weakness, impaired balance, decreased endurance, gait deviations, pain, decreased activity tolerance, decreased safety awareness, fall risk and orthopedic restrictions  Pt educated in log roll technique for bed mobility  Required A from raising HOB to complete bed mobility at this time 2* to spasm  Pt noted no adverse symptoms from sitting  Increased height of bed for transfer  Educated in hand placement   Initially demonstrated very short stride with decreased foot clearance  Pt demonstrated very good motivation for therapy  Reports decreased pain with upright mobility  Currently functioning below baseline mobility as pt reports he does not require use of AD at baseline  The following objective measures performed on IE also reveal limitations: Barthel Index: 55/100  Pt's clinical presentation is currently unstable/unpredictable seen in pt's presentation of pain  Pt to benefit from continued PT tx to address deficits as defined above and maximize level of functional independent mobility and consistency  From PT/mobility standpoint, recommendation at time of d/c would be OP PT and increased support from family pending progress in order to facilitate return to PLOF  Goals   Patient Goals To move around    STG Expiration Date 01/16/20   Short Term Goal #1 1  Complete bed mobility and transfers I to decrease need for caregiver in home  2  Ambulate 400' I to complete household and community mobility without A  3  Improve dynamic balance to good to decrease need for UE support during ambulation  4 I with log roll and spinal precautions   Plan   Treatment/Interventions OT; Spoke to case management;Spoke to nursing;Gait training;Bed mobility; Equipment eval/education;Patient/family training; Endurance training;LE strengthening/ROM; Functional transfer training   PT Frequency Other (Comment)  (3-6x/wk)   Recommendation   Recommendation Home with family support; Outpatient PT   Equipment Recommended Walker   PT - OK to Discharge Yes  (with increased support from family when medically stable )   Barthel Index   Feeding 10   Bathing 0   Grooming Score 5   Dressing Score 5   Bladder Score 10   Bowels Score 10   Toilet Use Score 5   Transfers (Bed/Chair) Score 10   Mobility (Level Surface) Score 0   Stairs Score 0   Barthel Index Score 55           Coit Hum, PT

## 2020-01-04 NOTE — ASSESSMENT & PLAN NOTE
Likely multifactorial-reactive postoperative and steroid induced    Continue to monitor    Repeat CBC today - ordered

## 2020-01-04 NOTE — PROGRESS NOTES
Progress Note - Kishan Paul Sr  1955, 71 y o  male MRN: [id number]    Unit/Bed#: [facility] 621-01 Encounter: [id number]    Primary Care Provider: Beverely Burkitt, PA-C   Date and time admitted to hospital: 1/2/2020  5:17 AM        * Intradural extramedullary spinal tumor  Assessment & Plan  POD2 T12 laminectomy for resection IDEM mass    Plan:   · Continue to monitor neurological status  · Continue strict bed rest until 1/4/18 at 1030am  · Dc jimenez today  · Decadron 2 mg every 6 hours  Plan quick taper starting today  Decrease dose to 2mg Q8H and changed to PO   · No brace needed  No XRays needed  · Pain control - continue current regimen  Dc IV medications  · Mobilize with PT/OT after 1/4/19 1200  · DVT PPX: SCDs and HSQ ordered  He is agreeable to wearing sequential compression devices much as he can tolerate  Continues to adamantly refuse any subcutaneous injection for pharmacological DVT prophylaxis  · Patient care rounds completed with nurse Maximo Salmon  Spinal cord compression [facility])  Assessment & Plan  See plan as above    Leukocytosis  Assessment & Plan  Likely multifactorial-reactive postoperative and steroid induced    Continue to monitor  Repeat CBC today - ordered      Subjective/Objective   Chief Complaint: "It hurt when I turned"    Subjective: Back pain overall controlled  Slept well overnight  No weakness, numbness, tingling  No CP/SOB/N/V  tolerating PO  Patient admits to sitting up on edge of bed on evening of surgery and has pushed himself up on his side on several occassions  Denies headaches  Objective: lying flat in bed  NAD  I/O       01/02 0701 - 01/03 0700 01/03 0701 - 01/04 0700 01/04 0701 - 01/05 0700    P  O  780 720 240    I V  (mL/kg) 4756 3 (50 2) 2981 3 (31 4)     IV Piggyback 250      Total Intake(mL/kg) 5786 3 (61) 3701 3 (39) 240 (2 5)    Urine (mL/kg/hr) 3450 (1 5) 5050 (2 2)     Blood 50      Total Output 3500 5050     Net +2286 3 -1348 8 +240 Invasive Devices     Peripheral Intravenous Line            Peripheral IV 01/02/20 Left Hand 2 days    Peripheral IV 01/02/20 Right Hand 2 days          Drain            Urethral Catheter Latex 16 Fr  2 days                Physical Exam:  Vitals: Blood pressure 142/79, pulse 88, temperature 98 9 °F (37 2 °C), resp  rate 18, height 5' 10" (1 778 m), weight 94 8 kg (209 lb), SpO2 94 %  ,Body mass index is 29 99 kg/m²  General appearance: alert, appears stated age, cooperative and no distress  Head: Normocephalic, without obvious abnormality, atraumatic  Eyes: conjugate gaze  Neck: supple, symmetrical, trachea midline   Back: dressing intact and dry  Flat  Lungs: non labored breathing  Heart: regular heart rate  Neurologic:   Mental status: Alert, oriented, thought content appropriate  Sensory: normal to light touch  Motor: moving all extremities without focal weakness, 5/5 BLE  Reflexes: no clonus b/l    Lab Results:  Results from last 7 days   Lab Units 01/03/20  0519 12/31/19  1354   WBC Thousand/uL 26 72* 10 87*   HEMOGLOBIN g/dL 14 5 15 9   HEMATOCRIT % 43 7 48 6   PLATELETS Thousands/uL 268 274   NEUTROS PCT %  --  52   MONOS PCT %  --  8     Results from last 7 days   Lab Units 01/03/20  0519 12/31/19  1354   POTASSIUM mmol/L 4 3 4 2   CHLORIDE mmol/L 108 107   CO2 mmol/L 27 33*   BUN mg/dL 14 16   CREATININE mg/dL 1 20 1 14   CALCIUM mg/dL 9 5 10 3*   ALK PHOS U/L  --  62   ALT U/L  --  20   AST U/L  --  11             Results from last 7 days   Lab Units 12/31/19  1354   INR  1 04   PTT seconds 32     No results found for: TROPONINT  ABG:No results found for: PHART, FXN6OBM, PO2ART, EMZ3WGV, T5KWBBGS, BEART, SOURCE    Imaging Studies: I have personally reviewed pertinent reports  and I have personally reviewed pertinent films in PACS    Xr Spine Thoracolumbar 2 Vw    Result Date: 1/2/2020  Impression: Fluoroscopic guidance provided for surgical procedure    Please refer to the separate procedure notes for additional details  Workstation performed: [id number]       EKG, Pathology, and Other Studies: I have personally reviewed pertinent reports        VTE Pharmacologic Prophylaxis: Heparin  - refusing    VTE Mechanical Prophylaxis: sequential compression device

## 2020-01-04 NOTE — ASSESSMENT & PLAN NOTE
POD2 T12 laminectomy for resection IDEM mass    Plan:   · Continue to monitor neurological status  · Continue strict bed rest until 1/4/18 at 1030am  · Dc jimenez today  · Decadron 2 mg every 6 hours  Plan quick taper starting today  Decrease dose to 2mg Q8H and changed to PO   · No brace needed  No XRays needed  · Pain control - continue current regimen  Dc IV medications  · Mobilize with PT/OT after 1/4/19 1200  · DVT PPX: SCDs and HSQ ordered  He is agreeable to wearing sequential compression devices much as he can tolerate  Continues to adamantly refuse any subcutaneous injection for pharmacological DVT prophylaxis  · Patient care rounds completed with nurse Creig Halsted

## 2020-01-05 VITALS
SYSTOLIC BLOOD PRESSURE: 141 MMHG | RESPIRATION RATE: 18 BRPM | WEIGHT: 209 LBS | DIASTOLIC BLOOD PRESSURE: 91 MMHG | HEART RATE: 91 BPM | OXYGEN SATURATION: 94 % | HEIGHT: 70 IN | BODY MASS INDEX: 29.92 KG/M2 | TEMPERATURE: 97.7 F

## 2020-01-05 PROBLEM — G95.20 SPINAL CORD COMPRESSION (HCC): Status: RESOLVED | Noted: 2019-08-27 | Resolved: 2020-01-05

## 2020-01-05 PROBLEM — D49.7 INTRADURAL EXTRAMEDULLARY SPINAL TUMOR: Status: RESOLVED | Noted: 2019-08-27 | Resolved: 2020-01-05

## 2020-01-05 PROCEDURE — 99024 POSTOP FOLLOW-UP VISIT: CPT | Performed by: PHYSICIAN ASSISTANT

## 2020-01-05 RX ORDER — AMOXICILLIN 250 MG
2 CAPSULE ORAL DAILY
Refills: 0
Start: 2020-01-06 | End: 2020-08-06

## 2020-01-05 RX ORDER — DEXAMETHASONE 2 MG/1
2 TABLET ORAL EVERY 12 HOURS SCHEDULED
Qty: 2 TABLET | Refills: 0 | Status: SHIPPED | OUTPATIENT
Start: 2020-01-05 | End: 2020-01-07 | Stop reason: HOSPADM

## 2020-01-05 RX ORDER — OXYCODONE HYDROCHLORIDE 5 MG/1
5 TABLET ORAL EVERY 4 HOURS PRN
Qty: 45 TABLET | Refills: 0 | Status: SHIPPED | OUTPATIENT
Start: 2020-01-05 | End: 2020-08-06

## 2020-01-05 RX ORDER — METHOCARBAMOL 750 MG/1
750 TABLET, FILM COATED ORAL EVERY 6 HOURS PRN
Qty: 45 TABLET | Refills: 0 | Status: SHIPPED | OUTPATIENT
Start: 2020-01-05 | End: 2020-01-20 | Stop reason: SDUPTHER

## 2020-01-05 RX ORDER — ACETAMINOPHEN 325 MG/1
975 TABLET ORAL EVERY 8 HOURS PRN
Qty: 30 TABLET | Refills: 0
Start: 2020-01-05 | End: 2020-01-14

## 2020-01-05 RX ORDER — DEXAMETHASONE 1 MG
1 TABLET ORAL EVERY 12 HOURS SCHEDULED
Qty: 2 TABLET | Refills: 0 | Status: SHIPPED | OUTPATIENT
Start: 2020-01-06 | End: 2020-01-07 | Stop reason: HOSPADM

## 2020-01-05 RX ADMIN — METHOCARBAMOL TABLETS 750 MG: 750 TABLET, COATED ORAL at 12:39

## 2020-01-05 RX ADMIN — METHOCARBAMOL TABLETS 750 MG: 750 TABLET, COATED ORAL at 05:26

## 2020-01-05 RX ADMIN — ACETAMINOPHEN 975 MG: 325 TABLET ORAL at 05:26

## 2020-01-05 RX ADMIN — METHOCARBAMOL TABLETS 750 MG: 750 TABLET, COATED ORAL at 00:04

## 2020-01-05 RX ADMIN — ACETAMINOPHEN 975 MG: 325 TABLET ORAL at 13:56

## 2020-01-05 RX ADMIN — DEXAMETHASONE 2 MG: 2 TABLET ORAL at 05:26

## 2020-01-05 RX ADMIN — OXYCODONE HYDROCHLORIDE 5 MG: 5 TABLET ORAL at 04:04

## 2020-01-05 RX ADMIN — OXYCODONE HYDROCHLORIDE 5 MG: 5 TABLET ORAL at 13:56

## 2020-01-05 NOTE — ASSESSMENT & PLAN NOTE
POD3 T12 laminectomy for resection IDEM mass    Plan:   · Continue to monitor neurological status  · Decadron 2 mg every 8 hours  Continue quick taper  · No brace needed  No XRays needed  · Pain control - continue current regimen  · Mobilize with PT/OT - cleared for home with OPPT but given need for walker and lack of transportation/not permitted to drive - will need home PT  · Will need walker and bedside commode   · DVT PPX: SCDs on during exam  HSQ ordered  He is agreeable to wearing sequential compression devices much as he can tolerate  · Continues to adamantly refuse any subcutaneous injection for pharmacological DVT prophylaxis  · Patient care rounds completed with nurse Leda Mena  · Patient medical stable for discharge  Discharge instructions reviewed with patient

## 2020-01-05 NOTE — DISCHARGE SUMMARY
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 status post decompression and resection of tumor  · Multifactorial leukocytosis reactive/steroid induced-improving    Resolved Problems  Date Reviewed: 1/2/2020          Resolved    * (Principal) Intradural extramedullary spinal tumor 1/5/2020     Resolved by  Charli Davenport PA-C    Spinal cord compression ([address] ) 1/5/2020     Resolved by  Charli Davenport PA-C          Attending Physician: Dorinda Patton MD    Consulting Physician:  None    Procedures Performed: T12 LAMINECTOMY FOR RESECTION OF INTRADURAL-EXTRAMEDULLARY MASS    Pathology:   Preliminary -T12 intradural extramedullary mass: Features present consistent with mature, cystic teratoma    Hospital Course: 25-year-old male with a T12 intradural extramedullary mass consistent with schwannoma or meningioma based off imagingh  He had been followed for some years, and this mass has not show overt radiographic progression  However, he has had more freequent back and leg pain  Presently he states his back pain is 2/10, and he has occasinoal stabbing pains into his legs, R > L  He mentioned occasional bowel incontinence and urinary urgency with nocturia 2-3x/night  Patient was found to be an appropriate candidate for laminectomy and resection of mass  For these reasons, he was admitted to the hospital for above-noted procedure  Surgery was without complications    Operative findings included stage exudate leg anterior oral filling thin walled cyst located dorsally to the spinal cord with some small nodules along the dorsal spinal cord at the base of the cystic mass  Neurological monitoring was stable throughout the case  Once medically stabilized postoperatively he was transferred to the step-down level of care for close monitoring  Given intradural nature of mass, patient was kept flat for 48 hours to prevent CSF leak  Patient's pain was well controlled  After completion of bedrest, patient was mobilized with physical therapy and occupational therapy and cleared for home with ongoing outpatient physical therapy use of roller walker and bedside code  Patient was without complaints of headache upon mobilization  His pain was controlled with oral medications  He was tolerating oral intake  Fluids discontinued and patient was voiding well  Of note patient adamantly refused pharmacological DVT prophylaxis even after detailed discussion regarding the risks of DVT/PE  In addition on pocket discontinuation of IV fluids was through the patient did not receive his last dose of postoperative IV Ancef which was to be given on 1/2/2020 for which incident report will be completed  Patient's neurological exam remained stable throughout his stay  He will be discharge home medically stable after review of discharge instructions  Condition at Discharge: good     Discharge Instructions/Information to Patient and Family:   See after visit summary for information provided to patient and family  Provisions for Follow-Up Care:  See after visit summary for information related to follow-up care and any pertinent home health orders  Disposition: Home    Planned Readmission: No    Discharge Statement   I spent 25 minutes discharging the patient  This time was spent on the day of discharge  I had direct contact with the patient on the day of discharge   Additional documentation is required if more than 30 minutes were spent on discharge  Discharge Medications:  See after visit summary for reconciled discharge medications provided to patient and family

## 2020-01-05 NOTE — NURSING NOTE
Upon removal of IV pole and pump from patient's room this morning, it was noted that the patient's last dose of IVPB cefazolin, from 01/02/2020  had not been administered  The medication bag was still full and the clamp for the IVPB tubing was closed, thus not allowing the medication to flow through the primary IV line  Charge RN present upon discovery  Information will be passed to oncoming nurse in order for nuero surgery to be notified

## 2020-01-05 NOTE — DISCHARGE INSTRUCTIONS
Discharge Instructions  Lumbar decompression  (laminectomy/laminotomy/foraminotomy/discectomy)      Surgical incisional care:   Maintain dressing in place for 3 days  On postoperative day 3, dressing may be removed and incision may be left open to air   Keep incision clean and dry  Avoid applying creams, lotion or antiseptic to incision area   Check the wound daily  If the incision becomes red, swollen, tender, warm, or has increased drainage please notify physician immediately   May shower 3 days after surgery, but do not soak in a tub and no swimming   o Incision may be cleaned with water and a mild antimicrobial soap using a clean washcloth  Incision is to be gently patted dry with a clean towel   Continue to change bed linens and pajamas more frequently  Wear clean clothes daily  Activity Restrictions:   No heavy lifting greater than 10lbs and no strenuous activities until cleared   No bending or twisting  No BLTs (bending, lifting, twisting)  Avoid pushing/pulling movements   May walk as tolerated  Encourage at least 4 short walks per day   No driving for at least 2 weeks or until cleared by physician  Postoperative medication:   Take pain medications to relieve incision pain, and muscle relaxants to prevent spasms as directed  Please see after visit summary (AVS) for details   Do not operate heavy machinery or vehicles while taking sedating medications   Use a bowel regimen while on opioids as they induce constipation  Ie  Senokot-S, Miralax, Colace, etc  Increase fiber and water intake   Do not take ibuprofen, Naproxen/Aleve or any NSAID until cleared by surgeon  May take Tylenol instead   If taking Coumadin, Aspirin, or Plavix, you may resume these medications when cleared by Neurosurgery  Follow-up as scheduled for a 2 week incision check  Follow-up as scheduled for 6 week postoperative visit       **Please notify MD if you experience a fever 101F, chills or have increased pain, numbness, tingling, or weakness in your legs and/or bowel/bladder dysfunction/incontinence**

## 2020-01-05 NOTE — ASSESSMENT & PLAN NOTE
Likely multifactorial-reactive postoperative and steroid induced - improving     Patient remains afebrile

## 2020-01-05 NOTE — SOCIAL WORK
JOY met with Pt to discuss the post acute care recommendation was made by your care team for [facility]  Discussed [place] [facility] Choice with patient  List of agencies given to patient via in person  patient aware the list is custom filtered for them by preference  and that [facility] post acute providers are designated  Pt reported being agreeable to [facility] services, and gave permission to JOY to make a referral to [address] [place] [address] for home PT services  JOY made a DME referral to [facility] for a roller walker, and delivered one to the Pt's room prior to his d/c today  Pt refused the offered bedside commode

## 2020-01-05 NOTE — PROGRESS NOTES
Progress Note - Tom Pillai Sr  1955, 71 y o  male MRN: [id number]    Unit/Bed#: [facility] 621-01 Encounter: [id number]    Primary Care Provider: Cintia Pereira PA-C   Date and time admitted to hospital: 1/2/2020  5:17 AM        * Intradural extramedullary spinal tumor  Assessment & Plan  POD3 T12 laminectomy for resection IDEM mass    Plan:   · Continue to monitor neurological status  · Decadron 2 mg every 8 hours  Continue quick taper  · No brace needed  No XRays needed  · Pain control - continue current regimen  · Mobilize with PT/OT - cleared for home with OPPT but given need for walker and lack of transportation/not permitted to drive - will need home PT  · Will need walker and bedside commode   · DVT PPX: SCDs on during exam  HSQ ordered  He is agreeable to wearing sequential compression devices much as he can tolerate  · Continues to adamantly refuse any subcutaneous injection for pharmacological DVT prophylaxis  · Patient care rounds completed with nurse Yara Jensen  · Patient medical stable for discharge  Discharge instructions reviewed with patient  Spinal cord compression [facility])  Assessment & Plan  See plan as above    Leukocytosis  Assessment & Plan  Likely multifactorial-reactive postoperative and steroid induced - improving     Patient remains afebrile        Subjective/Objective   Chief Complaint: I'm doing okay    Subjective: Patient feels better now that he has mobilize  Overall back pain controlled with oral medications  Denies any new radicular complaints  Some mild diffuse weakness and stiffness  Denies headache  Denies chest pain, shortness of breath or vomiting  Mild nausea this morning but resolved  Mild inferior abdominal pain which has resolved  Voiding well  No BM  + flatus  Objective: Lying in bed  NAD  I/O       01/03 0701 - 01/04 0700 01/04 0701 - 01/05 0700 01/05 0701 - 01/06 0700    P  O  720 480 240    I V  (mL/kg) 2981 3 (31 4) 797 9 (8 4)     IV Piggyback       Total Intake(mL/kg) 3701 3 (39) 1277 9 (13 5) 240 (2 5)    Urine (mL/kg/hr) 5050 (2 2) 2075 (0 9) 375 (1 6)    Blood       Total Output 5050 2075 375    Net -1348 8 -797 1 -135                 Invasive Devices     Peripheral Intravenous Line            Peripheral IV 01/02/20 Left Hand 3 days    Peripheral IV 01/02/20 Right Hand 3 days                Physical Exam:  Vitals: Blood pressure 137/80, pulse 69, temperature 97 6 °F (36 4 °C), resp  rate 18, height 5' 10" (1 778 m), weight 94 8 kg (209 lb), SpO2 96 %  ,Body mass index is 29 99 kg/m²  General appearance: alert, appears stated age, cooperative and no distress  Head: Normocephalic, without obvious abnormality  Eyes:  Conjugate gaze  Neck: supple, symmetrical, trachea midline   Back:  Incision clean dry intact with running suture  Skin edges well approximated  No erythema or swelling  No drainage    Lungs: non labored breathing  Heart: regular heart rate  Neurologic:   Mental status: Alert, oriented, thought content appropriate  Cranial nerves: grossly intact (Cranial nerves II-XII)  Sensory: normal to LT x4  Motor: moving all extremities without focal weakness  Reflexes: no clonus b/l    Lab Results:  Results from last 7 days   Lab Units 01/04/20  1049 01/03/20  0519 12/31/19  1354   WBC Thousand/uL 20 03* 26 72* 10 87*   HEMOGLOBIN g/dL 14 6 14 5 15 9   HEMATOCRIT % 42 8 43 7 48 6   PLATELETS Thousands/uL 222 268 274   NEUTROS PCT %  --   --  52   MONOS PCT %  --   --  8     Results from last 7 days   Lab Units 01/03/20  0519 12/31/19  1354   POTASSIUM mmol/L 4 3 4 2   CHLORIDE mmol/L 108 107   CO2 mmol/L 27 33*   BUN mg/dL 14 16   CREATININE mg/dL 1 20 1 14   CALCIUM mg/dL 9 5 10 3*   ALK PHOS U/L  --  62   ALT U/L  --  20   AST U/L  --  11             Results from last 7 days   Lab Units 12/31/19  1354   INR  1 04   PTT seconds 32     No results found for: TROPONINT  ABG:No results found for: PHART, XOD2CCT, PO2ART, ZBQ1CBT, X1WSDFSX, DARA, SOURCE    Imaging Studies: I have personally reviewed pertinent reports  and I have personally reviewed pertinent films in PACS    Xr Spine Thoracolumbar 2 Vw    Result Date: 1/2/2020  Impression: Fluoroscopic guidance provided for surgical procedure  Please refer to the separate procedure notes for additional details  Workstation performed: [id number]       EKG, Pathology, and Other Studies: I have personally reviewed pertinent reports        VTE Pharmacologic Prophylaxis: Heparin - refusing    VTE Mechanical Prophylaxis: sequential compression device

## 2020-01-05 NOTE — PLAN OF CARE
Problem: PAIN - ADULT  Goal: Verbalizes/displays adequate comfort level or baseline comfort level  Description  Interventions:  - Encourage patient to monitor pain and request assistance  - Assess pain using appropriate pain scale  - Administer analgesics based on type and severity of pain and evaluate response  - Implement non-pharmacological measures as appropriate and evaluate response  - Consider cultural and social influences on pain and pain management  - Notify physician/advanced practitioner if interventions unsuccessful or patient reports new pain  Outcome: Adequate for Discharge     Problem: INFECTION - ADULT  Goal: Absence or prevention of progression during hospitalization  Description  INTERVENTIONS:  - Assess and monitor for signs and symptoms of infection  - Monitor lab/diagnostic results  - Monitor all insertion sites, i e  indwelling lines, tubes, and drains  - Monitor endotracheal if appropriate and nasal secretions for changes in amount and color  - [place] appropriate cooling/warming therapies per order  - Administer medications as ordered  - Instruct and encourage patient and family to use good hand hygiene technique  - Identify and instruct in appropriate isolation precautions for identified infection/condition  Outcome: Adequate for Discharge  Goal: Absence of fever/infection during neutropenic period  Description  INTERVENTIONS:  - Monitor WBC    Outcome: Adequate for Discharge     Problem: SAFETY ADULT  Goal: Patient will remain free of falls  Description  INTERVENTIONS:  - Assess patient frequently for physical needs  -  Identify cognitive and physical deficits and behaviors that affect risk of falls    -  [place] fall precautions as indicated by assessment   - Educate patient/family on patient safety including physical limitations  - Instruct patient to call for assistance with activity based on assessment  - Modify environment to reduce risk of injury  - Consider OT/PT consult to assist with strengthening/mobility  Outcome: Adequate for Discharge  Goal: Maintain or return to baseline ADL function  Description  INTERVENTIONS:  -  Assess patient's ability to carry out ADLs; assess patient's baseline for ADL function and identify physical deficits which impact ability to perform ADLs (bathing, care of mouth/teeth, toileting, grooming, dressing, etc )  - Assess/evaluate cause of self-care deficits   - Assess range of motion  - Assess patient's mobility; develop plan if impaired  - Assess patient's need for assistive devices and provide as appropriate  - Encourage maximum independence but intervene and supervise when necessary  - Involve family in performance of ADLs  - Assess for home care needs following discharge   - Consider OT consult to assist with ADL evaluation and planning for discharge  - Provide patient education as appropriate  Outcome: Adequate for Discharge  Goal: Maintain or return mobility status to optimal level  Description  INTERVENTIONS:  - Assess patient's baseline mobility status (ambulation, transfers, stairs, etc )    - Identify cognitive and physical deficits and behaviors that affect mobility  - Identify mobility aids required to assist with transfers and/or ambulation (gait belt, sit-to-stand, lift, walker, cane, etc )  - [place] fall precautions as indicated by assessment  - Record patient progress and toleration of activity level on Mobility SBAR; progress patient to next Phase/Stage  - Instruct patient to call for assistance with activity based on assessment  - Consider rehabilitation consult to assist with strengthening/weightbearing, etc   Outcome: Adequate for Discharge     Problem: DISCHARGE PLANNING  Goal: Discharge to home or other facility with appropriate resources  Description  INTERVENTIONS:  - Identify barriers to discharge w/patient and caregiver  - Arrange for needed discharge resources and transportation as appropriate  - Identify discharge learning needs (meds, wound care, etc )  - Arrange for interpretive services to assist at discharge as needed  - Refer to Case Management Department for coordinating discharge planning if the patient needs post-hospital services based on physician/advanced practitioner order or complex needs related to functional status, cognitive ability, or social support system  Outcome: Adequate for Discharge     Problem: Knowledge Deficit  Goal: Patient/family/caregiver demonstrates understanding of disease process, treatment plan, medications, and discharge instructions  Description  Complete learning assessment and assess knowledge base    Interventions:  - Provide teaching at level of understanding  - Provide teaching via preferred learning methods  Outcome: Adequate for Discharge

## 2020-01-05 NOTE — DISCHARGE INSTR - AVS FIRST PAGE
Steroids Decadron Taper:  1/5/2020 take the 2mg tablets twice a day  1/6/2020 take 1mg tablets twice a day

## 2020-01-06 ENCOUNTER — HOSPITAL ENCOUNTER (OUTPATIENT)
Facility: HOSPITAL | Age: 65
Setting detail: OBSERVATION
Discharge: HOME HEALTH | End: 2020-01-07
Attending: EMERGENCY MEDICINE | Admitting: NEUROLOGICAL SURGERY
Payer: MEDICARE

## 2020-01-06 ENCOUNTER — APPOINTMENT (EMERGENCY)
Dept: RADIOLOGY | Facility: HOSPITAL | Age: 65
End: 2020-01-06
Payer: MEDICARE

## 2020-01-06 DIAGNOSIS — M79.605 LEG PAIN, BILATERAL: ICD-10-CM

## 2020-01-06 DIAGNOSIS — F31.9 BIPOLAR DEPRESSION (HCC): ICD-10-CM

## 2020-01-06 DIAGNOSIS — D49.7 INTRADURAL EXTRAMEDULLARY THORACIC TUMOR: ICD-10-CM

## 2020-01-06 DIAGNOSIS — Z86.59 HISTORY OF SUICIDAL IDEATION: Primary | ICD-10-CM

## 2020-01-06 DIAGNOSIS — M79.604 LEG PAIN, BILATERAL: ICD-10-CM

## 2020-01-06 PROCEDURE — 72070 X-RAY EXAM THORAC SPINE 2VWS: CPT

## 2020-01-06 PROCEDURE — 99285 EMERGENCY DEPT VISIT HI MDM: CPT | Performed by: EMERGENCY MEDICINE

## 2020-01-06 PROCEDURE — 97163 PT EVAL HIGH COMPLEX 45 MIN: CPT

## 2020-01-06 PROCEDURE — 99284 EMERGENCY DEPT VISIT MOD MDM: CPT

## 2020-01-06 PROCEDURE — 97166 OT EVAL MOD COMPLEX 45 MIN: CPT

## 2020-01-06 PROCEDURE — NC001 PR NO CHARGE: Performed by: NEUROLOGICAL SURGERY

## 2020-01-06 PROCEDURE — 99024 POSTOP FOLLOW-UP VISIT: CPT | Performed by: NEUROLOGICAL SURGERY

## 2020-01-06 PROCEDURE — 72100 X-RAY EXAM L-S SPINE 2/3 VWS: CPT

## 2020-01-06 RX ORDER — OXYCODONE HYDROCHLORIDE 5 MG/1
5 TABLET ORAL EVERY 4 HOURS PRN
Status: DISCONTINUED | OUTPATIENT
Start: 2020-01-06 | End: 2020-01-07 | Stop reason: HOSPADM

## 2020-01-06 RX ORDER — DEXAMETHASONE 2 MG/1
2 TABLET ORAL ONCE
Status: COMPLETED | OUTPATIENT
Start: 2020-01-06 | End: 2020-01-06

## 2020-01-06 RX ORDER — HEPARIN SODIUM 5000 [USP'U]/ML
5000 INJECTION, SOLUTION INTRAVENOUS; SUBCUTANEOUS EVERY 8 HOURS SCHEDULED
Status: DISCONTINUED | OUTPATIENT
Start: 2020-01-06 | End: 2020-01-07 | Stop reason: HOSPADM

## 2020-01-06 RX ORDER — ACETAMINOPHEN 325 MG/1
975 TABLET ORAL EVERY 8 HOURS PRN
Status: DISCONTINUED | OUTPATIENT
Start: 2020-01-06 | End: 2020-01-07 | Stop reason: HOSPADM

## 2020-01-06 RX ORDER — AMOXICILLIN 250 MG
2 CAPSULE ORAL DAILY
Status: DISCONTINUED | OUTPATIENT
Start: 2020-01-07 | End: 2020-01-07 | Stop reason: HOSPADM

## 2020-01-06 RX ORDER — METHOCARBAMOL 750 MG/1
750 TABLET, FILM COATED ORAL EVERY 6 HOURS PRN
Status: DISCONTINUED | OUTPATIENT
Start: 2020-01-06 | End: 2020-01-07 | Stop reason: HOSPADM

## 2020-01-06 RX ORDER — POLYETHYLENE GLYCOL 3350 17 G/17G
17 POWDER, FOR SOLUTION ORAL DAILY
Status: DISCONTINUED | OUTPATIENT
Start: 2020-01-07 | End: 2020-01-07 | Stop reason: HOSPADM

## 2020-01-06 RX ORDER — GABAPENTIN 300 MG/1
300 CAPSULE ORAL
Status: DISCONTINUED | OUTPATIENT
Start: 2020-01-06 | End: 2020-01-07 | Stop reason: HOSPADM

## 2020-01-06 RX ORDER — DIAZEPAM 5 MG/1
5 TABLET ORAL ONCE
Status: COMPLETED | OUTPATIENT
Start: 2020-01-06 | End: 2020-01-06

## 2020-01-06 RX ORDER — GABAPENTIN 300 MG/1
300 CAPSULE ORAL ONCE
Status: COMPLETED | OUTPATIENT
Start: 2020-01-06 | End: 2020-01-06

## 2020-01-06 RX ORDER — DIAZEPAM 5 MG/ML
5 INJECTION, SOLUTION INTRAMUSCULAR; INTRAVENOUS ONCE
Status: DISCONTINUED | OUTPATIENT
Start: 2020-01-06 | End: 2020-01-06

## 2020-01-06 RX ORDER — CALCIUM CARBONATE 200(500)MG
1000 TABLET,CHEWABLE ORAL DAILY PRN
Status: DISCONTINUED | OUTPATIENT
Start: 2020-01-06 | End: 2020-01-07 | Stop reason: HOSPADM

## 2020-01-06 RX ORDER — DEXAMETHASONE 2 MG/1
2 TABLET ORAL EVERY 8 HOURS SCHEDULED
Status: DISCONTINUED | OUTPATIENT
Start: 2020-01-06 | End: 2020-01-07 | Stop reason: HOSPADM

## 2020-01-06 RX ORDER — DIAZEPAM 5 MG/1
5 TABLET ORAL EVERY 6 HOURS PRN
Status: DISCONTINUED | OUTPATIENT
Start: 2020-01-06 | End: 2020-01-07 | Stop reason: HOSPADM

## 2020-01-06 RX ORDER — OXYCODONE HYDROCHLORIDE 5 MG/1
5 TABLET ORAL ONCE
Status: DISCONTINUED | OUTPATIENT
Start: 2020-01-06 | End: 2020-01-07 | Stop reason: HOSPADM

## 2020-01-06 RX ADMIN — DIAZEPAM 5 MG: 5 TABLET ORAL at 11:17

## 2020-01-06 RX ADMIN — GABAPENTIN 300 MG: 300 CAPSULE ORAL at 07:50

## 2020-01-06 RX ADMIN — DEXAMETHASONE 2 MG: 2 TABLET ORAL at 22:26

## 2020-01-06 RX ADMIN — GABAPENTIN 300 MG: 300 CAPSULE ORAL at 22:26

## 2020-01-06 RX ADMIN — DEXAMETHASONE 2 MG: 2 TABLET ORAL at 18:49

## 2020-01-06 NOTE — ED ATTENDING ATTESTATION
1/6/2020  ILeobardo MD, saw and evaluated the patient  I have discussed the patient with the resident/non-physician practitioner and agree with the resident's/non-physician practitioner's findings, Plan of Care, and MDM as documented in the resident's/non-physician practitioner's note, except where noted  All available labs and Radiology studies were reviewed  I was present for key portions of any procedure(s) performed by the resident/non-physician practitioner and I was immediately available to provide assistance  At this point I agree with the current assessment done in the Emergency Department  I have conducted an independent evaluation of this patient a history and physical is as follows:    ED Course      Emergency Department Note- Nadine Fitzgerald Sr  71 y o  male MRN: [id number]    Unit/Bed#: [facility] 26 Encounter: [id number]    Lauren Petty  is a 71 y o  male who presents with   Chief Complaint   Patient presents with    Post-op Problem     Patient was discharged yesterday after laminectomy  Patient woke up in severe pain  Patient took last pain medication 4 to 5 hours PTA         History of Present Illness   HPI:  Lauren Petty  is a 71 y o  male who presents for evaluation of:  Post operative pain  Patient was discharged at 3 pm yesterday after operation for intradural extramedullary spinal tumor  The patient has been having severe leg along the front of both legs  Any sort of movement exacerbates his pain  Review of Systems   Constitutional: Negative for chills and fever  Skin: Negative for rash and wound  Neurological: Negative for light-headedness and headaches  All other systems reviewed and are negative      Review of EMR:  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]  Spinal cord compression ([address] ) [G95 20]  Myelomalacia ([address] ) [G95 89]     Discharge Diagnosis:   · 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 status post decompression and resection of tumor- Dr Jamel Felder  Multifactorial leukocytosis reactive/steroid induced-improving    Historical Information   Past Medical History:   Diagnosis Date    Anxiety     Bipolar depression ([address] )     bipolar disorder    Borderline diabetes mellitus     Chronic back pain     Chronic pain     Gallstones     Restless leg syndrome     Spinal arachnoid cyst      Past Surgical History:   Procedure Laterality Date    COLONOSCOPY      INCISION AND DRAINAGE ABSCESS / HEMATOMA OF BURSA / KNEE / Dong Jarvis  in 1990's    outer right leg and center of upper back   complicated  resolved status    OTHER SURGICAL HISTORY  11/2019    cyst excision from back by Dermatolgist at office of Dr Orquidea Recio BX/EXCIS SPIN [facility] N/A 1/2/2020    Procedure: T12 LAMINECTOMY FOR RESECTION OF INTRADURAL-EXTRAMEDULLARY MASS;  Surgeon: Trey Andrews MD;  [facility];  Service: Neurosurgery    NV COLONOSCOPY FLX DX W/COLLJ [address] PFRMD N/A 8/31/2017    Procedure: COLONOSCOPY;  Surgeon: Connor Huff MD;  [facility]: [facility]; Service: Gastroenterology    NV COLONOSCOPY FLX DX W/COLLJ [facility] WHEN PFRMD N/A 3/23/2018    Procedure: COLONOSCOPY;  Surgeon: Connor Huff MD;  [facility];   Service: Gastroenterology    TUMOR REMOVAL  1990's    "Cheese tumor" removed from back     Social History   Social History     Substance and Sexual Activity   Alcohol Use Yes    Frequency: Monthly or less    Drinks per session: 1 or 2    Binge frequency: Never    Comment: very seldom now     Social History     Substance and Sexual Activity   Drug Use Yes    Types: Marijuana     Social History     Tobacco Use   Smoking Status Former Smoker   Smokeless Tobacco Never Used   Tobacco Comment    all scripts says former smoker quit at 14-17 years old     Family History: non-contributory    Meds/Allergies   all medications and allergies reviewed  No Known Allergies    Objective   First Vitals:   Blood Pressure: 142/90 (20)  Pulse: 90 (20)  Temperature: 98 °F (36 7 °C) (20)  Temp Source: Oral (20)  Respirations: 18 (20)  SpO2: 94 % (20)    Current Vitals:   Blood Pressure: 142/90 (20)  Pulse: 90 (20)  Temperature: 98 °F (36 7 °C) (20)  Temp Source: Oral (20)  Respirations: 18 (20)  SpO2: 94 % (20)    No intake or output data in the 24 hours ending 20    Invasive Devices     None                 Physical Exam   Constitutional: He is oriented to person, place, and time  He appears well-developed and well-nourished  HENT:   Head: Normocephalic and atraumatic  Neck: Normal range of motion  Neck supple  Cardiovascular: Normal rate and regular rhythm  Pulmonary/Chest: Effort normal and breath sounds normal    Musculoskeletal: Normal range of motion  He exhibits no deformity  Neurological: He is alert and oriented to person, place, and time  Skin: Skin is warm and dry  Capillary refill takes less than 2 seconds  Psychiatric: He has a normal mood and affect  His behavior is normal  Judgment and thought content normal    Nursing note and vitals reviewed  Male in mild distress secondary to pain          Medical Decision Makin  Acute flare of post operative pain: d/w neurosurgery; pain control    No results found for this or any previous visit (from the past 36 hour(s))  No orders to display         Portions of the record may have been created with voice recognition software  Occasional wrong word or "sound a like" substitutions may have occurred due to the inherent limitations of voice recognition software  Read the chart carefully and recognize, using context, where substitutions have occurred            Critical Care Time  Procedures

## 2020-01-06 NOTE — PLAN OF CARE
Problem: PHYSICAL THERAPY ADULT  Goal: Performs mobility at highest level of function for planned discharge setting  See evaluation for individualized goals  Description  Treatment/Interventions: Functional transfer training, LE strengthening/ROM, Therapeutic exercise, Endurance training, Gait training, Bed mobility, Spoke to MD, Spoke to nursing, OT          See flowsheet documentation for full assessment, interventions and recommendations  Note:   Prognosis: Good  Problem List: Decreased endurance, Impaired balance, Decreased mobility, Decreased coordination, Pain  Assessment: Pt is a 72 yo male presenting to [facility] ED on 1/6/20 with increased LE pain s/p T12 laminectomy for resection of IDEM mass on 1/2/20  Pt evaluated in ED for discharge purposes  Pt  has a past medical history of Anxiety, Bipolar depression ([address] ), Borderline diabetes mellitus, Chronic back pain, Chronic pain, Gallstones, Restless leg syndrome, and Spinal arachnoid cyst  Pt is supine at start of session and agreeable to therapy  Pt is primarily limited by pain throughout session  Noted erythema on B/L thighs, ED resident notified  Pt educated on log roll technique to minimize pain with bed mobility  Pt transferred supine <> sit with Raphael  Pt requires increased time to perform all mobility due to pain  Pt transferred sit <> stand with modA x1 and hand held assist  Pt took x3 lateral steps with modA x1 and hand held assist  Pt requires constant cues for weight shifting and LE advancement during ambulation  Pt unable to tolerate additional mobility due to pain  Pt returned to supine and remained supine with all needs within reach at end of session  PT to recommend inpt rehab to maximize safety and independence with functional mobility  PMR consult for acute rehab placement   PT to follow  Barriers to Discharge: Decreased caregiver support     Recommendation: (S) Post acute IP rehab(PMR consult for acute rehab)     PT - OK to Discharge: (S) Yes(To inpt rehab when medically stable)    See flowsheet documentation for full assessment        Paula Soto PT, DPT

## 2020-01-06 NOTE — H&P
Consult- Radha Landry Sr  1955, 71 y o  male MRN: [id number]    Unit/Bed#: ED 26 Encounter: [id number]    Primary Care Provider: Oral Morris PA-C   Date and time admitted to hospital: 1/6/2020  5:34 AM    ADDENDUM: patient continues with anterior thigh pain/spasm  States IV valium put him to sleep with limited improvement once he mobilized after  Will plan to increase decadron to 2mg Q8H with possibly increasing back to 2mg Q6H if needed  Recommend continuation of gabapentin  Updated ER staff with recommendations  Consults    Leg pain, bilateral  Assessment & Plan  Severe sharp spasm b/l anterior thigh pain which is worse with movement  S/p T12 laminectomy for resection of IDEM mass 1/2/20    Imaging:   · Thoracolumbar upright XR 1/6/2020: final read pending  overall stable alignment  Plan:  · Monitor exam - non focal  · Pain control - recommend trial of IV Valium  Consider gabapentin  Continue home oxycodone and robaxin  · Mobilize with PT  If placement needed, discuss with CM if placement able to be completed from ED  · No surgical needs identified  No admission anticipated  · Call with questions/concerns  History of Present Illness     HPI: Radha Landry Sr  is a 71 y o  male with PMH including 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 who presents with complaint of bilateral anterior thigh pain  Patient was discharged yesterday after a uncomplicated hospital course  He admits to having mild leg pain during his admission that was only present with position changing yesterday  Upon going home, he developed severe sharp spasm anterior thigh pain which did not improve with oxycodone, robaxin or steroid  He required his significant other to call EMS and presented to the ED  He denies any weakness or sensory deficits  Voiding well  No incontinence  +constipation  Pain slightly better once he is walking but worse with position changes and any movement in bed         Review of Systems   Constitutional: Positive for activity change  Negative for fatigue and fever  HENT: Negative for trouble swallowing and voice change  Eyes: Negative for photophobia and visual disturbance  Respiratory: Negative for cough and shortness of breath  Cardiovascular: Negative for chest pain and leg swelling  Gastrointestinal: Positive for constipation  Negative for abdominal pain, nausea and vomiting  Genitourinary: Negative for decreased urine volume and difficulty urinating  Musculoskeletal: Positive for back pain  Negative for gait problem and neck pain  Bilateral anterior thigh pain    Skin: Negative for pallor, rash and wound  Neurological: Negative for dizziness, tremors, seizures, speech difficulty, weakness, light-headedness, numbness and headaches  Psychiatric/Behavioral: Negative for agitation and confusion  Historical Information   Past Medical History:   Diagnosis Date    Anxiety     Bipolar depression ([facility] [address] )     bipolar disorder    Borderline diabetes mellitus     Chronic back pain     Chronic pain     Gallstones     Restless leg syndrome     Spinal arachnoid cyst      Past Surgical History:   Procedure Laterality Date    COLONOSCOPY      INCISION AND DRAINAGE ABSCESS / HEMATOMA OF BURSA / KNEE / THIGH  in 1990's    outer right leg and center of upper back   complicated  resolved status    OTHER SURGICAL HISTORY  11/2019    cyst excision from back by Dermatolgist at office of Dr Mariela Young [facility] [address] [place] [address] N/A 1/2/2020    Procedure: T12 LAMINECTOMY FOR RESECTION OF INTRADURAL-EXTRAMEDULLARY MASS;  Surgeon: Nereyda Zabala MD;  [facility];  Service: Neurosurgery    FL COLONOSCOPY FLX DX W/COLLJ SPEC WHEN PFRMD N/A 8/31/2017    Procedure: COLONOSCOPY;  Surgeon: Martha Perez MD;  Location[facility];   Service: Gastroenterology    FL COLONOSCOPY FLX DX W/COLLJ [facility] INPATIENT REHABILITATION WHEN PFRMD N/A 3/23/2018    Procedure: COLONOSCOPY;  Surgeon: Zabrina Nina MD;  Location: [facility]; Service: Gastroenterology    TUMOR REMOVAL  1990's    "Cheese tumor" removed from back     Social History     Substance and Sexual Activity   Alcohol Use Yes    Frequency: Monthly or less    Drinks per session: 1 or 2    Binge frequency: Never    Comment: very seldom now     Social History     Substance and Sexual Activity   Drug Use Yes    Types: Marijuana     Social History     Tobacco Use   Smoking Status Former Smoker   Smokeless Tobacco Never Used   Tobacco Comment    all scripts says former smoker quit at 14-17 years old     Family History   Problem Relation Age of Onset    Throat cancer Mother     Heart attack Father         at [de-identified]    Stomach cancer Sister     Lung cancer Brother     Stomach cancer Brother     Heart disease Brother        Meds/Allergies   all current active meds have been reviewed, current meds:   No current facility-administered medications for this encounter  and PTA meds:   Prior to Admission Medications   Prescriptions Last Dose Informant Patient Reported?  Taking?   acetaminophen (TYLENOL) 325 mg tablet   No No   Sig: Take 3 tablets (975 mg total) by mouth every 8 (eight) hours as needed for mild pain   dexamethasone (DECADRON) 1 mg tablet   No No   Sig: Take 1 tablet (1 mg total) by mouth every 12 (twelve) hours for 2 doses   dexamethasone (DECADRON) 2 mg tablet   No No   Sig: Take 1 tablet (2 mg total) by mouth every 12 (twelve) hours for 2 doses   methocarbamol (ROBAXIN) 750 mg tablet   No No   Sig: Take 1 tablet (750 mg total) by mouth every 6 (six) hours as needed for muscle spasms   oxyCODONE (ROXICODONE) 5 mg immediate release tablet   No No   Sig: Take 1 tablet (5 mg total) by mouth every 4 (four) hours as needed for moderate painMax Daily Amount: 30 mg   senna-docusate sodium (SENOKOT S) 8 6-50 mg per tablet   No No   Sig: Take 2 tablets by mouth daily      Facility-Administered Medications: None     No Known Allergies    Objective   I/O       01/04 0701 - 01/05 0700 01/05 0701 - 01/06 0700 01/06 0701 - 01/07 0700    Urine (mL/kg/hr)   400 (0 9)    Total Output   400    Net   -400                 Physical Exam   Constitutional: He is oriented to person, place, and time  He appears well-developed and well-nourished  No distress  HENT:   Head: Normocephalic and atraumatic  Eyes: Conjunctivae and EOM are normal  No scleral icterus  Neck: Normal range of motion  Neck supple  Cardiovascular: Normal rate  Pulmonary/Chest: Effort normal  No respiratory distress  Abdominal: Soft  He exhibits no distension  There is no tenderness  Musculoskeletal: He exhibits no tenderness  Neurological: He is oriented to person, place, and time  He has normal strength  Reflex Scores:       Patellar reflexes are 2+ on the right side and 3+ on the left side  Skin: Skin is warm and dry  Incision CDI  Skin edges are well approximated  Monocryl  Psychiatric: He has a normal mood and affect  His speech is normal and behavior is normal  Judgment and thought content normal      Neurologic Exam     Mental Status   Oriented to person, place, and time  Follows 3 step commands  Attention: normal  Concentration: normal    Speech: speech is normal   Level of consciousness: alert  Normal comprehension  Cranial Nerves     CN III, IV, VI   Extraocular motions are normal    Conjugate gaze: present    CN VII   Facial expression full, symmetric  CN VIII   Hearing: intact    CN XII   Tongue: not atrophic  Fasciculations: absent    Motor Exam   Muscle bulk: normal  Overall muscle tone: normal    Strength   Strength 5/5 throughout   Visible involuntary muscle firing b/l quad      Sensory Exam   Light touch normal    Pinprick normal      Gait, Coordination, and Reflexes     Tremor   Resting tremor: absent  Intention tremor: absent  Action tremor: absent    Reflexes   Right patellar: 2+  Left patellar: 3+  Right ankle clonus: absent  Left ankle clonus: absentJPS intact b/l hallus       Vitals:Blood pressure 138/91, pulse 68, temperature 97 5 °F (36 4 °C), temperature source Oral, resp  rate 18, weight 94 8 kg (208 lb 15 9 oz), SpO2 94 %  ,Body mass index is 29 99 kg/m²  Lab Results:   Results from last 7 days   Lab Units 01/04/20  1049 01/03/20  0519 12/31/19  1354   WBC Thousand/uL 20 03* 26 72* 10 87*   HEMOGLOBIN g/dL 14 6 14 5 15 9   HEMATOCRIT % 42 8 43 7 48 6   PLATELETS Thousands/uL 222 268 274   NEUTROS PCT %  --   --  52   MONOS PCT %  --   --  8     Results from last 7 days   Lab Units 01/03/20  0519 12/31/19  1354   POTASSIUM mmol/L 4 3 4 2   CHLORIDE mmol/L 108 107   CO2 mmol/L 27 33*   BUN mg/dL 14 16   CREATININE mg/dL 1 20 1 14   CALCIUM mg/dL 9 5 10 3*   ALK PHOS U/L  --  62   ALT U/L  --  20   AST U/L  --  11             Results from last 7 days   Lab Units 12/31/19  1354   INR  1 04   PTT seconds 32     No results found for: TROPONINT  ABG:No results found for: PHART, EYF4JPO, PO2ART, OSO3SNN, X4SMZNUX, BEART, SOURCE    Imaging Studies: I have personally reviewed pertinent reports  T/L spine XR 1/96327: final report pending  EKG, Pathology, and Other Studies: I have personally reviewed pertinent reports  VTE Prophylaxis: Sequential compression device Nereyda Mas)     Code Status: Prior  Advance Directive and Living Will:      Power of :    POLST:      Counseling / Coordination of Care  I spent 30 minutes with the patient

## 2020-01-06 NOTE — SOCIAL WORK
CM met w/pt in ED with introduction of CM role and to discuss d/c needs  Pt reported he lives with Tai Esparza in the home, but is at home alone during the day with no assistance  CM and pt discussed SNF placement as an option  Pt is agreeable to same  A post acute care recommendation was made by your care team for STR  Discussed Freedom of Choice with patient  List of facilities given to patient via in person  patient aware the list is custom filtered for them by preference  and that [facility] post acute providers are designated  Pt made aware    CM and pt discussed PT/OT recs were for home therapy and that acceptance by SNF is not guaranteed  Pt has had 3 midnight qualifying stay within 30 months  CM to f/u with patient for SNF choices to send referrals

## 2020-01-06 NOTE — OCCUPATIONAL THERAPY NOTE
OccupationalTherapy Evaluation     Patient Name: Kailash Fang Sr  Today's Date: 1/6/2020  Problem List  Active Problems:    Leg pain, bilateral    Past Medical History  Past Medical History:   Diagnosis Date    Anxiety     Bipolar depression ([address] )     bipolar disorder    Borderline diabetes mellitus     Chronic back pain     Chronic pain     Gallstones     Restless leg syndrome     Spinal arachnoid cyst      Past Surgical History  Past Surgical History:   Procedure Laterality Date    COLONOSCOPY      INCISION AND DRAINAGE ABSCESS / HEMATOMA OF BURSA / KNEE / Raymon Dandy  in 1990's    outer right leg and center of upper back   complicated  resolved status    OTHER SURGICAL HISTORY  11/2019    cyst excision from back by Dermatolgist at office of Dr Jose Dominique [facility] N/A 1/2/2020    Procedure: T12 LAMINECTOMY FOR RESECTION OF INTRADURAL-EXTRAMEDULLARY MASS;  Surgeon: Janelle Travis MD;  [facility];  Service: Neurosurgery    SD COLONOSCOPY FLX DX W/COLLJ SPEC WHEN PFRMD N/A 8/31/2017    Procedure: COLONOSCOPY;  Surgeon: Alysha Garcia MD;  [facility]; Service: Gastroenterology    SD COLONOSCOPY FLX DX W/COLLJ [facility] REHABILITATION WHEN PFRMD N/A 3/23/2018    Procedure: COLONOSCOPY;  Surgeon: Alysha Garcia MD;  [facility]: [facility]; Service: Gastroenterology    TUMOR REMOVAL  2117'Q    "Cheese tumor" removed from back             01/06/20 1227   Note Type   Note type Eval/Treat   Restrictions/Precautions   Weight Bearing Precautions Per Order No   Other Precautions Telemetry;Multiple lines; Fall Risk;Pain   Pain Assessment   Pain Assessment 0-10   Pain Score Worst Possible Pain   Pain Type Acute pain   Pain Location Leg   Pain Orientation Bilateral   Pain Descriptors Discomfort;Aching;Spasm; Sharp   Pain Frequency Intermittent   Pain Onset Sudden   Clinical Progression Gradually improving   Effect of Pain on Daily Activities limits functional activity   Patient's Stated Pain Goal No pain   Hospital Pain Intervention(s) Repositioned; Ambulation/increased activity; Emotional support   Response to Interventions tolerated   Home Living   Type of [address] One level; Other (Comment); Performs ADLs on one level; Able to live on main level with bedroom/bathroom  (0 [place])   Bathroom Shower/Tub Tub/shower unit   Bathroom Toilet Standard   Bathroom Equipment Grab bars in [facility] [address]   Additional Comments Pt lives in [address] [facility], 0 [place]   Prior Function   Level of [place] Independent with ADLs and functional mobility   Lives With Son;Significant other   Receives Help From Family   ADL Assistance Independent   IADLs Independent   Vocational Retired   Comments Prior to last hospital admission, pt was I w/ ADLS/IADLS; since last admission however pt has needed assist (pt only home for less than 24 hours from last admission); pt is Mod I w/ transfers and functional mobility w/ use of RW PTA   Lifestyle   Autonomy I ADLS, IADLS, Mod I w/ transfers and functional mobility PTA   Reciprocal Relationships Pt lives w/ S O and son; pt is home alone during the day   Service to Others pt is retired; used to Raiing   Intrinsic Gratification Pt enjoys TV   Psychosocial   Psychosocial (WDL) WDL   ADL   Eating Assistance 5  Supervision/Setup   Grooming Assistance 5  Supervision/Setup   [address] 5  Supervision/Setup   LB [address] 3  Moderate Assistance   UB Dressing Assistance 5  Supervision/Setup   LB Dressing Assistance 2  Maximal Assistance   LB Dressing Deficit Don/doff R sock; Don/doff L sock   Toileting Assistance  3  Moderate Assistance   Functional Assistance 3  Moderate Assistance   Functional Deficit Steadying;Verbal cueing;Supervision/safety; Increased time to complete   Bed Mobility   Supine to Sit 4  Minimal assistance   Additional items Assist x 1; Increased time required;Verbal cues;LE management   Sit to Supine 5  Supervision   Additional items Increased time required;Verbal cues   Additional Comments Pt went from supine to sit w/ Min A x1 for UB support and LE management, +VC for proper technique  Pt limited by pain; requires increased time and cues for safety  pt sat EOB w/ CTG for sitting balance   Transfers   Sit to Stand 3  Moderate assistance   Additional items Assist x 1; Increased time required;Verbal cues   Stand to Sit 3  Moderate assistance   Additional items Assist x 1; Increased time required;Verbal cues   Additional Comments Pt performed sit-stand from EOB w/ Mod  Ax1 for moderate force production into standing and +VC for proper technique/safety  HHA used into stnding   Functional Mobility   Functional Mobility 3  Moderate assistance   Additional Comments Pt took few small steps at EOB w/ Mod A x1 , HHA used, increased time needed 2/2 pain  Additional items Hand hold assistance   Balance   Static Sitting Fair -   Dynamic Sitting Poor   Static Standing Poor   Dynamic Standing Poor   Ambulatory Poor   Activity Tolerance   Activity Tolerance Patient limited by fatigue;Patient limited by pain   Medical Staff Made Aware PT   Nurse Made Aware yes   RUE Assessment   RUE Assessment WFL   LUE Assessment   LUE Assessment WFL   Hand Function   Gross Motor Coordination Functional   Fine Motor Coordination Functional   Sensation   Light Touch No apparent deficits   Proprioception   Proprioception No apparent deficits   Vision-Basic Assessment   Current Vision Wears glasses all the time   Cognition   Overall Cognitive Status Impaired   Arousal/Participation Responsive; Cooperative   Attention Attends with cues to redirect   Orientation Level Oriented X4   Memory Within functional limits   Following Commands Follows one step commands without difficulty   Comments Pt is cooperative; limited by pain; needs cues for safety   Assessment   Limitation Decreased ADL status; Decreased Safe judgement during ADL;Decreased cognition;Decreased endurance;Decreased self-care trans;Decreased high-level ADLs   Prognosis Fair   Assessment Pt is a 70 y/o male seen for OT eval s/p adm to B w/ complaint of B/L anterior thigh pain  Of note, pt recently D/C'd yesterday after uncomplicated hospital course where he was admitted w/ T12 IDEM mass s/p resection 2020  Pt is dx'd w/ B/L leg pain  Pt  has a past medical history of Anxiety, Bipolar depression ([facility] [address] ), Borderline diabetes mellitus, Chronic back pain, Chronic pain, Gallstones, Restless leg syndrome, and Spinal arachnoid cyst  Pt with active OT orders and up with assistance  orders  Pt lives with S O and son in [address], 0 [place]  Pt was I w/  ADLS and IADLS (prior to last admission), drove, & required use of DME PTA including a RW  Pt is currently demonstrating the following occupational deficits: S UB ADLS, Mod-Max A LB ADLS, Min A bed mobility, Mod A transfers and functional mobility w/ HHA, +VC for safety and increased time 2/2 pain  These deficits that are impacting pt's baseline areas of occupation are a result of the following impairments: pain, endurance, activity tolerance, functional mobility, forward functional reach, balance, functional standing tolerance, unsupportive home environment, decreased I w/ ADLS/IADLS, cognitive impairments and decreased safety awareness  The following Occupational Performance Areas to address include: grooming, bathing/shower, toilet hygiene, dressing, health maintenance, functional mobility, community mobility, clothing management and household maintenance  Pt scored overall 50/100 on the Barthel Index  Based on the aforementioned OT evaluation, functional performance deficits, and assessments, pt has been identified as a moderate complexity evaluation  Recommend STR upon D/C, when medically stable   Pt to continue to benefit from acute immediate OT services to address the following goals 3-5x/week to  w/in 7-10 days:    Goals   Patient Goals to have less pain and increase independence LTG Time Frame 7-10   Long Term Goal #1 see below listed goals   Plan   Treatment Interventions ADL retraining;Functional transfer training;UE strengthening/ROM; Endurance training;Cognitive reorientation;Patient/family training;Equipment evaluation/education; Compensatory technique education;Continued evaluation; Energy conservation; Activityengagement   Goal Expiration Date 01/16/20   OT Frequency 3-5x/wk   Recommendation   OT Discharge Recommendation Short Term Rehab   OT - OK to Discharge Yes  (when medically stable)   Barthel Index   Feeding 10   Bathing 0   Grooming Score 5   Dressing Score 5   Bladder Score 10   Bowels Score 10   Toilet Use Score 5   Transfers (Bed/Chair) Score 5   Mobility (Level Surface) Score 0   Stairs Score 0   Barthel Index Score 50   Modified Stuart Scale   Modified [place] Scale 4       GOALS    1) Pt will improve activity tolerance to G for min 30 min txment sessions for increase engagement in functional tasks  2) Pt will complete UB/LB dressing/self care w/ mod I using adaptive device and DME as needed  3) Pt will complete bathing w/ Mod I w/ use of AE and DME as needed  4) Pt will complete toileting w/ mod I w/ G hygiene/thoroughness using DME as needed  5) Pt will improve functional transfers to Mod I on/off all surfaces using DME as needed w/ G balance/safety   6) Pt will improve functional mobility during ADL/IADL/leisure tasks to Mod I using DME as needed w/ G balance/safety   7) Pt will improve standing tolerance to 10 mins w/ Mod I and use of DME as needed to increase endurance for sink level ADL tasks  8) Pt will be attentive 100% of the time during ongoing cognitive assessment w/ G participation to assist w/ safe d/c planning/recommendations  9) Pt will demonstrate G carryover of pt/caregiver education and training as appropriate w/o cues w/ good tolerance to increase safety during functional tasks  10) Pt will demonstrate 100% carryover of energy conservation techniques t/o functional I/ADL/leisure tasks w/o cues s/p skilled education to increase endurance during functional tasks     Bria Braun MS, OTR/L

## 2020-01-06 NOTE — H&P
Please see consult completed earlier today as H&P  Patient unable to be placed in rehab from ED  Continues with pain and will be unable to manage at home now requiring admission

## 2020-01-06 NOTE — ED PROVIDER NOTES
History  Chief Complaint   Patient presents with    Post-op Problem     Patient was discharged yesterday after laminectomy  Patient woke up in severe pain  Patient took last pain medication 4 to 5 hours PTA     71year old generally healthy male day 4 s/p resection T12 intradural extramedullary mass presents with intermittent acute lower extremity pain bilaterally  Endorses intermittent, excruciating, shooting pain down the anterior thighs, triggered by movement of his upper body, repositioning in bed  Since the surgery says he has been doing fairly well, discharged yesterday  Says his back generally feels pretty good  Denies bladder or bowel symptoms currently  Says that prior to his surgery he did have some episodes of incontinence  Urinating without issue, normal stream   Passing gas, has not had bowel movement since surgery, does admit to having diminished appetite  Denies any fever or chills, nausea, vomiting, diarrhea  Prior to Admission Medications   Prescriptions Last Dose Informant Patient Reported?  Taking?   acetaminophen (TYLENOL) 325 mg tablet   No No   Sig: Take 3 tablets (975 mg total) by mouth every 8 (eight) hours as needed for mild pain   dexamethasone (DECADRON) 1 mg tablet   No No   Sig: Take 1 tablet (1 mg total) by mouth every 12 (twelve) hours for 2 doses   dexamethasone (DECADRON) 2 mg tablet   No No   Sig: Take 1 tablet (2 mg total) by mouth every 12 (twelve) hours for 2 doses   methocarbamol (ROBAXIN) 750 mg tablet   No No   Sig: Take 1 tablet (750 mg total) by mouth every 6 (six) hours as needed for muscle spasms   oxyCODONE (ROXICODONE) 5 mg immediate release tablet   No No   Sig: Take 1 tablet (5 mg total) by mouth every 4 (four) hours as needed for moderate painMax Daily Amount: 30 mg   senna-docusate sodium (SENOKOT S) 8 6-50 mg per tablet   No No   Sig: Take 2 tablets by mouth daily      Facility-Administered Medications: None       Past Medical History: Diagnosis Date    Anxiety     Bipolar depression ([facility] [address] )     bipolar disorder    Borderline diabetes mellitus     Chronic back pain     Chronic pain     Gallstones     Restless leg syndrome     Spinal arachnoid cyst        Past Surgical History:   Procedure Laterality Date    COLONOSCOPY      INCISION AND DRAINAGE ABSCESS / HEMATOMA OF BURSA / KNEE / THIGH  in 1990's    outer right leg and center of upper back   complicated  resolved status    OTHER SURGICAL HISTORY  11/2019    cyst excision from back by Dermatolgist at office of Dr Aracelis Carias BX/EXCIS SPIN [address] N/A 1/2/2020    Procedure: T12 LAMINECTOMY FOR RESECTION OF INTRADURAL-EXTRAMEDULLARY MASS;  Surgeon: Luis Lowe MD;  [facility];  Service: Neurosurgery    ME COLONOSCOPY FLX DX W/COLLJ SPEC WHEN PFRMD N/A 8/31/2017    Procedure: COLONOSCOPY;  Surgeon: Estrella Cintron MD;  Location: [facility]; Service: Gastroenterology    ME COLONOSCOPY FLX DX W/COLLJ [facility] WHEN PFRMD N/A 3/23/2018    Procedure: COLONOSCOPY;  Surgeon: Estrella Cintron MD;  Location: [facility]; Service: Gastroenterology    TUMOR REMOVAL  9074'W    "Cheese tumor" removed from back       Family History   Problem Relation Age of Onset    Throat cancer Mother     Heart attack Father         at [de-identified]    Stomach cancer Sister     Lung cancer Brother     Stomach cancer Brother     Heart disease Brother      I have reviewed and agree with the history as documented  Social History     Tobacco Use    Smoking status: Former Smoker    Smokeless tobacco: Never Used    Tobacco comment: all scripts says former smoker quit at 14-17 years old   Substance Use Topics    Alcohol use: Yes     Frequency: Monthly or less     Drinks per session: 1 or 2     Binge frequency: Never     Comment: very seldom now    Drug use: Yes     Types: Marijuana        Review of Systems   Constitutional: Positive for activity change and appetite change  Negative for chills and fever  HENT: Negative for ear pain, sinus pain and sore throat  Eyes: Negative for pain  Respiratory: Negative for shortness of breath  Cardiovascular: Negative for chest pain  Gastrointestinal: Negative for abdominal pain, diarrhea, nausea and vomiting  Genitourinary: Negative for decreased urine volume, difficulty urinating, dysuria, flank pain and urgency  Musculoskeletal: Positive for back pain  Negative for neck pain  Neurological: Negative for headaches  All other systems reviewed and are negative  Physical Exam  ED Triage Vitals [01/06/20 0541]   Temperature Pulse Respirations Blood Pressure SpO2   98 °F (36 7 °C) 90 18 142/90 94 %      Temp Source Heart Rate Source Patient Position - Orthostatic VS BP Location FiO2 (%)   Oral Monitor Sitting Left arm --      Pain Score       7             Orthostatic Vital Signs  Vitals:    01/06/20 1145 01/06/20 1300 01/06/20 1400 01/06/20 1500   BP: 108/77 133/87 148/88 134/85   Pulse: 75 91 94 80   Patient Position - Orthostatic VS: Lying Lying Lying        Physical Exam   Constitutional: He is oriented to person, place, and time  He appears well-developed and well-nourished  No distress  Intermittently uncomfortable with movement  HENT:   Head: Normocephalic  Nose: Nose normal    Mouth/Throat: Oropharynx is clear and moist    Eyes: Pupils are equal, round, and reactive to light  Conjunctivae and EOM are normal  Right eye exhibits no discharge  Left eye exhibits no discharge  No scleral icterus  Neck: Normal range of motion  Cardiovascular: Normal rate, normal heart sounds and intact distal pulses  Pulmonary/Chest: Effort normal and breath sounds normal  No stridor  No respiratory distress  He has no wheezes  He has no rales  Abdominal: Soft  He exhibits no distension  There is no tenderness  There is no rebound and no guarding  Musculoskeletal: He exhibits no tenderness     Neurological: He is alert and oriented to person, place, and time  No cranial nerve deficit or sensory deficit  He exhibits normal muscle tone  Equal strength, sensation light touch in upper and lower extremities bilaterally  Skin: Skin is warm and dry  Capillary refill takes less than 2 seconds  He is not diaphoretic  Thoracolumbar incision is clean dry and intact   Psychiatric: He has a normal mood and affect  His behavior is normal  Judgment and thought content normal    Nursing note and vitals reviewed  ED Medications  Medications   oxyCODONE (ROXICODONE) IR tablet 5 mg (0 mg Oral Hold 1/6/20 1628)   dexamethasone (DECADRON) tablet 2 mg (has no administration in time range)   gabapentin (NEURONTIN) capsule 300 mg (has no administration in time range)   gabapentin (NEURONTIN) capsule 300 mg (300 mg Oral Given 1/6/20 0750)   diazepam (VALIUM) tablet 5 mg (5 mg Oral Given 1/6/20 1117)       Diagnostic Studies  Results Reviewed     None                 XR lumbar spine 2 or 3 views   Final Result by Maggi Villegas MD (01/06 1202)      No significant interval change since 12/17/2019         Workstation performed: [id number]         XR thoracic spine 2 views   Final Result by Maggi Villegas MD (01/06 1200)      No acute osseous abnormality  Workstation performed: [id number]               Procedures  Procedures      ED Course  ED Course as of Jan 06 1713 Mon Jan 06, 2020   6639 Day 4 s/p resection T12 intradural extramedullary mass, done by Dr Danette Helton, here for acute neuropathic leg pain; coni, plain films, PT  Elizabeth AP will eval this AM  Suspect dispo to SNF                                  [facility]  Number of Diagnoses or Management Options  Diagnosis management comments: Gabapentin 300 mg, plain films thoracolumbar spine, PT eval   AP will come to see the patient this morning           Disposition  Final diagnoses:   None     ED Disposition     None      Follow-up Information    None         Patient's Medications   Discharge Prescriptions    No medications on file     No discharge procedures on file  ED Provider  Attending physically available and evaluated Dennie Loyal    FABIAN managed the patient along with the ED Attending      Electronically Signed by         Yeison Hall MD  01/06/20 9721

## 2020-01-06 NOTE — PHYSICAL THERAPY NOTE
Physical Therapy Evaluation     Patient's Name: Selina Cam Sr  Admitting Diagnosis  Post-op pain [G89 18]    Problem List  Patient Active Problem List   Diagnosis    Adult residual type attention deficit hyperactivity disorder (ADHD)    Esophagitis    HTN (hypertension)    HLD (hyperlipidemia)    Prediabetes    Chronic bilateral low back pain without sciatica    Adenomatous polyp of transverse colon    Sessile rectal polyp    Erectile dysfunction    Polyp of colon    Bipolar 1 disorder (HCC)    Tubular adenoma of colon    Dizziness    Restless leg syndrome    Bipolar depression (HCC)    Borderline diabetes mellitus    Sinus tachycardia    Severe episode of recurrent major depressive disorder, without psychotic features (HCC)    Leukocytosis    Stage 2 chronic kidney disease    Bilateral carpal tunnel syndrome    Leg pain, bilateral       Past Medical History  Past Medical History:   Diagnosis Date    Anxiety     Bipolar depression (HCC)     bipolar disorder    Borderline diabetes mellitus     Chronic back pain     Chronic pain     Gallstones     Restless leg syndrome     Spinal arachnoid cyst        Past Surgical History  Past Surgical History:   Procedure Laterality Date    COLONOSCOPY      INCISION AND DRAINAGE ABSCESS / HEMATOMA OF BURSA / KNEE / Stephania Apple  in 1990's    outer right leg and center of upper back   complicated  resolved status    OTHER SURGICAL HISTORY  11/2019    cyst excision from back by Dermatolgist at office of Dr Yeny Phillips BX/EXCIS SPIN [address] [place] [address] N/A 1/2/2020    Procedure: T12 LAMINECTOMY FOR RESECTION OF INTRADURAL-EXTRAMEDULLARY MASS;  Surgeon: Roseanne Chavis MD;  [facility];  Service: Neurosurgery    DE COLONOSCOPY FLX DX W/COLLJ SPEC WHEN PFRMD N/A 8/31/2017    Procedure: COLONOSCOPY;  Surgeon: Lawanda Jimenes MD;  [facility];   Service: Gastroenterology    DE COLONOSCOPY FLX DX W/COLLJ SPEC WHEN PFRMD N/A 3/23/2018 Procedure: COLONOSCOPY;  Surgeon: Lyle Marcial MD;  [facility]: [facility]; Service: Gastroenterology    TUMOR REMOVAL  6558'M    "Cheese tumor" removed from back            01/06/20 1228   Note Type   Note type Eval only   Pain Assessment   Pain Assessment 0-10   Pain Score Worst Possible Pain  (Pain increased to 10 with movement, 3/10 at rest)   Pain Type Acute pain   Pain Location Leg  (B/L thigh)   Pain Orientation Left; Anterior   Pain Frequency Constant/continuous   Effect of Pain on Daily Activities Pain increased with mobility  Noted erythema on B/L thighs, ED resident notified   Patient's Stated Pain Goal No pain   Hospital Pain Intervention(s) Repositioned;MD notified (Comment)   Home Living   Type of Home House  (0 TYESHA)   Home Layout One level;Performs ADLs on one level; Able to live on main level with bedroom/bathroom   Home Equipment Walker  (Use of RW after 1/2/20 surgery, previously independent)   Prior Function   Level of [place] Independent with ADLs and functional mobility   Lives With Significant other; Nick Lopez Help From Family   ADL Assistance Independent   IADLs Independent   Falls in the last 6 months 0   Comments Pt reports that prior to surgery on 1/2/20 he was independent with ADLs and mobility  Pt reports he was home <12 hours after surgery prior to returning to hospital due to severe pain and inability to complete functional activities  Restrictions/Precautions   Weight Bearing Precautions Per Order No   Braces or Orthoses   (Spinal precautions, no brace)   Other Precautions Spinal precautions;Pain;Telemetry; Fall Risk   General   Family/Caregiver Present No   Cognition   Overall Cognitive Status WFL   Arousal/Participation Cooperative   Orientation Level Oriented X4   Following Commands Follows all commands and directions without difficulty   Comments Pt is primarily limited by pain, requiring encouragement to participate in evaluation   RLE Assessment   RLE Assessment WFL   LLE Assessment   LLE Assessment WFL   Coordination   Movements are Fluid and Coordinated 0   Coordination and Movement Description Antalgic, increased pain with all mobility   Light Touch   RLE Light Touch Grossly intact   LLE Light Touch Grossly intact   Bed Mobility   Supine to Sit 4  Minimal assistance   Additional items Assist x 1;Bedrails; Increased time required;Verbal cues;LE management   Sit to Supine 4  Minimal assistance   Additional items Assist x 1;Bedrails; Increased time required;Verbal cues;LE management   Transfers   Sit to Stand 3  Moderate assistance   Additional items Assist x 1; Increased time required;Verbal cues   Stand to Sit 3  Moderate assistance   Additional items Assist x 1; Increased time required;Verbal cues   Ambulation/Elevation   Gait pattern Improper Weight shift; Antalgic;Decreased foot clearance;Shuffling; Short stride; Excessively slow   Gait Assistance 3  Moderate assist   Additional items Assist x 1;Verbal cues; Tactile cues   Assistive Device   (Handheld assist)   Distance x3 lateral steps   Balance   Static Sitting Fair -   Dynamic Sitting Poor +   Static Standing Poor   Dynamic Standing Poor   Ambulatory Poor   Endurance Deficit   Endurance Deficit Yes   Endurance Deficit Description Pain   Activity Tolerance   Activity Tolerance Patient limited by pain   Medical Staff Made Aware OT, ED resident   Nurse Made Aware Yes   Assessment   Prognosis Good   Problem List Decreased endurance; Impaired balance;Decreased mobility; Decreased coordination;Pain   Assessment Pt is a 72 yo male [facility] ED on 1/[facility]/20 with increased LE pain s/p T12 laminectomy for resection of IDEM mass on 1/2/20  Pt evaluated in ED for discharge purposes  Pt  has a past medical history of Anxiety, Bipolar depression ([address] ), Borderline diabetes mellitus, Chronic back pain, Chronic pain, Gallstones, Restless leg syndrome, and Spinal arachnoid cyst  Pt is supine at start of session and agreeable to therapy   Pt is primarily limited by pain throughout session  Noted erythema on B/L thighs, ED resident notified  Pt educated on log roll technique to minimize pain with bed mobility  Pt transferred supine <> sit with Rapheal  Pt requires increased time to perform all mobility due to pain  Pt transferred sit <> stand with modA x1 and hand held assist  Pt took x3 lateral steps with modA x1 and hand held assist  Pt requires constant cues for weight shifting and LE advancement during ambulation  Pt unable to tolerate additional mobility due to pain  Pt returned to supine and remained supine with all needs within reach at end of session  PT to recommend inpt rehab to maximize safety and independence with functional mobility  PMR consult for acute rehab placement  PT to follow   Barriers to Discharge Decreased caregiver support   Goals   Patient Goals To decrease pain   STG Expiration Date 01/20/20   Short Term Goal #1 In 10 sessions pt will: 1  Transfer supine <> sit with supervision  2  Transfer sit <> stand with Raphael and LRAD  3  Ambulate >40 ft with Raphael and LRAD  4  Perform LE exercise program 5  Increase activity tolerance >45 minutes   PT Treatment Day 0   Plan   Treatment/Interventions Functional transfer training;LE strengthening/ROM; Therapeutic exercise; Endurance training;Gait training;Bed mobility;Spoke to MD;Spoke to nursing;OT   PT Frequency   (3-6x/wk)   Recommendation   Recommendation Post acute IP rehab  (PMR consult for acute rehab)   PT - OK to Discharge Yes  (To inpt rehab when medically stable)   Modified Gonzales Scale   Modified Gonzales Scale 4   Barthel Index   Feeding 10   Bathing 0   Grooming Score 0   Dressing Score 5   Bladder Score 10   Bowels Score 10   Toilet Use Score 5   Transfers (Bed/Chair) Score 5   Mobility (Level Surface) Score 0   Stairs Score 0   Barthel Index Score 45       Briscoe Collet, PT, DPT

## 2020-01-06 NOTE — ASSESSMENT & PLAN NOTE
Severe sharp spasm b/l anterior thigh pain which is worse with movement  S/p T12 laminectomy for resection of IDEM mass 1/2/20    Imaging:   · Thoracolumbar upright XR 1/6/2020: final read pending  overall stable alignment  Plan:  · Monitor exam - non focal  · Pain control - recommend trial of IV Valium  Consider gabapentin  Continue home oxycodone and robaxin  · Mobilize with PT  If placement needed, discuss with CM if placement able to be completed from ED  · No surgical needs identified  No admission anticipated  · Call with questions/concerns

## 2020-01-07 VITALS
SYSTOLIC BLOOD PRESSURE: 140 MMHG | TEMPERATURE: 98.8 F | DIASTOLIC BLOOD PRESSURE: 82 MMHG | BODY MASS INDEX: 29.99 KG/M2 | WEIGHT: 209 LBS | OXYGEN SATURATION: 96 % | RESPIRATION RATE: 18 BRPM | HEART RATE: 97 BPM

## 2020-01-07 PROBLEM — D49.7 INTRADURAL EXTRAMEDULLARY THORACIC TUMOR: Status: ACTIVE | Noted: 2020-01-07

## 2020-01-07 PROBLEM — M79.605 LEG PAIN, BILATERAL: Status: RESOLVED | Noted: 2020-01-06 | Resolved: 2020-01-07

## 2020-01-07 PROBLEM — D49.7 INTRADURAL EXTRAMEDULLARY THORACIC TUMOR: Status: RESOLVED | Noted: 2020-01-07 | Resolved: 2020-01-07

## 2020-01-07 PROBLEM — M79.604 LEG PAIN, BILATERAL: Status: RESOLVED | Noted: 2020-01-06 | Resolved: 2020-01-07

## 2020-01-07 PROCEDURE — 99024 POSTOP FOLLOW-UP VISIT: CPT | Performed by: PHYSICIAN ASSISTANT

## 2020-01-07 PROCEDURE — NC001 PR NO CHARGE: Performed by: PSYCHIATRY & NEUROLOGY

## 2020-01-07 PROCEDURE — NC001 PR NO CHARGE: Performed by: PHYSICIAN ASSISTANT

## 2020-01-07 RX ORDER — GABAPENTIN 300 MG/1
300 CAPSULE ORAL
Qty: 30 CAPSULE | Refills: 0 | Status: SHIPPED | OUTPATIENT
Start: 2020-01-07 | End: 2020-01-08

## 2020-01-07 RX ORDER — DEXAMETHASONE 2 MG/1
2 TABLET ORAL EVERY 8 HOURS SCHEDULED
Qty: 30 TABLET | Refills: 0 | Status: SHIPPED | OUTPATIENT
Start: 2020-01-07 | End: 2020-01-08

## 2020-01-07 RX ORDER — DIAZEPAM 5 MG/1
5 TABLET ORAL EVERY 6 HOURS PRN
Qty: 15 TABLET | Refills: 0 | Status: SHIPPED | OUTPATIENT
Start: 2020-01-07 | End: 2020-01-08

## 2020-01-07 RX ADMIN — DEXAMETHASONE 2 MG: 2 TABLET ORAL at 05:06

## 2020-01-07 RX ADMIN — OXYCODONE HYDROCHLORIDE 5 MG: 5 TABLET ORAL at 01:41

## 2020-01-07 RX ADMIN — METHOCARBAMOL TABLETS 750 MG: 750 TABLET, COATED ORAL at 09:16

## 2020-01-07 RX ADMIN — DEXAMETHASONE 2 MG: 2 TABLET ORAL at 14:34

## 2020-01-07 NOTE — PROGRESS NOTES
Patient seen and examined  Exam stable  States minimal leg pain  Oob to BR  B M  x 2 today  Seen ambulating sales with PCA using RW  No pain with ambulating  Will dc home on current regimen

## 2020-01-07 NOTE — UTILIZATION REVIEW
Initial Clinical Review    Admission: Date/Time/Statement: Observation 1/6 @ 1621  Orders Placed This Encounter   Procedures    Place in Observation     Standing Status:   Standing     Number of Occurrences:   1     Order Specific Question:   Admitting Physician     Answer:   Santiago Dow [[id number]]     Order Specific Question:   Level of Care     Answer:   Med Surg [[id number]]     ED Arrival Information     Expected Arrival Acuity Means of Arrival Escorted By Service Admission Type    - 1/6/2020 05:34 Urgent Ambulance [facility] EMS Neurosurgery Urgent    Arrival Complaint    [address] [facility] OP        Chief Complaint   Patient presents with    Post-op Problem     Patient was discharged yesterday after laminectomy  Patient woke up in severe pain  Patient took last pain medication 4 to 5 hours PTA     Assessment/Plan:  71 y o  male with PMH including 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 who presents with complaint of bilateral anterior thigh pain  Patient was discharged yesterday after a uncomplicated hospital course  He admits to having mild leg pain during his admission that was only present with position changing yesterday  Upon going home, he developed severe sharp spasm anterior thigh pain which did not improve with oxycodone, robaxin or steroid  He required his significant other to call EMS and presented to the ED  He denies any weakness or sensory deficits  Voiding well  No incontinence  +constipation  Pain slightly better once he is walking but worse with position changes and any movement in bed     Leg pain, bilateral  Assessment & Plan  Severe sharp spasm b/l anterior thigh pain which is worse with movement  S/p T12 laminectomy for resection of IDEM mass 1/2/20     Imaging:   · Thoracolumbar upright XR 1/6/2020: final read pending  overall stable alignment       Plan:  · Monitor exam - non focal  · Pain control - recommend trial of IV Valium  Consider gabapentin  Continue home oxycodone and robaxin     · Mobilize with PT  If placement needed, discuss with CM if placement able to be completed from ED  No surgical needs identified    ED Triage Vitals [01/06/20 0541]   Temperature Pulse Respirations Blood Pressure SpO2   98 °F (36 7 °C) 90 18 142/90 94 %      Temp Source Heart Rate Source Patient Position - Orthostatic VS BP Location FiO2 (%)   Oral Monitor Sitting Left arm --      Pain Score       7        Wt Readings from Last 1 Encounters:   01/06/20 94 8 kg (208 lb 15 9 oz)     Additional Vital Signs:   01/07/20 07:11:36  98 2 °F (36 8 °C)  85  18  124/85  98  95 %       01/07/20 0023        138/72           01/07/20 00:13:44  99 1 °F (37 3 °C)  91  18  95/60  72  94 %       01/06/20 2300              None (Room air)     01/06/20 1500    80  18  134/85    95 %       01/06/20 1400  98 °F (36 7 °C)  94  20  148/88    95 %  None (Room air)  Lying   01/06/20 1300  98 1 °F (36 7 °C)  91  16  133/87             Pertinent Labs/Diagnostic Test Results:   Xray Thoracic Spine - No acute osseous abnormality      Xray Lumbar Spine - No significant interval change since 12/17/2019    Results from last 7 days   Lab Units 01/04/20  1049 01/03/20 0519 12/31/19  1354   WBC Thousand/uL 20 03* 26 72* 10 87*   HEMOGLOBIN g/dL 14 6 14 5 15 9   HEMATOCRIT % 42 8 43 7 48 6   PLATELETS Thousands/uL 222 268 274   NEUTROS ABS Thousands/µL  --   --  5 69         Results from last 7 days   Lab Units 01/03/20  0519 12/31/19  1354   SODIUM mmol/L 140 143   POTASSIUM mmol/L 4 3 4 2   CHLORIDE mmol/L 108 107   CO2 mmol/L 27 33*   ANION GAP mmol/L 5 3*   BUN mg/dL 14 16   CREATININE mg/dL 1 20 1 14   EGFR ml/min/1 73sq m 63 67   CALCIUM mg/dL 9 5 10 3*     Results from last 7 days   Lab Units 12/31/19  1354   AST U/L 11   ALT U/L 20   ALK PHOS U/L 62   TOTAL PROTEIN g/dL 8 0   ALBUMIN g/dL 4 0   TOTAL BILIRUBIN mg/dL 0 22     Results from last 7 days   Lab Units 01/02/20  1151   POC GLUCOSE mg/dl 133     Results from last 7 days   Lab Units 01/03/20  0519   GLUCOSE RANDOM mg/dL 154*         Results from last 7 days   Lab Units 12/31/19  1354   HEMOGLOBIN A1C % 6 1   EAG mg/dl 128     Results from last 7 days   Lab Units 12/31/19  1354   PROTIME seconds 13 2   INR  1 04   PTT seconds 32     Results from last 7 days   Lab Units 12/31/19  1354   HEP C AB  Non-reactive     Results from last 7 days   Lab Units 12/31/19  1354   CLARITY UA  Clear   COLOR UA  Yellow   SPEC GRAV UA  1 025   PH UA  6 0   GLUCOSE UA mg/dl Negative   KETONES UA mg/dl Negative   BLOOD UA  Negative   PROTEIN UA mg/dl Negative   NITRITE UA  Negative   BILIRUBIN UA  Negative   UROBILINOGEN UA E U /dl 0 2   LEUKOCYTES UA  Negative     ED Treatment:   Medication Administration from 01/06/2020 0534 to 01/06/2020 1808      01/06/2020 0750 gabapentin (NEURONTIN) capsule 300 mg 300 mg Oral Given     01/06/2020 1117 diazepam (VALIUM) tablet 5 mg 5 mg Oral Given        Past Medical History:   Diagnosis Date    Anxiety     Bipolar depression ([facility] [address] )     bipolar disorder    Borderline diabetes mellitus     Chronic back pain     Chronic pain     Gallstones     Restless leg syndrome     Spinal arachnoid cyst      Present on Admission:   HTN (hypertension)      Admitting Diagnosis: Post-op pain [G89 18]  Age/Sex: 71 y o  male     Admission Orders:    IP CONSULT TO PSYCHIATRY    Scheduled Medications:  Medications:  dexamethasone 2 mg Oral Q8H [facility] HOME   gabapentin 300 mg Oral HS   heparin (porcine) 5,000 Units Subcutaneous Q8H [facility]   oxyCODONE 5 mg Oral Once   polyethylene glycol 17 g Oral Daily   senna-docusate sodium 2 tablet Oral Daily     Continuous IV Infusions:     PRN Meds:  acetaminophen 975 mg Oral Q8H PRN   calcium carbonate 1,000 mg Oral Daily PRN   diazepam 5 mg Oral Q6H PRN   methocarbamol 750 mg Oral Q6H PRN   oxyCODONE 5 mg Oral Q4H PRN     Network Utilization Review Department  [email or web address]  ATTENTION: Please call with any questions or concerns to [phone] and carefully listen to the prompts so that you are directed to the right person  All voicemails are confidential   Darlene Velarde all requests for admission clinical reviews, approved or denied determinations and any other requests to dedicated fax number below belonging to the campus where the patient is receiving treatment   List of dedicated fax numbers for the Facilities:  [address] DENIALS (Administrative/Medical Necessity) [phone]   [address] (Maternity/NICU/Pediatrics) [phone]   [facility] [phone]   [facility] [phone]   Delores Null [phone]   [address] [facility]  [phone]   [address] [place] [address] [address] [place] [address] [phone]   [facility]  [phone]   [address] [facility]  [address] [facility] [address] [facility] [phone]

## 2020-01-07 NOTE — DISCHARGE INSTR - AVS FIRST PAGE
Continue steroid (dexamethasone) 2mg tablets three times a day until contacted by office  Will plan for slow decrease in 2-3 days  Call with any increased leg pain or weakness/sensation changes

## 2020-01-07 NOTE — SOCIAL WORK
CM met with patient for more SNF options  Referrals made to [facility], [facility] and [facility] were denied  Patient states he feels much better and would just like to go home with [facility]  Patient states he has a 1 floor home an d no steps to enter  Referral made to Freddy Dow for [facility]  Referral pending in [place]  CM will continue to follow  Made patient aware of start of care date for [facility] is Friday 1/10/20  Patient understands and is agreeable

## 2020-01-07 NOTE — PLAN OF CARE
Problem: Potential for Falls  Goal: Patient will remain free of falls  Description  INTERVENTIONS:  - Assess patient frequently for physical needs  -  Identify cognitive and physical deficits and behaviors that affect risk of falls    -  [place] fall precautions as indicated by assessment   - Educate patient/family on patient safety including physical limitations  - Instruct patient to call for assistance with activity based on assessment  - Modify environment to reduce risk of injury  - Consider OT/PT consult to assist with strengthening/mobility  Outcome: Progressing     Problem: Prexisting or High Potential for Compromised Skin Integrity  Goal: Skin integrity is maintained or improved  Description  INTERVENTIONS:  - Identify patients at risk for skin breakdown  - Assess and monitor skin integrity  - Assess and monitor nutrition and hydration status  - Monitor labs   - Assess for incontinence   - Turn and reposition patient  - Assist with mobility/ambulation  - Relieve pressure over bony prominences  - Avoid friction and shearing  - Provide appropriate hygiene as needed including keeping skin clean and dry  - Evaluate need for skin moisturizer/barrier cream  - Collaborate with interdisciplinary team   - Patient/family teaching  - Consider wound care consult   Outcome: Progressing     Problem: SKIN/TISSUE INTEGRITY - ADULT  Goal: Skin integrity remains intact  Description  INTERVENTIONS  - Identify patients at risk for skin breakdown  - Assess and monitor skin integrity  - Assess and monitor nutrition and hydration status  - Monitor labs (i e  albumin)  - Assess for incontinence   - Turn and reposition patient  - Assist with mobility/ambulation  - Relieve pressure over bony prominences  - Avoid friction and shearing  - Provide appropriate hygiene as needed including keeping skin clean and dry  - Evaluate need for skin moisturizer/barrier cream  - Collaborate with interdisciplinary team (i e  Nutrition, Rehabilitation, etc )   - Patient/family teaching  Outcome: Progressing  Goal: Incision(s), wounds(s) or drain site(s) healing without S/S of infection  Description  INTERVENTIONS  - Assess and document risk factors for skin impairment   - Assess and document dressing, incision, wound bed, drain sites and surrounding tissue  - Consider nutrition services referral as needed  - Oral mucous membranes remain intact  - Provide patient/ family education  Outcome: Progressing  Goal: Oral mucous membranes remain intact  Description  INTERVENTIONS  - Assess oral mucosa and hygiene practices  - Implement preventative oral hygiene regimen  - Implement oral medicated treatments as ordered  - Initiate Nutrition services referral as needed  Outcome: Progressing     Problem: MUSCULOSKELETAL - ADULT  Goal: Maintain or return mobility to safest level of function  Description  INTERVENTIONS:  - Assess patient's ability to carry out ADLs; assess patient's baseline for ADL function and identify physical deficits which impact ability to perform ADLs (bathing, care of mouth/teeth, toileting, grooming, dressing, etc )  - Assess/evaluate cause of self-care deficits   - Assess range of motion  - Assess patient's mobility  - Assess patient's need for assistive devices and provide as appropriate  - Encourage maximum independence but intervene and supervise when necessary  - Involve family in performance of ADLs  - Assess for home care needs following discharge   - Consider OT consult to assist with ADL evaluation and planning for discharge  - Provide patient education as appropriate  Outcome: Progressing  Goal: Maintain proper alignment of affected body part  Description  INTERVENTIONS:  - Support, maintain and protect limb and body alignment  - Provide patient/ family with appropriate education  Outcome: Progressing

## 2020-01-07 NOTE — DISCHARGE INSTRUCTIONS
Discharge Instructions        Surgical incisional care:   Maintain dressing in place for 3 days  On postoperative day 3, dressing may be removed and incision may be left open to air   Keep incision clean and dry  Avoid applying creams, lotion or antiseptic to incision area   Check the wound daily  If the incision becomes red, swollen, tender, warm, or has increased drainage please notify physician immediately   May shower 3 days after surgery, but do not soak in a tub and no swimming   o Incision may be cleaned with water and a mild antimicrobial soap using a clean washcloth  Incision is to be gently patted dry with a clean towel   Continue to change bed linens and pajamas more frequently  Wear clean clothes daily  Activity Restrictions:   No heavy lifting greater than 10lbs and no strenuous activities until cleared   No bending or twisting  No BLTs (bending, lifting, twisting)  Avoid pushing/pulling movements   May walk as tolerated  Encourage at least 4 short walks per day   No driving for at least 2 weeks or until cleared by physician  Postoperative medication:   Take pain medications to relieve incision pain, and muscle relaxants to prevent spasms as directed  Please see after visit summary (AVS) for details   Do not operate heavy machinery or vehicles while taking sedating medications   Use a bowel regimen while on opioids as they induce constipation  Ie  Senokot-S, Miralax, Colace, etc  Increase fiber and water intake   Do not take ibuprofen, Naproxen/Aleve or any NSAID until cleared by surgeon  May take Tylenol instead   If taking Coumadin, Aspirin, or Plavix, you may resume these medications when cleared by Neurosurgery  Follow-up as scheduled for a 2 week incision check  Follow-up as scheduled for 6 week postoperative visit       **Please notify MD if you experience a fever 101F, chills or have increased pain, numbness, tingling, or weakness in your legs and/or bowel/bladder dysfunction/incontinence**

## 2020-01-07 NOTE — ASSESSMENT & PLAN NOTE
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

## 2020-01-07 NOTE — PROGRESS NOTES
RN attempted to place IV site per order although pt is adamantly refusing stating he had multiple IV sites last admission and none of them were being used  Piter Lion from Neurosurgery team aware  Will continue to monitor

## 2020-01-07 NOTE — ASSESSMENT & PLAN NOTE
Severe sharp spasm b/l anterior thigh pain which is worse with movement - improving  S/p T12 laminectomy for resection of IDEM mass 1/2/20    Imaging:   · Thoracolumbar upright XR 1/6/2020: overall stable alignment  Plan:  · Monitor exam - non focal  · Pain control - continue current multimodal regimen  · Prn valium, Tylenol, robaxin, oxycodone  · Gabapentin 300mg HS  · Continue Decadron 2 mg every 8 hours  · Mobilize with PT/OT - awaiting placement  · No surgical needs identified  · Target patient for discharge to rehab  Psychiatry consulted  · Dispo pending  Call with questions/concerns

## 2020-01-07 NOTE — SOCIAL WORK
Pt not accepted @ [facility]  T/c w/Marline @ [address] advised pt is a [facility]  Hx of suicide attempt and inpatient hospitalization for mental health  Pt not eligible to be admitted directly from ED  ED MD updated re: same  Pt to be admitted to the floor due to concerns w/safety at home

## 2020-01-07 NOTE — PROGRESS NOTES
Progress Note - Terence Sanders Sr  1955, 71 y o  male MRN: [id number]    Unit/Bed#: [facility] 632-01 Encounter: [id number]    Primary Care Provider: Monisha Underwood PA-C   Date and time admitted to hospital: 1/6/2020  5:34 AM        * Leg pain, bilateral  Assessment & Plan  Severe sharp spasm b/l anterior thigh pain which is worse with movement - improving  S/p T12 laminectomy for resection of IDEM mass 1/2/20    Imaging:   · Thoracolumbar upright XR 1/6/2020: overall stable alignment  Plan:  · Monitor exam - non focal  · Pain control - continue current multimodal regimen  · Prn valium, Tylenol, robaxin, oxycodone  · Gabapentin 300mg HS  · Continue Decadron 2 mg every 8 hours  · Mobilize with PT/OT - awaiting placement  · No surgical needs identified  · Target patient for discharge to rehab  Psychiatry consulted  · Dispo pending  Call with questions/concerns  Intradural extramedullary thoracic tumor  Assessment & Plan  POD 5 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)    Incision CDI and healing well without erythema, edema or drainage  No evidence for infection  No fever    Vitals WNL  No SOB - no concern for DVT/PE    Neck supple, no headache or AMS - no concern for meningitis      Subjective/Objective   Chief Complaint: I feel better today  Subjective:  Patient continues with intermittent fasciculations his bilateral quads  He states leg pain is currently rated 3/10  Was able to walk to BR  + BM  Voiding well  Tolerating PO  No headache or neck pain  No weakness or sensory changes  No CP/SOB  No N/V  No leg edema or erythema  Objective: Sitting up in bed  NAD  I/O       01/05 0701 - 01/06 0700 01/06 0701 - 01/07 0700 01/07 0701 - 01/08 0700    P  O    240    Total Intake(mL/kg)   240 (2 5)    Urine (mL/kg/hr)  725 (0 3) 300 (0 6)    Stool   0    Total Output  725 300    Net  -725 -60           Unmeasured Urine Occurrence   1 x    Unmeasured Stool Occurrence 1 x          Invasive Devices     None                 Physical Exam:  Vitals: Blood pressure 124/85, pulse 85, temperature 98 2 °F (36 8 °C), resp  rate 18, weight 94 8 kg (208 lb 15 9 oz), SpO2 95 %  ,Body mass index is 29 99 kg/m²  General appearance: alert, appears stated age, cooperative and no distress  Head: Normocephalic, without obvious abnormality  Eyes:  Conjugate gaze  Neck: supple, symmetrical, trachea midline  Back:  Incision clean dry intact  No erythema, edema or drainage  Skin edges well approximated  Lungs: non labored breathing  Heart: regular heart rate  Neurologic:   Mental status: Alert, oriented, thought content appropriate  Sensory: normal to LT BLE  Motor: moving all extremities without focal weakness, 5/5 BLE  Reflexes: 2-3+ and symmetric patellar, no clonus b/l     Lab Results:  Results from last 7 days   Lab Units 01/04/20  1049 01/03/20  0519 12/31/19  1354   WBC Thousand/uL 20 03* 26 72* 10 87*   HEMOGLOBIN g/dL 14 6 14 5 15 9   HEMATOCRIT % 42 8 43 7 48 6   PLATELETS Thousands/uL 222 268 274   NEUTROS PCT %  --   --  52   MONOS PCT %  --   --  8     Results from last 7 days   Lab Units 01/03/20  0519 12/31/19  1354   POTASSIUM mmol/L 4 3 4 2   CHLORIDE mmol/L 108 107   CO2 mmol/L 27 33*   BUN mg/dL 14 16   CREATININE mg/dL 1 20 1 14   CALCIUM mg/dL 9 5 10 3*   ALK PHOS U/L  --  62   ALT U/L  --  20   AST U/L  --  11             Results from last 7 days   Lab Units 12/31/19  1354   INR  1 04   PTT seconds 32     No results found for: TROPONINT  ABG:No results found for: PHART, JIL1BDQ, PO2ART, GHZ9IIP, R3ZLIGLA, BEART, SOURCE    Imaging Studies: I have personally reviewed pertinent reports  and I have personally reviewed pertinent films in PACS    Xr Thoracic Spine 2 Views    Result Date: 1/6/2020  Impression: No acute osseous abnormality   Workstation performed: [id number]     Xr Lumbar Spine 2 Or 3 Views    Result Date: 1/6/2020  Impression: No significant interval change since 12/17/2019 Workstation performed: [id number]       EKG, Pathology, and Other Studies: I have personally reviewed pertinent reports        VTE Pharmacologic Prophylaxis: Heparin    VTE Mechanical Prophylaxis: sequential compression device

## 2020-01-08 ENCOUNTER — TRANSITIONAL CARE MANAGEMENT (OUTPATIENT)
Dept: FAMILY MEDICINE CLINIC | Facility: CLINIC | Age: 65
End: 2020-01-08

## 2020-01-08 ENCOUNTER — TELEPHONE (OUTPATIENT)
Dept: NEUROSURGERY | Facility: CLINIC | Age: 65
End: 2020-01-08

## 2020-01-08 RX ORDER — DEXAMETHASONE 2 MG/1
2 TABLET ORAL EVERY 8 HOURS SCHEDULED
Qty: 30 TABLET | Refills: 0 | Status: SHIPPED | OUTPATIENT
Start: 2020-01-08 | End: 2020-01-20 | Stop reason: SDUPTHER

## 2020-01-08 RX ORDER — DIAZEPAM 5 MG/1
5 TABLET ORAL EVERY 6 HOURS PRN
Qty: 15 TABLET | Refills: 0 | Status: SHIPPED | OUTPATIENT
Start: 2020-01-08 | End: 2020-08-06

## 2020-01-08 RX ORDER — GABAPENTIN 300 MG/1
300 CAPSULE ORAL
Qty: 30 CAPSULE | Refills: 0 | Status: SHIPPED | OUTPATIENT
Start: 2020-01-08 | End: 2020-08-06

## 2020-01-09 DIAGNOSIS — R12 HEARTBURN: Primary | ICD-10-CM

## 2020-01-09 NOTE — TELEPHONE ENCOUNTER
Patient was discharged from the hospital on 1/7/20  Called patient to provide post op call  Patient reports he is doing okay overall and denies any incisional issues or fevers  He denies any back pain and reports his leg pain has greatly improved  He does report heartburn  Patient states the heartburn kept him up all night  Inquired if patient is taking any medication for the heartburn  Patient declined and requested for a script to be sent to his pharmacy  Will do  Patient is able to ambulate around the house and complete ADLs  Reviewed incision care with the patient  Instructed patient to shower daily and to use a mild soap to cleanse the incision with gentle pressure  Instructed patient to not use any ointments, creams, or lotions on the incision  Patient is aware to call the office if any redness, swelling, drainage, dehiscence of incision, or fever >100 F occurs  Patient has moved his bowels since the surgery  Educated the patient about the importance of preventing blood clots and provided measures how to prevent them  Patient is aware to call the office if any concerns or questions may arise  Reminded patient of his upcoming appointments with the date/time/location  Patient was appreciative for the call

## 2020-01-10 ENCOUNTER — TELEPHONE (OUTPATIENT)
Dept: NEUROSURGERY | Facility: CLINIC | Age: 65
End: 2020-01-10

## 2020-01-10 RX ORDER — PANTOPRAZOLE SODIUM 20 MG/1
20 TABLET, DELAYED RELEASE ORAL DAILY
Qty: 45 TABLET | Refills: 0 | Status: SHIPPED | OUTPATIENT
Start: 2020-01-10 | End: 2020-08-06

## 2020-01-10 NOTE — TELEPHONE ENCOUNTER
Received a call from Marline who is with [facility] physical therapy  Marline reports how the patient is "unreachable " Marline scheduled an in home PT session today and spoke with the patient yesterday to confirm  Marline went to the patient's home today and the patient did not answer the door or his phone  Marline said she will attempt to contact the patient again this weekend  Inquired if she feels the need to call the  for a wellness check   Marline said she does not feel that is necessary at this time  She was just contacting the neurosurgery to make us aware

## 2020-01-10 NOTE — TELEPHONE ENCOUNTER
Called patient's s/o, Taz Bey, to see how the patient is doing  Taz Bey did not answer  Left a voice message with my direct line

## 2020-01-13 ENCOUNTER — TELEPHONE (OUTPATIENT)
Dept: NEUROSURGERY | Facility: CLINIC | Age: 65
End: 2020-01-13

## 2020-01-13 NOTE — TELEPHONE ENCOUNTER
Discussed with Dr Michael Baron recommended for a wellness check by police  Called the [place] police Department [phone]  Explained the situation and how we are concerned for the patient's safety  The  said the police will perform a wellness check and will contact my direct line with an update  Provided direct line

## 2020-01-13 NOTE — TELEPHONE ENCOUNTER
Marline from  reports how Marline from [facility] called the service over the weekend and left a message stating how she is concerned regarding the patient's mental health  Called Marline corcoran [facility] to receive more information  Marline reports how she called the patient over the weekend since she was unable to get into contact with the patient on Friday 1/10/20  Marline reports when she talked to the patient over the phone, the patient stated he doesn't care what happens to him  Patient did not answer the door on Friday 1/10/20 when Marline went to his house for a PT session  Patient stated over the phone how he fell and could not get up on Friday which is why he did not answer the door  Informed Marline a wellness check is necessary and how the Neurosurgery office will call the police today  Marline expressed understanding

## 2020-01-13 NOTE — TELEPHONE ENCOUNTER
Received a call from Ayaka Montiel reports he went to the patient's home and reports the patient is doing fine  Jen Montiel states he feels the patient is at his normal baseline and he is not concerned regarding his safety  He reports the patient is moving around well  Officer Jen Montiel does not believe the patient needs immediate medical attention at this time

## 2020-01-14 ENCOUNTER — TELEPHONE (OUTPATIENT)
Dept: NEUROSURGERY | Facility: CLINIC | Age: 65
End: 2020-01-14

## 2020-01-14 ENCOUNTER — OFFICE VISIT (OUTPATIENT)
Dept: FAMILY MEDICINE CLINIC | Facility: CLINIC | Age: 65
End: 2020-01-14
Payer: MEDICARE

## 2020-01-14 VITALS
RESPIRATION RATE: 16 BRPM | BODY MASS INDEX: 28.92 KG/M2 | SYSTOLIC BLOOD PRESSURE: 134 MMHG | OXYGEN SATURATION: 98 % | HEART RATE: 88 BPM | WEIGHT: 202 LBS | DIASTOLIC BLOOD PRESSURE: 72 MMHG | HEIGHT: 70 IN

## 2020-01-14 DIAGNOSIS — R73.03 PREDIABETES: ICD-10-CM

## 2020-01-14 DIAGNOSIS — F31.9 BIPOLAR 1 DISORDER (HCC): ICD-10-CM

## 2020-01-14 DIAGNOSIS — Z12.5 PROSTATE CANCER SCREENING: ICD-10-CM

## 2020-01-14 DIAGNOSIS — Z13.220 SCREENING CHOLESTEROL LEVEL: ICD-10-CM

## 2020-01-14 DIAGNOSIS — Z23 ENCOUNTER FOR IMMUNIZATION: ICD-10-CM

## 2020-01-14 DIAGNOSIS — R35.1 NOCTURIA: ICD-10-CM

## 2020-01-14 DIAGNOSIS — R12 HEARTBURN: ICD-10-CM

## 2020-01-14 DIAGNOSIS — Z11.4 SCREENING FOR HIV WITHOUT PRESENCE OF RISK FACTORS: ICD-10-CM

## 2020-01-14 DIAGNOSIS — D72.829 LEUKOCYTOSIS, UNSPECIFIED TYPE: ICD-10-CM

## 2020-01-14 DIAGNOSIS — N18.2 STAGE 2 CHRONIC KIDNEY DISEASE: ICD-10-CM

## 2020-01-14 DIAGNOSIS — Z00.00 ROUTINE GENERAL MEDICAL EXAMINATION AT A HEALTH CARE FACILITY: Primary | ICD-10-CM

## 2020-01-14 PROBLEM — K21.00 GASTROESOPHAGEAL REFLUX DISEASE WITH ESOPHAGITIS: Status: ACTIVE | Noted: 2018-01-21

## 2020-01-14 PROCEDURE — G0438 PPPS, INITIAL VISIT: HCPCS | Performed by: PHYSICIAN ASSISTANT

## 2020-01-14 PROCEDURE — 1111F DSCHRG MED/CURRENT MED MERGE: CPT | Performed by: PHYSICIAN ASSISTANT

## 2020-01-14 PROCEDURE — 1123F ACP DISCUSS/DSCN MKR DOCD: CPT | Performed by: PHYSICIAN ASSISTANT

## 2020-01-14 PROCEDURE — 99495 TRANSJ CARE MGMT MOD F2F 14D: CPT | Performed by: PHYSICIAN ASSISTANT

## 2020-01-14 NOTE — ASSESSMENT & PLAN NOTE
Previously managed by [facility]  No longer accepts his insurance  Previously managed on Cymbalta, Seroquel, Prozac and Antivan  Current unmedicated by choice  - Directed to contact insurance for covered providers   Patient again declines

## 2020-01-14 NOTE — PROGRESS NOTES
Assessment and Plan:     Problem List Items Addressed This Visit     None      Visit Diagnoses     Routine general medical examination at a health care facility    -  Primary    Encounter for immunization            BMI Counseling: Body mass index is 28 98 kg/m²  The BMI is above normal  Nutrition recommendations include decreasing portion sizes and encouraging healthy choices of fruits and vegetables  Exercise recommendations include moderate physical activity 150 minutes/week  Preventive health issues were discussed with patient, and age appropriate screening tests were ordered as noted in patient's After Visit Summary  Personalized health advice and appropriate referrals for health education or preventive services given if needed, as noted in patient's After Visit Summary       History of Present Illness:     Patient presents for Medicare Annual Wellness visit    Patient Care Team:  Jan Leigh PA-C as PCP - General (Family Medicine)  MD Alfonzo Mcguire MD Larwance Macintosh, MD as Endoscopist     Problem List:     Patient Active Problem List   Diagnosis    Adult residual type attention deficit hyperactivity disorder (ADHD)    Esophagitis    HTN (hypertension)    HLD (hyperlipidemia)    Prediabetes    Chronic bilateral low back pain without sciatica    Adenomatous polyp of transverse colon    Sessile rectal polyp    Erectile dysfunction    Polyp of colon    [facility] 1 disorder [facility] [address] [facility]    Tubular adenoma of colon    Dizziness    Restless leg syndrome    Bipolar depression ([address]    Borderline diabetes mellitus    Sinus tachycardia    Severe episode of recurrent major depressive disorder, without psychotic features ([address] )    Leukocytosis    Stage 2 chronic kidney disease    Bilateral carpal tunnel syndrome    Intradural extramedullary thoracic tumor      Past Medical and Surgical History:     Past Medical History:   Diagnosis Date    Anxiety     Bipolar depression ([address] ) bipolar disorder    Borderline diabetes mellitus     Chronic back pain     Chronic pain     Gallstones     Restless leg syndrome     Spinal arachnoid cyst      Past Surgical History:   Procedure Laterality Date    COLONOSCOPY      INCISION AND DRAINAGE ABSCESS / HEMATOMA OF BURSA / KNEE / [employer]  in 1990's    outer right leg and center of upper back   complicated  resolved status    OTHER SURGICAL HISTORY  11/2019    cyst excision from back by Dermatolgist at office of Dr Khanh Banuelos BX/EXCIS SPIN [facility] N/A 1/2/2020    Procedure: T12 LAMINECTOMY FOR RESECTION OF INTRADURAL-EXTRAMEDULLARY MASS;  Surgeon: Kierra Aranda MD;  [facility];  Service: Neurosurgery    ME COLONOSCOPY FLX DX W/[facility] REHABILITATION WHEN PFRMD N/A 8/31/2017    Procedure: COLONOSCOPY;  Surgeon: Tae Salvador MD;  Location: [facility] LAB; Service: Gastroenterology    ME COLONOSCOPY FLX DX W/[facility] INPATIENT RE[facility]ABIL[facility]ATION WHEN PFRMD N/A 3/23/2018    Procedure: COLONOSCOPY;  Surgeon: Tae Salvador MD;  Location: [facility];   Service: Gastroenterology    TUMOR REMOVAL  1990's    "Cheese tumor" removed from back      Family History:     Family History   Problem Relation Age of Onset    Throat cancer Mother     Heart attack Father         at [de-identified]    Stomach cancer Sister     Lung cancer Brother     Stomach cancer Brother     Heart disease Brother       Social History:     Social History     Socioeconomic History    Marital status: Single     Spouse name: None    Number of children: 3    Years of education: 8    Highest education level: None   Occupational History     Comment: disabled   Social Needs    Financial resource strain: None    Food insecurity:     Worry: None     Inability: None    Transportation needs:     Medical: None     Non-medical: None   Tobacco Use    Smoking status: Former Smoker    Smokeless tobacco: Never Used    Tobacco comment: all scripts says former smoker quit at 14-17 years old   Substance and Sexual Activity  Alcohol use: Yes     Frequency: Monthly or less     Drinks per session: 1 or 2     Binge frequency: Never     Comment: very seldom now    Drug use: Yes     Types: Marijuana    Sexual activity: Yes     Partners: Female     Birth control/protection: Post-menopausal   Lifestyle    Physical activity:     Days per week: None     Minutes per session: None    Stress: None   Relationships    Social connections:     Talks on phone: None     Gets together: None     Attends Mandaen service: None     Active member of club or organization: None     Attends meetings of clubs or organizations: None     Relationship status: None    Intimate partner violence:     Fear of current or ex partner: None     Emotionally abused: None     Physically abused: None     Forced sexual activity: None   Other Topics Concern    None   Social History Narrative    Disabled    GED       Medications and Allergies:     Current Outpatient Medications   Medication Sig Dispense Refill    dexamethasone (DECADRON) 2 mg tablet Take 1 tablet (2 mg total) by mouth every 8 (eight) hours 30 tablet 0    diazepam (VALIUM) 5 mg tablet Take 1 tablet (5 mg total) by mouth every 6 (six) hours as needed for anxiety or muscle spasms for up to 10 days 15 tablet 0    gabapentin (NEURONTIN) 300 mg capsule Take 1 capsule (300 mg total) by mouth daily at bedtime 30 capsule 0    methocarbamol (ROBAXIN) 750 mg tablet Take 1 tablet (750 mg total) by mouth every 6 (six) hours as needed for muscle spasms 45 tablet 0    oxyCODONE (ROXICODONE) 5 mg immediate release tablet Take 1 tablet (5 mg total) by mouth every 4 (four) hours as needed for moderate painMax Daily Amount: 30 mg 45 tablet 0    pantoprazole (PROTONIX) 20 mg tablet Take 1 tablet (20 mg total) by mouth daily 45 tablet 0    senna-docusate sodium (SENOKOT S) 8 6-50 mg per tablet Take 2 tablets by mouth daily (Patient not taking: Reported on 1/6/2020)  0     No current facility-administered medications for this visit  No Known Allergies   Immunizations: There is no immunization history on file for this patient  Health Maintenance:         Topic Date Due    CRC Screening: Colonoscopy  03/23/2028    Hepatitis C Screening  Completed         Topic Date Due    Pneumococcal Vaccine: 65+ Years (1 of 2 - PCV13) 12/23/2019      Medicare Health Risk Assessment:     /72   Pulse 88   Resp 16   Ht 5' 10" (1 778 m)   Wt 91 6 kg (202 lb)   SpO2 98%   BMI 28 98 kg/m²      Justus Díaz is here for his Welcome to Medicare visit  Health Risk Assessment:   Patient rates overall health as good  Patient feels that their physical health rating is slightly better  Eyesight was rated as same  Hearing was rated as same  Patient feels that their emotional and mental health rating is same  Pain experienced in the last 7 days has been some  Patient's pain rating has been 4/10  Lower back pain, is improving    Depression Screening:   PHQ-2 Score: 3  PHQ-9 Score: 7      Fall Risk Screening: In the past year, patient has experienced: no history of falling in past year      Home Safety:  Patient does not have trouble with stairs inside or outside of their home  Patient has working smoke alarms and has working carbon monoxide detector  Home safety hazards include: none  Nutrition:   Current diet is Frequent junk food  Eats Cookies    Medications:   Patient is currently taking over-the-counter supplements  OTC medications include: see medication list  Patient is able to manage medications  Activities of Daily Living (ADLs)/Instrumental Activities of Daily Living (IADLs):   Walk and transfer into and out of bed and chair?: Yes  Dress and groom yourself?: Yes    Bathe or shower yourself?: Yes    Feed yourself?  Yes  Do your laundry/housekeeping?: Yes  Manage your money, pay your bills and track your expenses?: Yes  Make your own meals?: Yes    Do your own shopping?: No    Previous Hospitalizations:   Any hospitalizations or ED visits within the last 12 months?: Yes    How many hospitalizations have you had in the last year?: 3-4    Advance Care Planning:   Living will: No    Durable POA for healthcare: No    Advanced directive: No    Advanced directive counseling given: Yes    Five wishes given: Yes    Patient declined ACP directive: Yes      Cognitive Screening:   Provider or family/friend/caregiver concerned regarding cognition?: No    PREVENTIVE SCREENINGS      Cardiovascular Screening:    General: History Lipid Disorder and Risks and Benefits Discussed    Due for: Lipid Panel      Diabetes Screening:     General: Screening Current      Colorectal Cancer Screening:     General: Screening Current      Prostate Cancer Screening:    General: Screening Current    Due for: PSA      Osteoporosis Screening:    General: Screening Not Indicated      Abdominal Aortic Aneurysm (AAA) Screening:    Risk factors include: age between 73-67 yo and tobacco use        General: Screening Not Indicated      Lung Cancer Screening:     General: Screening Not Indicated      Hepatitis C Screening:    General: Screening Current      Jan Leigh PA-C

## 2020-01-14 NOTE — TELEPHONE ENCOUNTER
Called patient to see how he is doing on the dexamethasone  Patient is currently on 2 mg q8 hours  Dr Viraj Rivers requested for a slow taper  Patient did not answer  Left a voice message for the patient to call back with my direct line

## 2020-01-14 NOTE — PATIENT INSTRUCTIONS

## 2020-01-14 NOTE — PROGRESS NOTES
Assessment/Plan:    Bipolar 1 disorder ([address] )  Previously managed by [facility]  No longer accepts his insurance  Previously managed on Cymbalta, Seroquel, Prozac and Antivan  Current unmedicated by choice  - Directed to contact insurance for covered providers  Patient again declines    Prediabetes  A1C of 6 1  - Repeat in 6 month    Stage 2 chronic kidney disease  GFR of 63, ranging from 63-71 over past month  - Continue with serial BMP    Leukocytosis  Elevated only after surgery  Afebrile  - Repeat CBC  Patient will completed in 1-2 day    Gastroesophageal reflux disease with esophagitis  Currently managed on Pantoprazole 20 mg  - Patient was explained about the lifestyle and dietary modifications  Advised to avoid fatty, spicy or greasy foods, chocolates, caffeine, alcohol, mint and any other triggering foods   - Discussed avoiding eating 3 hours before bed  - Patient will take PPI prior to sleep, wait 2-3 hours after eating and lift head of bed  - Directed to increase dose of PPI to 40 mg if symptoms persist   - Directed to RTC if symptoms persist  May need EGD  TCM Call (since 12/14/2019)     Date and time call was made  1/8/2020  9:10 AM    Hospital care reviewed  Records reviewed    Patient was hospitialized at  [facility]    Date of Admission  01/06/20    Date of discharge  01/07/20    Diagnosis  Leg pain, bilateral     Disposition  Home health services    Were the patients medications reviewed and updated  Yes    Current Symptoms  None      TCM Call (since 12/14/2019)     Post hospital issues  None    Should patient be enrolled in anticoag monitoring? Yes    Scheduled for follow up? Patient Refused    Patient refusal reason  PT DOES NOT WISH TO FOLLOW UP AT THIS TIME       Referrals needed  [facility]    Did you obtain your prescribed medications  Yes    Do you need help managing your prescriptions or medications  No    Is transportation to your appointment needed  Yes    Specify why  WIFE    I have advised the patient to call PCP with any new or worsening symptoms  MEGAN SORENSEN MA    Living Arrangements  Spouse or Significiant other    Support System  Family    Do you have social support  Yes, as much as I need    Are you recieving any outpatient services  No    Are you recieving home care services  Yes    Types of home care services  Home PT; Other (comment)    Comment  OT    Are you using any community resources  No    Current waiver services  No    Have you fallen in the last 12 months  No    Interperter language line needed  No           Prostate cancer screening  -     PSA Total, Diagnostic; Future    Screening for HIV without presence of risk factors  -     HIV 1/2 AG-AB combo; Future    Screening cholesterol level  -     Lipid Panel with Direct LDL reflex; Future    Nocturia   -     PSA Total, Diagnostic; Future    Patient continues to declined PNA vaccination    FU in 3 months        Subjective:    Patient ID: Nadine Fitzgerald Sr  is a 71 y o  male  Pt is presenting today for TCM from hospitalization  He had surgery, T12 laminectomy for resection IDEM cystic mass on 1/2 and had a leukemoid reaction and acute leg pain  After 3 days and repeat labs his mobility, pain resolved and his CBC was trending down  He has been having heartburn every night heartburn which keeps him up at night  He has been taking Protonix 20 mg just before bed  He eats at 6pm and goes to bed at 730pm     He wakes at night to urinate  He would like PSA checked  He is able to ambulate without issue around the house and the neighborhood  The following portions of the patient's history were reviewed and updated as appropriate: allergies, current medications and problem list     Review of Systems   Constitutional: Negative for activity change, fatigue, fever and unexpected weight change  HENT: Negative for rhinorrhea and sore throat  Respiratory: Negative for cough, shortness of breath and wheezing  Cardiovascular: Negative for chest pain, palpitations and leg swelling  Gastrointestinal: Positive for abdominal pain (see HPI)  Negative for constipation, diarrhea, nausea, rectal pain and vomiting  Musculoskeletal: Negative for back pain, neck pain and neck stiffness  Skin: Negative for rash  Objective:  /72   Pulse 88   Resp 16   Ht 5' 10" (1 778 m)   Wt 91 6 kg (202 lb)   SpO2 98%   BMI 28 98 kg/m²      Physical Exam   Constitutional: He is oriented to person, place, and time  He appears well-developed and well-nourished  No distress  HENT:   Head: Normocephalic and atraumatic  Cardiovascular: Normal rate, regular rhythm, normal heart sounds and intact distal pulses  Exam reveals no gallop and no friction rub  No murmur heard  Pulmonary/Chest: Effort normal and breath sounds normal  No respiratory distress  He has no wheezes  Neurological: He is alert and oriented to person, place, and time  Skin: Skin is warm and dry  He is not diaphoretic  Psychiatric: He has a normal mood and affect  His behavior is normal  Thought content normal    Vitals reviewed

## 2020-01-14 NOTE — ASSESSMENT & PLAN NOTE
Currently managed on Pantoprazole 20 mg  - Patient was explained about the lifestyle and dietary modifications  Advised to avoid fatty, spicy or greasy foods, chocolates, caffeine, alcohol, mint and any other triggering foods   - Discussed avoiding eating 3 hours before bed  - Patient will take PPI prior to sleep, wait 2-3 hours after eating and lift head of bed  - Directed to increase dose of PPI to 40 mg if symptoms persist   - Directed to RTC if symptoms persist  May need EGD

## 2020-01-15 ENCOUNTER — OFFICE VISIT (OUTPATIENT)
Dept: NEUROSURGERY | Facility: CLINIC | Age: 65
End: 2020-01-15

## 2020-01-15 ENCOUNTER — TELEPHONE (OUTPATIENT)
Dept: FAMILY MEDICINE CLINIC | Facility: CLINIC | Age: 65
End: 2020-01-15

## 2020-01-15 VITALS
TEMPERATURE: 98.3 F | HEIGHT: 70 IN | RESPIRATION RATE: 16 BRPM | BODY MASS INDEX: 29.15 KG/M2 | WEIGHT: 203.6 LBS | DIASTOLIC BLOOD PRESSURE: 75 MMHG | SYSTOLIC BLOOD PRESSURE: 111 MMHG | HEART RATE: 88 BPM

## 2020-01-15 DIAGNOSIS — D49.7 INTRADURAL EXTRAMEDULLARY THORACIC TUMOR: Primary | ICD-10-CM

## 2020-01-15 DIAGNOSIS — D48.9 MATURE TERATOMA: ICD-10-CM

## 2020-01-15 PROCEDURE — 99024 POSTOP FOLLOW-UP VISIT: CPT | Performed by: NEUROLOGICAL SURGERY

## 2020-01-15 NOTE — PROGRESS NOTES
Neurosurgery Office Note  Radha Landry Sr  71 y o  male MRN: [id number]      Assessment/Plan      Diagnoses and all orders for this visit:    Intradural extramedullary thoracic tumor    Mature teratoma          Discussion:    58-year-old male 2 weeks s/p resection of IDEM mass    Final path is mature teratoma  I believe we achieved a gross total resection  Today he is doing very well - earlier he walked 2 miles with no aids  He denies any leg symptoms, and has minimal back discomfort, 2/10  He is not taking any narcotics presently  He continues on 2 mg dexamethasone TID  His incision is healing well  I reviewed with him, his pathology  He should not need adjuvant therapies  I have recommended surveillance imaging, which we will need to order at a later day (e g  3 months, 1 year postop, and perhaps yearly)  I lightened his restrictions  We will see him back at 6 weeks postop to assess his progress and remove all restrictions if appropriate  I have asked him to wean to 2mg BID of steroid, and continue at that dose for 1 week  Our Neuro Oncology Nurse Navigator will call next week to assess his progress, and wean him to 2mg daily if appropriate  We'll continue that for a week, and discontinue after that if he tolerates it  Should he need more supply, he'll let us know  CHIEF COMPLAINT    Chief Complaint   Patient presents with    Post-op     2 week POV with Path Review       HISTORY    History of Present Illness     71y o  year old male     HPI    See Discussion    REVIEW OF SYSTEMS    Review of Systems   Constitutional: Negative  HENT: Negative  Eyes: Positive for visual disturbance (occasional)  Respiratory: Negative  Cardiovascular: Negative  Gastrointestinal: Negative  Had acid reflux, resolved with protonix   Endocrine: Positive for cold intolerance  Genitourinary: Negative  Nocturia 0-1x   Musculoskeletal: Positive for back pain     Skin: Surgical incision on back is well healed   Allergic/Immunologic: Negative  Neurological: Positive for numbness (occasional)  Hematological: Bruises/bleeds easily  Psychiatric/Behavioral: Positive for agitation, confusion, dysphoric mood and sleep disturbance (improved)  The patient is nervous/anxious  Meds/Allergies     Current Outpatient Medications   Medication Sig Dispense Refill    dexamethasone (DECADRON) 2 mg tablet Take 1 tablet (2 mg total) by mouth every 8 (eight) hours 30 tablet 0    pantoprazole (PROTONIX) 20 mg tablet Take 1 tablet (20 mg total) by mouth daily 45 tablet 0    diazepam (VALIUM) 5 mg tablet Take 1 tablet (5 mg total) by mouth every 6 (six) hours as needed for anxiety or muscle spasms for up to 10 days (Patient not taking: Reported on 1/14/2020) 15 tablet 0    gabapentin (NEURONTIN) 300 mg capsule Take 1 capsule (300 mg total) by mouth daily at bedtime (Patient not taking: Reported on 1/14/2020) 30 capsule 0    methocarbamol (ROBAXIN) 750 mg tablet Take 1 tablet (750 mg total) by mouth every 6 (six) hours as needed for muscle spasms (Patient not taking: Reported on 1/14/2020) 45 tablet 0    oxyCODONE (ROXICODONE) 5 mg immediate release tablet Take 1 tablet (5 mg total) by mouth every 4 (four) hours as needed for moderate painMax Daily Amount: 30 mg (Patient not taking: Reported on 1/14/2020) 45 tablet 0    senna-docusate sodium (SENOKOT S) 8 6-50 mg per tablet Take 2 tablets by mouth daily (Patient not taking: Reported on 1/6/2020)  0     No current facility-administered medications for this visit          No Known Allergies    PAST HISTORY    Past Medical History:   Diagnosis Date    Anxiety     Bipolar depression ([facility] )     bipolar disorder    Borderline diabetes mellitus     Chronic back pain     Chronic pain     Gallstones     Restless leg syndrome     Spinal arachnoid cyst        Past Surgical History:   Procedure Laterality Date    COLONOSCOPY      INCISION AND DRAINAGE ABSCESS / HEMATOMA OF BURSA / Philmore Flies / Karina Southern  in     outer right leg and center of upper back   complicated  resolved status    OTHER SURGICAL HISTORY  2019    cyst excision from back by Dermatolgist at office of Dr Francesca Mcdaniel BX/EXCIS SPIN [facility] N/A 2020    Procedure: T12 LAMINECTOMY FOR RESECTION OF INTRADURAL-EXTRAMEDULLARY MASS;  Surgeon: Ángela Olsen MD;  Location[facility];  Service: Neurosurgery    [place] COLONOSCOPY FLX DX W/[facility] INPATIENT REHABILITATION WHEN PFRMD N/A 2017    Procedure: COLONOSCOPY;  Surgeon: Grace Webber MD;  Location: [facility]; Service: Gastroenterology    NV COLONOSCOPY FLX DX W/[facility] REHABILITATION WHEN PFRMD N/A 3/23/2018    Procedure: COLONOSCOPY;  Surgeon: Grace Webber MD;  Location: [facility]; Service: Gastroenterology    TUMOR REMOVAL  4929'Z    "Cheese tumor" removed from back       Social History     Tobacco Use    Smoking status: Former Smoker     Packs/day: 1 00     Years: 3 00     Pack years: 3 00     Start date:      Last attempt to quit: Aster Colón     Years since quittin 0    Smokeless tobacco: Never Used    Tobacco comment: all scripts says former smoker quit at 14-17 years old   Substance Use Topics    Alcohol use: Yes     Frequency: Monthly or less     Drinks per session: 1 or 2     Binge frequency: Never     Comment: very seldom now    Drug use: Yes     Types: Marijuana       Family History   Problem Relation Age of Onset    Throat cancer Mother     Heart attack Father         at [de-identified]    Stomach cancer Sister     Lung cancer Brother     Stomach cancer Brother     Heart disease Brother                EXAM    Vitals:Blood pressure 111/75, pulse 88, temperature 98 3 °F (36 8 °C), temperature source Tympanic, resp  rate 16, height 5' 10" (1 778 m), weight 92 4 kg (203 lb 9 6 oz)  ,Body mass index is 29 21 kg/m²  Physical Exam    Neurologic Exam      MEDICAL DECISION MAKING    Pathology:    Final Diagnosis   A   T12 intradural extramedullary mass:  - Features present consistent with mature, cystic teratoma  See neuropathology consultant's report below  [address]     Spinal Cord, T12 Intradural Extramedullary Mass, [id number] (2 slides):     Dear Dr Ángela Aiken,      I agree  This is an example of a rare entity that has been given various names, the largest series being reported under the designation "adult-onset intradural spinal teratoma" (see [address]11;[id number])  These are benign lesions composed of fully mature somatic tissues potentially representing all three germ layers  Whether these are leo candido germ cell tumors or complex malformative/ dysembryogenic lesions has been debated  Again, these are benign and the treatment of choice is gross total excision  Local recurrence can be encountered, so appropriate surveillance is called for       Fernando Tate MD         I have personally reviewed pertinent reports

## 2020-01-15 NOTE — TELEPHONE ENCOUNTER
Emily Zendejas from [address] [place] [address] has an order for home pt for Gabriellajacques  that he was given at his discharge from hospital    Emily Zendejas has not been able to reach [employer]ogie Shipley to set this up  She is calling to let Leverne Siemens know and does he want him to have the pt

## 2020-01-16 ENCOUNTER — TELEPHONE (OUTPATIENT)
Dept: NEUROSURGERY | Facility: CLINIC | Age: 65
End: 2020-01-16

## 2020-01-16 NOTE — TELEPHONE ENCOUNTER
Spoke with Suha guerrero  She reports therapist was never able to actually set up therapy at home due to multiple reasons as pt not answering call, or returning calls etc  After review of f/u yesterday it is documented that the pt took a 2 mile walk without the need of any assistive devices prior to coming to his appt  Pt is apparently not homebound or in need of home therapy  CC: MRSA bacteremia    INTERVAL HPI:  Patient was seen and examined by PA and PA student. Case discussed with attending.  Laying comfortably in bed.  Patient awake during exam, not very interactive though able to tell me name,  and where he is.   Difficulty to obtain ROS given pt is not very interactive.   VSS.     Vital Signs Last 24 Hrs  T(C): 36.8 (15 Nov 2018 04:45), Max: 36.8 (15 Nov 2018 04:45)  T(F): 98.3 (15 Nov 2018 04:45), Max: 98.3 (15 Nov 2018 04:45)  HR: 74 (15 Nov 2018 04:45) (72 - 79)  BP: 150/62 (15 Nov 2018 04:45) (127/78 - 150/62)  BP(mean): --  RR: 18 (2018 22:30) (16 - 18)  SpO2: 97% (2018 22:30) (95% - 97%)    PHYSICAL EXAM:  GENERAL: No acute distress.  NECK: Supple, No JVD, no cervical lymphadenopathy.   CHEST/LUNG: Clear to auscultation b/l. No rales, rhonchi or wheezing.   HEART: Regular rate and rhythm. Normal S1S2. No murmurs, gallops, or rubs.   ABDOMEN: Soft, nontender, nondistended. Normal bowel sounds in all 4 quadrants.   EXTREMITIES:  1+ radial and DP pulses noted, no clubbing, cyanosis, or edema noted;  FROM x 4. Shiny atrophic appearance on shins b/l with no hair. Necrotic appearing tissue on distal tip of the great toe. Multiple scabbed areas to multiple digits of the foot and ankles bilaterally.   SKIN: + multiple tattoos throughout; scattered skin abrasions from fall at home that have scabbing in different stages.  PSYCH: insight/judgement unable to assess.    LABS:  Type + Screen (18 @ 06:34)    Type + Screen: O POS

## 2020-01-20 ENCOUNTER — DOCUMENTATION (OUTPATIENT)
Dept: NEUROSURGERY | Facility: CLINIC | Age: 65
End: 2020-01-20

## 2020-01-20 DIAGNOSIS — D49.7 INTRADURAL EXTRAMEDULLARY THORACIC TUMOR: ICD-10-CM

## 2020-01-20 DIAGNOSIS — G95.20 SPINAL CORD COMPRESSION (HCC): ICD-10-CM

## 2020-01-20 DIAGNOSIS — Z98.890 POSTOPERATIVE STATE: ICD-10-CM

## 2020-01-20 RX ORDER — DEXAMETHASONE 2 MG/1
2 TABLET ORAL 2 TIMES DAILY WITH MEALS
Qty: 30 TABLET | Refills: 0 | Status: SHIPPED | OUTPATIENT
Start: 2020-01-20 | End: 2020-08-06

## 2020-01-20 RX ORDER — METHOCARBAMOL 750 MG/1
750 TABLET, FILM COATED ORAL EVERY 6 HOURS PRN
Qty: 45 TABLET | Refills: 0 | Status: SHIPPED | OUTPATIENT
Start: 2020-01-20 | End: 2020-05-20 | Stop reason: SDUPTHER

## 2020-01-20 NOTE — TELEPHONE ENCOUNTER
Patient's significant other, Martha Rascon, called requesting for a refill of the oxycodone and dexamethasone  She left a voice message requesting for the office to return the call to the patient

## 2020-01-20 NOTE — TELEPHONE ENCOUNTER
Discussed with Dr Negrete Shokan in the office  Dr Negrete Shokan approved for dexamethasone refill  Dr Negrete Shokan recommended robaxin for the pain  He does not believe oxycodone should be prescribed at this time  Called the patient to notify him of this  Patient expressed understanding and was appreciative

## 2020-01-20 NOTE — TELEPHONE ENCOUNTER
Called the patient  Patient reports he needs a refill of the oxycodone since he is having back pain  He rates his back pain a 5 out of 10  Patient said he was shoveling snow this weekend and this aggravated his back  He was off of narcotics prior to this  He states how he has been laying down trying not to move due to the pain  He also requested for a refill of the dexamethasone  He is currently on 2 mg BID  I will be calling the patient this Wednesday to hopefully continue the taper  Informed patient how I will discuss with Dr Darrow Kayser regarding the refills  He confirmed his pharmacy and was appreciative  Thoughts on oxycodone refill? I did attach a refill of the dexamethasone, but I was unsure if you would provide an oxycodone refill  PDMP was reviewed and Crista placed it in the chart  Thank you

## 2020-01-22 ENCOUNTER — TELEPHONE (OUTPATIENT)
Dept: NEUROSURGERY | Facility: CLINIC | Age: 65
End: 2020-01-22

## 2020-01-22 NOTE — TELEPHONE ENCOUNTER
Called patient as previously planned to continue to wean him off of the dexamethasone  Patient is currently on 2 mg BID of dexamethasone  Called patient to see how he is doing  Patient states how he has pain and it hurts  Patient would not describe if the pain is mild, moderate, or severe  Asked the patient to rate the pain a 0-10  Patient continued to say "it just hurts " Inquired if the pain increased after decreasing the dexamethasone  Patient said no it is the same  Inquired if patient picked up the robaxin that was called in on 1/20/20  Patient said he did not because he does not have money for it  Inquired if he could borrow money from a friend or if the pharmacy had a coupon for it such as a savings card  Patient then hung up the phone

## 2020-01-24 ENCOUNTER — TELEPHONE (OUTPATIENT)
Dept: NEUROSURGERY | Facility: CLINIC | Age: 65
End: 2020-01-24

## 2020-01-24 NOTE — TELEPHONE ENCOUNTER
Returned the call to Amie Bashir did not answer  Left a voice message with my direct line for her to call back to provide more details

## 2020-01-24 NOTE — TELEPHONE ENCOUNTER
Patient's significant other, Naveen Manas, called and left a voice message reporting how the patient has a headache and a rash on his stomach  She requested for a call back  well appearing

## 2020-04-15 ENCOUNTER — TELEPHONE (OUTPATIENT)
Dept: NEUROSURGERY | Facility: CLINIC | Age: 65
End: 2020-04-15

## 2020-04-15 ENCOUNTER — TELEMEDICINE (OUTPATIENT)
Dept: FAMILY MEDICINE CLINIC | Facility: CLINIC | Age: 65
End: 2020-04-15
Payer: MEDICARE

## 2020-04-15 VITALS — BODY MASS INDEX: 29.06 KG/M2 | WEIGHT: 203 LBS | HEIGHT: 70 IN

## 2020-04-15 DIAGNOSIS — D48.9 MATURE TERATOMA: ICD-10-CM

## 2020-04-15 DIAGNOSIS — D49.7 INTRADURAL EXTRAMEDULLARY THORACIC TUMOR: Primary | ICD-10-CM

## 2020-04-15 DIAGNOSIS — R73.03 PREDIABETES: ICD-10-CM

## 2020-04-15 DIAGNOSIS — F31.9 BIPOLAR 1 DISORDER (HCC): Primary | ICD-10-CM

## 2020-04-15 PROBLEM — N18.2 STAGE 2 CHRONIC KIDNEY DISEASE: Status: RESOLVED | Noted: 2019-08-27 | Resolved: 2020-04-15

## 2020-04-15 PROBLEM — I10 HTN (HYPERTENSION): Status: RESOLVED | Noted: 2018-01-21 | Resolved: 2020-04-15

## 2020-04-15 PROBLEM — R00.0 SINUS TACHYCARDIA: Status: RESOLVED | Noted: 2018-09-20 | Resolved: 2020-04-15

## 2020-04-15 PROBLEM — D72.829 LEUKOCYTOSIS: Status: RESOLVED | Noted: 2018-09-24 | Resolved: 2020-04-15

## 2020-04-15 PROBLEM — F33.2 SEVERE EPISODE OF RECURRENT MAJOR DEPRESSIVE DISORDER, WITHOUT PSYCHOTIC FEATURES (HCC): Chronic | Status: RESOLVED | Noted: 2018-09-21 | Resolved: 2020-04-15

## 2020-04-15 PROBLEM — R42 DIZZINESS: Status: RESOLVED | Noted: 2018-06-04 | Resolved: 2020-04-15

## 2020-04-15 PROBLEM — K62.1 SESSILE RECTAL POLYP: Status: RESOLVED | Noted: 2018-01-30 | Resolved: 2020-04-15

## 2020-04-15 PROBLEM — K21.00 GASTROESOPHAGEAL REFLUX DISEASE WITH ESOPHAGITIS: Status: RESOLVED | Noted: 2018-01-21 | Resolved: 2020-04-15

## 2020-04-15 PROBLEM — D12.6 TUBULAR ADENOMA OF COLON: Status: RESOLVED | Noted: 2018-04-30 | Resolved: 2020-04-15

## 2020-04-15 PROCEDURE — 99442 PR PHYS/QHP TELEPHONE EVALUATION 11-20 MIN: CPT | Performed by: FAMILY MEDICINE

## 2020-05-06 ENCOUNTER — HOSPITAL ENCOUNTER (OUTPATIENT)
Dept: MRI IMAGING | Facility: HOSPITAL | Age: 65
Discharge: HOME | End: 2020-05-06
Attending: NEUROLOGICAL SURGERY
Payer: MEDICARE

## 2020-05-06 DIAGNOSIS — D49.7 INTRADURAL EXTRAMEDULLARY THORACIC TUMOR: ICD-10-CM

## 2020-05-06 DIAGNOSIS — D48.9 MATURE TERATOMA: ICD-10-CM

## 2020-05-06 PROCEDURE — A9585 GADOBUTROL INJECTION: HCPCS | Performed by: NEUROLOGICAL SURGERY

## 2020-05-06 PROCEDURE — 72158 MRI LUMBAR SPINE W/O & W/DYE: CPT

## 2020-05-06 RX ADMIN — GADOBUTROL 4 ML: 604.72 INJECTION INTRAVENOUS at 18:44

## 2020-05-15 NOTE — ASSESSMENT & PLAN NOTE
Rather atypical with chest wall tenderness and dysphagia    Add maalox  Check barium esophogram  Check troponin x2, recent stress test was WNL Valtrex Pregnancy And Lactation Text: this medication is Pregnancy Category B and is considered safe during pregnancy. This medication is not directly found in breast milk but it's metabolite acyclovir is present.

## 2020-05-19 ENCOUNTER — TELEPHONE (OUTPATIENT)
Dept: NEUROSURGERY | Facility: CLINIC | Age: 65
End: 2020-05-19

## 2020-05-20 ENCOUNTER — OFFICE VISIT (OUTPATIENT)
Dept: NEUROSURGERY | Facility: CLINIC | Age: 65
End: 2020-05-20
Payer: MEDICARE

## 2020-05-20 VITALS
DIASTOLIC BLOOD PRESSURE: 89 MMHG | RESPIRATION RATE: 16 BRPM | BODY MASS INDEX: 30.92 KG/M2 | TEMPERATURE: 98.1 F | WEIGHT: 216 LBS | HEART RATE: 88 BPM | HEIGHT: 70 IN | SYSTOLIC BLOOD PRESSURE: 133 MMHG

## 2020-05-20 DIAGNOSIS — G89.29 CHRONIC BILATERAL LOW BACK PAIN WITHOUT SCIATICA: ICD-10-CM

## 2020-05-20 DIAGNOSIS — D48.9 MATURE TERATOMA: ICD-10-CM

## 2020-05-20 DIAGNOSIS — G25.81 RESTLESS LEG SYNDROME: ICD-10-CM

## 2020-05-20 DIAGNOSIS — D49.7 INTRADURAL EXTRAMEDULLARY THORACIC TUMOR: Primary | ICD-10-CM

## 2020-05-20 DIAGNOSIS — Z98.890 POSTOPERATIVE STATE: ICD-10-CM

## 2020-05-20 DIAGNOSIS — M54.50 CHRONIC BILATERAL LOW BACK PAIN WITHOUT SCIATICA: ICD-10-CM

## 2020-05-20 DIAGNOSIS — G95.20 SPINAL CORD COMPRESSION (HCC): ICD-10-CM

## 2020-05-20 PROCEDURE — 3079F DIAST BP 80-89 MM HG: CPT | Performed by: NEUROLOGICAL SURGERY

## 2020-05-20 PROCEDURE — 99214 OFFICE O/P EST MOD 30 MIN: CPT | Performed by: NEUROLOGICAL SURGERY

## 2020-05-20 PROCEDURE — 3075F SYST BP GE 130 - 139MM HG: CPT | Performed by: NEUROLOGICAL SURGERY

## 2020-05-20 PROCEDURE — 1036F TOBACCO NON-USER: CPT | Performed by: NEUROLOGICAL SURGERY

## 2020-05-20 PROCEDURE — 3008F BODY MASS INDEX DOCD: CPT | Performed by: NEUROLOGICAL SURGERY

## 2020-05-20 RX ORDER — METHOCARBAMOL 750 MG/1
750 TABLET, FILM COATED ORAL EVERY 12 HOURS PRN
Qty: 45 TABLET | Refills: 0 | Status: SHIPPED | OUTPATIENT
Start: 2020-05-20 | End: 2020-12-04 | Stop reason: SDUPTHER

## 2020-08-06 ENCOUNTER — OFFICE VISIT (OUTPATIENT)
Dept: FAMILY MEDICINE CLINIC | Facility: CLINIC | Age: 65
End: 2020-08-06
Payer: MEDICARE

## 2020-08-06 VITALS
WEIGHT: 208 LBS | SYSTOLIC BLOOD PRESSURE: 140 MMHG | DIASTOLIC BLOOD PRESSURE: 90 MMHG | BODY MASS INDEX: 29.78 KG/M2 | HEART RATE: 100 BPM | OXYGEN SATURATION: 98 % | HEIGHT: 70 IN | TEMPERATURE: 97.6 F

## 2020-08-06 DIAGNOSIS — R73.03 PREDIABETES: ICD-10-CM

## 2020-08-06 DIAGNOSIS — R55 RECURRENT SYNCOPE: Primary | ICD-10-CM

## 2020-08-06 PROCEDURE — 3080F DIAST BP >= 90 MM HG: CPT | Performed by: PHYSICIAN ASSISTANT

## 2020-08-06 PROCEDURE — 3008F BODY MASS INDEX DOCD: CPT | Performed by: PHYSICIAN ASSISTANT

## 2020-08-06 PROCEDURE — 3077F SYST BP >= 140 MM HG: CPT | Performed by: PHYSICIAN ASSISTANT

## 2020-08-06 PROCEDURE — 1036F TOBACCO NON-USER: CPT | Performed by: PHYSICIAN ASSISTANT

## 2020-08-06 PROCEDURE — 99214 OFFICE O/P EST MOD 30 MIN: CPT | Performed by: PHYSICIAN ASSISTANT

## 2020-08-06 NOTE — PROGRESS NOTES
Assessment/Plan:    Recurrent syncope  Recurrent occurring 1-2 times a year  Symptoms vision blurriness and diaphoresis  Typical trigger outside in heat +- Etoh/marijuana  Has not occurred with activity/exercise    10 ECGs in the past 5 years with NSR and no change Discussed possible etiology including neurally mediated, orthostatic hypotension, cardiac arrhythmia, hypoglycemia  Visual disturbances and diaphoresis support vasovagal  - Ordered tilt-table testing  Patient will complete previously requested labs including CBC, CMP and A1c  - Discussed avoidance of triggers and safe behavior surrounding presyncopal symptoms    Prediabetes  Lab Results   Component Value Date    HGBA1C 6 1 12/31/2019   - Repeat A1C             Subjective:    Patient ID: Jalen Black  is a 71 y o  male  Pt is presenting today for Episode of visual blurriness, diaphoresis  He was outside in the sun and stood up and walked a few steps and legs gave out  He was sitting out    Denies palpitations, CP  Prior episodes 40 years ago when he was drinking alcochol  An episode every 6-12 months  Restarted 5 years ago occurring 2 times   He had a good meal a few hours before  Smoked marijuana 30 minutes prior    The following portions of the patient's history were reviewed and updated as appropriate: allergies, current medications and problem list     Review of Systems   Constitutional: Negative for activity change, fatigue, fever and unexpected weight change  HENT: Negative for rhinorrhea and sore throat  Respiratory: Negative for cough, shortness of breath and wheezing  Cardiovascular: Negative for chest pain, palpitations and leg swelling  Gastrointestinal: Negative for constipation, diarrhea, nausea and vomiting  Musculoskeletal: Negative for back pain, neck pain and neck stiffness  Skin: Negative for rash           Objective:  /90 (BP Location: Left arm, Patient Position: Sitting, Cuff Size: Standard) Pulse 100   Temp 97 6 °F (36 4 °C)   Ht 5' 10" (1 778 m)   Wt 94 3 kg (208 lb)   SpO2 98%   BMI 29 84 kg/m²      Physical Exam   Constitutional: He is oriented to person, place, and time  He appears well-developed  No distress  HENT:   Head: Normocephalic and atraumatic  Eyes: Pupils are equal, round, and reactive to light  Cardiovascular: Normal rate, regular rhythm and normal heart sounds  Exam reveals no gallop and no friction rub  No murmur heard  Pulmonary/Chest: Effort normal and breath sounds normal  No respiratory distress  He has no wheezes  Neurological: He is alert and oriented to person, place, and time  Skin: Skin is warm and dry  He is not diaphoretic  Psychiatric: His behavior is normal  Thought content normal    Vitals reviewed  BMI Counseling: Body mass index is 29 84 kg/m²  The BMI is above normal  Nutrition recommendations include reducing portion sizes  Exercise recommendations include moderate aerobic physical activity for 150 minutes/week

## 2020-08-06 NOTE — ASSESSMENT & PLAN NOTE
Recurrent occurring 1-2 times a year  Symptoms vision blurriness and diaphoresis  Typical trigger outside in heat +- Etoh/marijuana  Has not occurred with activity/exercise    10 ECGs in the past 5 years with NSR and no change Discussed possible etiology including neurally mediated, orthostatic hypotension, cardiac arrhythmia, hypoglycemia      Visual disturbances and diaphoresis support vasovagal  - Ordered tilt-table testing  Patient will complete previously requested labs including CBC, CMP and A1c  - Discussed avoidance of triggers and safe behavior surrounding presyncopal symptoms

## 2020-10-28 ENCOUNTER — TELEPHONE (OUTPATIENT)
Dept: NEUROSURGERY | Facility: CLINIC | Age: 65
End: 2020-10-28

## 2020-11-23 ENCOUNTER — HOSPITAL ENCOUNTER (OUTPATIENT)
Dept: MRI IMAGING | Facility: HOSPITAL | Age: 65
Discharge: HOME | End: 2020-11-23
Attending: NEUROLOGICAL SURGERY
Payer: MEDICARE

## 2020-11-23 DIAGNOSIS — D48.9 MATURE TERATOMA: ICD-10-CM

## 2020-11-23 DIAGNOSIS — D49.7 INTRADURAL EXTRAMEDULLARY THORACIC TUMOR: ICD-10-CM

## 2020-11-23 PROCEDURE — A9585 GADOBUTROL INJECTION: HCPCS | Performed by: NEUROLOGICAL SURGERY

## 2020-11-23 PROCEDURE — 72158 MRI LUMBAR SPINE W/O & W/DYE: CPT

## 2020-11-23 RX ADMIN — GADOBUTROL 10 ML: 604.72 INJECTION INTRAVENOUS at 14:59

## 2020-12-04 DIAGNOSIS — G95.20 SPINAL CORD COMPRESSION (HCC): ICD-10-CM

## 2020-12-04 DIAGNOSIS — Z98.890 POSTOPERATIVE STATE: ICD-10-CM

## 2020-12-04 DIAGNOSIS — K21.9 GASTROESOPHAGEAL REFLUX DISEASE WITHOUT ESOPHAGITIS: Primary | ICD-10-CM

## 2020-12-04 DIAGNOSIS — K21.9 GASTROESOPHAGEAL REFLUX DISEASE WITHOUT ESOPHAGITIS: ICD-10-CM

## 2020-12-04 RX ORDER — METHOCARBAMOL 750 MG/1
750 TABLET, FILM COATED ORAL EVERY 12 HOURS PRN
Qty: 45 TABLET | Refills: 0 | Status: SHIPPED | OUTPATIENT
Start: 2020-12-04 | End: 2021-08-05 | Stop reason: SDUPTHER

## 2020-12-04 RX ORDER — PANTOPRAZOLE SODIUM 20 MG/1
TABLET, DELAYED RELEASE ORAL
Qty: 90 TABLET | Refills: 0 | Status: SHIPPED | OUTPATIENT
Start: 2020-12-04 | End: 2021-12-06 | Stop reason: SDUPTHER

## 2020-12-04 RX ORDER — PANTOPRAZOLE SODIUM 20 MG/1
20 TABLET, DELAYED RELEASE ORAL
Qty: 30 TABLET | Refills: 2 | Status: SHIPPED | OUTPATIENT
Start: 2020-12-04 | End: 2020-12-04

## 2020-12-30 ENCOUNTER — OFFICE VISIT (OUTPATIENT)
Dept: NEUROSURGERY | Facility: CLINIC | Age: 65
End: 2020-12-30
Payer: MEDICARE

## 2020-12-30 VITALS
BODY MASS INDEX: 31.47 KG/M2 | DIASTOLIC BLOOD PRESSURE: 81 MMHG | RESPIRATION RATE: 16 BRPM | TEMPERATURE: 98.2 F | HEIGHT: 70 IN | HEART RATE: 88 BPM | WEIGHT: 219.8 LBS | SYSTOLIC BLOOD PRESSURE: 130 MMHG

## 2020-12-30 DIAGNOSIS — D48.9 MATURE TERATOMA: ICD-10-CM

## 2020-12-30 DIAGNOSIS — D49.7 INTRADURAL EXTRAMEDULLARY THORACIC TUMOR: Primary | ICD-10-CM

## 2020-12-30 PROCEDURE — 99214 OFFICE O/P EST MOD 30 MIN: CPT | Performed by: NEUROLOGICAL SURGERY

## 2021-03-15 ENCOUNTER — OFFICE VISIT (OUTPATIENT)
Dept: FAMILY MEDICINE CLINIC | Facility: CLINIC | Age: 66
End: 2021-03-15
Payer: COMMERCIAL

## 2021-03-15 VITALS
DIASTOLIC BLOOD PRESSURE: 82 MMHG | WEIGHT: 220 LBS | BODY MASS INDEX: 31.5 KG/M2 | HEIGHT: 70 IN | HEART RATE: 63 BPM | SYSTOLIC BLOOD PRESSURE: 124 MMHG | OXYGEN SATURATION: 96 % | TEMPERATURE: 96.8 F

## 2021-03-15 DIAGNOSIS — G95.20 SPINAL CORD COMPRESSION (HCC): ICD-10-CM

## 2021-03-15 DIAGNOSIS — F31.9 BIPOLAR 1 DISORDER (HCC): ICD-10-CM

## 2021-03-15 DIAGNOSIS — R41.89 COGNITIVE DECLINE: ICD-10-CM

## 2021-03-15 DIAGNOSIS — M54.50 CHRONIC BILATERAL LOW BACK PAIN WITHOUT SCIATICA: ICD-10-CM

## 2021-03-15 DIAGNOSIS — G89.29 CHRONIC BILATERAL LOW BACK PAIN WITHOUT SCIATICA: ICD-10-CM

## 2021-03-15 DIAGNOSIS — G47.30 SLEEP APNEA, UNSPECIFIED TYPE: ICD-10-CM

## 2021-03-15 DIAGNOSIS — R73.03 PREDIABETES: Primary | ICD-10-CM

## 2021-03-15 LAB — SL AMB POCT HEMOGLOBIN AIC: 6 (ref ?–6.5)

## 2021-03-15 PROCEDURE — 3008F BODY MASS INDEX DOCD: CPT | Performed by: PHYSICIAN ASSISTANT

## 2021-03-15 PROCEDURE — 1170F FXNL STATUS ASSESSED: CPT | Performed by: PHYSICIAN ASSISTANT

## 2021-03-15 PROCEDURE — 1125F AMNT PAIN NOTED PAIN PRSNT: CPT | Performed by: PHYSICIAN ASSISTANT

## 2021-03-15 PROCEDURE — 1036F TOBACCO NON-USER: CPT | Performed by: PHYSICIAN ASSISTANT

## 2021-03-15 PROCEDURE — 1160F RVW MEDS BY RX/DR IN RCRD: CPT | Performed by: PHYSICIAN ASSISTANT

## 2021-03-15 PROCEDURE — 3288F FALL RISK ASSESSMENT DOCD: CPT | Performed by: PHYSICIAN ASSISTANT

## 2021-03-15 PROCEDURE — 3725F SCREEN DEPRESSION PERFORMED: CPT | Performed by: PHYSICIAN ASSISTANT

## 2021-03-15 PROCEDURE — G0439 PPPS, SUBSEQ VISIT: HCPCS | Performed by: PHYSICIAN ASSISTANT

## 2021-03-15 PROCEDURE — 83036 HEMOGLOBIN GLYCOSYLATED A1C: CPT | Performed by: PHYSICIAN ASSISTANT

## 2021-03-15 PROCEDURE — 99214 OFFICE O/P EST MOD 30 MIN: CPT | Performed by: PHYSICIAN ASSISTANT

## 2021-03-15 NOTE — PROGRESS NOTES
Assessment and Plan:     Problem List Items Addressed This Visit        Other    Prediabetes - Primary    Relevant Orders    POCT hemoglobin A1c (Completed)    Chronic bilateral low back pain without sciatica        BMI Counseling: Body mass index is 31 57 kg/m²  The BMI is above normal  Nutrition recommendations include decreasing portion sizes, encouraging healthy choices of fruits and vegetables, limiting drinks that contain sugar and reducing intake of cholesterol  Exercise recommendations include moderate physical activity 150 minutes/week  No pharmacotherapy was ordered  Preventive health issues were discussed with patient, and age appropriate screening tests were ordered as noted in patient's After Visit Summary  Personalized health advice and appropriate referrals for health education or preventive services given if needed, as noted in patient's After Visit Summary       History of Present Illness:     Patient presents for Medicare Annual Wellness visit    Patient Care Team:  Joe Orantes PA-C as PCP - General (Family Medicine)  MD Neal Melendez MD Colie Harrow, MD as Endoscopist     Problem List:     Patient Active Problem List   Diagnosis    Adult residual type attention deficit hyperactivity disorder (ADHD)    HLD (hyperlipidemia)    Prediabetes    Chronic bilateral low back pain without sciatica    Adenomatous polyp of transverse colon    Erectile dysfunction    Polyp of colon    Bipolar 1 disorder ([address] )    Restless leg syndrome    Bilateral carpal tunnel syndrome    Intradural extramedullary thoracic tumor    Mature teratoma    Recurrent syncope      Past Medical and Surgical History:     Past Medical History:   Diagnosis Date    Anxiety     Bipolar depression ([facility] [address]     bipolar disorder    Bipolar depression ([facility] [address] )     bipolar disorder    Borderline diabetes mellitus     Borderline diabetes mellitus     Brittle nails     Chronic back pain     Chronic pain     Colon polyp     Depression     Dizziness 6/4/2018    Foot pain     unspecified laterality- per [employer]scripts     Gallstones     Gastroesophageal reflux disease with esophagitis 1/21/2018    HTN (hypertension) 1/21/2018    Intradural extramedullary spinal tumor     Leukocytosis 9/24/2018    Onychomycosis     Restless leg syndrome     Sessile rectal polyp 1/30/2018    Severe episode of recurrent major depressive disorder, without psychotic features ([facility] [address] ) 9/21/2018    Sinus tachycardia 9/20/2018    Spinal arachnoid cyst     Stage 2 chronic kidney disease 8/27/2019    Tubular adenoma of colon 4/30/2018     Past Surgical History:   Procedure Laterality Date    COLONOSCOPY      INCISION AND DRAINAGE ABSCESS / HEMATOMA OF BURSA / KNEE / THIGH  in 1990's    outer right leg and center of upper back   complicated  resolved status    OTHER SURGICAL HISTORY  11/2019    cyst excision from back by Dermatolgist at office of Dr Jeffrey Ch BX/EXCIS SPIN SHAY,INDUR,XMED,LUMB N/A 1/2/2020    Procedure: T12 LAMINECTOMY FOR RESECTION OF INTRADURAL-EXTRAMEDULLARY MASS;  Surgeon: Oscar Arboleda MD;  [facility];  Service: Neurosurgery    VA COLONOSCOPY FLX DX W/COLLJ SPEC WHEN PFRMD N/A 8/31/2017    Procedure: COLONOSCOPY;  Surgeon: Rebecca Ferrell MD;  Location: [facility]; Service: Gastroenterology    VA COLONOSCOPY FLX DX W/COLLJ [facility] WHEN PFRMD N/A 3/23/2018    Procedure: COLONOSCOPY;  Surgeon: Rebecca Ferrell MD;  [facility];   Service: Gastroenterology    TUMOR REMOVAL  1990's    "Cheese tumor" removed from back      Family History:     Family History   Problem Relation Age of Onset    Throat cancer Mother     Heart attack Father         at [de-identified]    Stomach cancer Sister     Lung cancer Brother     Stomach cancer Brother     Heart disease Brother       Social History:     E-Cigarette/Vaping    E-Cigarette Use Never User      E-Cigarette/Vaping Substances    Nicotine No     THC No     CBD No     Flavoring No     Other No     Unknown No      Social History     Socioeconomic History    Marital status: Single     Spouse name: Not on file    Number of children: 3    Years of education: 8    Highest education level: Not on file   Occupational History    Occupation: disabled     Comment: disabled   Social Needs    Financial resource strain: Not on file    Food insecurity     Worry: Not on file     Inability: Not on file   [employer] needs     Medical: Not on file     Non-medical: Not on file   Tobacco Use    Smoking status: Former Smoker     Packs/day: 1 00     Years: 3 00     Pack years: 3 00     Start date:      Quit date:      Years since quittin 2    Smokeless tobacco: Never Used    Tobacco comment: all scripts says former smoker quit at 14-17 years old   Substance and Sexual Activity    Alcohol use: Yes     Frequency: Monthly or less     Drinks per session: 1 or 2     Binge frequency: Never     Comment: very seldom now    Drug use: Yes     Types: Marijuana    Sexual activity: Yes     Partners: Female     Birth control/protection: Post-menopausal   Lifestyle    Physical activity     Days per week: Not on file     Minutes per session: Not on file    Stress: Not on file   Relationships    Social connections     Talks on phone: Not on file     Gets together: Not on file     Attends Confucianist service: Not on file     Active member of club or organization: Not on file     Attends meetings of clubs or organizations: Not on file     Relationship status: Not on file    Intimate partner violence     Fear of current or ex partner: Not on file     Emotionally abused: Not on file     Physically abused: Not on file     Forced sexual activity: Not on file   Other Topics Concern    Not on file   Social History Narrative    Disabled    GED    Alcoholism in recovery     Remotely quit alcohol use    Former smoker     Uses of Marijuana         As per allscripts      Medications and Allergies:     Current Outpatient Medications   Medication Sig Dispense Refill    methocarbamol (ROBAXIN) 750 mg tablet Take 1 tablet (750 mg total) by mouth every 12 (twelve) hours as needed for muscle spasms 45 tablet 0    pantoprazole (PROTONIX) 20 mg tablet TAKE 1 TABLET(20 MG) BY MOUTH DAILY BEFORE BREAKFAST 90 tablet 0     No current facility-administered medications for this visit  No Known Allergies   Immunizations: There is no immunization history on file for this patient  Health Maintenance:         Topic Date Due    Colorectal Cancer Screening  03/23/2028    Hepatitis C Screening  Completed     There are no preventive care reminders to display for this patient  Medicare Health Risk Assessment:     /82 (BP Location: Left arm, Patient Position: Sitting, Cuff Size: Standard)   Pulse 63   Temp (!) 96 8 °F (36 °C)   Ht 5' 10" (1 778 m)   Wt 99 8 kg (220 lb)   SpO2 96%   BMI 31 57 kg/m²      Last Medicare Wellness visit information reviewed, patient interviewed, no change since last AWV  Health Risk Assessment:   Patient rates overall health as very good  Patient feels that their physical health rating is much better  Patient is satisfied with their life  Eyesight was rated as slightly worse  Hearing was rated as same  Patient feels that their emotional and mental health rating is same  Patients states they are always angry  Patient states they are sometimes unusually tired/fatigued  Pain experienced in the last 7 days has been some  Patient's pain rating has been 5/10  Patient states that he has experienced no weight loss or gain in last 6 months  Depression Screening:   PHQ-2 Score: 2      Fall Risk Screening: In the past year, patient has experienced: no history of falling in past year      Home Safety:  Patient does not have trouble with stairs inside or outside of their home  Patient has working smoke alarms and has working carbon monoxide detector   Home safety hazards include: none  Nutrition:   Current diet is Regular and Frequent junk food  Medications:   Patient is not currently taking any over-the-counter supplements  Patient is able to manage medications  Activities of Daily Living (ADLs)/Instrumental Activities of Daily Living (IADLs):   Walk and transfer into and out of bed and chair?: Yes  Dress and groom yourself?: Yes    Bathe or shower yourself?: Yes    Feed yourself? Yes  Do your laundry/housekeeping?: Yes  Manage your money, pay your bills and track your expenses?: Yes  Make your own meals?: Yes    Do your own shopping?: Yes    Previous Hospitalizations:   Any hospitalizations or ED visits within the last 12 months?: No      Advance Care Planning:   Living will: Yes    Durable POA for healthcare: Yes    Advanced directive: Yes      Cognitive Screening:   Provider or family/friend/caregiver concerned regarding cognition?: Yes    Cognition Comments: Gradual change over years, memory loss    PREVENTIVE SCREENINGS      Cardiovascular Screening:    General: Screening Not Indicated and History Lipid Disorder      Diabetes Screening:     General: Risks and Benefits Discussed      Colorectal Cancer Screening:     General: Screening Current      Prostate Cancer Screening:    General: Risks and Benefits Discussed    Due for: PSA      Abdominal Aortic Aneurysm (AAA) Screening:    Risk factors include: age between 73-69 yo and tobacco use        Lung Cancer Screening:     General: Screening Not Indicated      Hepatitis C Screening:    General: Screening Current    Screening, Brief Intervention, and Referral to Treatment (SBIRT)    Screening  Typical number of drinks in a day: 0  Typical number of drinks in a week: 0  Interpretation: Low risk drinking behavior      Single Item Drug Screening:  How often have you used an illegal drug (including marijuana) or a prescription medication for non-medical reasons in the past year? daily or almost daily    Single Item Drug Screen Score: 4  Interpretation: POSITIVE screen for possible drug use disorder    Drug Abuse Screening Test (DAST-10):  1) Have you used drugs other than those required for medical reasons? Yes  2) Do you abuse more than one drug at a time? No  3) Are you always able to stop using drugs when you want to? Yes  4) Have you had "blackouts" or "flashbacks" as a result of drug use? No  5) Do you ever feel bad or guilty about your drug use? Yes  6) Does your spouse (or parents) ever complain about your involvement with drugs? No  7) Have you neglected your family because of your use of drugs? Yes  8) Have you engaged in illegal activities in order to obtain drugs? Yes  9) Have you ever experienced withdrawal symptoms (felt sick) when you stopped taking drugs? Yes  10) Have you had medical problems as a result of your drug use (e g , memory loss, hepatitis, convulsions, bleeding, etc )? No    DAST-10 Score: 5  Interpretation: Moderate level problems related to drug abuse    Brief Intervention  Drug use counseling given       Time Spent  Time spent providing alcohol/substance abuse assessment and intervention services: 5 minutes      Reinaldo Ellis PA-C

## 2021-03-15 NOTE — PROGRESS NOTES
Assessment/Plan:    Prediabetes  Lab Results   Component Value Date    HGBA1C 6 0 03/15/2021   Stable  Encouraged healthy diet    Cognitive decline  MOCA of 24 (with adjustment of +1 due to 7th grade highest completed)  Discussed   Healthy habits to prevent worsening  Discussed follow-up with neurology or neuropsychiatric workup  No FU at this time    Bipolar 1 disorder ([facility] [address] )  Likely managed with marijuana use    Previously managed by psychiatry who no longer accepts his insurance  Previously managed on Cymbalta, Seroquel, Prozac and Ativan  Patient declines any further workup or medication management    Chronic bilateral low back pain without sciatica  Managed with topical patches  Discussed limited avoidance of NSAIDs     Sleep apnea, unspecified type  Ordered sleep study  Multiple risk factors  -     Diagnostic Sleep Study; Future            Subjective:    Patient ID: Yamilka Roth Sr  is a 76 y o  male  Pt is presenting today for 6 month FU of Back pain, prediabetes, HLD and Bipolar disorder  No change in chronic lower back pain  Uses Solanpas occasionally which provides partial improvement of back pain  Uses Marijuana daily for 45 years, to help regulate mood to deal with anger  Does not want to take any medications  He and wife worry about his gradual worsening with short memory  No known family history of alzheimer's or dementia  Uses Protonix PRN about once a week  Known triggers include sauce  He has episodes of waking at night gasping for air    The following portions of the patient's history were reviewed and updated as appropriate: allergies, current medications, past family history, past medical history, past social history and problem list     Review of Systems   Constitutional: Negative for activity change, chills, fatigue, fever and unexpected weight change  Respiratory: Negative for cough, shortness of breath and wheezing      Cardiovascular: Negative for chest pain, palpitations and leg swelling  Gastrointestinal: Negative for constipation, diarrhea, nausea and vomiting  Musculoskeletal: Negative for back pain, neck pain and neck stiffness  Allergic/Immunologic: Negative for environmental allergies and food allergies  Neurological: Negative for dizziness, weakness and headaches  Psychiatric/Behavioral: Positive for sleep disturbance  Negative for dysphoric mood  The patient is not nervous/anxious  Objective:  /82 (BP Location: Left arm, Patient Position: Sitting, Cuff Size: Standard)   Pulse 63   Temp (!) 96 8 °F (36 °C)   Ht 5' 10" (1 778 m)   Wt 99 8 kg (220 lb)   SpO2 96%   BMI 31 57 kg/m²      Physical Exam  Vitals signs reviewed  Constitutional:       General: He is not in acute distress  Appearance: He is well-developed  He is not diaphoretic  HENT:      Head: Normocephalic and atraumatic  Eyes:      Pupils: Pupils are equal, round, and reactive to light  Cardiovascular:      Rate and Rhythm: Normal rate and regular rhythm  Heart sounds: Normal heart sounds  No murmur  No friction rub  No gallop  Pulmonary:      Effort: Pulmonary effort is normal  No respiratory distress  Breath sounds: Normal breath sounds  No wheezing  Skin:     General: Skin is warm and dry  Neurological:      Mental Status: He is alert and oriented to person, place, and time  Psychiatric:         Behavior: Behavior normal          Thought Content:  Thought content normal

## 2021-03-15 NOTE — ASSESSMENT & PLAN NOTE
MOCA of 24 (with adjustment of +1 due to 7th grade highest completed)  Discussed   Healthy habits to prevent worsening  Discussed follow-up with neurology or neuropsychiatric workup   No FU at this time

## 2021-03-15 NOTE — ASSESSMENT & PLAN NOTE
Likely managed with marijuana use    Previously managed by psychiatry who no longer accepts his insurance  Previously managed on Cymbalta, Seroquel, Prozac and Ativan    Patient declines any further workup or medication management

## 2021-03-15 NOTE — PATIENT INSTRUCTIONS

## 2021-07-28 ENCOUNTER — OFFICE VISIT (OUTPATIENT)
Dept: DENTISTRY | Facility: CLINIC | Age: 66
End: 2021-07-28

## 2021-07-28 VITALS — HEART RATE: 116 BPM | DIASTOLIC BLOOD PRESSURE: 92 MMHG | SYSTOLIC BLOOD PRESSURE: 170 MMHG

## 2021-07-28 DIAGNOSIS — K05.219 PERIODONTAL ABSCESS: Primary | ICD-10-CM

## 2021-07-28 PROCEDURE — D0220 INTRAORAL - PERIAPICAL FIRST RADIOGRAPHIC IMAGE: HCPCS | Performed by: DENTIST

## 2021-07-28 PROCEDURE — D0140 LIMITED ORAL EVALUATION - PROBLEM FOCUSED: HCPCS | Performed by: DENTIST

## 2021-07-28 PROCEDURE — D0230 INTRAORAL - PERIAPICAL EACH ADDITIONAL RADIOGRAPHIC IMAGE: HCPCS | Performed by: DENTIST

## 2021-07-28 RX ORDER — AMOXICILLIN 500 MG/1
500 CAPSULE ORAL EVERY 8 HOURS SCHEDULED
Qty: 15 CAPSULE | Refills: 0 | Status: SHIPPED | OUTPATIENT
Start: 2021-07-28 | End: 2021-08-02

## 2021-07-28 NOTE — PROGRESS NOTES
75 y/o M presented to the [facility] for an emergency appointment  The patient reported no signs/symptoms of COVID-19 and his temperature today was WNL  CC: "pain coming from front tooth"  The patient stated that he has continual pain of the last 4 days arising from a lower front tooth (points to tooth #24)  Pain increases on biting  The patient stated that he feels the tooth is mobile  He also stated that he has pain arising from the lower left as well (points to #19)  2 periapical x-rays were taken today of #19 and #24  X-ray of lower anteriors shows significant calculus, bone loss, and periapical pathology  X-ray of #19 shows retained root tips and periapical pathology  Clinically, an abscess could be seen on labial mucosa apical to the crown of #24  Pain on percussion and palpation of #24 and #25 along with class III mobility  Since today was a limited exam, asked patient what he would like to focus on today and the patient stated that #24 causes him the most pain and would like treatment for that area  Recommended extraction of at least #24 but preferably #24 and #25 at this time  Patient refused extraction treatment today - he would like his wife to accompany him to his appointment for treatment  Because the patient refused treatment, prescribed amoxicillin 500mg to combat his infection while he waits to schedule an appointment  Pt denies allergy to antibiotic  Confirmed pharmacy information  Instructed patient that he should not wait long for treatment and to return ASAP if he develops facial swelling or fever  Patient also understands that he has other dental problems that need treatment outside of those 2 extractions  All patient's questions were answered today  The patient was dismissed in good health

## 2021-08-05 DIAGNOSIS — G95.20 SPINAL CORD COMPRESSION (HCC): ICD-10-CM

## 2021-08-05 DIAGNOSIS — Z98.890 POSTOPERATIVE STATE: ICD-10-CM

## 2021-08-05 RX ORDER — METHOCARBAMOL 750 MG/1
750 TABLET, FILM COATED ORAL EVERY 12 HOURS PRN
Qty: 45 TABLET | Refills: 0 | Status: SHIPPED | OUTPATIENT
Start: 2021-08-05 | End: 2021-12-06 | Stop reason: SDUPTHER

## 2021-08-11 ENCOUNTER — TELEPHONE (OUTPATIENT)
Dept: FAMILY MEDICINE CLINIC | Facility: CLINIC | Age: 66
End: 2021-08-11

## 2021-08-11 NOTE — TELEPHONE ENCOUNTER
MARLIN Received call from [facility]  They stated they received appeal from patient for Methocarbamol  They will come with a decision by 08- and will call  our office with a decision

## 2021-12-06 DIAGNOSIS — Z98.890 POSTOPERATIVE STATE: ICD-10-CM

## 2021-12-06 DIAGNOSIS — G95.20 SPINAL CORD COMPRESSION (HCC): ICD-10-CM

## 2021-12-06 DIAGNOSIS — K21.9 GASTROESOPHAGEAL REFLUX DISEASE WITHOUT ESOPHAGITIS: ICD-10-CM

## 2021-12-07 RX ORDER — METHOCARBAMOL 750 MG/1
750 TABLET, FILM COATED ORAL EVERY 12 HOURS PRN
Qty: 45 TABLET | Refills: 0 | Status: SHIPPED | OUTPATIENT
Start: 2021-12-07

## 2021-12-07 RX ORDER — PANTOPRAZOLE SODIUM 20 MG/1
20 TABLET, DELAYED RELEASE ORAL DAILY
Qty: 90 TABLET | Refills: 0 | Status: SHIPPED | OUTPATIENT
Start: 2021-12-07

## 2022-01-03 ENCOUNTER — HOSPITAL ENCOUNTER (OUTPATIENT)
Dept: MRI IMAGING | Facility: HOSPITAL | Age: 67
Discharge: HOME | End: 2022-01-03
Attending: NEUROLOGICAL SURGERY
Payer: MEDICARE

## 2022-01-03 DIAGNOSIS — D48.9 MATURE TERATOMA: ICD-10-CM

## 2022-01-03 DIAGNOSIS — D49.7 INTRADURAL EXTRAMEDULLARY THORACIC TUMOR: ICD-10-CM

## 2022-01-03 PROCEDURE — A9585 GADOBUTROL INJECTION: HCPCS | Performed by: NEUROLOGICAL SURGERY

## 2022-01-03 PROCEDURE — 72158 MRI LUMBAR SPINE W/O & W/DYE: CPT

## 2022-01-03 PROCEDURE — G1004 CDSM NDSC: HCPCS

## 2022-01-03 RX ADMIN — GADOBUTROL 10 ML: 604.72 INJECTION INTRAVENOUS at 13:35

## 2022-02-09 ENCOUNTER — OFFICE VISIT (OUTPATIENT)
Dept: NEUROSURGERY | Facility: CLINIC | Age: 67
End: 2022-02-09
Payer: MEDICARE

## 2022-02-09 VITALS
DIASTOLIC BLOOD PRESSURE: 91 MMHG | BODY MASS INDEX: 33.41 KG/M2 | WEIGHT: 233.4 LBS | SYSTOLIC BLOOD PRESSURE: 132 MMHG | HEART RATE: 101 BPM | RESPIRATION RATE: 16 BRPM | HEIGHT: 70 IN | TEMPERATURE: 98.7 F

## 2022-02-09 DIAGNOSIS — D49.7 INTRADURAL EXTRAMEDULLARY THORACIC TUMOR: Primary | ICD-10-CM

## 2022-02-09 DIAGNOSIS — D48.9 MATURE TERATOMA: ICD-10-CM

## 2022-02-09 DIAGNOSIS — Z01.818 PRE-PROCEDURAL EXAMINATION: Primary | ICD-10-CM

## 2022-02-09 PROCEDURE — 99213 OFFICE O/P EST LOW 20 MIN: CPT | Performed by: NEUROLOGICAL SURGERY

## 2022-02-09 RX ORDER — DULOXETIN HYDROCHLORIDE 60 MG/1
60 CAPSULE, DELAYED RELEASE ORAL
COMMUNITY
Start: 2022-01-20 | End: 2022-05-20 | Stop reason: SDUPTHER

## 2022-02-09 RX ORDER — QUETIAPINE FUMARATE 100 MG/1
100 TABLET, FILM COATED ORAL
COMMUNITY
Start: 2022-01-20 | End: 2022-05-20 | Stop reason: SDUPTHER

## 2022-02-09 NOTE — PROGRESS NOTES
Neurosurgery Office Note  Mary Krishnan Sr  78 y o  male MRN: [id number]      Assessment/Plan      Diagnoses and all orders for this visit:    Intradural extramedullary thoracic tumor  -     MRI lumbar spine with and without contrast; Future    Mature teratoma  -     MRI lumbar spine with and without contrast; Future      Discussion:    Pain Score:   2 (centered lower back) far below his incision, and does not radiate into hips any longer    S/p resection of IDEM mass on 1/2/20 via T12 laminectomy    Final path is mature teratoma  I believe we achieved a gross total resection       He continues to do well  States he may have 1 episode of nocturia nightly vs  The 2-3 he would have preop, and has does not admit to urgency/frequency or incontinence      His follow-up MRI does not show any residual or recurrence  He should not need adjuvant therapies  I have recommended surveillance imaging in 1 year  12/30/20 Metrics: EQ5D5L 76749=4 809; VAS 80; ECOG 1; KPS 80    02/09/22 Metrics: EQ5D5L 96055=7 861; VAS 80; ECOG 1; KPS 80          CHIEF COMPLAINT    Chief Complaint   Patient presents with    Follow-up     1 YR F/U MRI L-SPINE 1/3/22       HISTORY    History of Present Illness     78y o  year old male     HPI    See Discussion    REVIEW OF SYSTEMS    Review of Systems   Constitutional: Negative  HENT: Negative  Eyes: Positive for visual disturbance (occasional floaters)  Respiratory: Negative for shortness of breath (improved since last visit)  Cardiovascular: Negative  Gastrointestinal: Negative  Acid reflux   Endocrine: Negative  Genitourinary: Negative  Nocturia 3-4x   Musculoskeletal: Positive for back pain (currently only having centered of lower back, otherwise improved since last visit, across lower back radiates into bilateral hips) and gait problem (no recent falls, once in a while)  Skin: Negative  Allergic/Immunologic: Negative      Neurological: Positive for numbness (same as last visit, bilateral hands/fingers)  Negative for dizziness, seizures, syncope, weakness (more of a stiffness in the bilateral arms/legs) and headaches  Hematological: Negative  Psychiatric/Behavioral: Positive for sleep disturbance (in general)  Negative for dysphoric mood  The patient is not nervous/anxious  Meds/Allergies     Current Outpatient Medications   Medication Sig Dispense Refill    DULoxetine (CYMBALTA) 60 mg delayed release capsule Take 60 mg by mouth daily at bedtime      methocarbamol (ROBAXIN) 750 mg tablet Take 1 tablet (750 mg total) by mouth every 12 (twelve) hours as needed for muscle spasms 45 tablet 0    pantoprazole (PROTONIX) 20 mg tablet Take 1 tablet (20 mg total) by mouth daily (Patient not taking: Reported on 2/9/2022 ) 90 tablet 0    QUEtiapine (SEROquel) 100 mg tablet Take 100 mg by mouth daily at bedtime       No current facility-administered medications for this visit         No Known Allergies    PAST HISTORY    Past Medical History:   Diagnosis Date    Anxiety     [facility] )     bipolar disorder    Bipolar depression ([facility])     bipolar disorder    Borderline diabetes mellitus     Borderline diabetes mellitus     Brittle nails     Chronic back pain     Chronic pain     Colon polyp     Depression     Dizziness 6/4/2018    Foot pain     unspecified laterality- per Allscripts     Gallstones     Gastroesophageal reflux disease with esophagitis 1/21/2018    HTN (hypertension) 1/21/2018    Intradural extramedullary spinal tumor     Leukocytosis 9/24/2018    Onychomycosis     Restless leg syndrome     Sessile rectal polyp 1/30/2018    Severe episode of recurrent major depressive disorder, without psychotic features ([facility] ) 9/21/2018    Sinus tachycardia 9/20/2018    Spinal arachnoid cyst     Stage 2 chronic kidney disease 8/27/2019    Tubular adenoma of colon 4/30/2018       Past Surgical History:   Procedure Laterality Date    COLONOSCOPY      INCISION AND DRAINAGE ABSCESS / HEMATOMA OF BURSA / KNEE / Blanchie Cho  in     outer right leg and center of upper back   complicated  resolved status    OTHER SURGICAL HISTORY  2019    cyst excision from back by Dermatolgist at office of Dr Doris MURPHY/JEFFERYIS SPIN [facility] N/A 2020    Procedure: T12 LAMINECTOMY FOR RESECTION OF INTRADURAL-EXTRAMEDULLARY MASS;  Surgeon: Jael Grier MD;  Location: [facility] MAIN OR;  Service: Neurosurgery    CO COLONOSCOPY FLX DX W/[facility] INPATIENT REHABILITATION WHEN PFRMD N/A 2017    Procedure: COLONOSCOPY;  Surgeon: Hanna Ta MD;  Location: [facility] GI LAB; Service: Gastroenterology    CO COLONOSCOPY FLX DX W/[facility] REHABILITATION WHEN PFRMD N/A 3/23/2018    Procedure: COLONOSCOPY;  Surgeon: Hanna Ta MD;  Location: [facility] LAB; Service: Gastroenterology    TUMOR REMOVAL  7416'U    "Cheese tumor" removed from back       Social History     Tobacco Use    Smoking status: Former Smoker     Packs/day: 1 00     Years: 3 00     Pack years: 3 00     Start date:      Quit date:      Years since quittin 1    Smokeless tobacco: Never Used    Tobacco comment: all scripts says former smoker quit at 14-17 years old   Vaping Use    Vaping Use: Never used   Substance Use Topics    Alcohol use: Yes     Comment: very seldom now    Drug use: Yes     Types: Marijuana       Family History   Problem Relation Age of Onset    Throat cancer Mother     Heart attack Father         at [de-identified]    Stomach cancer Sister     Lung cancer Brother     Stomach cancer Brother     Heart disease Brother          The following portions of the patient's history were reviewed in this encounter and updated as appropriate: Past medical, surgical, family, and social history, as well as medications, allergies, and review of systems  EXAM    Vitals:Blood pressure 132/91, temperature 98 7 °F (37 1 °C), resp   rate 16, height 5' 10" (1 778 m), weight 106 kg (233 lb 6 4 oz) ,Body mass index is 33 49 kg/m²  Physical Exam  Vitals reviewed  Constitutional:       Appearance: Normal appearance  He is well-developed  HENT:      Head: Normocephalic and atraumatic  Eyes:      General: No scleral icterus  Cardiovascular:      Rate and Rhythm: Normal rate  Pulmonary:      Effort: Pulmonary effort is normal    Musculoskeletal:      Cervical back: Neck supple  Skin:     General: Skin is warm and dry  Neurological:      Mental Status: He is alert  Psychiatric:         Speech: Speech normal          Behavior: Behavior normal          Neurologic Exam     Mental Status   Speech: speech is normal         MEDICAL DECISION MAKING    Imaging Studies:     [place] [facility] [facility]: IMPRESSION:        1  Stable postoperative changes, status post intradural extra medullary spinal tumor resection at T12 without evidence of recurrent or residual tumor  Persistent dorsal tethering of the terminal spinal cord in the operative bed is stable  2   Minimal spondylosis also stable  No significant central canal or neural foraminal narrowing  I have personally reviewed pertinent reports  PLEASE NOTE:  This encounter may have been completed utilizing the [employer] Direct Speech Voice Recognition Software  Grammatical errors, random word insertions, pronoun errors and incomplete sentences are occasional consequences of the system due to software limitations, ambient noise and hardware issues  These may be missed by proof reading prior to affixing electronic signature  Any questions or concerns about the content, text or information contained within the body of this dictation should be directly addressed to the advanced practitioner or physician for clarification

## 2022-04-29 ENCOUNTER — TELEPHONE (OUTPATIENT)
Dept: PSYCHIATRY | Facility: CLINIC | Age: 67
End: 2022-04-29

## 2022-05-20 DIAGNOSIS — F31.9 BIPOLAR 1 DISORDER (HCC): Primary | ICD-10-CM

## 2022-05-20 RX ORDER — DULOXETIN HYDROCHLORIDE 60 MG/1
60 CAPSULE, DELAYED RELEASE ORAL
Qty: 90 CAPSULE | Refills: 0 | Status: SHIPPED | OUTPATIENT
Start: 2022-05-20

## 2022-05-20 RX ORDER — QUETIAPINE FUMARATE 100 MG/1
100 TABLET, FILM COATED ORAL
Qty: 90 TABLET | Refills: 0 | Status: SHIPPED | OUTPATIENT
Start: 2022-05-20

## 2022-05-20 NOTE — TELEPHONE ENCOUNTER
Will refill, though please encourage patient to look for new behavioral health provider  There should be a number on his insurance card

## 2022-05-20 NOTE — TELEPHONE ENCOUNTER
Called patient and asked him who ordered this medication, stated that he received it from [address] in [place] [address] and they are no longer open

## 2022-07-24 ENCOUNTER — HOSPITAL ENCOUNTER (EMERGENCY)
Facility: HOSPITAL | Age: 67
Discharge: HOME | End: 2022-07-24
Attending: EMERGENCY MEDICINE | Admitting: EMERGENCY MEDICINE
Payer: MEDICARE

## 2022-07-24 VITALS
HEART RATE: 111 BPM | RESPIRATION RATE: 16 BRPM | DIASTOLIC BLOOD PRESSURE: 90 MMHG | OXYGEN SATURATION: 96 % | SYSTOLIC BLOOD PRESSURE: 138 MMHG | TEMPERATURE: 98.2 F

## 2022-07-24 DIAGNOSIS — N49.2 CELLULITIS OF SCROTUM: Primary | ICD-10-CM

## 2022-07-24 PROCEDURE — 99284 EMERGENCY DEPT VISIT MOD MDM: CPT | Performed by: EMERGENCY MEDICINE

## 2022-07-24 PROCEDURE — 99283 EMERGENCY DEPT VISIT LOW MDM: CPT

## 2022-07-24 RX ORDER — CEPHALEXIN 500 MG/1
500 CAPSULE ORAL EVERY 6 HOURS SCHEDULED
Qty: 27 CAPSULE | Refills: 0 | Status: SHIPPED | OUTPATIENT
Start: 2022-07-24 | End: 2022-07-31

## 2022-07-24 RX ORDER — CEPHALEXIN 250 MG/1
500 CAPSULE ORAL ONCE
Status: COMPLETED | OUTPATIENT
Start: 2022-07-24 | End: 2022-07-24

## 2022-07-24 RX ADMIN — CEPHALEXIN 500 MG: 250 CAPSULE ORAL at 15:16

## 2022-07-24 NOTE — ED PROVIDER NOTES
History  Chief Complaint   Patient presents with    Testicle Swelling     Pt presents to the ED with left testicular lump, noticed x 2days  Denies dysuria  Reports increase in swelling overnight  25-year-old male presents emergency department complaining of scrotal lesion  Patient states that he was showering yesterday and noticed a small lump, today he says that the lump had increased in size with associated pain range it currently 1/10, nonradiating, pressure-like in sensation  Patient notes that he had some itching the other day, and is unsure if him scratching the area caused the issue and states that he has 2 smaller lesions that he noticed this morning  Patient denies previously having skin issues of his scrotum  Patient denies any testicular pain, chest pain, abdominal pain, shortness breath, nausea, vomiting, dysuria, penile discharge, or any other concerns at this time  Abscess  Abscess location: Posterior scrotum  Size:  2cm  Abscess quality comment:  Indurated  Red streaking: no    Duration:  2 days  Progression:  Worsening  Chronicity:  New  Relieved by: Tylenol  Exacerbated by: Wearing pants  Ineffective treatments:  None tried  Associated symptoms: no fever, no nausea and no vomiting        Prior to Admission Medications   Prescriptions Last Dose Informant Patient Reported? Taking?    DULoxetine (CYMBALTA) 60 mg delayed release capsule   No No   Sig: Take 1 capsule (60 mg total) by mouth daily at bedtime   QUEtiapine (SEROquel) 100 mg tablet   No No   Sig: Take 1 tablet (100 mg total) by mouth daily at bedtime   methocarbamol (ROBAXIN) 750 mg tablet  Self No No   Sig: Take 1 tablet (750 mg total) by mouth every 12 (twelve) hours as needed for muscle spasms   pantoprazole (PROTONIX) 20 mg tablet  Self No No   Sig: Take 1 tablet (20 mg total) by mouth daily   Patient not taking: Reported on 2/9/2022       Facility-Administered Medications: None       Past Medical History:   Diagnosis Date    Anxiety     Bipolar depression ([facility] )     bipolar disorder    Bipolar depression ([facility] )     bipolar disorder    Borderline diabetes mellitus     Borderline diabetes mellitus     Brittle nails     Chronic back pain     Chronic pain     Colon polyp     Depression     Dizziness 6/4/2018    Foot pain     unspecified laterality- per Allscripts     Gallstones     Gastroesophageal reflux disease with esophagitis 1/21/2018    HTN (hypertension) 1/21/2018    Intradural extramedullary spinal tumor     Leukocytosis 9/24/2018    Onychomycosis     Restless leg syndrome     Sessile rectal polyp 1/30/2018    Severe episode of recurrent major depressive disorder, without psychotic features ([facility] ) 9/21/2018    Sinus tachycardia 9/20/2018    Spinal arachnoid cyst     Stage 2 chronic kidney disease 8/27/2019    Tubular adenoma of colon 4/30/2018       Past Surgical History:   Procedure Laterality Date    COLONOSCOPY      INCISION AND DRAINAGE ABSCESS / HEMATOMA OF BURSA / KNEE / THIGH  in 1990's    outer right leg and center of upper back   complicated  resolved status    OTHER SURGICAL HISTORY  11/2019    cyst excision from back by Dermatolgist at office of Dr Diego Cavazos BX/EXCIS SPIN SHAY,INDUR,XMED,LUMB N/A 1/2/2020    Procedure: T12 LAMINECTOMY FOR RESECTION OF INTRADURAL-EXTRAMEDULLARY MASS;  Surgeon: Taya Huerta MD;  Location: [facility];  Service: Neurosurgery    [place] COLONOSCOPY FLX DX W/[facility] WHEN PFRMD N/A 8/31/2017    Procedure: COLONOSCOPY;  Surgeon: Marda Dubin, MD;  Location: [facility]; Service: Gastroenterology    AZ COLONOSCOPY FLX DX W/[facility]PATIENT [facility] WHEN PFRMD N/A 3/23/2018    Procedure: COLONOSCOPY;  Surgeon: Marda Dubin, MD;  Location: [facility];   Service: Gastroenterology    TUMOR REMOVAL  5233'R    "Cheese tumor" removed from back       Family History   Problem Relation Age of Onset    Throat cancer Mother     Heart attack Father         at [de-identified]    Stomach cancer Sister  Lung cancer Brother     Stomach cancer Brother     Heart disease Brother      I have reviewed and agree with the history as documented  E-Cigarette/Vaping    E-Cigarette Use Never User      E-Cigarette/Vaping Substances    Nicotine No     THC No     CBD No     Flavoring No     Other No     Unknown No      Social History     Tobacco Use    Smoking status: Former Smoker     Packs/day: 1 00     Years: 3 00     Pack years: 3 00     Start date:      Quit date:      Years since quittin 5    Smokeless tobacco: Never Used    Tobacco comment: all scripts says former smoker quit at 14-17 years old   Vaping Use    Vaping Use: Never used   Substance Use Topics    Alcohol use: Yes     Comment: very seldom now    Drug use: Yes     Types: Marijuana        Review of Systems   Constitutional: Negative for chills and fever  HENT: Negative for ear pain and sore throat  Eyes: Negative for pain and visual disturbance  Respiratory: Negative for cough and shortness of breath  Cardiovascular: Negative for chest pain and palpitations  Gastrointestinal: Negative for abdominal pain, nausea and vomiting  Genitourinary: Negative for dysuria and hematuria  Musculoskeletal: Negative for arthralgias and back pain  Skin:        Posterior scrotal lesions   Neurological: Negative for seizures and syncope  All other systems reviewed and are negative  Physical Exam  ED Triage Vitals [22 1448]   Temperature Pulse Respirations Blood Pressure SpO2   98 2 °F (36 8 °C) (!) 111 16 138/90 96 %      Temp Source Heart Rate Source Patient Position - Orthostatic VS BP Location FiO2 (%)   Oral Monitor Sitting Right arm --      Pain Score       --             Orthostatic Vital Signs  Vitals:    22 1448   BP: 138/90   Pulse: (!) 111   Patient Position - Orthostatic VS: Sitting       Physical Exam  Vitals and nursing note reviewed  Constitutional:       Appearance: He is well-developed     HENT: Head: Normocephalic and atraumatic  Eyes:      Conjunctiva/sclera: Conjunctivae normal    Cardiovascular:      Rate and Rhythm: Normal rate and regular rhythm  Heart sounds: No murmur heard  Pulmonary:      Effort: Pulmonary effort is normal  No respiratory distress  Breath sounds: Normal breath sounds  Abdominal:      Palpations: Abdomen is soft  Tenderness: There is no abdominal tenderness  Musculoskeletal:         General: Normal range of motion  Cervical back: Neck supple  Skin:     General: Skin is warm and dry  Findings: Lesion (There is a 2 cm indurated lesion of the posterior scrotum with 0 5 cm area of erythema overlying without discharge or drainage, there are 3 similar erythematous lesions without induration or drainage extending proximally  Roughly 0 25 cm in size) present  Neurological:      Mental Status: He is alert  Psychiatric:         Mood and Affect: Mood normal          Behavior: Behavior normal          ED Medications  Medications   cephalexin (KEFLEX) capsule 500 mg (500 mg Oral Given 7/24/22 1516)       Diagnostic Studies  Results Reviewed     None                 No orders to display         Procedures  Procedures      ED Course                                       MDM  Number of Diagnoses or Management Options  Cellulitis of scrotum  Diagnosis management comments: 39-year-old male presents emergency department complaining of scrotal lesion  The patient seen and examined with  a 2 cm indurated lesion of the posterior scrotum with 0 5 cm area of erythema overlying without discharge or drainage, there are 3 similar erythematous lesions without induration or drainage extending proximally  Roughly 0 25 cm in size  There were grossly no evidence of fluctuance to suggest need for incision and drainage at this time  Patient given Keflex and prescription sent to pharmacy    Discussed with patient to follow-up with his primary as well as given her neurological follow-up should he require it  Discussed strict return precautions, patient appears well, is nontoxic appearing, expresses understanding and agrees with plan of care at this time  In light of this patient would benefit from outpatient management  Amount and/or Complexity of Data Reviewed  Discussion of test results with the performing providers: yes    Patient Progress  Patient progress: stable      Disposition  Final diagnoses:   Cellulitis of scrotum     Time reflects when diagnosis was documented in both MDM as applicable and the Disposition within this note     Time User Action Codes Description Comment    7/24/2022  3:11 PM sobia Thurman Add [N49 2] Cellulitis of scrotum       ED Disposition     ED Disposition   Discharge    Condition   Stable    Date/Time   Sun Jul 24, 2022  3:11 PM    Comment   Bernardo Six Sr  discharge to home/self care                 Follow-up Information     Follow up With Specialties Details Why Contact Info Additional [id number], [place] Family Medicine Call  If symptoms worsen [address] [facility]  [phone]       [facility] Call   [address] [id number] [facility] [phone]  [address] [place] [address] For Urology [facility], [address] [place], [place], [address]          Discharge Medication List as of 7/24/2022  3:14 PM      START taking these medications    Details   cephalexin (KEFLEX) 500 mg capsule Take 1 capsule (500 mg total) by mouth every 6 (six) hours for 7 days, Starting Sun 7/24/2022, Until Sun 7/31/2022, Normal         CONTINUE these medications which have NOT CHANGED    Details   DULoxetine (CYMBALTA) 60 mg delayed release capsule Take 1 capsule (60 mg total) by mouth daily at bedtime, Starting Fri 5/20/2022, Normal      methocarbamol (ROBAXIN) 750 mg tablet Take 1 tablet (750 mg total) by mouth every 12 (twelve) hours as needed for muscle spasms, Starting Tue 12/7/2021, Normal      pantoprazole (PROTONIX) 20 mg tablet Take 1 tablet (20 mg total) by mouth daily, Starting Tue 12/7/2021, Normal      QUEtiapine (SEROquel) 100 mg tablet Take 1 tablet (100 mg total) by mouth daily at bedtime, Starting Fri 5/20/2022, Normal           No discharge procedures on file  PDMP Review       Value Time User    PDMP Reviewed  Yes 1/7/2020  4:29 PM Micheline Rao PA-C           ED Provider  Attending physically available and evaluated Willow Jo    FABIAN managed the patient along with the ED Attending      Electronically Signed by         Yasmin Pelayo MD  07/24/22 7488

## 2022-07-24 NOTE — DISCHARGE INSTRUCTIONS
Please return to the emergency department immediately if you experience any worsening symptoms including increased pain, redness, issues with urination, fever, or any other concerns  Please take your antibiotics as prescribed and complete full course  You may apply warm compresses to the affected area for symptomatic relief  Continue taking Tylenol for pain control

## 2022-07-24 NOTE — ED ATTENDING ATTESTATION
7/24/2022  I, Esha Comer MD, saw and evaluated the patient  I have discussed the patient with the resident/non-physician practitioner and agree with the resident's/non-physician practitioner's findings, Plan of Care, and MDM as documented in the resident's/non-physician practitioner's note, except where noted  All available labs and Radiology studies were reviewed  I was present for key portions of any procedure(s) performed by the resident/non-physician practitioner and I was immediately available to provide assistance  At this point I agree with the current assessment done in the Emergency Department  I have conducted an independent evaluation of this patient a history and physical is as follows:    21-year-old male presents to the emergency department for evaluation after appreciating swelling in the left hemiscrotum  He recalls the area having been itchy a couple of days ago and having scratched at that location  Yesterday he noticed a tiny area of swelling and today appreciated that it was larger  He also noted a 2nd location swelling close to it  When his pants apply pressure ascites mildly uncomfortable  He has not appreciated any drainage from the sites and overall otherwise still feels well  He has not experienced any fevers, abdominal discomfort, nausea, vomiting or any abnormality or discomfort with urination  He denies history any skin abscesses or MRSA infection  Denies having had any history of herpes or unexpected skin blistering  No other areas skin irritation presently  Past medical history reviewed  This includes prediabetes  On exam he is generally very well-appearing  Heart rate is mildly elevated  Respirations are nonlabored  No lower abdominal or inguinal tenderness  No appreciable inguinal lymphadenopathy  The right hemiscrotum is clear, chest x-ray all palpable and nontender  Left testicle is nontender    There are 4 discrete areas mildly raised erythema-each with a tiny amount of scabbing over the inferior portion of the left hemiscrotum  The most posterior site has a moderate amount of induration around this-1 5 cm diameter, mildly tender  No fluctuance is appreciated  The other 3 sites are approximately 3 mm in diameter with very minimal erythema around these  Sites are dry  Remainder exam is without irritation/open lesions  Findings most consistent with folliculitis and progression to cellulitis around posterior-most site  I do not appreciate any fluid collection for drainage  No evidence of systemic infection  We will initiate oral antibiotics  No history of MRSA infection  Encouraged close follow-up either with PCP or Urology over the next few days  Emphasized importance of re-seeking medical attention in the emergency department p r n  Worsening      ED Course         Critical Care Time  Procedures

## 2022-08-05 NOTE — ED NOTES
Pt discharge instructions reviewed BY Dr Ramses Ahumada, Pt has no further questions at this time  Pt awake and alert, no signs of acute distress noted        Rory Bowens RN  09/26/17 [id number] Peng Advancement Flap Text: The defect edges were debeveled with a #15 scalpel blade.  Given the location of the defect, shape of the defect and the proximity to free margins a Peng advancement flap was deemed most appropriate.  Using a sterile surgical marker, an appropriate advancement flap was drawn incorporating the defect and placing the expected incisions within the relaxed skin tension lines where possible. The area thus outlined was incised deep to adipose tissue with a #15 scalpel blade.  The skin margins were undermined to an appropriate distance in all directions utilizing iris scissors.

## 2022-08-29 ENCOUNTER — OFFICE VISIT (OUTPATIENT)
Dept: FAMILY MEDICINE CLINIC | Facility: CLINIC | Age: 67
End: 2022-08-29
Payer: MEDICARE

## 2022-08-29 VITALS
WEIGHT: 225.5 LBS | HEIGHT: 70 IN | SYSTOLIC BLOOD PRESSURE: 154 MMHG | TEMPERATURE: 96.6 F | BODY MASS INDEX: 32.28 KG/M2 | DIASTOLIC BLOOD PRESSURE: 100 MMHG | OXYGEN SATURATION: 95 % | RESPIRATION RATE: 18 BRPM | HEART RATE: 94 BPM

## 2022-08-29 DIAGNOSIS — Z13.6 SCREENING FOR CARDIOVASCULAR CONDITION: ICD-10-CM

## 2022-08-29 DIAGNOSIS — Z13.6 SCREENING FOR AAA (ABDOMINAL AORTIC ANEURYSM): ICD-10-CM

## 2022-08-29 DIAGNOSIS — F31.9 BIPOLAR 1 DISORDER (HCC): ICD-10-CM

## 2022-08-29 DIAGNOSIS — Z00.00 MEDICARE ANNUAL WELLNESS VISIT, SUBSEQUENT: Primary | ICD-10-CM

## 2022-08-29 DIAGNOSIS — R73.03 PREDIABETES: ICD-10-CM

## 2022-08-29 PROCEDURE — G0439 PPPS, SUBSEQ VISIT: HCPCS | Performed by: FAMILY MEDICINE

## 2022-08-29 RX ORDER — DULOXETIN HYDROCHLORIDE 60 MG/1
60 CAPSULE, DELAYED RELEASE ORAL
Qty: 90 CAPSULE | Refills: 1 | Status: SHIPPED | OUTPATIENT
Start: 2022-08-29

## 2022-08-29 RX ORDER — QUETIAPINE FUMARATE 100 MG/1
100 TABLET, FILM COATED ORAL
Qty: 90 TABLET | Refills: 1 | Status: SHIPPED | OUTPATIENT
Start: 2022-08-29

## 2022-08-29 NOTE — PROGRESS NOTES
Assessment and Plan:     Problem List Items Addressed This Visit        Other    Prediabetes    Relevant Orders    HEMOGLOBIN A1C W/ EAG ESTIMATION    Bipolar 1 disorder (HCC)    Relevant Medications    DULoxetine (CYMBALTA) 60 mg delayed release capsule    QUEtiapine (SEROquel) 100 mg tablet      Other Visit Diagnoses     Medicare annual wellness visit, subsequent    -  Primary    Screening for cardiovascular condition        Relevant Orders    Lipid panel    CBC and Platelet    Basic metabolic panel    Screening for AAA (abdominal aortic aneurysm)        Relevant Orders    US abdominal aorta screening aaa    BMI 32 0-32 9,adult        Relevant Orders    CBC and Platelet    Basic metabolic panel           Preventive health issues were discussed with patient, and age appropriate screening tests were ordered as noted in patient's After Visit Summary  Personalized health advice and appropriate referrals for health education or preventive services given if needed, as noted in patient's After Visit Summary       History of Present Illness:     Patient presents for a Medicare Wellness Visit    HPI   Patient Care Team:  Rocio Alvarez MD as PCP - General (Family Medicine)  MD Zhou Posey MD Saundra Dicker, MD as Endoscopist     Review of Systems:     Review of Systems     Problem List:     Patient Active Problem List   Diagnosis    Adult residual type attention deficit hyperactivity disorder (ADHD)    HLD (hyperlipidemia)    Prediabetes    Chronic bilateral low back pain without sciatica    Adenomatous polyp of transverse colon    Erectile dysfunction    Polyp of colon    Bipolar 1 disorder ([facility] [address] )    Restless leg syndrome    Bilateral carpal tunnel syndrome    Intradural extramedullary thoracic tumor    Mature teratoma    Recurrent syncope    Cognitive decline      Past Medical and Surgical History:     Past Medical History:   Diagnosis Date    Anxiety     Bipolar depression ([facility] )     bipolar disorder    Bipolar depression ([facility] )     bipolar disorder    Borderline diabetes mellitus     Borderline diabetes mellitus     Brittle nails     Chronic back pain     Chronic pain     Colon polyp     Depression     Dizziness 6/4/2018    Foot pain     unspecified laterality- per [employer]     Gallstones     Gastroesophageal reflux disease with esophagitis 1/21/2018    HTN (hypertension) 1/21/2018    Intradural extramedullary spinal tumor     Leukocytosis 9/24/2018    Onychomycosis     Restless leg syndrome     Sessile rectal polyp 1/30/2018    Severe episode of recurrent major depressive disorder, without psychotic features ([facility] ) 9/21/2018    Sinus tachycardia 9/20/2018    Spinal arachnoid cyst     Stage 2 chronic kidney disease 8/27/2019    Tubular adenoma of colon 4/30/2018     Past Surgical History:   Procedure Laterality Date    COLONOSCOPY      INCISION AND DRAINAGE ABSCESS / HEMATOMA OF BURSA / KNEE / THIGH  in 1990's    outer right leg and center of upper back   complicated  resolved status    OTHER SURGICAL HISTORY  11/2019    cyst excision from back by Dermatolgist at office of Dr Eva Cabrera BX/EXCIS SPIN SHAY,INDUR,XMED,LUMB N/A 1/2/2020    Procedure: T12 LAMINECTOMY FOR RESECTION OF INTRADURAL-EXTRAMEDULLARY MASS;  Surgeon: Doc Person MD;  [facility];  Service: Neurosurgery    CO COLONOSCOPY FLX DX W/COLLJ SPEC WHEN PFRMD N/A 8/31/2017    Procedure: COLONOSCOPY;  Surgeon: Farida Ackerman MD;  Location: [facility]; Service: Gastroenterology    CO COLONOSCOPY FLX DX W/COLLJ [facility] WHEN PFRMD N/A 3/23/2018    Procedure: COLONOSCOPY;  Surgeon: Farida Ackerman MD;  Location: [facility];   Service: Gastroenterology    TUMOR REMOVAL  0072'C    "Cheese tumor" removed from back      Family History:     Family History   Problem Relation Age of Onset    Throat cancer Mother     Heart attack Father         at [de-identified]    Stomach cancer Sister     Lung cancer Brother  Stomach cancer Brother     Heart disease Brother       Social History:     Social History     Socioeconomic History    Marital status: Single     Spouse name: None    Number of children: 1    Years of education: 8    Highest education level: None   Occupational History    Occupation: disabled     Comment: disabled   Tobacco Use    Smoking status: Former Smoker     Packs/day: 1 00     Years: 3 00     Pack years: 3 00     Start date:      Quit date:      Years since quittin 6    Smokeless tobacco: Never Used    Tobacco comment: all scripts says former smoker quit at 14-17 years old   Vaping Use    Vaping Use: Never used   Substance and Sexual Activity    Alcohol use: Yes     Comment: very seldom now    Drug use: Yes     Types: Marijuana    Sexual activity: Yes     Partners: Female     Birth control/protection: Post-menopausal   Other Topics Concern    None   Social History Narrative    Disabled    GED    Alcoholism in recovery     Remotely quit alcohol use    Former smoker     Uses of Marijuana         As per allscripts     Social Determinants of Health     Financial Resource Strain: Medium Risk    Difficulty of Paying Living Expenses: Somewhat hard   Food Insecurity: Not on file   Transportation Needs: No Transportation Needs    Lack of Transportation (Medical): No    Lack of Transportation (Non-Medical):  No   Physical Activity: Not on file   Stress: Not on file   Social Connections: Not on file   Intimate Partner Violence: Not on file   Housing Stability: Not on file      Medications and Allergies:     Current Outpatient Medications   Medication Sig Dispense Refill    DULoxetine (CYMBALTA) 60 mg delayed release capsule Take 1 capsule (60 mg total) by mouth daily at bedtime 90 capsule 1    methocarbamol (ROBAXIN) 750 mg tablet Take 1 tablet (750 mg total) by mouth every 12 (twelve) hours as needed for muscle spasms 45 tablet 0    pantoprazole (PROTONIX) 20 mg tablet Take 1 tablet (20 mg total) by mouth daily 90 tablet 0    QUEtiapine (SEROquel) 100 mg tablet Take 1 tablet (100 mg total) by mouth daily at bedtime 90 tablet 1     No current facility-administered medications for this visit  No Known Allergies   Immunizations:     Immunization History   Administered Date(s) Administered    COVID-19 PFIZER VACCINE 0 3 ML IM 11/22/2021, 12/15/2021      Health Maintenance:         Topic Date Due    Colorectal Cancer Screening  03/23/2028    Hepatitis C Screening  Completed         Topic Date Due    Pneumococcal Vaccine: 65+ Years (1 - PCV) Never done    COVID-19 Vaccine (3 - Booster for Pfizer series) 05/15/2022    Influenza Vaccine (1) 09/01/2022      Medicare Screening Tests and Risk Assessments:     Kerry is here for his Subsequent Wellness visit  Last Medicare Wellness visit information reviewed, patient interviewed and updates made to the record today  Health Risk Assessment:   Patient rates overall health as good  Patient feels that their physical health rating is same  Patient is satisfied with their life  Eyesight was rated as slightly worse  Hearing was rated as slightly worse  Patient feels that their emotional and mental health rating is same  Patients states they are often angry  Patient states they are sometimes unusually tired/fatigued  Pain experienced in the last 7 days has been some  Patient's pain rating has been 6/10  Patient states that he has experienced no weight loss or gain in last 6 months  Depression Screening:   PHQ-2 Score: 2      Fall Risk Screening: In the past year, patient has experienced: no history of falling in past year      Home Safety:  Patient does not have trouble with stairs inside or outside of their home  Patient has working smoke alarms and has no working carbon monoxide detector  Home safety hazards include: none  Nutrition:   Current diet is Regular and Frequent junk food   Limited fruits and vegetables       Medications: Patient is not currently taking any over-the-counter supplements  Patient is able to manage medications  Psychiatrist left  Currently on Cymbalta and Seroquel  Activities of Daily Living (ADLs)/Instrumental Activities of Daily Living (IADLs):   Walk and transfer into and out of bed and chair?: Yes  Dress and groom yourself?: Yes    Bathe or shower yourself?: Yes    Feed yourself? Yes  Do your laundry/housekeeping?: Yes  Manage your money, pay your bills and track your expenses?: Yes  Make your own meals?: Yes    Do your own shopping?: Yes    Previous Hospitalizations:   Any hospitalizations or ED visits within the last 12 months?: No      Advance Care Planning:   Living will: No    Durable POA for healthcare: No    Advanced directive: No    End of Life Decisions reviewed with patient: Yes      Comments: Full-code    PREVENTIVE SCREENINGS      Cardiovascular Screening:    General: Screening Not Indicated and History Lipid Disorder      Diabetes Screening:     General: Risks and Benefits Discussed      Colorectal Cancer Screening:     General: Screening Current      Prostate Cancer Screening:    General: Risks and Benefits Discussed and Patient Declines      Osteoporosis Screening:    General: Screening Not Indicated      Abdominal Aortic Aneurysm (AAA) Screening:    Risk factors include: age between 73-67 yo and tobacco use        General: Risks and Benefits Discussed    Due for: Screening AAA Ultrasound      Lung Cancer Screening:     General: Screening Not Indicated      Hepatitis C Screening:    General: Screening Current    Screening, Brief Intervention, and Referral to Treatment (SBIRT)    Screening  Typical number of drinks in a day: 0  Typical number of drinks in a week: 0  Interpretation: Low risk drinking behavior      Single Item Drug Screening:  How often have you used an illegal drug (including marijuana) or a prescription medication for non-medical reasons in the past year? daily or almost daily    Single Item Drug Screen Score: 4  Interpretation: POSITIVE screen for possible drug use disorder    Drug Abuse Screening Test (DAST-10):  1) Have you used drugs other than those required for medical reasons? Yes  2) Do you abuse more than one drug at a time? No  3) Are you always able to stop using drugs when you want to? Yes  4) Have you had "blackouts" or "flashbacks" as a result of drug use? No  5) Do you ever feel bad or guilty about your drug use? No  6) Does your spouse (or parents) ever complain about your involvement with drugs? No  7) Have you neglected your family because of your use of drugs? No  8) Have you engaged in illegal activities in order to obtain drugs? No  9) Have you ever experienced withdrawal symptoms (felt sick) when you stopped taking drugs? No  10) Have you had medical problems as a result of your drug use (e g , memory loss, hepatitis, convulsions, bleeding, etc )?  No    DAST-10 Score: 1  Interpretation: Low level problems related to drug abuse    No exam data present     Physical Exam:     /100 (BP Location: Left arm, Patient Position: Sitting, Cuff Size: Standard)   Pulse 94   Temp (!) 96 6 °F (35 9 °C)   Resp 18   Ht 5' 9 5" (1 765 m)   Wt 102 kg (225 lb 8 oz)   SpO2 95%   BMI 32 82 kg/m²     Physical Exam     Shorty Mills MD

## 2022-08-29 NOTE — PATIENT INSTRUCTIONS
Medicare Preventive Visit Patient Instructions  Thank you for completing your Welcome to Medicare Visit or Medicare Annual Wellness Visit today  Your next wellness visit will be due in one year (8/30/2023)  The screening/preventive services that you may require over the next 5-10 years are detailed below  Some tests may not apply to you based off risk factors and/or age  Screening tests ordered at today's visit but not completed yet may show as past due  Also, please note that scanned in results may not display below  Preventive Screenings:  Service Recommendations Previous Testing/Comments   Colorectal Cancer Screening  · Colonoscopy    · Fecal Occult Blood Test (FOBT)/Fecal Immunochemical Test (FIT)  · Fecal DNA/Cologuard Test  · Flexible Sigmoidoscopy Age: 39-70 years old   Colonoscopy: every 10 years (May be performed more frequently if at higher risk)  OR  FOBT/FIT: every 1 year  OR  Cologuard: every 3 years  OR  Sigmoidoscopy: every 5 years  Screening may be recommended earlier than age 39 if at higher risk for colorectal cancer  Also, an individualized decision between you and your healthcare provider will decide whether screening between the ages of 74-80 would be appropriate   Colonoscopy: 03/23/2018  FOBT/FIT: Not on file  Cologuard: Not on file  Sigmoidoscopy: Not on file    Screening Current     Prostate Cancer Screening Individualized decision between patient and health care provider in men between ages of 53-78   Medicare will cover every 12 months beginning on the day after your 50th birthday PSA: No results in last 5 years           Hepatitis C Screening Once for adults born between 1945 and 1965  More frequently in patients at high risk for Hepatitis C Hep C Antibody: 12/31/2019    Screening Current   Diabetes Screening 1-2 times per year if you're at risk for diabetes or have pre-diabetes Fasting glucose: 112 mg/dL (12/31/2019)  A1C: 6 0 (3/15/2021)      Cholesterol Screening Once every 5 years if you don't have a lipid disorder  May order more often based on risk factors  Lipid panel: 12/31/2019  Screening Not Indicated  History Lipid Disorder      Other Preventive Screenings Covered by Medicare:  1  Abdominal Aortic Aneurysm (AAA) Screening: covered once if your at risk  You're considered to be at risk if you have a family history of AAA or a male between the age of 73-68 who smoking at least 100 cigarettes in your lifetime  2  Lung Cancer Screening: covers low dose CT scan once per year if you meet all of the following conditions: (1) Age 50-69; (2) No signs or symptoms of lung cancer; (3) Current smoker or have quit smoking within the last 15 years; (4) You have a tobacco smoking history of at least 20 pack years (packs per day x number of years you smoked); (5) You get a written order from a healthcare provider  3  Glaucoma Screening: covered annually if you're considered high risk: (1) You have diabetes OR (2) Family history of glaucoma OR (3)  aged 48 and older OR (3)  American aged 72 and older  3  Osteoporosis Screening: covered every 2 years if you meet one of the following conditions: (1) Have a vertebral abnormality; (2) On glucocorticoid therapy for more than 3 months; (3) Have primary hyperparathyroidism; (4) On osteoporosis medications and need to assess response to drug therapy  5  HIV Screening: covered annually if you're between the age of 12-76  Also covered annually if you are younger than 13 and older than 72 with risk factors for HIV infection  For pregnant patients, it is covered up to 3 times per pregnancy      Immunizations:  Immunization Recommendations   Influenza Vaccine Annual influenza vaccination during flu season is recommended for all persons aged >= 6 months who do not have contraindications   Pneumococcal Vaccine   * Pneumococcal conjugate vaccine = PCV13 (Prevnar 13), PCV15 (Vaxneuvance), PCV20 (Prevnar 20)  * Pneumococcal polysaccharide vaccine = PPSV23 (Pneumovax) Adults 2364 years old: 1-3 doses may be recommended based on certain risk factors  Adults 72 years old: 1-2 doses may be recommended based off what pneumonia vaccine you previously received   Hepatitis B Vaccine 3 dose series if at intermediate or high risk (ex: diabetes, end stage renal disease, liver disease)   Tetanus (Td) Vaccine - COST NOT COVERED BY MEDICARE PART B Following completion of primary series, a booster dose should be given every 10 years to maintain immunity against tetanus  Td may also be given as tetanus wound prophylaxis  Tdap Vaccine - COST NOT COVERED BY MEDICARE PART B Recommended at least once for all adults  For pregnant patients, recommended with each pregnancy  Shingles Vaccine (Shingrix) - COST NOT COVERED BY MEDICARE PART B  2 shot series recommended in those aged 48 and above     Health Maintenance Due:      Topic Date Due    Colorectal Cancer Screening  03/23/2028    Hepatitis C Screening  Completed     Immunizations Due:      Topic Date Due    Pneumococcal Vaccine: 65+ Years (1 - PCV) Never done    COVID-19 Vaccine (3 - Booster for Pfizer series) 05/15/2022    Influenza Vaccine (1) 09/01/2022     Advance Directives   What are advance directives? Advance directives are legal documents that state your wishes and plans for medical care  These plans are made ahead of time in case you lose your ability to make decisions for yourself  Advance directives can apply to any medical decision, such as the treatments you want, and if you want to donate organs  What are the types of advance directives? There are many types of advance directives, and each state has rules about how to use them  You may choose a combination of any of the following:  · Living will: This is a written record of the treatment you want  You can also choose which treatments you do not want, which to limit, and which to stop at a certain time   This includes surgery, medicine, IV fluid, and tube feedings  · Durable power of  for healthcare [facility]): This is a written record that states who you want to make healthcare choices for you when you are unable to make them for yourself  This person, called a proxy, is usually a family member or a friend  You may choose more than 1 proxy  · Do not resuscitate (DNR) order:  A DNR order is used in case your heart stops beating or you stop breathing  It is a request not to have certain forms of treatment, such as CPR  A DNR order may be included in other types of advance directives  · Medical directive: This covers the care that you want if you are in a coma, near death, or unable to make decisions for yourself  You can list the treatments you want for each condition  Treatment may include pain medicine, surgery, blood transfusions, dialysis, IV or tube feedings, and a ventilator (breathing machine)  · Values history: This document has questions about your views, beliefs, and how you feel and think about life  This information can help others choose the care that you would choose  Why are advance directives important? An advance directive helps you control your care  Although spoken wishes may be used, it is better to have your wishes written down  Spoken wishes can be misunderstood, or not followed  Treatments may be given even if you do not want them  An advance directive may make it easier for your family to make difficult choices about your care  Weight Management   Why it is important to manage your weight:  Being overweight increases your risk of health conditions such as heart disease, high blood pressure, type 2 diabetes, and certain types of cancer  It can also increase your risk for osteoarthritis, sleep apnea, and other respiratory problems  Aim for a slow, steady weight loss  Even a small amount of weight loss can lower your risk of health problems    How to lose weight safely:  A safe and healthy way to lose weight is to eat fewer calories and get regular exercise  You can lose up about 1 pound a week by decreasing the number of calories you eat by 500 calories each day  Healthy meal plan for weight management:  A healthy meal plan includes a variety of foods, contains fewer calories, and helps you stay healthy  A healthy meal plan includes the following:  · Eat whole-grain foods more often  A healthy meal plan should contain fiber  Fiber is the part of grains, fruits, and vegetables that is not broken down by your body  Whole-grain foods are healthy and provide extra fiber in your diet  Some examples of whole-grain foods are whole-wheat breads and pastas, oatmeal, brown rice, and bulgur  · Eat a variety of vegetables every day  Include dark, leafy greens such as spinach, kale, reanna greens, and mustard greens  Eat yellow and orange vegetables such as carrots, sweet potatoes, and winter squash  · Eat a variety of fruits every day  Choose fresh or canned fruit (canned in its own juice or light syrup) instead of juice  Fruit juice has very little or no fiber  · Eat low-fat dairy foods  Drink fat-free (skim) milk or 1% milk  Eat fat-free yogurt and low-fat cottage cheese  Try low-fat cheeses such as mozzarella and other reduced-fat cheeses  · Choose meat and other protein foods that are low in fat  Choose beans or other legumes such as split peas or lentils  Choose fish, skinless poultry (chicken or turkey), or lean cuts of red meat (beef or pork)  Before you cook meat or poultry, cut off any visible fat  · Use less fat and oil  Try baking foods instead of frying them  Add less fat, such as margarine, sour cream, regular salad dressing and mayonnaise to foods  Eat fewer high-fat foods  Some examples of high-fat foods include french fries, doughnuts, ice cream, and cakes  · Eat fewer sweets  Limit foods and drinks that are high in sugar  This includes candy, cookies, regular soda, and sweetened drinks    Exercise: Exercise at least 30 minutes per day on most days of the week  Some examples of exercise include walking, biking, dancing, and swimming  You can also fit in more physical activity by taking the stairs instead of the elevator or parking farther away from stores  Ask your healthcare provider about the best exercise plan for you  © Copyright [employer] 2018 Information is for End User's use only and may not be sold, redistributed or otherwise used for commercial purposes  All illustrations and images included in CareNotes® are the copyrighted property of [employer]  or [facility] Preventive Visit Patient Instructions  Thank you for completing your Welcome to Medicare Visit or Medicare Annual Wellness Visit today  Your next wellness visit will be due in one year (8/30/2023)  The screening/preventive services that you may require over the next 5-10 years are detailed below  Some tests may not apply to you based off risk factors and/or age  Screening tests ordered at today's visit but not completed yet may show as past due  Also, please note that scanned in results may not display below  Preventive Screenings:  Service Recommendations Previous Testing/Comments   Colorectal Cancer Screening  · Colonoscopy    · Fecal Occult Blood Test (FOBT)/Fecal Immunochemical Test (FIT)  · Fecal DNA/Cologuard Test  · Flexible Sigmoidoscopy Age: 39-70 years old   Colonoscopy: every 10 years (May be performed more frequently if at higher risk)  OR  FOBT/FIT: every 1 year  OR  Cologuard: every 3 years  OR  Sigmoidoscopy: every 5 years  Screening may be recommended earlier than age 39 if at higher risk for colorectal cancer  Also, an individualized decision between you and your healthcare provider will decide whether screening between the ages of 74-80 would be appropriate   Colonoscopy: 03/23/2018  FOBT/FIT: Not on file  Cologuard: Not on file  Sigmoidoscopy: Not on file    Screening Current     Prostate Cancer Screening Individualized decision between patient and health care provider in men between ages of 53-78   Medicare will cover every 12 months beginning on the day after your 50th birthday PSA: No results in last 5 years           Hepatitis C Screening Once for adults born between 1945 and 1965  More frequently in patients at high risk for Hepatitis C Hep C Antibody: 12/31/2019    Screening Current   Diabetes Screening 1-2 times per year if you're at risk for diabetes or have pre-diabetes Fasting glucose: 112 mg/dL (12/31/2019)  A1C: 6 0 (3/15/2021)      Cholesterol Screening Once every 5 years if you don't have a lipid disorder  May order more often based on risk factors  Lipid panel: 12/31/2019  Screening Not Indicated  History Lipid Disorder      Other Preventive Screenings Covered by Medicare:  6  Abdominal Aortic Aneurysm (AAA) Screening: covered once if your at risk  You're considered to be at risk if you have a family history of AAA or a male between the age of 73-68 who smoking at least 100 cigarettes in your lifetime  7  Lung Cancer Screening: covers low dose CT scan once per year if you meet all of the following conditions: (1) Age 50-69; (2) No signs or symptoms of lung cancer; (3) Current smoker or have quit smoking within the last 15 years; (4) You have a tobacco smoking history of at least 20 pack years (packs per day x number of years you smoked); (5) You get a written order from a healthcare provider  8  Glaucoma Screening: covered annually if you're considered high risk: (1) You have diabetes OR (2) Family history of glaucoma OR (3)  aged 48 and older OR (3)  American aged 72 and older  5   Osteoporosis Screening: covered every 2 years if you meet one of the following conditions: (1) Have a vertebral abnormality; (2) On glucocorticoid therapy for more than 3 months; (3) Have primary hyperparathyroidism; (4) On osteoporosis medications and need to assess response to drug therapy  10  HIV Screening: covered annually if you're between the age of 12-76  Also covered annually if you are younger than 13 and older than 72 with risk factors for HIV infection  For pregnant patients, it is covered up to 3 times per pregnancy  Immunizations:  Immunization Recommendations   Influenza Vaccine Annual influenza vaccination during flu season is recommended for all persons aged >= 6 months who do not have contraindications   Pneumococcal Vaccine   * Pneumococcal conjugate vaccine = PCV13 (Prevnar 13), PCV15 (Vaxneuvance), PCV20 (Prevnar 20)  * Pneumococcal polysaccharide vaccine = PPSV23 (Pneumovax) Adults 25-60 years old: 1-3 doses may be recommended based on certain risk factors  Adults 72 years old: 1-2 doses may be recommended based off what pneumonia vaccine you previously received   Hepatitis B Vaccine 3 dose series if at intermediate or high risk (ex: diabetes, end stage renal disease, liver disease)   Tetanus (Td) Vaccine - COST NOT COVERED BY MEDICARE PART B Following completion of primary series, a booster dose should be given every 10 years to maintain immunity against tetanus  Td may also be given as tetanus wound prophylaxis  Tdap Vaccine - COST NOT COVERED BY MEDICARE PART B Recommended at least once for all adults  For pregnant patients, recommended with each pregnancy  Shingles Vaccine (Shingrix) - COST NOT COVERED BY MEDICARE PART B  2 shot series recommended in those aged 48 and above     Health Maintenance Due:      Topic Date Due    Colorectal Cancer Screening  03/23/2028    Hepatitis C Screening  Completed     Immunizations Due:      Topic Date Due    Pneumococcal Vaccine: 65+ Years (1 - PCV) Never done    COVID-19 Vaccine (3 - Booster for Pfizer series) 05/15/2022    Influenza Vaccine (1) 09/01/2022     Advance Directives   What are advance directives? Advance directives are legal documents that state your wishes and plans for medical care   These plans are made ahead of time in case you lose your ability to make decisions for yourself  Advance directives can apply to any medical decision, such as the treatments you want, and if you want to donate organs  What are the types of advance directives? There are many types of advance directives, and each state has rules about how to use them  You may choose a combination of any of the following:  · Living will: This is a written record of the treatment you want  You can also choose which treatments you do not want, which to limit, and which to stop at a certain time  This includes surgery, medicine, IV fluid, and tube feedings  · Durable power of  for healthcare [place] [facility]): This is a written record that states who you want to make healthcare choices for you when you are unable to make them for yourself  This person, called a proxy, is usually a family member or a friend  You may choose more than 1 proxy  · Do not resuscitate (DNR) order:  A DNR order is used in case your heart stops beating or you stop breathing  It is a request not to have certain forms of treatment, such as CPR  A DNR order may be included in other types of advance directives  · Medical directive: This covers the care that you want if you are in a coma, near death, or unable to make decisions for yourself  You can list the treatments you want for each condition  Treatment may include pain medicine, surgery, blood transfusions, dialysis, IV or tube feedings, and a ventilator (breathing machine)  · Values history: This document has questions about your views, beliefs, and how you feel and think about life  This information can help others choose the care that you would choose  Why are advance directives important? An advance directive helps you control your care  Although spoken wishes may be used, it is better to have your wishes written down  Spoken wishes can be misunderstood, or not followed   Treatments may be given even if you do not want them  An advance directive may make it easier for your family to make difficult choices about your care  Weight Management   Why it is important to manage your weight:  Being overweight increases your risk of health conditions such as heart disease, high blood pressure, type 2 diabetes, and certain types of cancer  It can also increase your risk for osteoarthritis, sleep apnea, and other respiratory problems  Aim for a slow, steady weight loss  Even a small amount of weight loss can lower your risk of health problems  How to lose weight safely:  A safe and healthy way to lose weight is to eat fewer calories and get regular exercise  You can lose up about 1 pound a week by decreasing the number of calories you eat by 500 calories each day  Healthy meal plan for weight management:  A healthy meal plan includes a variety of foods, contains fewer calories, and helps you stay healthy  A healthy meal plan includes the following:  · Eat whole-grain foods more often  A healthy meal plan should contain fiber  Fiber is the part of grains, fruits, and vegetables that is not broken down by your body  Whole-grain foods are healthy and provide extra fiber in your diet  Some examples of whole-grain foods are whole-wheat breads and pastas, oatmeal, brown rice, and bulgur  · Eat a variety of vegetables every day  Include dark, leafy greens such as spinach, kale, reanna greens, and mustard greens  Eat yellow and orange vegetables such as carrots, sweet potatoes, and winter squash  · Eat a variety of fruits every day  Choose fresh or canned fruit (canned in its own juice or light syrup) instead of juice  Fruit juice has very little or no fiber  · Eat low-fat dairy foods  Drink fat-free (skim) milk or 1% milk  Eat fat-free yogurt and low-fat cottage cheese  Try low-fat cheeses such as mozzarella and other reduced-fat cheeses  · Choose meat and other protein foods that are low in fat    Choose beans or other legumes such as split peas or lentils  Choose fish, skinless poultry (chicken or turkey), or lean cuts of red meat (beef or pork)  Before you cook meat or poultry, cut off any visible fat  · Use less fat and oil  Try baking foods instead of frying them  Add less fat, such as margarine, sour cream, regular salad dressing and mayonnaise to foods  Eat fewer high-fat foods  Some examples of high-fat foods include french fries, doughnuts, ice cream, and cakes  · Eat fewer sweets  Limit foods and drinks that are high in sugar  This includes candy, cookies, regular soda, and sweetened drinks  Exercise:  Exercise at least 30 minutes per day on most days of the week  Some examples of exercise include walking, biking, dancing, and swimming  You can also fit in more physical activity by taking the stairs instead of the elevator or parking farther away from stores  Ask your healthcare provider about the best exercise plan for you  © Copyright [employer] 2018 Information is for End User's use only and may not be sold, redistributed or otherwise used for commercial purposes   All illustrations and images included in CareNotes® are the copyrighted property of [employer]  or [address] [place] [address]

## 2022-09-07 ENCOUNTER — LAB (OUTPATIENT)
Dept: LAB | Facility: CLINIC | Age: 67
End: 2022-09-07
Payer: MEDICARE

## 2022-09-07 DIAGNOSIS — Z13.6 SCREENING FOR CARDIOVASCULAR CONDITION: ICD-10-CM

## 2022-09-07 DIAGNOSIS — R73.03 PREDIABETES: ICD-10-CM

## 2022-09-07 LAB
ANION GAP SERPL CALCULATED.3IONS-SCNC: 4 MMOL/L (ref 4–13)
BUN SERPL-MCNC: 11 MG/DL (ref 5–25)
CALCIUM SERPL-MCNC: 9.7 MG/DL (ref 8.3–10.1)
CHLORIDE SERPL-SCNC: 104 MMOL/L (ref 96–108)
CHOLEST SERPL-MCNC: 146 MG/DL
CO2 SERPL-SCNC: 29 MMOL/L (ref 21–32)
CREAT SERPL-MCNC: 1.36 MG/DL (ref 0.6–1.3)
ERYTHROCYTE [DISTWIDTH] IN BLOOD BY AUTOMATED COUNT: 13 % (ref 11.6–15.1)
EST. AVERAGE GLUCOSE BLD GHB EST-MCNC: 157 MG/DL
GFR SERPL CREATININE-BSD FRML MDRD: 53 ML/MIN/1.73SQ M
GLUCOSE P FAST SERPL-MCNC: 134 MG/DL (ref 65–99)
HBA1C MFR BLD: 7.1 %
HCT VFR BLD AUTO: 48 % (ref 36.5–49.3)
HDLC SERPL-MCNC: 21 MG/DL
HGB BLD-MCNC: 15.9 G/DL (ref 12–17)
LDLC SERPL CALC-MCNC: 45 MG/DL (ref 0–100)
MCH RBC QN AUTO: 30.2 PG (ref 26.8–34.3)
MCHC RBC AUTO-ENTMCNC: 33.1 G/DL (ref 31.4–37.4)
MCV RBC AUTO: 91 FL (ref 82–98)
NONHDLC SERPL-MCNC: 125 MG/DL
PLATELET # BLD AUTO: 258 THOUSANDS/UL (ref 149–390)
PMV BLD AUTO: 10.4 FL (ref 8.9–12.7)
POTASSIUM SERPL-SCNC: 4.3 MMOL/L (ref 3.5–5.3)
RBC # BLD AUTO: 5.27 MILLION/UL (ref 3.88–5.62)
SODIUM SERPL-SCNC: 137 MMOL/L (ref 135–147)
TRIGL SERPL-MCNC: 399 MG/DL
WBC # BLD AUTO: 8.31 THOUSAND/UL (ref 4.31–10.16)

## 2022-09-07 PROCEDURE — 83036 HEMOGLOBIN GLYCOSYLATED A1C: CPT

## 2022-09-07 PROCEDURE — 36415 COLL VENOUS BLD VENIPUNCTURE: CPT

## 2022-09-07 PROCEDURE — 80048 BASIC METABOLIC PNL TOTAL CA: CPT

## 2022-09-07 PROCEDURE — 85027 COMPLETE CBC AUTOMATED: CPT

## 2022-09-07 PROCEDURE — 80061 LIPID PANEL: CPT

## 2022-09-08 ENCOUNTER — TELEPHONE (OUTPATIENT)
Dept: FAMILY MEDICINE CLINIC | Facility: CLINIC | Age: 67
End: 2022-09-08

## 2022-09-08 NOTE — TELEPHONE ENCOUNTER
Patient called back & notified of message regarding labs  He will call back to schedule appointment

## 2022-09-08 NOTE — RESULT ENCOUNTER NOTE
Please call patient to schedule an appointment at earliest convenience to review lab work  Hemoglobin A1c is in the diabetic range  We will need to discuss starting medication

## 2022-10-24 ENCOUNTER — OFFICE VISIT (OUTPATIENT)
Dept: FAMILY MEDICINE CLINIC | Facility: CLINIC | Age: 67
End: 2022-10-24
Payer: MEDICARE

## 2022-10-24 VITALS
BODY MASS INDEX: 32.93 KG/M2 | WEIGHT: 230 LBS | HEART RATE: 89 BPM | RESPIRATION RATE: 17 BRPM | TEMPERATURE: 98.6 F | DIASTOLIC BLOOD PRESSURE: 84 MMHG | SYSTOLIC BLOOD PRESSURE: 130 MMHG | HEIGHT: 70 IN | OXYGEN SATURATION: 96 %

## 2022-10-24 DIAGNOSIS — E11.69 TYPE 2 DIABETES MELLITUS WITH OTHER SPECIFIED COMPLICATION, WITHOUT LONG-TERM CURRENT USE OF INSULIN (HCC): Primary | ICD-10-CM

## 2022-10-24 DIAGNOSIS — N18.31 STAGE 3A CHRONIC KIDNEY DISEASE (HCC): ICD-10-CM

## 2022-10-24 DIAGNOSIS — E78.1 HYPERTRIGLYCERIDEMIA: ICD-10-CM

## 2022-10-24 PROCEDURE — 99214 OFFICE O/P EST MOD 30 MIN: CPT | Performed by: FAMILY MEDICINE

## 2022-10-24 RX ORDER — ATORVASTATIN CALCIUM 20 MG/1
20 TABLET, FILM COATED ORAL EVERY EVENING
Qty: 90 TABLET | Refills: 1 | Status: SHIPPED | OUTPATIENT
Start: 2022-10-24 | End: 2023-04-22

## 2022-10-24 NOTE — PROGRESS NOTES
Name: Gustavo Perez        : 1955      MRN: [id number]  Encounter Provider: Sharon Morocho MD  Encounter Date: 10/24/2022   Encounter department: [address] [place] [address]  Type 2 diabetes mellitus with other specified complication, without long-term current use of insulin ([place] [address] )  -     Ambulatory referral to Diabetic Education; Future; Expected date: 10/24/2022  -     atorvastatin (LIPITOR) 20 mg tablet; Take 1 tablet (20 mg total) by mouth every evening  -     Hemoglobin A1C; Future; Expected date: 2023  -     Basic metabolic panel; Future; Expected date: 2023  -     Microalbumin / creatinine urine ratio; Future; Expected date: 2023  -     metFORMIN (GLUCOPHAGE) 500 mg tablet; Take 1 tablet (500 mg total) by mouth 2 (two) times a day with meals  -     Ambulatory Referral to Ophthalmology; Future    2  Stage 3a chronic kidney disease ([place] [address] )    3  Hypertriglyceridemia    BMI Counseling: Body mass index is 33 48 kg/m²  The BMI is above normal  Nutrition recommendations include decreasing portion sizes, encouraging healthy choices of fruits and vegetables, consuming healthier snacks, moderation in carbohydrate intake and reducing intake of cholesterol  Exercise recommendations include exercising 3-5 times per week  No pharmacotherapy was ordered  Patient referred to PCP  Rationale for BMI follow-up plan is due to patient being overweight or obese  Orders as above  Start metformin 500 mg b i d  for diabetes  Will also start patient on atorvastatin 20 mg daily  Referred to diabetic Education  Stage III CKD likely secondary to diabetes  Counseled patient on side effects of medication and proper diet  Obtain lab work and follow-up in 3 months    Refused vaccine     Subjective      HPI  Review of Systems   Constitutional: Negative for fatigue and fever  HENT: Negative for sore throat  Eyes: Negative for visual disturbance     Respiratory: Negative for cough, chest tightness and shortness of breath  Cardiovascular: Negative for chest pain, palpitations and leg swelling  Gastrointestinal: Negative for abdominal pain, constipation, diarrhea and nausea  Endocrine: Negative for cold intolerance and heat intolerance  Genitourinary: Negative for flank pain  Musculoskeletal: Negative for back pain and neck pain  Skin: Negative for rash  Neurological: Negative for headaches  Psychiatric/Behavioral: Negative for behavioral problems and confusion  Current Outpatient Medications on File Prior to Visit   Medication Sig   • DULoxetine (CYMBALTA) 60 mg delayed release capsule Take 1 capsule (60 mg total) by mouth daily at bedtime   • methocarbamol (ROBAXIN) 750 mg tablet Take 1 tablet (750 mg total) by mouth every 12 (twelve) hours as needed for muscle spasms   • QUEtiapine (SEROquel) 100 mg tablet Take 1 tablet (100 mg total) by mouth daily at bedtime   • [DISCONTINUED] pantoprazole (PROTONIX) 20 mg tablet Take 1 tablet (20 mg total) by mouth daily       Objective     /84   Pulse 89   Temp 98 6 °F (37 °C)   Resp 17   Ht 5' 9 5" (1 765 m)   Wt 104 kg (230 lb)   SpO2 96%   BMI 33 48 kg/m²     Physical Exam  Vitals and nursing note reviewed  Constitutional:       Appearance: He is well-developed  He is obese  HENT:      Head: Normocephalic and atraumatic  Cardiovascular:      Rate and Rhythm: Normal rate and regular rhythm  Pulmonary:      Effort: Pulmonary effort is normal       Breath sounds: Normal breath sounds  Neurological:      General: No focal deficit present  Mental Status: He is alert     Psychiatric:         Mood and Affect: Mood normal          Behavior: Behavior normal        Annabel Yates MD

## 2022-10-24 NOTE — PATIENT INSTRUCTIONS
Type 2 Diabetes Management for Adults   AMBULATORY CARE:   Type 2 diabetes  is a disease that affects how your body uses glucose (sugar)  Either your body cannot make enough insulin, or it cannot use the insulin correctly  It is important to keep diabetes controlled to prevent damage to your heart, blood vessels, and other organs  Have someone call your local emergency number (911 in the [address] [facility]) if:   · You cannot be woken  · You have signs of diabetic ketoacidosis:     ? confusion, fatigue    ? vomiting    ? rapid heartbeat    ? fruity smelling breath    ? extreme thirst    ? dry mouth and skin    · You have any of the following signs of a heart attack:      ? Squeezing, pressure, or pain in your chest    ? You may  also have any of the following:     § Discomfort or pain in your back, neck, jaw, stomach, or arm    § Shortness of breath    § Nausea or vomiting    § Lightheadedness or a sudden cold sweat    · You have any of the following signs of a stroke:      ? Numbness or drooping on one side of your face     ? Weakness in an arm or leg    ? Confusion or difficulty speaking    ? Dizziness, a severe headache, or vision loss    Call your doctor or diabetes care team if:   · You have a sore or wound that will not heal     · You have a change in the amount you urinate  · Your blood sugar levels are higher than your target goals  · You often have lower blood sugar levels than your target goals  · Your skin is red, dry, warm, or swollen  · You have trouble coping with diabetes, or you feel anxious or depressed  · You have questions or concerns about your condition or care  What you need to know about high blood sugar levels:  High blood sugar levels may not cause any symptoms  You may feel more thirsty or urinate more often than usual  Over time, high blood sugar levels can damage your nerves, blood vessels, tissues, and organs   The following can increase your blood sugar levels:  · Large meals or large amounts of carbohydrates at one time    · Less physical activity    · Stress    · Illness    · A lower dose of medicine or insulin, or a late dose    What you need to know about low blood sugar levels: You can prevent symptoms such as shakiness, dizziness, irritability, or confusion by preventing your blood sugar levels from going too low  · Treat a low blood sugar level right away  ? Drink 4 ounces of juice or have 1 tube of glucose gel  ? Check your blood sugar level again 10 to 15 minutes later  ? When the level goes back to normal, eat a meal or snack to prevent another decrease  · Keep glucose gel, raisins, or hard candy with you at all times to treat a low blood sugar level  · Your blood sugar level can get too low if you take diabetes medicine or insulin and do not eat enough food  · If you use insulin, check your blood sugar level before you exercise  ? If your blood sugar level is below 100 mg/dL, eat 4 crackers or 2 ounces of raisins, or drink 4 ounces of juice  ? Check your level every 30 minutes if you exercise more than 1 hour  ? You may need a snack during or after exercise  What you can do to manage your blood sugar levels:   · Check your blood sugar levels as directed and as needed  Several items are available to use to check your levels  You may need to check by testing a drop of blood in a glucose monitor  You may instead be given a continuous glucose monitoring (CGM) device  The device is worn at all times  The CGM checks your blood sugar level every 5 minutes  It sends results to an electronic device such as a smart phone  A CGM can be used with or without an insulin pump  Talk with your provider to find out which method is best for you  The goal for blood sugar levels before meals  is between 80 and 130 mg/dL and 2 hours after eating  is lower than 180 mg/dL  · Make healthy food choices    Work with a dietitian to develop a meal plan that works for you and your schedule  A dietitian can help you learn how to eat the right amount of carbohydrates during your meals and snacks  Carbohydrates can raise your blood sugar level if you eat too many at one time  Examples of foods that contain carbohydrates are breads, cereals, rice, pasta, and sweets  · Get regular physical activity  Physical activity can help you get to your target blood sugar level goal and manage your weight  Get at least 150 minutes of moderate to vigorous aerobic physical activity each week  Do not miss more than 2 days in a row  Do not sit longer than 30 minutes at a time  Your healthcare provider can help you create an activity plan  The plan can include the best activities for you and can help you build your strength and endurance  · Maintain a healthy weight  Ask your healthcare provider what a healthy weight is for you  Ask him or her to help you create a safe weight loss plan if you are overweight  · Take your diabetes medicine or insulin as directed  You may need diabetes medicine, insulin, or both to help control your blood sugar levels  Your healthcare provider will teach you how and when to take your diabetes medicine or insulin  You will also be taught about side effects oral diabetes medicine can cause  Insulin may be injected, or given through a pump or pen  You and your care team will discuss which method is best for you  ? An insulin pump  is an implanted device that gives your insulin 24 hours a day  An insulin pump prevents the need for multiple insulin injections in a day  ? An insulin pen  is a device prefilled with the right amount of insulin  ? You and your family members will be taught how to draw up and give insulin  if this is the best method for you  Your education team will also teach you how to dispose of needles and syringes  ? You will learn how much insulin you need  and when to give it   You will be taught when not to give insulin  You will also be taught what to do if your blood sugar level drops too low  This may happen if you take insulin and do not eat the right amount of carbohydrates  Other things you can do to manage type 2 diabetes:   · Wear medical alert identification  Wear medical alert jewelry or carry a card that says you have diabetes  Ask your provider where to get these items  · Do not smoke  Nicotine and other chemicals in cigarettes and cigars can cause lung and blood vessel damage  It also makes it more difficult to manage your diabetes  Ask your provider for information if you currently smoke and need help to quit  Do not use e-cigarettes or smokeless tobacco in place of cigarettes or to help you quit  They still contain nicotine  · Check your feet each day for cuts, scratches, calluses, or other wounds  Look for redness and swelling, and feel for warmth  Wear shoes that fit well  Check your shoes for rocks or other objects that can hurt your feet  Do not walk barefoot or wear shoes without socks  Wear cotton socks to help keep your feet dry  · Ask about vaccines you may need  You have a higher risk for serious illness if you get the flu, pneumonia, COVID-19, or hepatitis  Ask your provider if you should get vaccines to prevent these or other diseases, and when to get the vaccines  · Talk to your care team if you become stressed about diabetes care  Sometimes being able to fit diabetes care into your life can cause increased stress  The stress can cause you not to take care of yourself properly  Your care team can help by offering tips about self-care  Your care team may suggest you talk to a mental health provider  The provider can listen and offer help with self-care issues  Follow up with your doctor or diabetes care team as directed: You may need to have blood tests done before your follow-up visit   The test results will show if changes need to be made in your treatment or self-care  Write down your questions so you remember to ask them during your visits  Talk to your provider if you cannot afford your medicine  © Copyright [address] 2022 Information is for End User's use only and may not be sold, redistributed or otherwise used for commercial purposes  All illustrations and images included in CareNotes® are the copyrighted property of [employer]  or [employer]  The above information is an  only  It is not intended as medical advice for individual conditions or treatments  Talk to your doctor, nurse or pharmacist before following any medical regimen to see if it is safe and effective for you  Type 2 Diabetes in Adults: New Diagnosis   AMBULATORY CARE:   Type 2 diabetes  is a disease that affects how your body uses glucose (sugar)  Normally, when the blood sugar level increases, the pancreas makes more insulin  Insulin helps move sugar out of the blood so it can be used for energy  Type 2 diabetes develops because either the body cannot make enough insulin, or it cannot use the insulin correctly  Type 2 diabetes can be controlled to prevent damage to your heart, blood vessels, and other organs  Common symptoms include the following:   · Numbness in your fingers or toes    · Blurred vision    · Frequent urination    · More hunger or thirst than usual    Have someone call your local emergency number (911 in the [address] [facility]) if:   · You have any of the following signs of a stroke:      ? Numbness or drooping on one side of your face     ? Weakness in an arm or leg    ? Confusion or difficulty speaking    ? Dizziness, a severe headache, or vision loss    · You have any of the following signs of a heart attack:      ?  Squeezing, pressure, or pain in your chest    ? You may  also have any of the following:     § Discomfort or pain in your back, neck, jaw, stomach, or arm    § Shortness of breath    § Nausea or vomiting    § Lightheadedness or a sudden cold sweat    · You have trouble breathing  Seek care immediately if:   · You have severe abdominal pain  · You vomit for more than 2 hours  · You have trouble staying awake or focusing  · You are shaking or sweating  · You feel weak or more tired than usual     · You have blurred or double vision  · Your breath has a fruity, sweet smell  Call your doctor or diabetes care team if:   · Your arms and legs are swollen  · You have an upset stomach and cannot eat the foods on your meal plan  · You feel dizzy, have headaches, or are easily irritated  · Your skin is red, warm, dry, or swollen  · You have a wound that does not heal     · You have numbness in your arms or legs  · You have trouble coping with your illness, or you feel anxious or depressed  · You have questions or concerns about your condition or care  Treatment for type 2 diabetes  helps prevent or delay complications, including heart and kidney disease  You must eat healthy meals and do regular physical activity  You may  need any of the following:  · Diabetes medicines or insulin  may be given to decrease the amount of sugar in your blood  · Blood pressure medicine  may be given to lower your blood pressure  · Cholesterol-lowering medicine  may be given to prevent heart disease  · Antiplatelets , such as aspirin, help prevent blood clots  Take your antiplatelet medicine exactly as directed  These medicines make it more likely for you to bleed or bruise  If you are told to take aspirin, do not take acetaminophen or ibuprofen instead  · Take your medicine as directed  Contact your healthcare provider if you think your medicine is not helping or if you have side effects  Tell him or her if you are allergic to any medicine  Keep a list of the medicines, vitamins, and herbs you take  Include the amounts, and when and why you take them   Bring the list or the pill bottles to follow-up visits  Carry your medicine list with you in case of an emergency  Diabetes education:  Diabetes education will start right away  Diabetes education may also happen later to refresh your memory  Your diabetes care team may include physicians, nurse practitioners, and physician assistants  It may also include nurses, dietitians, exercise specialists, pharmacists, dentists, and podiatrists  Family members, or others who are close to you, may also be part of the team  You and your team will make goals and plans to manage diabetes and other health problems  The plans and goals will be specific to your needs  Members of your diabetes care team teach you the following:  · About nutrition:  A dietitian will help you make a meal plan to keep your blood sugar level steady  You will learn how food affects your blood sugar levels  You will also learn to keep track of sugar and starchy foods (carbohydrates)  Do not skip meals  Your blood sugar level may drop too low if you have taken diabetes medicine and do not eat  You may be taught to use the plate method for portion control  With the plate method, ½ of your plate contains vegetables  The other half is divided so that ¼ contains protein or meat, and ¼ contains starches, such as potatoes  · About physical activity and diabetes: You will learn why physical activity, such as walking, is important  You and your care team provider will make a plan for your activity  Your care team provider will tell you what a healthy weight is for you  He or she will help you make a plan to get to that weight and stay there  Maintain a healthy weight to help delay or prevent complications of diabetes  · About your blood sugar level: You will learn what your blood sugar level should be  You will be given information on when and how to check your blood sugar level  You may need to check by testing a drop of blood in a glucose monitor   You may instead be given a continuous glucose monitoring (CGM) device  A sensor is placed in your abdomen or on your arm  You put a transmitter on the sensor to get a reading that shows up on the monitor  You will learn what to do if your level is too high or too low  Write down the times of your checks and your levels  Take them to all follow-up appointments  · About diabetes medicine: You may need oral diabetes medicine, insulin, or both to help control your blood sugar levels  Your healthcare provider will teach you how and when to take oral diabetes medicine  You will also be taught about side effects oral diabetes medicine can cause  Insulin may be added if oral diabetes medicine becomes less effective over time  Insulin may be injected, or given through a pump or pen  You and your care team will discuss which method is best for you  ? An insulin pump  is an implanted device that gives your insulin 24 hours a day  An insulin pump prevents the need for multiple insulin injections in a day  ? An insulin pen  is a device prefilled with the right amount of insulin  ? You and your family members will be taught how to draw up and give insulin  if this is the best method for you  Your education team will also teach you how to dispose of needles and syringes  ? You will learn how much insulin you need  and when to give it  You will be taught when not to give insulin  You will also be taught what to do if your blood sugar level drops too low  This may happen if you take insulin and do not eat the right amount of carbohydrates  Other ways to help manage type 2 diabetes:   · Talk to your care team if you have increased stress about your diagnosis  When you feel stressed about your diagnosis, it can cause you not to take care of yourself properly  Your care team can help by offering tips about self-care  Your care team may suggest you talk to a mental health provider   The provider can listen and offer help with self-care issues  · Check your feet each day for sores  Wear shoes and socks that fit correctly  Do not trim your toenails  Go to a podiatrist  Ask your care team for more information about foot care  · Do not smoke  Nicotine and other chemicals in cigarettes and cigars can cause lung damage and make diabetes harder to manage  Ask your care team provider for information if you currently smoke and need help to quit  E-cigarettes or smokeless tobacco still contain nicotine  Talk to your care team provider before you use these products  · Drink water instead of sugary drinks such as soft drinks and fruit juices  Sugary drinks cause high blood sugar and cause an increase in your weight  Your healthcare provider may tell you that diet drinks do not help with weight loss  · Know the risks if you choose to drink alcohol  Alcohol can cause your blood sugar levels to be low if you use insulin  Alcohol can cause high blood sugar levels and weight gain if you drink too much  A drink of alcohol is 12 ounces of beer, 5 ounces of wine, or 1½ ounces of liquor  Your care team can tell you how many drinks are okay to have in 24 hours and in 1 week  · Check your blood pressure as directed  If you have high blood pressure (BP), talk to your care team about your BP goals  Together you can create a plan to lower your BP if needed and keep it in a healthy range  The plan may include lifestyle changes or medicines  A normal BP is 119/79 or lower  A normal blood pressure can help prevent or delay certain complications from diabetes  Examples include retinopathy (eye damage) and kidney damage  · Wear medical alert identification  Wear medical alert jewelry or carry a card that says you have diabetes  Ask your care team provider where to get these items  · Ask about vaccines you may need  You have a higher risk for serious illness if you get the flu, pneumonia, COVID-19, or hepatitis   Ask your provider if you should get a flu, pneumonia, or hepatitis B vaccine, and when to get the COVID-19 vaccine  Risks of type 2 diabetes: It is important to learn to keep your diabetes in control  Uncontrolled diabetes can damage your nerves, veins, and arteries  Your risk for dementia increases faster the longer your diabetes is not controlled  High blood sugar levels may damage other body tissues and organs over time  Damage to arteries may increase your risk for heart attack and stroke  Nerve damage may also lead to other heart, stomach, and nerve problems  Diabetes can become life-threatening if it is not controlled  Follow up with your care team providers as directed: You may need to return to have your A1c checked every 3 months  You will need to have your feet checked during at least 1 visit each year  You will need an eye exam 1 time each year to check for retinopathy  You will also need urine tests every year to check for kidney problems  You may need tests to monitor for heart disease, such as an EKG, stress test, blood pressure monitoring, and blood tests  Write down your questions so you remember to ask them during your visits  © Copyright [employer] 2022 Information is for End User's use only and may not be sold, redistributed or otherwise used for commercial purposes  All illustrations and images included in CareNotes® are the copyrighted property of [employer]  or [employer]era Guadarr[employer]  The above information is an  only  It is not intended as medical advice for individual conditions or treatments  Talk to your doctor, nurse or pharmacist before following any medical regimen to see if it is safe and effective for you

## 2022-11-30 ENCOUNTER — OFFICE VISIT (OUTPATIENT)
Dept: DIABETES SERVICES | Facility: CLINIC | Age: 67
End: 2022-11-30

## 2022-11-30 VITALS — BODY MASS INDEX: 32.49 KG/M2 | WEIGHT: 223.2 LBS

## 2022-11-30 DIAGNOSIS — E11.69 TYPE 2 DIABETES MELLITUS WITH OTHER SPECIFIED COMPLICATION, WITHOUT LONG-TERM CURRENT USE OF INSULIN (HCC): Primary | ICD-10-CM

## 2022-11-30 NOTE — PROGRESS NOTES
Medical Nutrition Therapy      Assessment    Visit Type: Initial visit  Chief complaint/Medical Diagnosis/reason for visit: E11 69 (ICD-10-CM) - Type 2 diabetes mellitus with other specified complication, without long-term current use of insulin ([facility] [address] )    PÉREZ Arianna Germán Sr  was seen today accompanied by his fiance regarding type 2 diabetes  Last HbA1c was 7 1% in September 2022  He is not currently checking his blood glucose at home  He is taking 500 mg of metformin twice daily  Serge Gutierrez typically eats 2 meals per day; breakfast and dinner  Typically skips lunch  He has cut out all soda and iced tea since he was diagnosed with diabetes and switched to lower-sugar cereals  Problems identified in food recall include skipping meals and inconsistent carbohydrate intake  Explained basic pathophysiology of diabetes and impact of diet on blood glucose levels  Together we discussed what foods contain carbohydrates, reading a food label, timing of meals and snacks, serving sizes, the role of fiber, the importance of consistent carbohydrate intake in the appropriate amounts  Used the portion booklet to teach Serge Gutierrez more about food groups and basic carbohydrate counting  Created an individualized meal plan with 3 meals and 1 snack providing 45-60 g carb per meal and 0-15 g carb per snack  Put together sample meals for reference  Serge Gutierrez demonstrated good understanding and will call with any questions or if he would like to schedule a follow-up visit  Ht Readings from Last 1 Encounters:   10/24/22 5' 9 5" (1 765 m)     Wt Readings from Last 3 Encounters:   11/30/22 101 kg (223 lb 3 2 oz)   10/24/22 104 kg (230 lb)   08/29/22 102 kg (225 lb 8 oz)     Weight Change: Yes - down 7 lbs in 6 days per EMR review      Barriers to Learning: no barriers    Do you follow any special diet presently?: Yes - Serge Gutierrez has cut out all soda and iced tea and has switched to plain oatmeal and plain cereals rather than sugar cereals  Who shops: His fiance  Who cooks: His fiance    Food Log: Completed via the method of usual daily food recall    Breakfast: 2 cups of plain cheerios with 2% milk OR 2 packets of plain oatmeal made with 2% milk, apple juice or orange juice sometimes with breakfast  Morning Snack: none  Lunch: typically skips  Afternoon Snack: none  Dinner: 5 PM - chicken, beef, fish, vegetables - broccoli, carrots, green beans, peas, brussel sprouts, pasta, potatoes  Evening Snack: 1/2 glass 2% milk and a banana  Beverages: water, 2% milk, apple and orange juice  Eating out/Take out: once a week  Exercise: no regular physical activity at present, does walk outside sometimes    Estimated calorie needs: 1,900 kcals/day   Carbohydrate: 45-60 g/meal, 0-15 g/snack       Fat: 5 servings/day      Protein: 8 ounces/day    Nutrition Diagnosis:  Inconsistent carbohydrate intake related to food and nutrition related knowledge deficit concerning appropriate amount and timing of carbohydrate intake as evidenced by estimated carbohydrate intake that is different from recommended types or ingested on an irregular basis  Intervention: label reading, behavior modification strategies, carbohydrate counting, meal timing, meal planning, individualized meal plan and monitoring portion control     Treatment Goals: Patient understands education and recommendations, Patient will consume 3 meals a day, Patient will monitor portion control and Patient will count carbohydrates    Monitoring and evaluation:    Term code indicator  FH 4 4 Mealtime Behavior Criteria: Eat 3 regular meals approximately 4-5 hours apart and 1 evening snack per day  Term code indicator  FH 1 6 3 Carbohydrate Intake Criteria: Aim for 45-60 grams of carbohydrate per meal and ~15 grams of carbohydrate for evening snack      Materials Provided: Portion booklet     Patient’s Response to Instruction:  Comprehension: good  Motivation: good  Expected Compliance: good    Start- Stop: 2:31-3:16  Total Minutes: 45 Minutes  Group or Individual Instruction: MNT-I  Other: Karime Massey MD    Thank you for coming to the [place] [id number] for education today  Please feel free to call with any questions or concerns      Margarito Humphries [address], [address] [place] [address], [address] [id number]

## 2022-11-30 NOTE — PATIENT INSTRUCTIONS
Eat 3 regular meals approximately 4-5 hours apart and 1 evening snack per day  Aim for 45-60 grams of carbohydrate per meal and ~15 grams of carbohydrate for evening snack

## 2023-01-17 DIAGNOSIS — G95.20 SPINAL CORD COMPRESSION (HCC): ICD-10-CM

## 2023-01-17 DIAGNOSIS — Z98.890 POSTOPERATIVE STATE: ICD-10-CM

## 2023-01-18 RX ORDER — METHOCARBAMOL 750 MG/1
750 TABLET, FILM COATED ORAL EVERY 12 HOURS PRN
Qty: 45 TABLET | Refills: 0 | Status: SHIPPED | OUTPATIENT
Start: 2023-01-18

## 2023-01-19 DIAGNOSIS — E11.69 TYPE 2 DIABETES MELLITUS WITH OTHER SPECIFIED COMPLICATION, WITHOUT LONG-TERM CURRENT USE OF INSULIN (HCC): ICD-10-CM

## 2023-03-17 DIAGNOSIS — F31.9 BIPOLAR 1 DISORDER (HCC): ICD-10-CM

## 2023-03-17 RX ORDER — QUETIAPINE FUMARATE 100 MG/1
100 TABLET, FILM COATED ORAL
Qty: 90 TABLET | Refills: 1 | Status: SHIPPED | OUTPATIENT
Start: 2023-03-17

## 2023-03-17 RX ORDER — DULOXETIN HYDROCHLORIDE 60 MG/1
60 CAPSULE, DELAYED RELEASE ORAL
Qty: 90 CAPSULE | Refills: 1 | Status: SHIPPED | OUTPATIENT
Start: 2023-03-17

## 2023-04-24 DIAGNOSIS — E11.69 TYPE 2 DIABETES MELLITUS WITH OTHER SPECIFIED COMPLICATION, WITHOUT LONG-TERM CURRENT USE OF INSULIN (HCC): ICD-10-CM

## 2023-04-24 RX ORDER — ATORVASTATIN CALCIUM 20 MG/1
20 TABLET, FILM COATED ORAL EVERY EVENING
Qty: 90 TABLET | Refills: 1 | Status: SHIPPED | OUTPATIENT
Start: 2023-04-24 | End: 2023-10-21

## 2023-04-27 ENCOUNTER — TELEPHONE (OUTPATIENT)
Dept: NEUROSURGERY | Facility: CLINIC | Age: 68
End: 2023-04-27

## 2023-04-27 NOTE — TELEPHONE ENCOUNTER
"Per message received from Dr Ford Orellana \"This patient has now gone for his f/u MRI T/LSpine  He did have an appt for 2/2023, but cancelled it (presumably b/c MRI wasn't done), and has not had one rescheduled  Can you contact him to arrange  Reasonable to schedule a virtual visit or if he'd prefer to come in for a full in person appt, also fine by me  \"    Left message for patient to call back to schedule follow up to review MRI per Dr Ford Orellana    "

## 2023-05-01 NOTE — TELEPHONE ENCOUNTER
Received call from patients wife, Shiva Lozano returning our call to schedule follow up appointment after MRI  Patient scheduled for Wednesday, May 3 at 1 pm for a virtual visit

## 2023-05-05 ENCOUNTER — TELEMEDICINE (OUTPATIENT)
Dept: NEUROSURGERY | Facility: CLINIC | Age: 68
End: 2023-05-05

## 2023-05-05 ENCOUNTER — TRANSCRIBE ORDERS (OUTPATIENT)
Dept: NEUROSURGERY | Facility: CLINIC | Age: 68
End: 2023-05-05

## 2023-05-05 VITALS — BODY MASS INDEX: 32.21 KG/M2 | WEIGHT: 225 LBS | HEIGHT: 70 IN

## 2023-05-05 DIAGNOSIS — D48.9 MATURE TERATOMA: ICD-10-CM

## 2023-05-05 DIAGNOSIS — D49.7 INTRADURAL EXTRAMEDULLARY THORACIC TUMOR: Primary | ICD-10-CM

## 2023-05-05 NOTE — PROGRESS NOTES
Virtual Regular Visit    Verification of patient location:    Patient is located at Home in the following state in which I hold an active license [place]        Assessment/Plan:    Problem List Items Addressed This Visit        Nervous and Auditory    Intradural extramedullary thoracic tumor - Primary    Relevant Orders    MRI lumbar spine with and without contrast       Other    Mature teratoma    Relevant Orders    MRI lumbar spine with and without contrast     S/p resection of IDEM mass on 1/2/20 via T12 laminectomy    Final path is mature teratoma  I believe we achieved a gross total resection       He continues to do well  Mentions nocturia, but not urgency/frequency or incontinence  Some intermittent residual burning pain, not frequent, or worsening in frequency/severity      His follow-up MRI does not show any residual or recurrence  I discussed with him that there was a mention of tethering, although this is not different than his prior studies in my opinion  No signs or symptoms of neurologic worsening i e  weakness numbness tingling gait dysfunction etc   He should not need adjuvant therapies  I have recommended surveillance imaging in 1 year  He is in agreement, I will place that order, we will schedule for that  We will follow-up with him virtually once the study is complete      12/30/20 Metrics: EQ5D5L 95116=2 809; VAS 80; ECOG 1; KPS 80     02/09/22 Metrics: EQ5D5L 42520=1 861; VAS 80; ECOG 1; KPS 80    05/05/23 Metrics: EQ5D5L 53594=7 820; VAS 75       Reason for visit is   Chief Complaint   Patient presents with    Virtual Regular Visit     1 YR FOLLOW UP MRI L SPINE SL 4/20/23        Encounter provider Monica Vizcaino MD    Provider located at [address] [place] [id number]      Recent Visits  No visits were found meeting these conditions    Showing recent visits within past 7 days and meeting all other requirements  Today's Visits  Date Type Provider Dept   05/05/23 Telemedicine Monica Vizcaino MD [facility] today's visits and meeting all other requirements  Future Appointments  No visits were found meeting these conditions  Showing future appointments within next 150 days and meeting all other requirements       The patient was identified by name and date of birth  Ezekiel Uriostegui Sr  was informed that this is a telemedicine visit and that the visit is being conducted through Telephone  My office door was closed  No one else was in the room  He acknowledged consent and understanding of privacy and security of the video platform  The patient has agreed to participate and understands they can discontinue the visit at any time  It was my intent to perform this visit via video technology but the patient was not able to do a video connection so the visit was completed via audio telephone only  Patient is aware this is a billable service  Subjective  Ezekiel Uriostegui Sr  is a 75 y o  male  Debbie Skillern       HPI     Past Medical History:   Diagnosis Date    Anxiety     Bipolar depression ([facility] [address] )     bipolar disorder    Bipolar depression ([facility] [address] )     bipolar disorder    Borderline diabetes mellitus     Borderline diabetes mellitus     Brittle nails     Chronic back pain     Chronic pain     Colon polyp     Depression     Diabetes ([address] )     Dizziness 06/04/2018    Foot pain     unspecified laterality- per [employer]     Gallstones     Gastroesophageal reflux disease with esophagitis 01/21/2018    HTN (hypertension) 01/21/2018    Intradural extramedullary spinal tumor     Leukocytosis 09/24/2018    Onychomycosis     Restless leg syndrome     Sessile rectal polyp 01/30/2018    Severe episode of recurrent major depressive disorder, without psychotic features ([facility] [address] ) 09/21/2018    Sinus tachycardia 09/20/2018    Spinal arachnoid cyst     Stage 2 chronic kidney disease "08/27/2019    Tubular adenoma of colon 04/30/2018       Past Surgical History:   Procedure Laterality Date    COLONOSCOPY      INCISION AND DRAINAGE ABSCESS / HEMATOMA OF BURSA / KNEE / [employer]  in 1990's    outer right leg and center of upper back   complicated  resolved status    OTHER SURGICAL HISTORY  11/2019    cyst excision from back by Dermatolgist at office of Dr Ravinder Russo FLX DX W/COLLJ [address] PFRMD N/A 8/31/2017    Procedure: COLONOSCOPY;  Surgeon: Katerine Muhammad MD;  [facility]; Service: Gastroenterology    DC COLONOSCOPY FLX DX W/COLLJ [facility] WHEN PFRMD N/A 3/23/2018    Procedure: COLONOSCOPY;  Surgeon: Katerine Muhammad MD;  [facility]; Service: Gastroenterology    DC MARISCAL BX/EXC ISPI MIKKI IDRL XMED LUMBAR N/A 1/2/2020    Procedure: T12 LAMINECTOMY FOR RESECTION OF INTRADURAL-EXTRAMEDULLARY MASS;  Surgeon: Douglas Dickerson MD;  [facility];  Service: Neurosurgery    TUMOR REMOVAL  1990's    \"Cheese tumor\" removed from back       Current Outpatient Medications   Medication Sig Dispense Refill    atorvastatin (LIPITOR) 20 mg tablet Take 1 tablet (20 mg total) by mouth every evening 90 tablet 1    DULoxetine (CYMBALTA) 60 mg delayed release capsule Take 1 capsule (60 mg total) by mouth daily at bedtime 90 capsule 1    metFORMIN (GLUCOPHAGE) 500 mg tablet Take 1 tablet (500 mg total) by mouth 2 (two) times a day with meals 180 tablet 1    QUEtiapine (SEROquel) 100 mg tablet Take 1 tablet (100 mg total) by mouth daily at bedtime 90 tablet 1    methocarbamol (ROBAXIN) 750 mg tablet Take 1 tablet (750 mg total) by mouth every 12 (twelve) hours as needed for muscle spasms (Patient not taking: Reported on 5/5/2023) 45 tablet 0     No current facility-administered medications for this visit  No Known Allergies    Review of Systems   Constitutional: Negative  HENT: Negative      Eyes: Positive for visual disturbance (unchanged since last visit, occasional " "floaters)  Respiratory: Negative  Cardiovascular: Negative  Gastrointestinal: Negative  Acid reflux   Endocrine: Negative  Genitourinary: Negative  Nocturia 3-4x   Musculoskeletal: Positive for back pain (currently only having centered of lower back/tailbone, otherwise still improved since last visit, across lower back radiates into bilateral hips) and gait problem (unchanged since last visit, no recent falls, once in a while)  Skin: Negative  Allergic/Immunologic: Negative  Neurological: Positive for numbness (unchanged since last visit, bilateral hands/fingers)  Negative for dizziness, seizures, syncope, weakness (more of a stiffness in the bilateral arms/legs) and headaches  Hematological: Negative  Psychiatric/Behavioral: Negative  Video Exam    Vitals:    05/05/23 1127   Weight: 102 kg (225 lb)   Height: 5' 9 5\" (1 765 m)       Physical Exam       MDM:     MRI lumbar spine with and without contrast    Result Date: 4/25/2023  Narrative: MRI LUMBAR SPINE WITH AND WITHOUT CONTRAST INDICATION: D49 7: Neoplasm of unspecified behavior of endocrine glands and other parts of nervous system D48 9: Neoplasm of uncertain behavior, unspecified  COMPARISON:  1/3/2022 TECHNIQUE:  Multiplanar, multisequence imaging of the lumbar spine was performed before and after gadolinium administration     IV Contrast:  10 mL of Gadobutrol injection (SINGLE-DOSE) IMAGE QUALITY:  Diagnostic FINDINGS: VERTEBRAL BODIES:  There are 5 lumbar type vertebral bodies  Hemangioma in the T12 vertebra noted  Decompressive laminectomy T12 redemonstrated  Minimal degenerative endplate changes  No compression deformity or focally suspicious marrow lesion  SACRUM:  Normal signal within the sacrum  No evidence of insufficiency or stress fracture  DISTAL CORD AND CONUS:  Terminal aspect of the spinal cord is unchanged    There is posterior deviation of the lower cord at the T12 level, having a slightly " angulated undulating deformity posteriorly, seen on series 3, image 8  This is unchanged from the prior study  The dorsal margin of the cord appears adherent to the posterior dural margin  Tiny focus of T2 hyperintensity in the posterior aspect of the cord series 5 image 8 is stable possibly a small focus of myelomalacia or postsurgical changes  in this region  Small amount of linear enhancement along the posterior aspect of the cord is most likely adjacent vessels  The conus medullaris terminates at the L1-2 level  There is no clumping of the nerve roots  PARASPINAL SOFT TISSUES:  Rounded T2 hyperintense lesion in the right kidney measures 1 5 cm, likely a cyst   This is stable  LOWER THORACIC DISC SPACES:  Normal disc height and signal   No disc herniation, canal stenosis or foraminal narrowing  LUMBAR DISC SPACES: L1-L2:  Bilateral facet hypertrophy  No focal disc herniation  No significant central canal or neural foraminal narrowing  L2-L3:  There is bilateral facet hypertrophy  There is no focal disk herniation, central canal or neural foraminal narrowing  L3-L4:  Tiny left neural foraminal disc protrusion  Minimal left neural foraminal narrowing  Bilateral facet hypertrophy  Right neural foramen and central canal patent  This level is similar to the prior study  L4-L5:  There is bilateral facet hypertrophy  Tiny central disc protrusion  No significant central canal or neural foraminal narrowing  L5-S1:  There is a diffuse disc bulge  There is bilateral facet hypertrophy  No significant central canal or neural foraminal narrowing  POSTCONTRAST IMAGING:  No abnormal enhancement  OTHER FINDINGS:  None  Impression: 1  Stable postoperative changes status post decompressive laminectomy at T12 with evidence of dorsal tethering of the terminal aspect of the spinal cord in the operative bed  No evidence of recurrent or residual tumor   2   Minimal spondylotic changes without significant central canal or neural foraminal narrowing are similar to the prior study  Workstation performed: [id number]     Study and report as above personally reviewed

## 2023-09-14 DIAGNOSIS — F31.9 BIPOLAR 1 DISORDER (HCC): ICD-10-CM

## 2023-09-14 RX ORDER — DULOXETIN HYDROCHLORIDE 60 MG/1
CAPSULE, DELAYED RELEASE ORAL
Qty: 90 CAPSULE | Refills: 1 | Status: SHIPPED | OUTPATIENT
Start: 2023-09-14

## 2023-09-14 RX ORDER — QUETIAPINE FUMARATE 100 MG/1
TABLET, FILM COATED ORAL
Qty: 90 TABLET | Refills: 1 | Status: SHIPPED | OUTPATIENT
Start: 2023-09-14

## 2023-09-25 ENCOUNTER — TELEPHONE (OUTPATIENT)
Dept: PSYCHIATRY | Facility: CLINIC | Age: 68
End: 2023-09-25

## 2023-09-25 NOTE — LETTER
Dear Leroy Rojas. :    We are contacting you because your name is currently included on the [address] [place] [address] wait-list for Talk Therapy and/or Medication Management. (Please [place] which services are needed)     In our efforts to provide the highest quality care, [facility] has begun the process of upgrading our behavioral health systems to increase efficiency and expedite delivery of services. As part of this process, we ask you to please confirm your continued interest in the services above. If you are no longer interested or in need, please eldon “No” in the area below. If you are still interested and in need, please eldon “Yes” and provide your most current demographic and insurance information within 15 days. If we do not receive confirmation from you by October 10, 2023 your information will not be included in the system upgrade and your place on the waitlist will be lost.     Thank you in advance for your patience and understanding and we apologize for any inconvenience this may cause. Patient Name and :    Still in need of services: Yes or No     Current Address:     Phone#:     Best time to receive a call: Insurance Carrier:      Policy/ID#: Group#: Insurance Services Phone#:      What is your current presenting problem? Open to virtual talk therapy: Yes or No      We will call you to do an Intake when an appointment becomes available. You can send this information back to us in any of the ways below:    Email: [email or web address] Cyndie Talavera  Fax#:  [phone]  Mail:   [address] [place] [address], [address] [place] [address]

## 2023-10-18 ENCOUNTER — OFFICE VISIT (OUTPATIENT)
Dept: FAMILY MEDICINE CLINIC | Facility: CLINIC | Age: 68
End: 2023-10-18

## 2023-10-18 VITALS
TEMPERATURE: 98 F | WEIGHT: 203 LBS | HEART RATE: 72 BPM | OXYGEN SATURATION: 96 % | HEIGHT: 70 IN | BODY MASS INDEX: 29.06 KG/M2 | RESPIRATION RATE: 18 BRPM | SYSTOLIC BLOOD PRESSURE: 122 MMHG | DIASTOLIC BLOOD PRESSURE: 70 MMHG

## 2023-10-18 DIAGNOSIS — F31.9 BIPOLAR 1 DISORDER (HCC): ICD-10-CM

## 2023-10-18 DIAGNOSIS — N18.31 STAGE 3A CHRONIC KIDNEY DISEASE (HCC): ICD-10-CM

## 2023-10-18 DIAGNOSIS — Z00.00 MEDICARE ANNUAL WELLNESS VISIT, SUBSEQUENT: ICD-10-CM

## 2023-10-18 DIAGNOSIS — E11.9 TYPE 2 DIABETES MELLITUS WITHOUT COMPLICATION, WITHOUT LONG-TERM CURRENT USE OF INSULIN (HCC): Primary | ICD-10-CM

## 2023-10-18 DIAGNOSIS — G95.20 SPINAL CORD COMPRESSION (HCC): ICD-10-CM

## 2023-10-18 NOTE — PROGRESS NOTES
Assessment and Plan:     Problem List Items Addressed This Visit        Endocrine    Type 2 diabetes mellitus with other specified complication, without long-term current use of insulin ([address]) - Primary       Nervous and Auditory    Spinal cord compression (HCC)       Genitourinary    Stage 3a chronic kidney disease ([address])    Relevant Orders    Comprehensive metabolic panel       Other    Bipolar 1 disorder ([address])   Other Visit Diagnoses     Medicare annual wellness visit, subsequent            AWV and follow-up completed today. Reviewed previous labs. Chronic conditions stable. Continue current medications. Labs ordered today. Obtain kidney profile, CMP, A1c, CBC and lipid panel    Patient will be due for refills within the next few weeks. He will reach out when he is ready for those refills. Preventive health issues were discussed with patient, and age appropriate screening tests were ordered as noted in patient's After Visit Summary. Personalized health advice and appropriate referrals for health education or preventive services given if needed, as noted in patient's After Visit Summary. History of Present Illness:     Patient presents for a Medicare Wellness Visit    Patient presents with:  Medicare Wellness Visit and follow-up. Patient believes he will be due for refills in a few weeks. Tolerating current medications well. Not interested in any further screenings. Continues to follow with psychiatry           Patient Care Team:  Gracy Schlatter, MD as PCP - General (Family Medicine)  MD Cristiane Odom MD Quenten Rear, MD as Endoscopist  Vilma Leyva as Diabetes Educator (Diabetes Services)     Review of Systems:     Review of Systems   Constitutional:  Negative for fatigue and fever. HENT:  Negative for sore throat. Eyes:  Negative for visual disturbance. Respiratory:  Negative for cough, chest tightness and shortness of breath.     Cardiovascular: Negative for chest pain, palpitations and leg swelling. Gastrointestinal:  Negative for abdominal pain, constipation, diarrhea and nausea. Endocrine: Negative for cold intolerance and heat intolerance. Genitourinary:  Negative for flank pain. Musculoskeletal:  Negative for back pain and neck pain. Skin:  Negative for rash. Neurological:  Negative for headaches. Psychiatric/Behavioral:  Negative for behavioral problems and confusion.          Problem List:     Patient Active Problem List   Diagnosis   • Adult residual type attention deficit hyperactivity disorder (ADHD)   • Hypertriglyceridemia   • Chronic bilateral low back pain without sciatica   • Adenomatous polyp of transverse colon   • Erectile dysfunction   • Polyp of colon   • Bipolar 1 disorder (HCC)   • Restless leg syndrome   • Spinal cord compression (HCC)   • Bilateral carpal tunnel syndrome   • Intradural extramedullary thoracic tumor   • Mature teratoma   • Recurrent syncope   • Cognitive decline   • Stage 3a chronic kidney disease (HCC)   • Type 2 diabetes mellitus with other specified complication, without long-term current use of insulin (HCC)      Past Medical and Surgical History:     Past Medical History:   Diagnosis Date   • Anxiety    • Bipolar depression ([address])     bipolar disorder   • Bipolar depression ([address])     bipolar disorder   • Borderline diabetes mellitus    • Borderline diabetes mellitus    • Brittle nails    • Chronic back pain    • Chronic pain    • Colon polyp    • Depression    • Diabetes ([address])    • Dizziness 06/04/2018   • Foot pain     unspecified laterality- per [employer]    • Gallstones    • Gastroesophageal reflux disease with esophagitis 01/21/2018   • HTN (hypertension) 01/21/2018   • Intradural extramedullary spinal tumor    • Leukocytosis 09/24/2018   • Onychomycosis    • Restless leg syndrome    • Sessile rectal polyp 01/30/2018   • Severe episode of recurrent major depressive disorder, without psychotic features ([address]) 2018   • Sinus tachycardia 2018   • Spinal arachnoid cyst    • Stage 2 chronic kidney disease 2019   • Tubular adenoma of colon 2018     Past Surgical History:   Procedure Laterality Date   • COLONOSCOPY     • INCISION AND DRAINAGE ABSCESS / HEMATOMA OF BURSA / KNEE / [place]  in     outer right leg and center of upper back   complicated  resolved status   • OTHER SURGICAL HISTORY  2019    cyst excision from back by Dermatolgist at office of Dr. Mercy Norman   • OR COLONOSCOPY FLX DX W/COLLJ [address] PFRMD N/A 2017    Procedure: COLONOSCOPY;  Surgeon: Neal Joyce MD;  Location: [facility]; Service: Gastroenterology   • OR COLONOSCOPY FLX DX W/COLLJ SPEC WHEN PFRMD N/A 3/23/2018    Procedure: COLONOSCOPY;  Surgeon: Neal Joyce MD;  Location: [facility];   Service: Gastroenterology   • OR MARISCAL BX/EXC ISPI MIKKI IDRL XMED LUMBAR N/A 2020    Procedure: T12 LAMINECTOMY FOR RESECTION OF INTRADURAL-EXTRAMEDULLARY MASS;  Surgeon: Chidi Birmingham MD;  Location: [facility];  Service: Neurosurgery   • TUMOR REMOVAL      "Cheese tumor" removed from back      Family History:     Family History   Problem Relation Age of Onset   • Throat cancer Mother    • Heart attack Father         at 80   • Stomach cancer Sister    • Lung cancer Brother    • Stomach cancer Brother    • Heart disease Brother       Social History:     Social History     Socioeconomic History   • Marital status: Single     Spouse name: None   • Number of children: 3   • Years of education: 8   • Highest education level: None   Occupational History   • Occupation: disabled     Comment: disabled   Tobacco Use   • Smoking status: Former     Packs/day: 1.00     Years: 3.00     Total pack years: 3.00     Types: Cigarettes     Start date:      Quit date:      Years since quittin.8   • Smokeless tobacco: Never   • Tobacco comments:     all scripts says former smoker quit at 17-14 years old   Vaping Use   • Vaping Use: Never used   Substance and Sexual Activity   • Alcohol use: Yes     Comment: very seldom now   • Drug use: Yes     Types: Marijuana   • Sexual activity: Yes     Partners: Female     Birth control/protection: Post-menopausal   Other Topics Concern   • None   Social History Narrative    Disabled    GED    Alcoholism in recovery     Remotely quit alcohol use    Former smoker     [facility]ses of Marijuana         [facility]     Social Determinants of Health     Financial Resource Strain: Medium Risk (8/29/2022)    Overall Financial Resource Strain (CARDIA)    • Difficulty of Paying Living Expenses: Somewhat hard   Food Insecurity: Not on file   Transportation Needs: No Transportation Needs (8/29/2022)    PRAPARE - Transportation    • Lack of Transportation (Medical): No    • Lack of Transportation (Non-Medical): No   Physical Activity: Not on file   Stress: Not on file   Social Connections: Not on file   Intimate Partner Violence: Not on file   Housing Stability: Not on file      Medications and Allergies:     Current Outpatient Medications   Medication Sig Dispense Refill   • atorvastatin (LIPITOR) 20 mg tablet Take 1 tablet (20 mg total) by mouth every evening 90 tablet 1   • DULoxetine (CYMBALTA) 60 mg delayed release capsule TAKE 1 CAPSULE(60 MG) BY MOUTH DAILY AT BEDTIME 90 capsule 1   • metFORMIN (GLUCOPHAGE) 500 mg tablet Take 1 tablet (500 mg total) by mouth 2 (two) times a day with meals 180 tablet 1   • QUEtiapine (SEROquel) 100 mg tablet TAKE 1 TABLET(100 MG) BY MOUTH DAILY AT BEDTIME 90 tablet 1   • methocarbamol (ROBAXIN) 750 mg tablet Take 1 tablet (750 mg total) by mouth every 12 (twelve) hours as needed for muscle spasms (Patient not taking: Reported on 5/5/2023) 45 tablet 0     No current facility-administered medications for this visit.      No Known Allergies   Immunizations:     Immunization History   Administered Date(s) Administered   • COVID-19 PFIZER VACCINE 0.3 ML IM 11/22/2021, 12/15/2021      Health Maintenance:         Topic Date Due   • Colorectal Cancer Screening  03/23/2028   • Hepatitis C Screening  Completed         Topic Date Due   • Pneumococcal Vaccine: 65+ Years (1 - PCV) Never done   • COVID-19 Vaccine (3 - Pfizer series) 02/09/2022   • Influenza Vaccine (1) Never done      Medicare Screening Tests and Risk Assessments:     Jennifer Wei is here for his Subsequent Wellness visit. Last Medicare Wellness visit information reviewed, patient interviewed, no change since last AWV. Health Risk Assessment:   Patient rates overall health as good. Patient feels that their physical health rating is same. Patient is satisfied with their life. Eyesight was rated as same. Hearing was rated as same. Patient feels that their emotional and mental health rating is same. Patients states they are sometimes angry. Patient states they are never, rarely unusually tired/fatigued. Pain experienced in the last 7 days has been a lot. Patient's pain rating has been 7/10. Patient states that he has experienced no weight loss or gain in last 6 months. Depression Screening:   PHQ-2 Score: 0      Fall Risk Screening: In the past year, patient has experienced: no history of falling in past year      Home Safety:  Patient does not have trouble with stairs inside or outside of their home. Patient has working smoke alarms and has working carbon monoxide detector. Home safety hazards include: none. Nutrition:   Current diet is Regular. Medications:   Patient is currently taking over-the-counter supplements. OTC medications include: see medication list. Patient is able to manage medications. Activities of Daily Living (ADLs)/Instrumental Activities of Daily Living (IADLs):   Walk and transfer into and out of bed and chair?: Yes  Dress and groom yourself?: Yes    Bathe or shower yourself?: Yes    Feed yourself?  Yes  Do your laundry/housekeeping?: Yes  Manage your money, pay your bills and track your expenses?: Yes  Make your own meals?: Yes    Do your own shopping?: Yes    Previous Hospitalizations:   Any hospitalizations or ED visits within the last 12 months?: No      Advance Care Planning:   Living will: No    Durable POA for healthcare: No    Advanced directive: No    Advanced directive counseling given: Yes      PREVENTIVE SCREENINGS      Cardiovascular Screening:    General: History Lipid Disorder and Risks and Benefits Discussed    Due for: Lipid Panel      Diabetes Screening:     General: Risks and Benefits Discussed    Due for: Blood Glucose      Colorectal Cancer Screening:     General: Screening Current      Prostate Cancer Screening:    General: Risks and Benefits Discussed    Due for: PSA      Abdominal Aortic Aneurysm (AAA) Screening:    Risk factors include: age between 70-77 yo and tobacco use        Lung Cancer Screening:     General: Screening Not Indicated      Hepatitis C Screening:    General: Screening Current    Screening, Brief Intervention, and Referral to Treatment (SBIRT)    Screening  Typical number of drinks in a day: 0  Typical number of drinks in a week: 0  Interpretation: Low risk drinking behavior. Single Item Drug Screening:  How often have you used an illegal drug (including marijuana) or a prescription medication for non-medical reasons in the past year? daily or almost daily    Single Item Drug Screen Score: 4  Interpretation: POSITIVE screen for possible drug use disorder    Drug Abuse Screening Test (DAST-10):  1) Have you used drugs other than those required for medical reasons? No  2) Do you abuse more than one drug at a time? No  3) Are you always able to stop using drugs when you want to? Yes  4) Have you had "blackouts" or "flashbacks" as a result of drug use? No  5) Do you ever feel bad or guilty about your drug use? No  6) Does your spouse (or parents) ever complain about your involvement with drugs?  No  7) Have you neglected your family because of your use of drugs? No  8) Have you engaged in illegal activities in order to obtain drugs? No  9) Have you ever experienced withdrawal symptoms (felt sick) when you stopped taking drugs? Yes  10) Have you had medical problems as a result of your drug use (e.g., memory loss, hepatitis, convulsions, bleeding, etc.)? No    DAST-10 Score: 1  Interpretation: Low level problems related to drug abuse    Brief Intervention  Alcohol & drug use screenings were reviewed. No concerns regarding substance use disorder identified. Time Spent  Time spent screening/evaluating the patient for alcohol misuse: 5 minutes. Annual Depression Screening  Time spent screening and evaluating the patient for depression during today's encounter was 5 minutes. Other Counseling Topics:   Car/seat belt/driving safety, skin self-exam, sunscreen and calcium and vitamin D intake and regular weightbearing exercise. No results found. Physical Exam:     /70 (BP Location: Left arm, Patient Position: Sitting, Cuff Size: Large)   Pulse 72   Temp 98 °F (36.7 °C)   Resp 18   Ht 5' 9.5" (1.765 m)   Wt 92.1 kg (203 lb)   SpO2 96%   BMI 29.55 kg/m²     Physical Exam  Vitals and nursing note reviewed. Constitutional:       Appearance: Normal appearance. He is well-developed. HENT:      Head: Normocephalic and atraumatic. Eyes:      Extraocular Movements: Extraocular movements intact. Pupils: Pupils are equal, round, and reactive to light. Cardiovascular:      Rate and Rhythm: Normal rate and regular rhythm. Pulmonary:      Effort: Pulmonary effort is normal.      Breath sounds: Normal breath sounds. Abdominal:      General: Bowel sounds are normal.      Palpations: Abdomen is soft. Musculoskeletal:      Cervical back: Normal range of motion. Skin:     General: Skin is warm and dry. Neurological:      General: No focal deficit present. Mental Status: He is alert and oriented to person, place, and time. Psychiatric:         Mood and Affect: Mood normal.         Speech: Speech normal.         Behavior: Behavior normal.          Evelyn Andres MD

## 2023-10-18 NOTE — PATIENT INSTRUCTIONS
Medicare Preventive Visit Patient Instructions  Thank you for completing your Welcome to Medicare Visit or Medicare Annual Wellness Visit today. Your next wellness visit will be due in one year (10/18/2024). The screening/preventive services that you may require over the next 5-10 years are detailed below. Some tests may not apply to you based off risk factors and/or age. Screening tests ordered at today's visit but not completed yet may show as past due. Also, please note that scanned in results may not display below. Preventive Screenings:  Service Recommendations Previous Testing/Comments   Colorectal Cancer Screening  Colonoscopy    Fecal Occult Blood Test (FOBT)/Fecal Immunochemical Test (FIT)  Fecal DNA/Cologuard Test  Flexible Sigmoidoscopy Age: 43-73 years old   Colonoscopy: every 10 years (May be performed more frequently if at higher risk)  OR  FOBT/FIT: every 1 year  OR  Cologuard: every 3 years  OR  Sigmoidoscopy: every 5 years  Screening may be recommended earlier than age 39 if at higher risk for colorectal cancer. Also, an individualized decision between you and your healthcare provider will decide whether screening between the ages of 77-80 would be appropriate.  Colonoscopy: 03/23/2018  FOBT/FIT: Not on file  Cologuard: Not on file  Sigmoidoscopy: Not on file    Screening Current     Prostate Cancer Screening Individualized decision between patient and health care provider in men between ages of 53-66   Medicare will cover every 12 months beginning on the day after your 50th birthday PSA: No results in last 5 years           Hepatitis C Screening Once for adults born between 1945 and 1965  More frequently in patients at high risk for Hepatitis C Hep C Antibody: 12/31/2019    Screening Current   Diabetes Screening 1-2 times per year if you're at risk for diabetes or have pre-diabetes Fasting glucose: 134 mg/dL (9/7/2022)  A1C: 7.1 % (9/7/2022)  Screening Not Indicated  History Diabetes   Cholesterol Screening Once every 5 years if you don't have a lipid disorder. May order more often based on risk factors. Lipid panel: 09/07/2022  Screening Not Indicated  History Lipid Disorder      Other Preventive Screenings Covered by Medicare:  Abdominal Aortic Aneurysm (AAA) Screening: covered once if your at risk. You're considered to be at risk if you have a family history of AAA or a male between the age of 70-76 who smoking at least 100 cigarettes in your lifetime. Lung Cancer Screening: covers low dose CT scan once per year if you meet all of the following conditions: (1) Age 48-67; (2) No signs or symptoms of lung cancer; (3) Current smoker or have quit smoking within the last 15 years; (4) You have a tobacco smoking history of at least 20 pack years (packs per day x number of years you smoked); (5) You get a written order from a healthcare provider. Glaucoma Screening: covered annually if you're considered high risk: (1) You have diabetes OR (2) Family history of glaucoma OR (3)  aged 48 and older OR (3)  American aged 72 and older  Osteoporosis Screening: covered every 2 years if you meet one of the following conditions: (1) Have a vertebral abnormality; (2) On glucocorticoid therapy for more than 3 months; (3) Have primary hyperparathyroidism; (4) On osteoporosis medications and need to assess response to drug therapy. HIV Screening: covered annually if you're between the age of 14-79. Also covered annually if you are younger than 13 and older than 72 with risk factors for HIV infection. For pregnant patients, it is covered up to 3 times per pregnancy.     Immunizations:  Immunization Recommendations   Influenza Vaccine Annual influenza vaccination during flu season is recommended for all persons aged >= 6 months who do not have contraindications   Pneumococcal Vaccine   * Pneumococcal conjugate vaccine = PCV13 (Prevnar 13), PCV15 (Vaxneuvance), PCV20 (Prevnar 20)  * Pneumococcal polysaccharide vaccine = PPSV23 (Pneumovax) Adults 90-23 yo with certain risk factors or if 69+ yo  If never received any pneumonia vaccine: recommend Prevnar 20 (PCV20)  Give PCV20 if previously received 1 dose of PCV13 or PPSV23   Hepatitis B Vaccine 3 dose series if at intermediate or high risk (ex: diabetes, end stage renal disease, liver disease)   Respiratory syncytial virus (RSV) Vaccine - COVERED BY MEDICARE PART D  * RSVPreF3 (Arexvy) CDC recommends that adults 61years of age and older may receive a single dose of RSV vaccine using shared clinical decision-making (SCDM)   Tetanus (Td) Vaccine - COST NOT COVERED BY MEDICARE PART B Following completion of primary series, a booster dose should be given every 10 years to maintain immunity against tetanus. Td may also be given as tetanus wound prophylaxis. Tdap Vaccine - COST NOT COVERED BY MEDICARE PART B Recommended at least once for all adults. For pregnant patients, recommended with each pregnancy. Shingles Vaccine (Shingrix) - COST NOT COVERED BY MEDICARE PART B  2 shot series recommended in those 19 years and older who have or will have weakened immune systems or those 50 years and older     Health Maintenance Due:      Topic Date Due   • Colorectal Cancer Screening  03/23/2028   • Hepatitis C Screening  Completed     Immunizations Due:      Topic Date Due   • Pneumococcal Vaccine: 65+ Years (1 - PCV) Never done   • COVID-19 Vaccine (3 - Pfizer series) 02/09/2022   • Influenza Vaccine (1) Never done     Advance Directives   What are advance directives? Advance directives are legal documents that state your wishes and plans for medical care. These plans are made ahead of time in case you lose your ability to make decisions for yourself. Advance directives can apply to any medical decision, such as the treatments you want, and if you want to donate organs. What are the types of advance directives?   There are many types of advance directives, and each state has rules about how to use them. You may choose a combination of any of the following:  Living will: This is a written record of the treatment you want. You can also choose which treatments you do not want, which to limit, and which to stop at a certain time. This includes surgery, medicine, IV fluid, and tube feedings. Durable power of  for [facility]): This is a written record that states who you want to make healthcare choices for you when you are unable to make them for yourself. This person, called a proxy, is usually a family member or a friend. You may choose more than 1 proxy. Do not resuscitate (DNR) order:  A DNR order is used in case your heart stops beating or you stop breathing. It is a request not to have certain forms of treatment, such as CPR. A DNR order may be included in other types of advance directives. Medical directive: This covers the care that you want if you are in a coma, near death, or unable to make decisions for yourself. You can list the treatments you want for each condition. Treatment may include pain medicine, surgery, blood transfusions, dialysis, IV or tube feedings, and a ventilator (breathing machine). Values history: This document has questions about your views, beliefs, and how you feel and think about life. This information can help others choose the care that you would choose. Why are advance directives important? An advance directive helps you control your care. Although spoken wishes may be used, it is better to have your wishes written down. Spoken wishes can be misunderstood, or not followed. Treatments may be given even if you do not want them. An advance directive may make it easier for your family to make difficult choices about your care.    Weight Management   Why it is important to manage your weight:  Being overweight increases your risk of health conditions such as heart disease, high blood pressure, type 2 diabetes, and certain types of cancer. It can also increase your risk for osteoarthritis, sleep apnea, and other respiratory problems. Aim for a slow, steady weight loss. Even a small amount of weight loss can lower your risk of health problems. How to lose weight safely:  A safe and healthy way to lose weight is to eat fewer calories and get regular exercise. You can lose up about 1 pound a week by decreasing the number of calories you eat by 500 calories each day. Healthy meal plan for weight management:  A healthy meal plan includes a variety of foods, contains fewer calories, and helps you stay healthy. A healthy meal plan includes the following:  Eat whole-grain foods more often. A healthy meal plan should contain fiber. Fiber is the part of grains, fruits, and vegetables that is not broken down by your body. Whole-grain foods are healthy and provide extra fiber in your diet. Some examples of whole-grain foods are whole-wheat breads and pastas, oatmeal, brown rice, and bulgur. Eat a variety of vegetables every day. Include dark, leafy greens such as spinach, kale, reanna greens, and mustard greens. Eat yellow and orange vegetables such as carrots, sweet potatoes, and winter squash. Eat a variety of fruits every day. Choose fresh or canned fruit (canned in its own juice or light syrup) instead of juice. Fruit juice has very little or no fiber. Eat low-fat dairy foods. Drink fat-free (skim) milk or 1% milk. Eat fat-free yogurt and low-fat cottage cheese. Try low-fat cheeses such as mozzarella and other reduced-fat cheeses. Choose meat and other protein foods that are low in fat. Choose beans or other legumes such as split peas or lentils. Choose fish, skinless poultry (chicken or turkey), or lean cuts of red meat (beef or pork). Before you cook meat or poultry, cut off any visible fat. Use less fat and oil. Try baking foods instead of frying them.  Add less fat, such as margarine, sour cream, regular salad dressing and mayonnaise to foods. Eat fewer high-fat foods. Some examples of high-fat foods include french fries, doughnuts, ice cream, and cakes. Eat fewer sweets. Limit foods and drinks that are high in sugar. This includes candy, cookies, regular soda, and sweetened drinks. Exercise:  Exercise at least 30 minutes per day on most days of the week. Some examples of exercise include walking, biking, dancing, and swimming. You can also fit in more physical activity by taking the stairs instead of the elevator or parking farther away from stores. Ask your healthcare provider about the best exercise plan for you. © Copyright [employer] 2018 Information is for End User's use only and may not be sold, redistributed or otherwise used for commercial purposes.  All illustrations and images included in CareNotes® are the copyrighted property of [employer] Inc. or [address] [place] [address]

## 2023-12-19 LAB
LEFT EYE DIABETIC RETINOPATHY: NORMAL
RIGHT EYE DIABETIC RETINOPATHY: NORMAL
SEVERITY (EYE EXAM): NORMAL

## 2024-01-23 ENCOUNTER — VBI (OUTPATIENT)
Dept: ADMINISTRATIVE | Facility: OTHER | Age: 69
End: 2024-01-23

## 2024-01-23 NOTE — TELEPHONE ENCOUNTER
Upon review of the In Basket request we were able to locate, review, and update the patient chart as requested for Diabetic Eye Exam.    Any additional questions or concerns should be emailed to the Practice Liaisons via the appropriate education email address, please do not reply via In Basket.    Thank you  Liz Vaughan MA       Upon review of the In Basket request we were able to locate, review, and update the patient chart as requested for Diabetic Eye Exam.    Any additional questions or concerns should be emailed to the Practice Liaisons via the appropriate education email address, please do not reply via In Basket.    Thank you  Liz Vaughan MA

## 2024-03-15 DIAGNOSIS — F31.9 BIPOLAR 1 DISORDER (HCC): ICD-10-CM

## 2024-03-15 RX ORDER — DULOXETIN HYDROCHLORIDE 60 MG/1
CAPSULE, DELAYED RELEASE ORAL
Qty: 90 CAPSULE | Refills: 1 | Status: SHIPPED | OUTPATIENT
Start: 2024-03-15

## 2024-03-15 RX ORDER — QUETIAPINE FUMARATE 100 MG/1
TABLET, FILM COATED ORAL
Qty: 90 TABLET | Refills: 1 | Status: SHIPPED | OUTPATIENT
Start: 2024-03-15

## 2024-03-21 ENCOUNTER — TELEPHONE (OUTPATIENT)
Dept: NEUROSURGERY | Facility: CLINIC | Age: 69
End: 2024-03-21

## 2024-04-25 ENCOUNTER — TELEPHONE (OUTPATIENT)
Dept: NEUROSURGERY | Facility: CLINIC | Age: 69
End: 2024-04-25

## 2024-04-29 ENCOUNTER — APPOINTMENT (OUTPATIENT)
Dept: LAB | Facility: CLINIC | Age: 69
End: 2024-04-29
Payer: MEDICARE

## 2024-04-29 DIAGNOSIS — N18.31 STAGE 3A CHRONIC KIDNEY DISEASE (HCC): ICD-10-CM

## 2024-04-29 DIAGNOSIS — E11.9 TYPE 2 DIABETES MELLITUS WITHOUT COMPLICATION, WITHOUT LONG-TERM CURRENT USE OF INSULIN (HCC): ICD-10-CM

## 2024-04-29 DIAGNOSIS — D49.7 INTRADURAL EXTRAMEDULLARY THORACIC TUMOR: ICD-10-CM

## 2024-04-29 LAB
ALBUMIN SERPL BCP-MCNC: 4.5 G/DL (ref 3.5–5)
ALP SERPL-CCNC: 56 U/L (ref 34–104)
ALT SERPL W P-5'-P-CCNC: 8 U/L (ref 7–52)
ANION GAP SERPL CALCULATED.3IONS-SCNC: 10 MMOL/L (ref 4–13)
AST SERPL W P-5'-P-CCNC: 16 U/L (ref 13–39)
BILIRUB SERPL-MCNC: 0.29 MG/DL (ref 0.2–1)
BUN SERPL-MCNC: 18 MG/DL (ref 5–25)
CALCIUM SERPL-MCNC: 9.7 MG/DL (ref 8.4–10.2)
CHLORIDE SERPL-SCNC: 106 MMOL/L (ref 96–108)
CHOLEST SERPL-MCNC: 111 MG/DL
CO2 SERPL-SCNC: 27 MMOL/L (ref 21–32)
CREAT SERPL-MCNC: 1.18 MG/DL (ref 0.6–1.3)
CREAT UR-MCNC: 241 MG/DL
ERYTHROCYTE [DISTWIDTH] IN BLOOD BY AUTOMATED COUNT: 13.9 % (ref 11.6–15.1)
EST. AVERAGE GLUCOSE BLD GHB EST-MCNC: 131 MG/DL
GFR SERPL CREATININE-BSD FRML MDRD: 62 ML/MIN/1.73SQ M
GLUCOSE P FAST SERPL-MCNC: 99 MG/DL (ref 65–99)
HBA1C MFR BLD: 6.2 %
HCT VFR BLD AUTO: 47.5 % (ref 36.5–49.3)
HDLC SERPL-MCNC: 32 MG/DL
HGB BLD-MCNC: 15.3 G/DL (ref 12–17)
LDLC SERPL CALC-MCNC: 56 MG/DL (ref 0–100)
MCH RBC QN AUTO: 29.9 PG (ref 26.8–34.3)
MCHC RBC AUTO-ENTMCNC: 32.2 G/DL (ref 31.4–37.4)
MCV RBC AUTO: 93 FL (ref 82–98)
MICROALBUMIN UR-MCNC: 15.6 MG/L
MICROALBUMIN/CREAT 24H UR: 6 MG/G CREATININE (ref 0–30)
NONHDLC SERPL-MCNC: 79 MG/DL
PLATELET # BLD AUTO: 263 THOUSANDS/UL (ref 149–390)
PMV BLD AUTO: 11.6 FL (ref 8.9–12.7)
POTASSIUM SERPL-SCNC: 4.2 MMOL/L (ref 3.5–5.3)
PROT SERPL-MCNC: 7.7 G/DL (ref 6.4–8.4)
RBC # BLD AUTO: 5.11 MILLION/UL (ref 3.88–5.62)
SODIUM SERPL-SCNC: 143 MMOL/L (ref 135–147)
TRIGL SERPL-MCNC: 115 MG/DL
WBC # BLD AUTO: 11.39 THOUSAND/UL (ref 4.31–10.16)

## 2024-04-29 PROCEDURE — 82570 ASSAY OF URINE CREATININE: CPT

## 2024-04-29 PROCEDURE — 80053 COMPREHEN METABOLIC PANEL: CPT

## 2024-04-29 PROCEDURE — 85027 COMPLETE CBC AUTOMATED: CPT

## 2024-04-29 PROCEDURE — 83036 HEMOGLOBIN GLYCOSYLATED A1C: CPT

## 2024-04-29 PROCEDURE — 36415 COLL VENOUS BLD VENIPUNCTURE: CPT

## 2024-04-29 PROCEDURE — 80061 LIPID PANEL: CPT

## 2024-04-29 PROCEDURE — 82043 UR ALBUMIN QUANTITATIVE: CPT

## 2024-04-30 ENCOUNTER — HOSPITAL ENCOUNTER (OUTPATIENT)
Dept: MRI IMAGING | Facility: HOSPITAL | Age: 69
Discharge: HOME | End: 2024-04-30
Attending: NEUROLOGICAL SURGERY
Payer: MEDICARE

## 2024-04-30 DIAGNOSIS — D48.9 MATURE TERATOMA: ICD-10-CM

## 2024-04-30 DIAGNOSIS — D49.7 INTRADURAL EXTRAMEDULLARY THORACIC TUMOR: ICD-10-CM

## 2024-04-30 PROCEDURE — 72158 MRI LUMBAR SPINE W/O & W/DYE: CPT

## 2024-04-30 PROCEDURE — A9585 GADOBUTROL INJECTION: HCPCS | Performed by: FAMILY MEDICINE

## 2024-04-30 PROCEDURE — G1004 CDSM NDSC: HCPCS

## 2024-04-30 RX ORDER — GADOBUTROL 604.72 MG/ML
9 INJECTION INTRAVENOUS
Status: COMPLETED | OUTPATIENT
Start: 2024-04-30 | End: 2024-04-30

## 2024-04-30 RX ADMIN — GADOBUTROL 9 ML: 604.72 INJECTION INTRAVENOUS at 15:10

## 2024-05-14 ENCOUNTER — TELEPHONE (OUTPATIENT)
Dept: NEUROSURGERY | Facility: CLINIC | Age: 69
End: 2024-05-14

## 2024-05-14 ENCOUNTER — TELEMEDICINE (OUTPATIENT)
Dept: NEUROSURGERY | Facility: CLINIC | Age: 69
End: 2024-05-14
Payer: MEDICARE

## 2024-05-14 DIAGNOSIS — D48.9 MATURE TERATOMA: ICD-10-CM

## 2024-05-14 DIAGNOSIS — D49.7 INTRADURAL EXTRAMEDULLARY THORACIC TUMOR: Primary | ICD-10-CM

## 2024-05-14 PROCEDURE — G2012 BRIEF CHECK IN BY MD/QHP: HCPCS | Performed by: NEUROLOGICAL SURGERY

## 2024-05-14 NOTE — PROGRESS NOTES
Virtual Regular Visit    Verification of patient location:    Patient is located at Home in the following state in which I hold an active license [place]      Assessment/Plan:    Problem List Items Addressed This Visit        Nervous and Auditory    Intradural extramedullary thoracic tumor - Primary    Relevant Orders    MRI lumbar spine with and without contrast       Oncology    Mature teratoma    Relevant Orders    MRI lumbar spine with and without contrast     S/p resection of IDEM mass on 1/2/20 via T12 laminectomy    Final path is mature teratoma. I believe we achieved a gross total resection.      Overall, seems to be doing well. Some tailbone pain, which he states is unchanged. No difficulty with walking or bladder function     His follow-up MRI does not show any residual or recurrence.  I discussed with him previously that there was a mention of tethering, although this is not different than his prior studies.  Again, he has no signs or symptoms of neurologic worsening i.e. weakness numbness tingling gait dysfunction etc.  He should not need adjuvant therapies.      We discussed timing of surveillance imaging, and we have agreed upon doing his next scan in 2 years. I will place that order, we will schedule for that.  We will follow-up with him once the study is complete.     12/30/20 Metrics: EQ5D5L 83821=8.809; VAS 80; ECOG 1; KPS 80     02/09/22 Metrics: EQ5D5L 90026=7.861; VAS 80; ECOG 1; KPS 80     05/05/23 Metrics: EQ5D5L 71812=8.820; VAS 75    05/14/24 Metrics: EQ5D5L 45168=1.820; VAS 75         Reason for visit is   Chief Complaint   Patient presents with   • Virtual Regular Visit          Encounter provider Alan Howe MD      Recent Visits  No visits were found meeting these conditions.  Showing recent visits within past 7 days and meeting all other requirements  Today's Visits  Date Type Provider Dept   05/14/24 Telephone Coni Sosa  [facility]   05/14/24 Telemedicine Alan  Pipo Howe MD [facility]   Showing today's visits and meeting all other requirements  Future Appointments  No visits were found meeting these conditions.  Showing future appointments within next 150 days and meeting all other requirements       The patient was identified by name and date of birth. Jamison Chi Sr. was informed that this is a telemedicine visit and that the visit is being conducted through Telephone.  My office door was closed. No one else was in the room.  He acknowledged consent and understanding of privacy and security of the video platform. The patient has agreed to participate and understands they can discontinue the visit at any time.    It was my intent to perform this visit via video technology but the patient was not able to do a video connection so the visit was completed via audio telephone only.    Patient is aware this is a billable service.     Subjective  Jamison Chi Sr. is a 68 y.o. male  .      HPI     Past Medical History:   Diagnosis Date   • Anxiety    • Bipolar depression (HCC)     bipolar disorder   • Bipolar depression (HCC)     bipolar disorder   • Borderline diabetes mellitus    • Borderline diabetes mellitus    • Brittle nails    • Chronic back pain    • Chronic pain    • Colon polyp    • Depression    • Diabetes (HCC)    • Dizziness 06/04/2018   • Foot pain     unspecified laterality- per [employer]    • Gallstones    • Gastroesophageal reflux disease with esophagitis 01/21/2018   • HTN (hypertension) 01/21/2018   • Intradural extramedullary spinal tumor    • Leukocytosis 09/24/2018   • Onychomycosis    • Restless leg syndrome    • Sessile rectal polyp 01/30/2018   • Severe episode of recurrent major depressive disorder, without psychotic features (HCC) 09/21/2018   • Sinus tachycardia 09/20/2018   • Spinal arachnoid cyst    • Stage 2 chronic kidney disease 08/27/2019   • Tubular adenoma of colon 04/30/2018       Past Surgical History:  "  Procedure Laterality Date   • COLONOSCOPY     • INCISION AND DRAINAGE ABSCESS / HEMATOMA OF BURSA / KNEE / THIGH  in 1990's    outer right leg and center of upper back   complicated  resolved status   • OTHER SURGICAL HISTORY  11/2019    cyst excision from back by Dermatolgist at office of Dr. Up   • [place] COLONOSCOPY FLX DX W/COLLJ SPEC WHEN PFRMD N/A 8/31/2017    Procedure: COLONOSCOPY;  Surgeon: Ata Rodriges MD;  Location: [facility];  Service: Gastroenterology   • AZ COLONOSCOPY FLX DX W/COLLJ SPEC WHEN PFRMD N/A 3/23/2018    Procedure: COLONOSCOPY;  Surgeon: Ata Rodriges MD;  Location: [facility];  Service: Gastroenterology   • AZ MARISCAL BX/EXC ISPI MIKKI IDRL XMED LUMBAR N/A 1/2/2020    Procedure: T12 LAMINECTOMY FOR RESECTION OF INTRADURAL-EXTRAMEDULLARY MASS;  Surgeon: Alan Howe MD;  Location: [facility];  Service: Neurosurgery   • TUMOR REMOVAL  1990's    \"Cheese tumor\" removed from back       Current Outpatient Medications   Medication Sig Dispense Refill   • atorvastatin (LIPITOR) 20 mg tablet TAKE 1 TABLET(20 MG) BY MOUTH EVERY EVENING 90 tablet 1   • DULoxetine (CYMBALTA) 60 mg delayed release capsule TAKE 1 CAPSULE(60 MG) BY MOUTH DAILY AT BEDTIME 90 capsule 1   • metFORMIN (GLUCOPHAGE) 500 mg tablet TAKE 1 TABLET(500 MG) BY MOUTH TWICE DAILY WITH MEALS 60 tablet 0   • QUEtiapine (SEROquel) 100 mg tablet TAKE 1 TABLET(100 MG) BY MOUTH DAILY AT BEDTIME 90 tablet 1   • methocarbamol (ROBAXIN) 750 mg tablet Take 1 tablet (750 mg total) by mouth every 12 (twelve) hours as needed for muscle spasms (Patient not taking: Reported on 5/5/2023) 45 tablet 0     No current facility-administered medications for this visit.        No Known Allergies    Review of Systems   Constitutional: Negative.    HENT: Negative.     Eyes:  Positive for visual disturbance (unchanged since last visit, occasional floaters).   Respiratory: Negative.     Cardiovascular: Negative.    Gastrointestinal: Negative.         Acid reflux "   Endocrine: Negative.    Genitourinary: Negative.         Nocturia 3-4x   Musculoskeletal:  Positive for back pain (currently only having centered of lower back/tailbone, otherwise still improved since last visit, across lower back radiates into bilateral hips) and gait problem (unchanged since last visit, no recent falls, once in a while).   Skin: Negative.    Allergic/Immunologic: Negative.    Neurological:  Positive for weakness (more of a stiffness in the bilateral arms/legs) and numbness (unchanged since last visit, bilateral hands/fingers). Negative for dizziness, seizures, syncope and headaches.   Hematological: Negative.    Psychiatric/Behavioral: Negative.         Video Exam    There were no vitals filed for this visit.    Physical Exam     MDM    MRI lumbar spine with and without contrast    Result Date: 5/7/2024  Narrative: MRI LUMBAR SPINE WITH AND WITHOUT CONTRAST INDICATION: D49.7: Neoplasm of unspecified behavior of endocrine glands and other parts of nervous system D48.9: Neoplasm of uncertain behavior, unspecified.   Status post resection of T12 intradural extramedullary teratoma. COMPARISON: Several prior MRIs most recently performed on 4/20/2023 TECHNIQUE:  Multiplanar, multisequence imaging of the lumbar spine was performed before and after gadolinium administration. . IV Contrast:  9 mL of Gadobutrol injection (SINGLE-DOSE) IMAGE QUALITY:  Diagnostic FINDINGS: VERTEBRAL BODIES:  There are 5 lumbar type vertebral bodies. No spondylolysis or spondylolisthesis. No scoliosis. No compression. Hemangiomas in the T10 and T12 vertebral bodies. Mild Modic type I endplate degenerative changes at L3-4. No suspicious marrow signal abnormality.. Status post T12 laminectomy SACRUM:  Normal signal within the sacrum. No evidence of insufficiency or stress fracture. DISTAL CORD AND CONUS: Stable appearance of the cord at T12 that appears adherent to the dura posteriorly with small linear focus of T2  hyperintensity. No residual or recurrent mass.. Stable linear enhancement along the dorsal cord favored to be vascular. PARASPINAL SOFT TISSUES:  Paraspinal soft tissues are unremarkable. LOWER THORACIC DISC SPACES: No disc herniation, canal or foraminal stenosis. T12 laminectomy LUMBAR DISC SPACES: L1-L2: No disc herniation, canal or foraminal stenosis. L2-L3: No disc herniation, canal or foraminal stenosis. Mild facet arthropathy. L3-L4: Small disc bulge and mild facet arthropathy. No significant canal or foraminal stenosis L4-L5: Small disc bulge with tiny central protrusion. Mild facet arthropathy. No significant canal or foraminal stenosis. L5-S1: Minimal disc bulge. Mild facet arthropathy. No significant canal or foraminal stenosis POSTCONTRAST IMAGING:  No abnormal enhancement. OTHER FINDINGS: Right renal cyst.     Impression: Stable postoperative changes status post T12 laminectomy for resection of mass. Stable appearance of the distal cord at T12 that is tethered posteriorly. No residual/recurrent mass. Stable mild degenerative spondylosis Workstation performed: [id number]      Study and report as above personally reviewed.

## 2024-05-14 NOTE — TELEPHONE ENCOUNTER
Patient was unable to connect virtually.    HDM completed visit as telephone call.    I informed patient moving forward future visits will have to be in office at the [facility] as it is closest to his home. Patient agreed.

## 2024-05-14 NOTE — PROGRESS NOTES
Virtual Regular Visit    Verification of patient location:    Patient is located at Home in the following state in which I hold an active license [place]      Assessment/Plan:    Problem List Items Addressed This Visit          Nervous and Auditory    Intradural extramedullary thoracic tumor - Primary    Relevant Orders    MRI lumbar spine with and without contrast       Oncology    Mature teratoma    Relevant Orders    MRI lumbar spine with and without contrast     S/p resection of IDEM mass on 1/2/20 via T12 laminectomy    Final path is mature teratoma. I believe we achieved a gross total resection.      Overall, seems to be doing well. Some tailbone pain, which he states is unchanged. No difficulty with walking or bladder function     His follow-up MRI does not show any residual or recurrence.  I discussed with him previously that there was a mention of tethering, although this is not different than his prior studies.  Again, he has no signs or symptoms of neurologic worsening i.e. weakness numbness tingling gait dysfunction etc.  He should not need adjuvant therapies.      We discussed timing of surveillance imaging, and we have agreed upon doing his next scan in 2 years. I will place that order, we will schedule for that.  We will follow-up with him once the study is complete.     12/30/20 Metrics: EQ5D5L 74291=3.809; VAS 80; ECOG 1; KPS 80     02/09/22 Metrics: EQ5D5L 35025=5.861; VAS 80; ECOG 1; KPS 80     05/05/23 Metrics: EQ5D5L 79671=4.820; VAS 75    05/14/24 Metrics: EQ5D5L 89647=0.820; VAS 75         Reason for visit is   Chief Complaint   Patient presents with    Virtual Regular Visit          Encounter provider Alan Howe MD      Recent Visits  No visits were found meeting these conditions.  Showing recent visits within past 7 days and meeting all other requirements  Today's Visits  Date Type Provider Dept   05/14/24 Telephone Coni Sosa  [facility]   05/14/24 Telemedicine Alan  Pipo Howe MD [facility]   Showing today's visits and meeting all other requirements  Future Appointments  No visits were found meeting these conditions.  Showing future appointments within next 150 days and meeting all other requirements       The patient was identified by name and date of birth. Jamison Chi Sr. was informed that this is a telemedicine visit and that the visit is being conducted through Telephone.  My office door was closed. No one else was in the room.  He acknowledged consent and understanding of privacy and security of the video platform. The patient has agreed to participate and understands they can discontinue the visit at any time.    It was my intent to perform this visit via video technology but the patient was not able to do a video connection so the visit was completed via audio telephone only.    Patient is aware this is a billable service.     Subjective  Jamison Chi Sr. is a 68 y.o. male  .      HPI     Past Medical History:   Diagnosis Date    Anxiety     Bipolar depression (HCC)     bipolar disorder    Bipolar depression (HCC)     bipolar disorder    Borderline diabetes mellitus     Borderline diabetes mellitus     Brittle nails     Chronic back pain     Chronic pain     Colon polyp     Depression     Diabetes (HCC)     Dizziness 06/04/2018    Foot pain     unspecified laterality- per [employer]     Gallstones     Gastroesophageal reflux disease with esophagitis 01/21/2018    HTN (hypertension) 01/21/2018    Intradural extramedullary spinal tumor     Leukocytosis 09/24/2018    Onychomycosis     Restless leg syndrome     Sessile rectal polyp 01/30/2018    Severe episode of recurrent major depressive disorder, without psychotic features (HCC) 09/21/2018    Sinus tachycardia 09/20/2018    Spinal arachnoid cyst     Stage 2 chronic kidney disease 08/27/2019    Tubular adenoma of colon 04/30/2018       Past Surgical History:   Procedure Laterality Date     "COLONOSCOPY      INCISION AND DRAINAGE ABSCESS / HEMATOMA OF BURSA / KNEE / THIGH  in 1990's    outer right leg and center of upper back   complicated  resolved status    OTHER SURGICAL HISTORY  11/2019    cyst excision from back by Dermatolgist at office of Dr. Up    [place] COLONOSCOPY FLX DX W/COLLJ SPEC WHEN PFRMD N/A 8/31/2017    Procedure: COLONOSCOPY;  Surgeon: Ata Rodriges MD;  [facility]: [facility];  Service: Gastroenterology    NM COLONOSCOPY FLX DX W/COLLJ SPEC WHEN PFRMD N/A 3/23/2018    Procedure: COLONOSCOPY;  Surgeon: Ata Rodriges MD;  [facility];  Service: Gastroenterology    NM MARISCAL BX/EXC ISPI MIKKI IDRL XMED LUMBAR N/A 1/2/2020    Procedure: T12 LAMINECTOMY FOR RESECTION OF INTRADURAL-EXTRAMEDULLARY MASS;  Surgeon: Alan Howe MD;  [facility];  Service: Neurosurgery    TUMOR REMOVAL  1990's    \"Cheese tumor\" removed from back       Current Outpatient Medications   Medication Sig Dispense Refill    atorvastatin (LIPITOR) 20 mg tablet TAKE 1 TABLET(20 MG) BY MOUTH EVERY EVENING 90 tablet 1    DULoxetine (CYMBALTA) 60 mg delayed release capsule TAKE 1 CAPSULE(60 MG) BY MOUTH DAILY AT BEDTIME 90 capsule 1    metFORMIN (GLUCOPHAGE) 500 mg tablet TAKE 1 TABLET(500 MG) BY MOUTH TWICE DAILY WITH MEALS 60 tablet 0    QUEtiapine (SEROquel) 100 mg tablet TAKE 1 TABLET(100 MG) BY MOUTH DAILY AT BEDTIME 90 tablet 1    methocarbamol (ROBAXIN) 750 mg tablet Take 1 tablet (750 mg total) by mouth every 12 (twelve) hours as needed for muscle spasms (Patient not taking: Reported on 5/5/2023) 45 tablet 0     No current facility-administered medications for this visit.        No Known Allergies    Review of Systems   Constitutional: Negative.    HENT: Negative.     Eyes:  Positive for visual disturbance (unchanged since last visit, occasional floaters).   Respiratory: Negative.     Cardiovascular: Negative.    Gastrointestinal: Negative.         Acid reflux   Endocrine: Negative.    Genitourinary: " Negative.         Nocturia 3-4x   Musculoskeletal:  Positive for back pain (currently only having centered of lower back/tailbone, otherwise still improved since last visit, across lower back radiates into bilateral hips) and gait problem (unchanged since last visit, no recent falls, once in a while).   Skin: Negative.    Allergic/Immunologic: Negative.    Neurological:  Positive for weakness (more of a stiffness in the bilateral arms/legs) and numbness (unchanged since last visit, bilateral hands/fingers). Negative for dizziness, seizures, syncope and headaches.   Hematological: Negative.    Psychiatric/Behavioral: Negative.         Video Exam    There were no vitals filed for this visit.    Physical Exam     MDM    MRI lumbar spine with and without contrast    Result Date: 5/7/2024  Narrative: MRI LUMBAR SPINE WITH AND WITHOUT CONTRAST INDICATION: D49.7: Neoplasm of unspecified behavior of endocrine glands and other parts of nervous system D48.9: Neoplasm of uncertain behavior, unspecified.   Status post resection of T12 intradural extramedullary teratoma. COMPARISON: Several prior MRIs most recently performed on 4/20/2023 TECHNIQUE:  Multiplanar, multisequence imaging of the lumbar spine was performed before and after gadolinium administration. . IV Contrast:  9 mL of Gadobutrol injection (SINGLE-DOSE) IMAGE QUALITY:  Diagnostic FINDINGS: VERTEBRAL BODIES:  There are 5 lumbar type vertebral bodies. No spondylolysis or spondylolisthesis. No scoliosis. No compression. Hemangiomas in the T10 and T12 vertebral bodies. Mild Modic type I endplate degenerative changes at L3-4. No suspicious marrow signal abnormality.. Status post T12 laminectomy SACRUM:  Normal signal within the sacrum. No evidence of insufficiency or stress fracture. DISTAL CORD AND CONUS: Stable appearance of the cord at T12 that appears adherent to the dura posteriorly with small linear focus of T2 hyperintensity. No residual or recurrent mass..  Stable linear enhancement along the dorsal cord favored to be vascular. PARASPINAL SOFT TISSUES:  Paraspinal soft tissues are unremarkable. LOWER THORACIC DISC SPACES: No disc herniation, canal or foraminal stenosis. T12 laminectomy LUMBAR DISC SPACES: L1-L2: No disc herniation, canal or foraminal stenosis. L2-L3: No disc herniation, canal or foraminal stenosis. Mild facet arthropathy. L3-L4: Small disc bulge and mild facet arthropathy. No significant canal or foraminal stenosis L4-L5: Small disc bulge with tiny central protrusion. Mild facet arthropathy. No significant canal or foraminal stenosis. L5-S1: Minimal disc bulge. Mild facet arthropathy. No significant canal or foraminal stenosis POSTCONTRAST IMAGING:  No abnormal enhancement. OTHER FINDINGS: Right renal cyst.     Impression: Stable postoperative changes status post T12 laminectomy for resection of mass. Stable appearance of the distal cord at T12 that is tethered posteriorly. No residual/recurrent mass. Stable mild degenerative spondylosis Workstation performed: [id number]      Study and report as above personally reviewed.

## 2024-05-14 NOTE — TELEPHONE ENCOUNTER
5/14/24 Recall set in epic for a 2 year 05/2026 follow up mri lumbar spine and office visit with Dr. Howe

## 2024-05-28 DIAGNOSIS — E11.69 TYPE 2 DIABETES MELLITUS WITH OTHER SPECIFIED COMPLICATION, WITHOUT LONG-TERM CURRENT USE OF INSULIN (HCC): ICD-10-CM

## 2024-05-29 DIAGNOSIS — E11.69 TYPE 2 DIABETES MELLITUS WITH OTHER SPECIFIED COMPLICATION, WITHOUT LONG-TERM CURRENT USE OF INSULIN (HCC): ICD-10-CM

## 2024-05-30 ENCOUNTER — OFFICE VISIT (OUTPATIENT)
Dept: FAMILY MEDICINE CLINIC | Facility: CLINIC | Age: 69
End: 2024-05-30
Payer: MEDICARE

## 2024-05-30 VITALS
HEART RATE: 67 BPM | HEIGHT: 70 IN | TEMPERATURE: 97.5 F | SYSTOLIC BLOOD PRESSURE: 124 MMHG | RESPIRATION RATE: 18 BRPM | OXYGEN SATURATION: 97 % | DIASTOLIC BLOOD PRESSURE: 82 MMHG | BODY MASS INDEX: 28.06 KG/M2 | WEIGHT: 196 LBS

## 2024-05-30 DIAGNOSIS — E11.69 TYPE 2 DIABETES MELLITUS WITH OTHER SPECIFIED COMPLICATION, WITHOUT LONG-TERM CURRENT USE OF INSULIN (HCC): Primary | ICD-10-CM

## 2024-05-30 DIAGNOSIS — F31.9 BIPOLAR 1 DISORDER (HCC): ICD-10-CM

## 2024-05-30 DIAGNOSIS — N18.31 STAGE 3A CHRONIC KIDNEY DISEASE (HCC): ICD-10-CM

## 2024-05-30 DIAGNOSIS — G89.29 CHRONIC BILATERAL THORACIC BACK PAIN: ICD-10-CM

## 2024-05-30 DIAGNOSIS — M54.6 CHRONIC BILATERAL THORACIC BACK PAIN: ICD-10-CM

## 2024-05-30 DIAGNOSIS — Z98.890 POSTOPERATIVE STATE: ICD-10-CM

## 2024-05-30 DIAGNOSIS — G95.20 SPINAL CORD COMPRESSION (HCC): ICD-10-CM

## 2024-05-30 PROCEDURE — 99214 OFFICE O/P EST MOD 30 MIN: CPT | Performed by: FAMILY MEDICINE

## 2024-05-30 PROCEDURE — G2211 COMPLEX E/M VISIT ADD ON: HCPCS | Performed by: FAMILY MEDICINE

## 2024-05-30 RX ORDER — METHOCARBAMOL 750 MG/1
750 TABLET, FILM COATED ORAL EVERY 12 HOURS PRN
Qty: 45 TABLET | Refills: 0 | Status: SHIPPED | OUTPATIENT
Start: 2024-05-30

## 2024-05-30 NOTE — ASSESSMENT & PLAN NOTE
Lab Results   Component Value Date    EGFR 62 04/29/2024    EGFR 53 09/07/2022    EGFR 63 01/03/2020    CREATININE 1.18 04/29/2024    CREATININE 1.36 (H) 09/07/2022    CREATININE 1.22 08/23/2021   Kidney function stable.  Avoid nephrotoxic medications.  Blood pressure remains well-controlled.

## 2024-05-30 NOTE — PROGRESS NOTES
Ambulatory Visit  Name: Jamison Chi Sr.      : 1955      MRN: [id number]  Encounter Provider: Pam Frausto MD  Encounter Date: 2024   Encounter department: [facility]    Assessment & Plan   1. Type 2 diabetes mellitus with other specified complication, without long-term current use of insulin (HCC)  Assessment & Plan:    Lab Results   Component Value Date    HGBA1C 6.2 (H) 2024   A1c improved with metformin.  Continue metformin 500 mg twice daily.  Lab work reviewed.  Diabetic foot exam completed today  Continue atorvastatin  Orders:  -     Hemoglobin A1C; Future  -     Comprehensive metabolic panel; Future  2. Bipolar 1 disorder (HCC)  Assessment & Plan:  Likely managed with marijuana use.  Continue Seroquel      3. Stage 3a chronic kidney disease (HCC)  Assessment & Plan:  Lab Results   Component Value Date    EGFR 62 2024    EGFR 53 2022    EGFR 63 2020    CREATININE 1.18 2024    CREATININE 1.36 (H) 2022    CREATININE 1.22 2021   Kidney function stable.  Avoid nephrotoxic medications.  Blood pressure remains well-controlled.  Orders:  -     Comprehensive metabolic panel; Future  4. Spinal cord compression (HCC)  -     methocarbamol (ROBAXIN) 750 mg tablet; Take 1 tablet (750 mg total) by mouth every 12 (twelve) hours as needed for muscle spasms  5. Postoperative state  6. Chronic bilateral thoracic back pain  -     methocarbamol (ROBAXIN) 750 mg tablet; Take 1 tablet (750 mg total) by mouth every 12 (twelve) hours as needed for muscle spasms       History of Present Illness     Patient presents with:  Medication Management    Completed blood work.  Tolerating metformin well.  No side effects.  Continues to follow with neurosurgery.  Was told by neurosurgery he is not a be seen for 2 years.  Everything is stable.    Extramedullary thoracic tumor which was removed by neurosurgery in     Review of Systems   Constitutional:   Negative for activity change, fatigue and fever.   Eyes:  Negative for visual disturbance.   Respiratory:  Negative for shortness of breath.    Cardiovascular:  Negative for chest pain.   Gastrointestinal:  Negative for abdominal pain, constipation, diarrhea and nausea.   Endocrine: Negative for cold intolerance and heat intolerance.   Musculoskeletal:  Positive for back pain.   Skin:  Negative for rash.   Neurological:  Negative for headaches.   Psychiatric/Behavioral:  Negative for confusion.      Medical History Reviewed by provider this encounter:       Current Outpatient Medications on File Prior to Visit   Medication Sig Dispense Refill    atorvastatin (LIPITOR) 20 mg tablet TAKE 1 TABLET(20 MG) BY MOUTH EVERY EVENING 90 tablet 1    DULoxetine (CYMBALTA) 60 mg delayed release capsule TAKE 1 CAPSULE(60 MG) BY MOUTH DAILY AT BEDTIME 90 capsule 1    metFORMIN (GLUCOPHAGE) 500 mg tablet TAKE 1 TABLET(500 MG) BY MOUTH TWICE DAILY WITH MEALS 180 tablet 1    QUEtiapine (SEROquel) 100 mg tablet TAKE 1 TABLET(100 MG) BY MOUTH DAILY AT BEDTIME 90 tablet 1    [DISCONTINUED] metFORMIN (GLUCOPHAGE) 500 mg tablet Take 1 tablet (500 mg total) by mouth 2 (two) times a day with meals 60 tablet 5    [DISCONTINUED] methocarbamol (ROBAXIN) 750 mg tablet Take 1 tablet (750 mg total) by mouth every 12 (twelve) hours as needed for muscle spasms (Patient not taking: Reported on 2023) 45 tablet 0     No current facility-administered medications on file prior to visit.      Social History     Tobacco Use    Smoking status: Former     Current packs/day: 0.00     Average packs/day: 1 pack/day for 3.0 years (3.0 ttl pk-yrs)     Types: Cigarettes     Start date:      Quit date:      Years since quittin.4    Smokeless tobacco: Never    Tobacco comments:     all scripts says former smoker quit at 16-17 years old   Vaping Use    Vaping status: Never Used   Substance and Sexual Activity    Alcohol use: Yes     Comment: very  "seldom now    Drug use: Yes     Types: Marijuana    Sexual activity: Yes     Partners: Female     Birth control/protection: Post-menopausal     Objective     /82 (BP Location: Left arm, Patient Position: Sitting, Cuff Size: Standard)   Pulse 67   Temp 97.5 °F (36.4 °C) (Temporal)   Resp 18   Ht 5' 9.5\" (1.765 m)   Wt 88.9 kg (196 lb)   SpO2 97%   BMI 28.53 kg/m²     Physical Exam  Vitals and nursing note reviewed.   Constitutional:       Appearance: Normal appearance. He is well-developed.   HENT:      Head: Normocephalic and atraumatic.   Eyes:      Extraocular Movements: Extraocular movements intact.      Pupils: Pupils are equal, round, and reactive to light.   Cardiovascular:      Rate and Rhythm: Normal rate and regular rhythm.      Pulses: no weak pulses.           Dorsalis pedis pulses are 2+ on the right side and 2+ on the left side.        Posterior tibial pulses are 2+ on the right side and 2+ on the left side.   Pulmonary:      Effort: Pulmonary effort is normal.      Breath sounds: Normal breath sounds.   Abdominal:      General: Bowel sounds are normal.      Palpations: Abdomen is soft.   Musculoskeletal:         General: Tenderness present.      Cervical back: Normal range of motion.   Feet:      Right foot:      Skin integrity: No ulcer, skin breakdown, erythema, warmth, callus or dry skin.      Left foot:      Skin integrity: No ulcer, skin breakdown, erythema, warmth, callus or dry skin.   Skin:     General: Skin is warm and dry.   Neurological:      Mental Status: He is alert.   Psychiatric:         Mood and Affect: Mood normal.         Speech: Speech normal.         Behavior: Behavior normal.         Diabetic Foot Exam    Patient's shoes and socks removed.    Right Foot/Ankle   Right Foot Inspection  Skin Exam: skin normal and skin intact. No dry skin, no warmth, no callus, no erythema, no maceration, no abnormal color, no pre-ulcer, no ulcer and no callus.     Toe Exam: ROM and " strength within normal limits.     Sensory   Monofilament testing: intact    Vascular  The right DP pulse is 2+. The right PT pulse is 2+.     Left Foot/Ankle  Left Foot Inspection  Skin Exam: skin normal and skin intact. No dry skin, no warmth, no erythema, no maceration, normal color, no pre-ulcer, no ulcer and no callus.     Toe Exam: ROM and strength within normal limits.     Sensory   Monofilament testing: intact    Vascular  The left DP pulse is 2+. The left PT pulse is 2+.     Assign Risk Category  No deformity present  No loss of protective sensation  No weak pulses  Risk: 0    Administrative Statements

## 2024-05-30 NOTE — ASSESSMENT & PLAN NOTE
Lab Results   Component Value Date    HGBA1C 6.2 (H) 04/29/2024   A1c improved with metformin.  Continue metformin 500 mg twice daily.  Lab work reviewed.  Diabetic foot exam completed today  Continue atorvastatin

## 2024-06-06 ENCOUNTER — NURSE TRIAGE (OUTPATIENT)
Dept: OTHER | Facility: OTHER | Age: 69
End: 2024-06-06

## 2024-06-06 NOTE — TELEPHONE ENCOUNTER
"Reason for Disposition  • All other patients, and now alert and feels fine (Exception: SIMPLE FAINT due to stress, pain, prolonged standing, or suddenly standing)    Answer Assessment - Initial Assessment Questions  1. ONSET: \"How long were you unconscious?\" (minutes) \"When did it happen?\"      Last night patient passed out, blood pressure check showed 71/54.   2. CONTENT: \"What happened during period of unconsciousness?\" (e.g., seizure activity)       Low blood pressure   3. MENTAL STATUS: \"Alert and oriented now?\" (oriented x 3 = name, month, location)       Normal today.   4. TRIGGER: \"What do you think caused the fainting?\" \"What were you doing just before you fainted?\"  (e.g., exercise, sudden standing up, prolonged standing)      BP was 71/54  5. RECURRENT SYMPTOM: \"Have you ever passed out before?\" If Yes, ask: \"When was the last time?\" and \"What happened that time?\"       Recurrent   6. INJURY: \"Did you sustain any injury during the fall?\"       No   7. CARDIAC SYMPTOMS: \"Have you had any of the following symptoms: chest pain, difficulty breathing, palpitations?\"      No   8. NEUROLOGIC SYMPTOMS: \"Have you had any of the following symptoms: headache, numbness, vertigo, weakness?\"      Headache yesterday   9. GI SYMPTOMS: \"Have you had any of the following symptoms: abdominal pain, vomiting, diarrhea, blood in stools?\"      No   10. OTHER SYMPTOMS: \"Do you have any other symptoms?\"        No symptoms today.    Protocols used: Fainting-ADULT-[place]    "

## 2024-06-06 NOTE — TELEPHONE ENCOUNTER
Patient stated that he lost his phone, he was calling from [phone]. Patient was advised to be seen today at 1640 with Dr. Leiva, the conversation was disconnected. Triage RN made an attempt to call the patient back, the call went to voicemail. Message was left to call back. Please follow up.

## 2024-06-07 NOTE — TELEPHONE ENCOUNTER
[place]    Contacted patient today on [phone] which is his girlfriend's number due to patient phone being lost.  Patient girlfriend stated that he is better now , he is doing fine .

## 2024-06-17 ENCOUNTER — OFFICE VISIT (OUTPATIENT)
Dept: URGENT CARE | Facility: CLINIC | Age: 69
End: 2024-06-17
Payer: MEDICARE

## 2024-06-17 VITALS
RESPIRATION RATE: 16 BRPM | BODY MASS INDEX: 31.1 KG/M2 | OXYGEN SATURATION: 98 % | SYSTOLIC BLOOD PRESSURE: 118 MMHG | DIASTOLIC BLOOD PRESSURE: 79 MMHG | HEART RATE: 89 BPM | TEMPERATURE: 98.7 F | WEIGHT: 210 LBS | HEIGHT: 69 IN

## 2024-06-17 DIAGNOSIS — H60.12 CELLULITIS OF ANTIHELIX OF LEFT EAR: Primary | ICD-10-CM

## 2024-06-17 PROCEDURE — 99213 OFFICE O/P EST LOW 20 MIN: CPT | Performed by: NURSE PRACTITIONER

## 2024-06-17 RX ORDER — DOXYCYCLINE 100 MG/1
100 TABLET ORAL 2 TIMES DAILY
Qty: 14 TABLET | Refills: 0 | Status: SHIPPED | OUTPATIENT
Start: 2024-06-17 | End: 2024-06-24

## 2024-06-19 NOTE — PROGRESS NOTES
[facility]        NAME: Jamison Chi Sr. is a 68 y.o. male  : 1955    MRN: [id number]  DATE: 2024  TIME: 8:09 AM    Assessment and Plan   Cellulitis of antihelix of left ear [H60.12]  1. Cellulitis of antihelix of left ear  doxycycline (ADOXA) 100 MG tablet    mupirocin (BACTROBAN) 2 % ointment            Patient Instructions       Follow up with PCP in 3-5 days.  Proceed to  ER if symptoms worsen.    Chief Complaint     Chief Complaint   Patient presents with    Insect Bite     Pt is not sure if he was bite by a spider but he has extreme redness on his left pinna he is also c/o a lump under his left axilla         History of Present Illness       Patient is a 68 year old male presenting with redness, swelling, and pain of the left external ear. He noted it this morning while in the shower. He is a diabetic. Denies fever, chills, or drainage from the ear. He also has an lump in the left armpit that he also noted this morning in the shower. No OTC treatment attempted.     Insect Bite  Pertinent negatives include no chills, coughing, fever or headaches.       Review of Systems   Review of Systems   Constitutional:  Negative for activity change, chills and fever.   HENT:  Positive for ear pain. Negative for ear discharge.    Respiratory:  Negative for cough.    Skin:  Positive for color change and wound.   Neurological:  Negative for headaches.   Hematological:  Positive for adenopathy.         Current Medications       Current Outpatient Medications:     doxycycline (ADOXA) 100 MG tablet, Take 1 tablet (100 mg total) by mouth 2 (two) times a day for 7 days, Disp: 14 tablet, Rfl: 0    mupirocin (BACTROBAN) 2 % ointment, Apply topically 3 (three) times a day, Disp: 60 g, Rfl: 0    atorvastatin (LIPITOR) 20 mg tablet, TAKE 1 TABLET(20 MG) BY MOUTH EVERY EVENING, Disp: 90 tablet, Rfl: 1    DULoxetine (CYMBALTA) 60 mg delayed release capsule, TAKE 1 CAPSULE(60 MG) BY MOUTH DAILY AT  BEDTIME, Disp: 90 capsule, Rfl: 1    metFORMIN (GLUCOPHAGE) 500 mg tablet, TAKE 1 TABLET(500 MG) BY MOUTH TWICE DAILY WITH MEALS, Disp: 180 tablet, Rfl: 1    methocarbamol (ROBAXIN) 750 mg tablet, Take 1 tablet (750 mg total) by mouth every 12 (twelve) hours as needed for muscle spasms, Disp: 45 tablet, Rfl: 0    QUEtiapine (SEROquel) 100 mg tablet, TAKE 1 TABLET(100 MG) BY MOUTH DAILY AT BEDTIME, Disp: 90 tablet, Rfl: 1    Current Allergies     Allergies as of 06/17/2024    (No Known Allergies)            The following portions of the patient's history were reviewed and updated as appropriate: allergies, current medications, past family history, past medical history, past social history, past surgical history and problem list.     Past Medical History:   Diagnosis Date    Anxiety     Bipolar depression (HCC)     bipolar disorder    Bipolar depression (HCC)     bipolar disorder    Borderline diabetes mellitus     Borderline diabetes mellitus     Brittle nails     Chronic back pain     Chronic pain     Colon polyp     Depression     Diabetes (HCC)     Dizziness 06/04/2018    Foot pain     unspecified laterality- per [employer]     Gallstones     Gastroesophageal reflux disease with esophagitis 01/21/2018    HTN (hypertension) 01/21/2018    Intradural extramedullary spinal tumor     Leukocytosis 09/24/2018    Onychomycosis     Restless leg syndrome     Sessile rectal polyp 01/30/2018    Severe episode of recurrent major depressive disorder, without psychotic features (HCC) 09/21/2018    Sinus tachycardia 09/20/2018    Spinal arachnoid cyst     Stage 2 chronic kidney disease 08/27/2019    Tubular adenoma of colon 04/30/2018       Past Surgical History:   Procedure Laterality Date    COLONOSCOPY      INCISION AND DRAINAGE ABSCESS / HEMATOMA OF BURSA / KNEE / THIGH  in 1990's    outer right leg and center of upper back   complicated  resolved status    OTHER SURGICAL HISTORY  11/2019    cyst excision from back by  "Dermatolgist at office of Dr. Up    [place] COLONOSCOPY FLX DX W/COLLJ SPEC WHEN PFRMD N/A 8/31/2017    Procedure: COLONOSCOPY;  Surgeon: Ata Rodriges MD;  [facility];  Service: Gastroenterology    [place] COLONOSCOPY FLX DX W/COLLJ SPEC WHEN PFRMD N/A 3/23/2018    Procedure: COLONOSCOPY;  Surgeon: Ata Rodriges MD;  [facility];  Service: Gastroenterology    [place] MARISCAL BX/EXC ISPI MIKKI IDRL XMED LUMBAR N/A 1/2/2020    Procedure: T12 LAMINECTOMY FOR RESECTION OF INTRADURAL-EXTRAMEDULLARY MASS;  Surgeon: Alan Howe MD;  [facility];  Service: Neurosurgery    TUMOR REMOVAL  1990's    \"Cheese tumor\" removed from back       Family History   Problem Relation Age of Onset    Throat cancer Mother     Heart attack Father         at 80    Stomach cancer Sister     Lung cancer Brother     Stomach cancer Brother     Heart disease Brother          Medications have been verified.        Objective   /79   Pulse 89   Temp 98.7 °F (37.1 °C)   Resp 16   Ht 5' 9\" (1.753 m)   Wt 95.3 kg (210 lb)   SpO2 98%   BMI 31.01 kg/m²        Physical Exam     Physical Exam  Vitals reviewed.   Constitutional:       General: He is awake. He is not in acute distress.     Appearance: Normal appearance. He is normal weight.   HENT:      Head: Normocephalic.      Right Ear: Hearing normal.      Left Ear: Hearing normal.      Ears:     Cardiovascular:      Rate and Rhythm: Normal rate.   Pulmonary:      Effort: Pulmonary effort is normal.   Skin:     General: Skin is warm and moist.   Neurological:      General: No focal deficit present.      Mental Status: He is alert and oriented to person, place, and time.   Psychiatric:         Behavior: Behavior is cooperative.                   "

## 2024-06-28 ENCOUNTER — TELEPHONE (OUTPATIENT)
Dept: FAMILY MEDICINE CLINIC | Facility: CLINIC | Age: 69
End: 2024-06-28

## 2024-06-28 NOTE — TELEPHONE ENCOUNTER
Prior Auth Request Rx Methocarbamol 750 mg .& Rx request for drug coverage. Scanned into Media 6/28/24. Please initiate Prior Auth. Ty

## 2024-09-13 ENCOUNTER — RA CDI HCC (OUTPATIENT)
Dept: OTHER | Facility: HOSPITAL | Age: 69
End: 2024-09-13

## 2024-09-13 NOTE — PROGRESS NOTES
HCC coding opportunities          Chart Reviewed number of suggestions sent to Provider: 1     Patients Insurance     Medicare Insurance: [employer]          E11.22: Type 2 diabetes mellitus with diabetic chronic kidney disease (HCC)     As per ICD 10 coding guidelines, DM & CKD are presumed to have a causal-effect relationship unless documented as unrelated please review and assess if applicable

## 2024-09-18 ENCOUNTER — OFFICE VISIT (OUTPATIENT)
Dept: FAMILY MEDICINE CLINIC | Facility: CLINIC | Age: 69
End: 2024-09-18

## 2024-09-18 VITALS
HEIGHT: 69 IN | WEIGHT: 194 LBS | SYSTOLIC BLOOD PRESSURE: 120 MMHG | BODY MASS INDEX: 28.73 KG/M2 | HEART RATE: 97 BPM | DIASTOLIC BLOOD PRESSURE: 72 MMHG | OXYGEN SATURATION: 100 %

## 2024-09-18 DIAGNOSIS — G89.29 CHRONIC BILATERAL THORACIC BACK PAIN: ICD-10-CM

## 2024-09-18 DIAGNOSIS — M54.6 CHRONIC BILATERAL THORACIC BACK PAIN: ICD-10-CM

## 2024-09-18 DIAGNOSIS — E78.1 HYPERTRIGLYCERIDEMIA: ICD-10-CM

## 2024-09-18 DIAGNOSIS — H60.8X2 CHRONIC ECZEMATOID OTITIS EXTERNA OF LEFT EAR: ICD-10-CM

## 2024-09-18 DIAGNOSIS — F31.9 BIPOLAR 1 DISORDER (HCC): Primary | ICD-10-CM

## 2024-09-18 DIAGNOSIS — D72.829 LEUKOCYTOSIS, UNSPECIFIED TYPE: ICD-10-CM

## 2024-09-18 DIAGNOSIS — E11.69 TYPE 2 DIABETES MELLITUS WITH OTHER SPECIFIED COMPLICATION, WITHOUT LONG-TERM CURRENT USE OF INSULIN (HCC): ICD-10-CM

## 2024-09-18 DIAGNOSIS — G95.20 SPINAL CORD COMPRESSION (HCC): ICD-10-CM

## 2024-09-18 DIAGNOSIS — N18.31 STAGE 3A CHRONIC KIDNEY DISEASE (HCC): ICD-10-CM

## 2024-09-18 PROBLEM — D49.7 INTRADURAL EXTRAMEDULLARY SPINAL TUMOR: Status: ACTIVE | Noted: 2017-01-06

## 2024-09-18 PROBLEM — K63.5 COLON POLYP: Status: ACTIVE | Noted: 2017-12-27

## 2024-09-18 PROBLEM — B35.1 ONYCHOMYCOSIS: Status: ACTIVE | Noted: 2017-08-29

## 2024-09-18 PROBLEM — F33.9 MAJOR DEPRESSIVE DISORDER, RECURRENT EPISODE WITH ANXIOUS DISTRESS (HCC): Status: ACTIVE | Noted: 2021-08-23

## 2024-09-18 RX ORDER — HYDROCORTISONE AND ACETIC ACID 20.75; 10.375 MG/ML; MG/ML
3 SOLUTION AURICULAR (OTIC) EVERY 6 HOURS SCHEDULED
Qty: 10 ML | Refills: 0 | Status: SHIPPED | OUTPATIENT
Start: 2024-09-18 | End: 2024-09-26 | Stop reason: SDUPTHER

## 2024-09-18 RX ORDER — QUETIAPINE FUMARATE 25 MG/1
25 TABLET, FILM COATED ORAL
Qty: 60 TABLET | Refills: 0 | Status: SHIPPED | OUTPATIENT
Start: 2024-09-18 | End: 2024-09-26

## 2024-09-18 NOTE — PROGRESS NOTES
New Patient Outpatient Note    HPI:     Jamison Chi ., 68 y.o. male  presents today as a new patient and to establish care.  Previously he was seen by [facility], at this location is just more convenient for him.  He notes that he has been having increased back pain for the last few weeks.  It is about a 3/10 at baseline and can get up to a 6/10 at its worst.  It does not wake him up, but he will wake up with the pain and discomfort and also go to bed with it, it is relatively constant.  There are certain movements he cannot do due to previous fusion/back surgery.    We also reviewed his chronic medical issues.  He has type 2 diabetes that is well-controlled with an A1c last of 6.2.  His metformin is at 500 mg twice daily.  Additionally he has a history of bipolar type I.  He is on Cymbalta 60 mg daily and Seroquel 100 mg daily.  His previous PCP informed him that this may need to be increased at some point, he feels that it is that time since he has been unable to sleep well.    Onset of back pain:  Weeks  Intensity of pain:  3/10 baseline, 5-6/10 at worst  Location of Pain:  lumbar region right side  Radiation:  localized  Character of Pain:  burning  Constant intermittent: Constant  Exacerbating factors:  twisting  Relieving Factors: laying down, advil  Associated Symptoms:    -  Erythema, Numbness, tingling  -  Nausea, vomiting  +  Decreased ROM  -  Decreased Strength  -  Red Flag Symptoms-  Numbness and tingling to Genitals/perineum,   increased weakness of LE, Loss of bowel or bladder function.     Inciting Event/ Trauma:  None  Progression:  consistent  Previous Episodes: None  Specialist Follow up: None  Hx of Physical Therapy:  None  Prior medications:  Advil    Family Hx  UTD in chart    Past Medical History:   Diagnosis Date    Anxiety     Bipolar depression (HCC)     bipolar disorder    Bipolar depression (HCC)     bipolar disorder    Borderline diabetes mellitus     Borderline  "diabetes mellitus     Brittle nails     Chronic back pain     Chronic pain     Colon polyp     Depression     Diabetes (HCC)     Dizziness 06/04/2018    Foot pain     unspecified laterality- per [employer]     Gallstones     Gastroesophageal reflux disease with esophagitis 01/21/2018    HTN (hypertension) 01/21/2018    Intradural extramedullary spinal tumor     Leukocytosis 09/24/2018    Onychomycosis     Restless leg syndrome     Sessile rectal polyp 01/30/2018    Severe episode of recurrent major depressive disorder, without psychotic features (HCC) 09/21/2018    Sinus tachycardia 09/20/2018    Spinal arachnoid cyst     Stage 2 chronic kidney disease 08/27/2019    Tubular adenoma of colon 04/30/2018        Past Surgical History:   Procedure Laterality Date    COLONOSCOPY      INCISION AND DRAINAGE ABSCESS / HEMATOMA OF BURSA / KNEE / THIGH  in 1990's    outer right leg and center of upper back   complicated  resolved status    OTHER SURGICAL HISTORY  11/2019    cyst excision from back by Dermatolgist at office of Dr. Up    AR COLONOSCOPY FLX DX W/COLLJ SPEC WHEN PFRMD N/A 8/31/2017    Procedure: COLONOSCOPY;  Surgeon: Ata Rodriges MD;  Location: [facility];  Service: Gastroenterology    AR COLONOSCOPY FLX DX W/COLLJ SPEC WHEN PFRMD N/A 3/23/2018    Procedure: COLONOSCOPY;  Surgeon: Ata Rodriges MD;  Location: [facility];  Service: Gastroenterology    AR MARISCAL BX/EXC ISPI MIKKI IDRL XMED LUMBAR N/A 1/2/2020    Procedure: T12 LAMINECTOMY FOR RESECTION OF INTRADURAL-EXTRAMEDULLARY MASS;  Surgeon: Alan Howe MD;  Location: [facility];  Service: Neurosurgery    TUMOR REMOVAL  1990's    \"Cheese tumor\" removed from back          Current Outpatient Medications:     atorvastatin (LIPITOR) 20 mg tablet, TAKE 1 TABLET(20 MG) BY MOUTH EVERY EVENING, Disp: 90 tablet, Rfl: 1    DULoxetine (CYMBALTA) 60 mg delayed release capsule, TAKE 1 CAPSULE(60 MG) BY MOUTH DAILY AT BEDTIME, Disp: 90 capsule, Rfl: 1    hydrocortisone-acetic " acid (VOSOL-HC) otic solution, Administer 3 drops into the left ear every 6 (six) hours, Disp: 10 mL, Rfl: 0    metFORMIN (GLUCOPHAGE) 500 mg tablet, TAKE 1 TABLET(500 MG) BY MOUTH TWICE DAILY WITH MEALS, Disp: 180 tablet, Rfl: 1    methocarbamol (ROBAXIN) 750 mg tablet, Take 1 tablet (750 mg total) by mouth every 12 (twelve) hours as needed for muscle spasms, Disp: 45 tablet, Rfl: 0    mupirocin (BACTROBAN) 2 % ointment, Apply topically 3 (three) times a day, Disp: 60 g, Rfl: 0    QUEtiapine (SEROquel) 100 mg tablet, TAKE 1 TABLET(100 MG) BY MOUTH DAILY AT BEDTIME, Disp: 90 tablet, Rfl: 1    QUEtiapine (SEROquel) 25 mg tablet, Take 1 tablet (25 mg total) by mouth daily at bedtime, Disp: 60 tablet, Rfl: 0     SOCIAL:   Rent/Own:  Renting  Currently living with: ochoa Valerio food: Yes  Safe At Home: Yes  Hobbies:  previously into cars.   Profession/ employment: retired/ disability  Restriction to medical procedures:  None    SEXUAL HISTORY:   Preference:  Women  Sexually Active:  Yes  Birth Control:  NA    Psychological History  Psychiatric history: bipolar type I  History of inpatient:  multiples times, greater than a year  Current Therapy/ Provider:  None  Current Medications:  Seroquel, cymbalta    Substance History  Smoking: former, at 16-17 years old.  None since then  Alcohol Use: rarely  Substance use: Marijuana      ROS:   Review of Systems   Constitutional:  Negative for chills, fever and unexpected weight change.   HENT:  Positive for ear pain (left ear) and rhinorrhea (chronic). Negative for congestion, ear discharge, hearing loss, postnasal drip, sinus pressure, sinus pain and sore throat.    Eyes:  Positive for visual disturbance (wears glasses).   Respiratory:  Negative for cough, chest tightness and shortness of breath.    Cardiovascular:  Negative for chest pain and palpitations.   Gastrointestinal:  Negative for abdominal pain, blood in stool, constipation, diarrhea, nausea and vomiting.    Genitourinary:  Negative for dysuria and frequency.   Skin:  Positive for wound. Negative for rash.   Neurological:  Positive for dizziness (turning too quick). Negative for light-headedness and headaches.   Psychiatric/Behavioral:  Negative for self-injury and suicidal ideas.           OBJECTIVE  Vitals:    09/18/24 1053   BP: 120/72   Pulse: 97   SpO2: 100%        Physical Exam  Constitutional:       General: He is not in acute distress.     Appearance: Normal appearance. He is not ill-appearing, toxic-appearing or diaphoretic.   HENT:      Head: Normocephalic and atraumatic.      Right Ear: Tympanic membrane, ear canal and external ear normal. There is no impacted cerumen.      Left Ear: Tympanic membrane and external ear normal. There is no impacted cerumen.      Ears:      Comments: Left ear canal has eczematous change with irritation and redness     Nose: Nose normal. No congestion or rhinorrhea.      Mouth/Throat:      Mouth: Mucous membranes are moist.      Pharynx: Oropharynx is clear. No oropharyngeal exudate or posterior oropharyngeal erythema.   Eyes:      General:         Right eye: No discharge.         Left eye: No discharge.      Pupils: Pupils are equal, round, and reactive to light.   Cardiovascular:      Rate and Rhythm: Normal rate and regular rhythm.      Heart sounds: Normal heart sounds. No murmur heard.     No friction rub. No gallop.   Pulmonary:      Effort: Pulmonary effort is normal. No respiratory distress.      Breath sounds: Normal breath sounds. No stridor. No wheezing, rhonchi or rales.   Abdominal:      General: Bowel sounds are normal. There is no distension.      Palpations: Abdomen is soft.      Tenderness: There is no abdominal tenderness.   Skin:     General: Skin is warm.      Capillary Refill: Capillary refill takes less than 2 seconds.   Neurological:      Mental Status: He is alert.      Sensory: No sensory deficit (Light touch present all 4 extremities).      Motor: No  weakness (5/5 in all 4 extremities).      Deep Tendon Reflexes: Reflexes normal (2/4, intact, C5, C6, L4, S1).            ASSESSMENT AND PLAN   Jamison was seen today for establish care.  Diagnoses and all orders for this visit:    Bipolar 1 disorder (HCC)  Patient's bipolar type I has recently been slightly exacerbated.  He is looking to increase one of his medications to help with symptoms of sleep.  Due to Seroquel's benefit of increased grogginess and sleepiness, will increase to dose of 125 mg nightly.  If this is helping, but he requires a higher dose, we may increase to 150 after 1 week.  Otherwise may consider increasing Cymbalta/frequency if possible.  -     QUEtiapine (SEROquel) 25 mg tablet; Take 1 tablet (25 mg total) by mouth daily at bedtime    Type 2 diabetes mellitus with other specified complication, without long-term current use of insulin ([facility])  Well-controlled, last A1c 6.2, recommended continuing current regimen of metformin 500 mg twice daily.  Patient to follow-up with A1c in 6 months  -     Hemoglobin A1C; Future    Spinal cord compression ([facility])  Chronic bilateral thoracic back pain  Patient has a history of spinal cord compression and history of laminectomy/decompression.  He does have some increased pain and discomfort on the right side in the lumbar region.  I offered x-rays, pain medication, or muscle relaxers, the patient is looking to monitor it over the course of the next several days.    Hypertriglyceridemia  Improved.  Last triglyceride values in April 2024 were within normal limits, prior to that in 2022 they were quite elevated at 400    Stage 3a chronic kidney disease (HCC)  Previously patient's GFR was in the stage IIIa range in 2022.  His most recent labs demonstrate a GFR in the stage II with a value of 62.  He has had an evaluation with his albumin creatinine ratio which is normal.  It is possible that previously his values were lower due to uncontrolled diabetes.  Will  continue to monitor.    Chronic eczematoid otitis externa of left ear  Eczematous findings in the external ear canal on the left side.  I recommended he use the hydrocortisone-acetic acid solution.  He was warned not to use this if he has any significant pressure, pain behind the ear or deep due to possible effect on the bones if there is a rupture.  He is took return to the office if there is any hesitation or concern for rupture that may affect the otic bones.  -     hydrocortisone-acetic acid (VOSOL-HC) otic solution; Administer 3 drops into the left ear every 6 (six) hours    Leukocytosis, unspecified type  History of leukocytosis on the last labs.  Will have him follow-up CBC to confirm that this is transient.  -     CBC and differential; Future         DO [facility]  9/18/2024 11:51 AM

## 2024-09-20 DIAGNOSIS — F31.9 BIPOLAR 1 DISORDER (HCC): ICD-10-CM

## 2024-09-20 RX ORDER — QUETIAPINE FUMARATE 100 MG/1
100 TABLET, FILM COATED ORAL
Qty: 90 TABLET | Refills: 0 | Status: SHIPPED | OUTPATIENT
Start: 2024-09-20 | End: 2024-09-26 | Stop reason: SDUPTHER

## 2024-09-20 NOTE — TELEPHONE ENCOUNTER
Patient has been without medication for 2 days.    Reason for call:   [x] Refill   [] Prior Auth  [] Other:     Office:   [x] PCP/Provider - Alan Beach DO   [] Specialty/Provider -     Medication: QUEtiapine (SEROquel) 100 mg tablet     Dose/Frequency: TAKE 1 TABLET(100 MG) BY MOUTH DAILY AT BEDTIME    Quantity: 90    Pharmacy:  [facility] [id number] [place] [address] [id number]     Does the patient have enough for 3 days?   [] Yes   [x] No - Send as HP to POD

## 2024-09-26 ENCOUNTER — TELEPHONE (OUTPATIENT)
Dept: FAMILY MEDICINE CLINIC | Facility: CLINIC | Age: 69
End: 2024-09-26

## 2024-09-26 DIAGNOSIS — H60.8X2 CHRONIC ECZEMATOID OTITIS EXTERNA OF LEFT EAR: ICD-10-CM

## 2024-09-26 DIAGNOSIS — F31.9 BIPOLAR 1 DISORDER (HCC): ICD-10-CM

## 2024-09-26 RX ORDER — HYDROCORTISONE AND ACETIC ACID 20.75; 10.375 MG/ML; MG/ML
3 SOLUTION AURICULAR (OTIC) EVERY 6 HOURS SCHEDULED
Qty: 10 ML | Refills: 0 | Status: SHIPPED | OUTPATIENT
Start: 2024-09-26

## 2024-09-26 RX ORDER — QUETIAPINE FUMARATE 50 MG/1
TABLET, FILM COATED ORAL
Qty: 90 TABLET | Refills: 0 | Status: SHIPPED | OUTPATIENT
Start: 2024-09-26

## 2024-09-26 RX ORDER — QUETIAPINE FUMARATE 100 MG/1
TABLET, FILM COATED ORAL
Qty: 90 TABLET | Refills: 0 | Status: SHIPPED | OUTPATIENT
Start: 2024-09-26

## 2024-09-26 NOTE — TELEPHONE ENCOUNTER
Patient did not  the medication for only the 25mg tablets were sent.  He requires a total of 150mg.  He also did not get the ear drops.  They were sent to pharmacy I do not understand why they were not available.  Will send again

## 2024-10-21 DIAGNOSIS — E11.69 TYPE 2 DIABETES MELLITUS WITH OTHER SPECIFIED COMPLICATION, WITHOUT LONG-TERM CURRENT USE OF INSULIN (HCC): ICD-10-CM

## 2024-10-22 RX ORDER — ATORVASTATIN CALCIUM 20 MG/1
TABLET, FILM COATED ORAL
Qty: 90 TABLET | Refills: 1 | Status: SHIPPED | OUTPATIENT
Start: 2024-10-22

## 2024-11-29 DIAGNOSIS — E11.69 TYPE 2 DIABETES MELLITUS WITH OTHER SPECIFIED COMPLICATION, WITHOUT LONG-TERM CURRENT USE OF INSULIN (HCC): ICD-10-CM

## 2024-12-31 DIAGNOSIS — F31.9 BIPOLAR 1 DISORDER (HCC): ICD-10-CM

## 2025-01-03 RX ORDER — QUETIAPINE FUMARATE 50 MG/1
TABLET, FILM COATED ORAL
Qty: 90 TABLET | Refills: 0 | Status: SHIPPED | OUTPATIENT
Start: 2025-01-03

## 2025-03-19 DIAGNOSIS — F31.9 BIPOLAR 1 DISORDER (HCC): ICD-10-CM

## 2025-03-19 RX ORDER — DULOXETIN HYDROCHLORIDE 60 MG/1
60 CAPSULE, DELAYED RELEASE ORAL DAILY
Qty: 90 CAPSULE | Refills: 1 | Status: SHIPPED | OUTPATIENT
Start: 2025-03-19

## 2025-03-20 RX ORDER — QUETIAPINE FUMARATE 50 MG/1
TABLET, FILM COATED ORAL
Qty: 90 TABLET | Refills: 1 | Status: SHIPPED | OUTPATIENT
Start: 2025-03-20

## 2025-04-21 DIAGNOSIS — E11.69 TYPE 2 DIABETES MELLITUS WITH OTHER SPECIFIED COMPLICATION, WITHOUT LONG-TERM CURRENT USE OF INSULIN (HCC): ICD-10-CM

## 2025-04-22 RX ORDER — ATORVASTATIN CALCIUM 20 MG/1
TABLET, FILM COATED ORAL
Qty: 90 TABLET | Refills: 0 | Status: SHIPPED | OUTPATIENT
Start: 2025-04-22

## 2025-05-13 ENCOUNTER — TELEPHONE (OUTPATIENT)
Age: 70
End: 2025-05-13

## 2025-05-13 ENCOUNTER — OFFICE VISIT (OUTPATIENT)
Dept: FAMILY MEDICINE CLINIC | Facility: CLINIC | Age: 70
End: 2025-05-13
Payer: COMMERCIAL

## 2025-05-13 VITALS
BODY MASS INDEX: 28.88 KG/M2 | DIASTOLIC BLOOD PRESSURE: 62 MMHG | WEIGHT: 195 LBS | HEIGHT: 69 IN | HEART RATE: 71 BPM | SYSTOLIC BLOOD PRESSURE: 122 MMHG | OXYGEN SATURATION: 97 %

## 2025-05-13 DIAGNOSIS — F31.9 BIPOLAR 1 DISORDER (HCC): ICD-10-CM

## 2025-05-13 DIAGNOSIS — E11.69 TYPE 2 DIABETES MELLITUS WITH OTHER SPECIFIED COMPLICATION, WITHOUT LONG-TERM CURRENT USE OF INSULIN (HCC): ICD-10-CM

## 2025-05-13 DIAGNOSIS — L60.2 NAIL THICKENING: ICD-10-CM

## 2025-05-13 DIAGNOSIS — Z13.220 SCREENING CHOLESTEROL LEVEL: ICD-10-CM

## 2025-05-13 DIAGNOSIS — G25.81 RESTLESS LEG SYNDROME: ICD-10-CM

## 2025-05-13 DIAGNOSIS — N18.31 STAGE 3A CHRONIC KIDNEY DISEASE (HCC): ICD-10-CM

## 2025-05-13 DIAGNOSIS — Z12.5 SCREENING FOR MALIGNANT NEOPLASM OF PROSTATE: ICD-10-CM

## 2025-05-13 DIAGNOSIS — B35.1 ONYCHOMYCOSIS: ICD-10-CM

## 2025-05-13 DIAGNOSIS — H60.8X3 CHRONIC ECZEMATOUS OTITIS EXTERNA OF BOTH EARS: ICD-10-CM

## 2025-05-13 DIAGNOSIS — E78.1 HYPERTRIGLYCERIDEMIA: ICD-10-CM

## 2025-05-13 DIAGNOSIS — N52.03 COMBINED ARTERIAL INSUFFICIENCY AND CORPORO-VENOUS OCCLUSIVE ERECTILE DYSFUNCTION: Primary | ICD-10-CM

## 2025-05-13 DIAGNOSIS — F33.9 MAJOR DEPRESSIVE DISORDER, RECURRENT EPISODE WITH ANXIOUS DISTRESS (HCC): ICD-10-CM

## 2025-05-13 DIAGNOSIS — Z13.0 SCREENING FOR DEFICIENCY ANEMIA: ICD-10-CM

## 2025-05-13 PROCEDURE — 99214 OFFICE O/P EST MOD 30 MIN: CPT | Performed by: FAMILY MEDICINE

## 2025-05-13 PROCEDURE — G2211 COMPLEX E/M VISIT ADD ON: HCPCS | Performed by: FAMILY MEDICINE

## 2025-05-13 PROCEDURE — G0439 PPPS, SUBSEQ VISIT: HCPCS | Performed by: FAMILY MEDICINE

## 2025-05-13 RX ORDER — NEOMYCIN SULFATE, POLYMYXIN B SULFATE AND HYDROCORTISONE 10; 3.5; 1 MG/ML; MG/ML; [USP'U]/ML
4 SUSPENSION/ DROPS AURICULAR (OTIC) EVERY 8 HOURS SCHEDULED
Qty: 10 ML | Refills: 0 | Status: SHIPPED | OUTPATIENT
Start: 2025-05-13

## 2025-05-13 RX ORDER — QUETIAPINE FUMARATE 100 MG/1
150 TABLET, FILM COATED ORAL
Qty: 135 TABLET | Refills: 1 | Status: SHIPPED | OUTPATIENT
Start: 2025-05-13

## 2025-05-13 RX ORDER — QUETIAPINE FUMARATE 100 MG/1
150 TABLET, FILM COATED ORAL
Qty: 135 TABLET | Refills: 1 | Status: SHIPPED | OUTPATIENT
Start: 2025-05-13 | End: 2025-05-13 | Stop reason: SDUPTHER

## 2025-05-13 RX ORDER — TADALAFIL 5 MG/1
5 TABLET ORAL DAILY PRN
Qty: 30 TABLET | Refills: 2 | Status: SHIPPED | OUTPATIENT
Start: 2025-05-13

## 2025-05-13 NOTE — ASSESSMENT & PLAN NOTE
Reviewed the different options for erectile dysfunction.  The patient recently starting having issues.  Unknown etiology.  Has multiple risk factors including history of smoking, hypertension, hyperlipidemia, psychiatric medication, and diabetes. He is interested in starting cailis for daily use.  5mg daily was sent to appropriate pharmacy.   Orders:    tadalafil (CIALIS) 5 MG tablet; Take 1 tablet (5 mg total) by mouth daily as needed for erectile dysfunction    CBC and differential; Future    Lipid panel; Future

## 2025-05-13 NOTE — TELEPHONE ENCOUNTER
Sahra with [facility] calling in regards to prescription QUEtiapine (SEROquel) 100 mg tablet with instructions Take 1.5 tablets (150 mg total) by mouth daily at bedtime Take total of 150mg nightly.  (One 100mg tablet and 50mg tablet). Asking to send new order for clarification. Does provider want 1.5 tablets and if so to please get rid of the one 100 and one 50 mg tablet part. Or does provider want both doses and would need prescription for both.

## 2025-05-13 NOTE — ASSESSMENT & PLAN NOTE
Patient has significant thickening of multiple nails due to onychomycosis.  Patient is interested in having this treated.  We discussed that the patient requires a CMP to look for liver function.  Once this is completed I can place the patient on Lamisil and send to podiatry for debulking.  It is important to note that this treatment will only be to new and healthy nail that will be coming out from the nailbed.  He will need to decrease burden and wait until the infected area is fully removed prior to full resolution.  Orders:    Ambulatory Referral to Podiatry; Future    Comprehensive metabolic panel; Future

## 2025-05-13 NOTE — ASSESSMENT & PLAN NOTE
History of his restless leg syndrome.  He is not currently on any medication outside Seroquel to help with sleep.  Stable.

## 2025-05-13 NOTE — ASSESSMENT & PLAN NOTE
Patient currently on Cymbalta 60 mg and Seroquel 150 mg.  This regimen has been doing well for his mental health, and I have been prescribing this medication for him.  With any exacerbations I did inform him that he will likely need to return to psychiatry for follow-up, but at this time since it is stable we will continue current regiment.

## 2025-05-13 NOTE — ASSESSMENT & PLAN NOTE
Patient has been attempting to stretch his medication.  Typically is at 150 mg nightly, but he has only been able to take 100 which has significantly reduced his sleep.  I am not sure the reason why he was not getting both of the tablets, it could have been due to insurance.  At this time I have sent 100 mg tablets to be taken 1-1/2 at night for a total of 150

## 2025-05-13 NOTE — ASSESSMENT & PLAN NOTE
Patient recently obtain labs.  Will follow-up in 6 months.  Patient's triglycerides are only mildly elevated, but we will continue to monitor since he is already on atorvastatin 20 mg.  If he values are exacerbated, consider going up on atorvastatin.  He is currently not on high dose, but otherwise is doing well.  Orders:    Lipid panel; Future

## 2025-05-13 NOTE — TELEPHONE ENCOUNTER
I have resent it with requested removal of other documentation.     DO [facility]  5/13/2025 5:36 PM

## 2025-05-13 NOTE — ASSESSMENT & PLAN NOTE
Stable.  Most recent A1c 6.2 in April 2024.  Patient to continue with current regimen of metformin 500 mg twice daily.  As long as his A1c remains less than 7.5, we will continue current regimen.  Lab Results   Component Value Date    HGBA1C 6.2 (H) 05/14/2025       Orders:    tadalafil (CIALIS) 5 MG tablet; Take 1 tablet (5 mg total) by mouth daily as needed for erectile dysfunction    Comprehensive metabolic panel; Future    Hemoglobin A1C; Future    Albumin / creatinine urine ratio; Future

## 2025-05-13 NOTE — ASSESSMENT & PLAN NOTE
Patient's EGFR has been ranging between 53-63.  This is been over the period of the last 5 years.  Labs placed to be followed up.  If labs continue to trend in the wrong direction, may need to consider switching lisinopril or ACE inhibitor to a different option to avoid CKD.  Most recent values placed patient outside of CKD stage III    Lab Results   Component Value Date    EGFR 67 05/14/2025    EGFR 62 04/29/2024    EGFR 53 09/07/2022    CREATININE 1.10 05/14/2025    CREATININE 1.18 04/29/2024    CREATININE 1.36 (H) 09/07/2022       Orders:    Comprehensive metabolic panel; Future

## 2025-05-13 NOTE — PROGRESS NOTES
Name: Jamison Chi .      : 1955      MRN: [id number]  Encounter Provider: Alan Beach DO  Encounter Date: 2025   Encounter department: [facility]  :  Assessment & Plan  Combined arterial insufficiency and corporo-venous occlusive erectile dysfunction  Reviewed the different options for erectile dysfunction.  The patient recently starting having issues.  Unknown etiology.  Has multiple risk factors including history of smoking, hypertension, hyperlipidemia, psychiatric medication, and diabetes. He is interested in starting cailis for daily use.  5mg daily was sent to appropriate pharmacy.   Orders:    tadalafil (CIALIS) 5 MG tablet; Take 1 tablet (5 mg total) by mouth daily as needed for erectile dysfunction    CBC and differential; Future    Lipid panel; Future    Bipolar 1 disorder (HCC)  Patient has been attempting to stretch his medication.  Typically is at 150 mg nightly, but he has only been able to take 100 which has significantly reduced his sleep.  I am not sure the reason why he was not getting both of the tablets, it could have been due to insurance.  At this time I have sent 100 mg tablets to be taken 1-1/2 at night for a total of 150       Hypertriglyceridemia  Patient recently obtain labs.  Will follow-up in 6 months.  Patient's triglycerides are only mildly elevated, but we will continue to monitor since he is already on atorvastatin 20 mg.  If he values are exacerbated, consider going up on atorvastatin.  He is currently not on high dose, but otherwise is doing well.  Orders:    Lipid panel; Future    Major depressive disorder, recurrent episode with anxious distress (HCC)  Patient currently on Cymbalta 60 mg and Seroquel 150 mg.  This regimen has been doing well for his mental health, and I have been prescribing this medication for him.  With any exacerbations I did inform him that he will likely need to return to psychiatry for follow-up, but at  this time since it is stable we will continue current regiment.         Restless leg syndrome  History of his restless leg syndrome.  He is not currently on any medication outside Seroquel to help with sleep.  Stable.       Stage 3a chronic kidney disease (HCC)  Patient's EGFR has been ranging between 53-63.  This is been over the period of the last 5 years.  Labs placed to be followed up.  If labs continue to trend in the wrong direction, may need to consider switching lisinopril or ACE inhibitor to a different option to avoid CKD.  Most recent values placed patient outside of CKD stage III    Lab Results   Component Value Date    EGFR 67 05/14/2025    EGFR 62 04/29/2024    EGFR 53 09/07/2022    CREATININE 1.10 05/14/2025    CREATININE 1.18 04/29/2024    CREATININE 1.36 (H) 09/07/2022       Orders:    Comprehensive metabolic panel; Future    Type 2 diabetes mellitus with other specified complication, without long-term current use of insulin ([facility])  Stable.  Most recent A1c 6.2 in April 2024.  Patient to continue with current regimen of metformin 500 mg twice daily.  As long as his A1c remains less than 7.5, we will continue current regimen.  Lab Results   Component Value Date    HGBA1C 6.2 (H) 05/14/2025       Orders:    tadalafil (CIALIS) 5 MG tablet; Take 1 tablet (5 mg total) by mouth daily as needed for erectile dysfunction    Comprehensive metabolic panel; Future    Hemoglobin A1C; Future    Albumin / creatinine urine ratio; Future    Onychomycosis  Patient has significant thickening of multiple nails due to onychomycosis.  Patient is interested in having this treated.  We discussed that the patient requires a CMP to look for liver function.  Once this is completed I can place the patient on Lamisil and send to podiatry for debulking.  It is important to note that this treatment will only be to new and healthy nail that will be coming out from the nailbed.  He will need to decrease burden and wait until the  infected area is fully removed prior to full resolution.  Orders:    Ambulatory Referral to Podiatry; Future    Comprehensive metabolic panel; Future    Nail thickening  See onychomycosis  Orders:    Ambulatory Referral to Podiatry; Future    Comprehensive metabolic panel; Future    Screening for deficiency anemia  We will screen for anemia, CBC ordered  Orders:    CBC and differential; Future    Screening cholesterol level  Recommended screening for cholesterol, patient has history of mixed hyperlipidemia/hypertriglyceridemia, lipid panel ordered  Orders:    Lipid panel; Future    Screening for malignant neoplasm of prostate  Recommended screening for prostate cancer, PSA ordered  Orders:    PSA, Total Screen; Future    Chronic eczematous otitis externa of both ears  Patient has a known history of eczematous otitis externa.  He has mild irritation and swelling of the canal on presentation, therefore we will give Corticosporin to cover for any underlying possible bacteria and the steroid will help to calm down the canal inflammation  Orders:    neomycin-polymyxin-hydrocortisone (CORTISPORIN) 0.35%-10,000 units/mL-1% otic suspension; Administer 4 drops into both ears every 8 (eight) hours       Preventive health issues were discussed with patient, and age appropriate screening tests were ordered as noted in patient's After Visit Summary. Personalized health advice and appropriate referrals for health education or preventive services given if needed, as noted in patient's After Visit Summary.    History of Present Illness     Patient presents today for annual physical/Medicare wellness exam.  The patient has been having more difficulty with obtaining and maintaining erections.  It is unknown the reason for this cause.  But he has multiple risk factors.  This includes diabetes, hypertension, hyperlipidemia, and psychiatric medications.  Therefore we discussed the various options for erectile dysfunction treatment.  He  would be more interested in daily Cialis over intermittent Cialis/Viagra.    Patient also notes that he has not been sleeping as well since the medication Seroquel has not been fully dispensed.  He has obtained 50 mg tablets, but the 100 mg tablets have not been given at the pharmacy.  Discussed 350s or 1-1/2 100 mg tablets.  He prefers the 100 mg tablets       Patient Care Team:  Alan Beach DO as PCP - General (Family Medicine)  MD Alan Wright MD Zheng Lin, MD as Endoscopist  Cata Bar RD as Diabetes Educator (Diabetes Services)    Review of Systems  Medical History Reviewed by provider this encounter:  Allergies  Problems       Annual Wellness Visit Questionnaire   Jamison is here for his Subsequent Wellness visit. Last Medicare Wellness visit information reviewed, patient interviewed, no change since last AWV.     Health Risk Assessment:   Patient rates overall health as good. Patient feels that their physical health rating is same. Patient is satisfied with their life. Eyesight was rated as same. Hearing was rated as same. Patient feels that their emotional and mental health rating is same. Patients states they are sometimes angry. Patient states they are never, rarely unusually tired/fatigued. Pain experienced in the last 7 days has been none. Patient states that he has experienced no weight loss or gain in last 6 months.     Fall Risk Screening:   In the past year, patient has experienced: no history of falling in past year      Home Safety:  Patient does not have trouble with stairs inside or outside of their home. Patient has working smoke alarms and has working carbon monoxide detector. Home safety hazards include: none.     Nutrition:   Current diet is Regular.     Medications:   Patient is currently taking over-the-counter supplements. OTC medications include: see medication list. Patient is able to manage medications.     Activities of Daily Living  (ADLs)/Instrumental Activities of Daily Living (IADLs):   Walk and transfer into and out of bed and chair?: Yes  Dress and groom yourself?: Yes    Bathe or shower yourself?: Yes    Feed yourself? Yes  Do your laundry/housekeeping?: Yes  Manage your money, pay your bills and track your expenses?: Yes  Make your own meals?: Yes    Do your own shopping?: Yes    Previous Hospitalizations:   Any hospitalizations or ED visits within the last 12 months?: No      Advance Care Planning:   Living will: No    Durable POA for healthcare: No    Advanced directive: No    Advanced directive counseling given: Yes      Preventive Screenings      Cardiovascular Screening:    General: Screening Not Indicated and History Lipid Disorder    Due for: Lipid Panel      Diabetes Screening:     General: Screening Not Indicated and History Diabetes    Due for: Blood Glucose      Colorectal Cancer Screening:     General: Screening Current      Prostate Cancer Screening:    General: Risks and Benefits Discussed    Due for: PSA      Abdominal Aortic Aneurysm (AAA) Screening:    Risk factors include: age between 65-76 yo and tobacco use        Lung Cancer Screening:     General: Screening Not Indicated      Hepatitis C Screening:    General: Screening Current    Immunizations:  - Immunizations due: Prevnar 20 and Zoster (Shingrix)    Screening, Brief Intervention, and Referral to Treatment (SBIRT)     Screening  Typical number of drinks in a day: 0  Typical number of drinks in a week: 0  Interpretation: Low risk drinking behavior.    Single Item Drug Screening:  How often have you used an illegal drug (including marijuana) or a prescription medication for non-medical reasons in the past year? daily or almost daily    Single Item Drug Screen Score: 4  Interpretation: POSITIVE screen for possible drug use disorder    Drug Abuse Screening Test (DAST-10):  1) Have you used drugs other than those required for medical reasons? No  2) Do you abuse more  "than one drug at a time? No  3) Are you always able to stop using drugs when you want to? Yes  4) Have you had \"blackouts\" or \"flashbacks\" as a result of drug use? No  5) Do you ever feel bad or guilty about your drug use? No  6) Does your spouse (or parents) ever complain about your involvement with drugs? No  7) Have you neglected your family because of your use of drugs? No  8) Have you engaged in illegal activities in order to obtain drugs? No  9) Have you ever experienced withdrawal symptoms (felt sick) when you stopped taking drugs? Yes  10) Have you had medical problems as a result of your drug use (e.g., memory loss, hepatitis, convulsions, bleeding, etc.)? No    DAST-10 Score: 1  Interpretation: Low level problems related to drug abuse    Brief Intervention  Alcohol & drug use screenings were reviewed. No concerns regarding substance use disorder identified.     Time Spent  Time spent screening/evaluating the patient for alcohol misuse: 5 minutes.     Other Counseling Topics:   Car/seat belt/driving safety, skin self-exam, sunscreen and calcium and vitamin D intake and regular weightbearing exercise.     Social Drivers of Health     Financial Resource Strain: Medium Risk (8/29/2022)    Overall Financial Resource Strain (CARDIA)     Difficulty of Paying Living Expenses: Somewhat hard   Food Insecurity: No Food Insecurity (5/13/2025)    Nursing - Inadequate Food Risk Classification     Worried About Running Out of Food in the Last Year: Never true     Ran Out of Food in the Last Year: Never true   Transportation Needs: No Transportation Needs (5/13/2025)    PRAPARE - Transportation     Lack of Transportation (Medical): No     Lack of Transportation (Non-Medical): No   Housing Stability: Unknown (5/13/2025)    Housing Stability Vital Sign     Unable to Pay for Housing in the Last Year: No     Homeless in the Last Year: No   Utilities: Not At Risk (5/13/2025)    [facility] Utilities     Threatened with loss of " "utilities: No     No results found.    Objective   /62   Pulse 71   Ht 5' 9\" (1.753 m)   Wt 88.5 kg (195 lb)   SpO2 97%   BMI 28.80 kg/m²     Physical Exam  Constitutional:       General: He is not in acute distress.     Appearance: Normal appearance. He is normal weight. He is not ill-appearing, toxic-appearing or diaphoretic.   HENT:      Head: Normocephalic and atraumatic.      Nose: Nose normal. No congestion or rhinorrhea.     Eyes:      General:         Right eye: No discharge.         Left eye: No discharge.       Cardiovascular:      Pulses: no weak pulses.           Dorsalis pedis pulses are 1+ on the right side and 1+ on the left side.        Posterior tibial pulses are 1+ on the right side and 1+ on the left side.   Feet:      Right foot:      Skin integrity: Dry skin present. No ulcer, skin breakdown, erythema, warmth or callus.      Left foot:      Skin integrity: Dry skin present. No ulcer, skin breakdown, erythema, warmth or callus.     Skin:     General: Skin is warm.      Capillary Refill: Capillary refill takes less than 2 seconds.     Neurological:      Mental Status: He is alert.         Patient's shoes and socks removed.    Right Foot/Ankle   Right Foot Inspection  Skin Exam: skin normal, skin intact and dry skin. No warmth, no callus, no erythema, no maceration, no abnormal color, no pre-ulcer, no ulcer and no callus.     Toe Exam: ROM and strength within normal limits and right toe deformity (Thickened nails with onychomycosis).     Sensory   Monofilament testing: intact    Vascular  Capillary refills: < 3 seconds  The right DP pulse is 1+. The right PT pulse is 1+.     Left Foot/Ankle  Left Foot Inspection  Skin Exam: skin normal, skin intact and dry skin. No warmth, no erythema, no maceration, normal color, no pre-ulcer, no ulcer and no callus.     Toe Exam: ROM and strength within normal limits and left toe deformity (Thickened nails with onychomycosis).     Sensory "   Monofilament testing: intact    Vascular  Capillary refills: < 3 seconds  The left DP pulse is 1+. The left PT pulse is 1+.     Assign Risk Category  Deformity present  No loss of protective sensation  No weak pulses  Risk: 0

## 2025-05-14 ENCOUNTER — APPOINTMENT (OUTPATIENT)
Dept: LAB | Facility: CLINIC | Age: 70
End: 2025-05-14
Payer: COMMERCIAL

## 2025-05-14 DIAGNOSIS — Z13.220 SCREENING CHOLESTEROL LEVEL: ICD-10-CM

## 2025-05-14 DIAGNOSIS — Z13.0 SCREENING FOR DEFICIENCY ANEMIA: ICD-10-CM

## 2025-05-14 DIAGNOSIS — L60.2 NAIL THICKENING: ICD-10-CM

## 2025-05-14 DIAGNOSIS — N52.03 COMBINED ARTERIAL INSUFFICIENCY AND CORPORO-VENOUS OCCLUSIVE ERECTILE DYSFUNCTION: ICD-10-CM

## 2025-05-14 DIAGNOSIS — E11.69 TYPE 2 DIABETES MELLITUS WITH OTHER SPECIFIED COMPLICATION, WITHOUT LONG-TERM CURRENT USE OF INSULIN (HCC): ICD-10-CM

## 2025-05-14 DIAGNOSIS — Z12.5 SCREENING FOR MALIGNANT NEOPLASM OF PROSTATE: ICD-10-CM

## 2025-05-14 DIAGNOSIS — E78.1 HYPERTRIGLYCERIDEMIA: ICD-10-CM

## 2025-05-14 DIAGNOSIS — D72.829 LEUKOCYTOSIS, UNSPECIFIED TYPE: ICD-10-CM

## 2025-05-14 DIAGNOSIS — B35.1 ONYCHOMYCOSIS: ICD-10-CM

## 2025-05-14 DIAGNOSIS — N18.31 STAGE 3A CHRONIC KIDNEY DISEASE (HCC): ICD-10-CM

## 2025-05-14 LAB
ALBUMIN SERPL BCG-MCNC: 4.6 G/DL (ref 3.5–5)
ALP SERPL-CCNC: 49 U/L (ref 34–104)
ALT SERPL W P-5'-P-CCNC: 9 U/L (ref 7–52)
ANION GAP SERPL CALCULATED.3IONS-SCNC: 7 MMOL/L (ref 4–13)
AST SERPL W P-5'-P-CCNC: 12 U/L (ref 13–39)
BASOPHILS # BLD AUTO: 0.13 THOUSANDS/ÂΜL (ref 0–0.1)
BASOPHILS NFR BLD AUTO: 1 % (ref 0–1)
BILIRUB SERPL-MCNC: 0.46 MG/DL (ref 0.2–1)
BUN SERPL-MCNC: 15 MG/DL (ref 5–25)
CALCIUM SERPL-MCNC: 10.4 MG/DL (ref 8.4–10.2)
CHLORIDE SERPL-SCNC: 105 MMOL/L (ref 96–108)
CHOLEST SERPL-MCNC: 101 MG/DL (ref ?–200)
CO2 SERPL-SCNC: 31 MMOL/L (ref 21–32)
CREAT SERPL-MCNC: 1.1 MG/DL (ref 0.6–1.3)
CREAT UR-MCNC: 108.9 MG/DL
EOSINOPHIL # BLD AUTO: 0.52 THOUSAND/ÂΜL (ref 0–0.61)
EOSINOPHIL NFR BLD AUTO: 5 % (ref 0–6)
ERYTHROCYTE [DISTWIDTH] IN BLOOD BY AUTOMATED COUNT: 13.7 % (ref 11.6–15.1)
EST. AVERAGE GLUCOSE BLD GHB EST-MCNC: 131 MG/DL
GFR SERPL CREATININE-BSD FRML MDRD: 67 ML/MIN/1.73SQ M
GLUCOSE P FAST SERPL-MCNC: 112 MG/DL (ref 65–99)
HBA1C MFR BLD: 6.2 %
HCT VFR BLD AUTO: 45.5 % (ref 36.5–49.3)
HDLC SERPL-MCNC: 30 MG/DL
HGB BLD-MCNC: 15 G/DL (ref 12–17)
IMM GRANULOCYTES # BLD AUTO: 0.06 THOUSAND/UL (ref 0–0.2)
IMM GRANULOCYTES NFR BLD AUTO: 1 % (ref 0–2)
LDLC SERPL CALC-MCNC: 41 MG/DL (ref 0–100)
LYMPHOCYTES # BLD AUTO: 3.1 THOUSANDS/ÂΜL (ref 0.6–4.47)
LYMPHOCYTES NFR BLD AUTO: 28 % (ref 14–44)
MCH RBC QN AUTO: 30.4 PG (ref 26.8–34.3)
MCHC RBC AUTO-ENTMCNC: 33 G/DL (ref 31.4–37.4)
MCV RBC AUTO: 92 FL (ref 82–98)
MICROALBUMIN UR-MCNC: 7.5 MG/L
MICROALBUMIN/CREAT 24H UR: 7 MG/G CREATININE (ref 0–30)
MONOCYTES # BLD AUTO: 0.72 THOUSAND/ÂΜL (ref 0.17–1.22)
MONOCYTES NFR BLD AUTO: 7 % (ref 4–12)
NEUTROPHILS # BLD AUTO: 6.37 THOUSANDS/ÂΜL (ref 1.85–7.62)
NEUTS SEG NFR BLD AUTO: 58 % (ref 43–75)
NONHDLC SERPL-MCNC: 71 MG/DL
NRBC BLD AUTO-RTO: 0 /100 WBCS
PLATELET # BLD AUTO: 282 THOUSANDS/UL (ref 149–390)
PMV BLD AUTO: 12 FL (ref 8.9–12.7)
POTASSIUM SERPL-SCNC: 4.5 MMOL/L (ref 3.5–5.3)
PROT SERPL-MCNC: 7.8 G/DL (ref 6.4–8.4)
PSA SERPL-MCNC: 0.98 NG/ML (ref 0–4)
RBC # BLD AUTO: 4.93 MILLION/UL (ref 3.88–5.62)
SODIUM SERPL-SCNC: 143 MMOL/L (ref 135–147)
TRIGL SERPL-MCNC: 152 MG/DL (ref ?–150)
WBC # BLD AUTO: 10.9 THOUSAND/UL (ref 4.31–10.16)

## 2025-05-14 PROCEDURE — 82043 UR ALBUMIN QUANTITATIVE: CPT

## 2025-05-14 PROCEDURE — G0103 PSA SCREENING: HCPCS

## 2025-05-14 PROCEDURE — 80053 COMPREHEN METABOLIC PANEL: CPT

## 2025-05-14 PROCEDURE — 83036 HEMOGLOBIN GLYCOSYLATED A1C: CPT

## 2025-05-14 PROCEDURE — 36415 COLL VENOUS BLD VENIPUNCTURE: CPT

## 2025-05-14 PROCEDURE — 85025 COMPLETE CBC W/AUTO DIFF WBC: CPT

## 2025-05-14 PROCEDURE — 82570 ASSAY OF URINE CREATININE: CPT

## 2025-05-14 PROCEDURE — 80061 LIPID PANEL: CPT

## 2025-05-31 ENCOUNTER — RESULTS FOLLOW-UP (OUTPATIENT)
Dept: FAMILY MEDICINE CLINIC | Facility: CLINIC | Age: 70
End: 2025-05-31

## 2025-06-02 DIAGNOSIS — E11.69 TYPE 2 DIABETES MELLITUS WITH OTHER SPECIFIED COMPLICATION, WITHOUT LONG-TERM CURRENT USE OF INSULIN (HCC): ICD-10-CM

## 2025-06-02 NOTE — TELEPHONE ENCOUNTER
----- Message from Alan Beach DO sent at 5/31/2025  6:44 PM EDT -----  This please call patient and inform him that his electrolytes, kidney function, overall liver function, urine testing, prostate-specific antigen/prostate testing is within normal limits.  His   hemoglobin A1c remains at 6.2 which was the same as last year.  His triglycerides were slightly elevated so he should just monitor his oral intake appropriately.  Elevation was minimal although.    Lastly the patient had a mildly elevated white count.  I do believe that this is likely secondary to medication, but if he has any new issues or concerns he should follow-up in the office.    Sincerely,    Dr. Beach DO  ----- Message -----  From: Lab, Background User  Sent: 5/14/2025   5:40 PM EDT  To: Alan Beach DO

## 2025-06-02 NOTE — TELEPHONE ENCOUNTER
Medication: metFORMIN (GLUCOPHAGE) 500 mg tablet     Dose/Frequency: TAKE 1 TABLET(500 MG) BY MOUTH TWICE DAILY WITH MEALS     Quantity: 60    Ph[facility]: [facility]    Office:   [x] PCP/Provider -   [] Speciality/Provider -     Does the patient have enough for 3 days?   [] Yes   [x] No - Send as HP to POD     Patient no longer sees Jett and is requesting for Dr Beach to take over the medications

## 2025-06-04 ENCOUNTER — TELEPHONE (OUTPATIENT)
Dept: FAMILY MEDICINE CLINIC | Facility: CLINIC | Age: 70
End: 2025-06-04

## 2025-06-04 DIAGNOSIS — E11.69 TYPE 2 DIABETES MELLITUS WITH OTHER SPECIFIED COMPLICATION, WITHOUT LONG-TERM CURRENT USE OF INSULIN (HCC): ICD-10-CM

## 2025-06-04 NOTE — TELEPHONE ENCOUNTER
"Pt calling RX line in regards to metformin refill. Pt was upset medication was not sent when requested- advised pt medication was sent on 6/4 @ 530 pm. Pt unhappy with my answer and raised his voice and stated \"well that pharmacy does not have it\" I then told pt we can resend it over to pharmacy no problem and resend it right away and pt then got even madder and said \" I might as well die then right\" and hung up.   Medication was resent to pharmacy for pt. Please give pt a call to advise him metformin was resent   "

## 2025-06-04 NOTE — TELEPHONE ENCOUNTER
Pt called refill line stating that he reached out to the pharmacy and again they informed him they did not receive Rx. I asked if he was speaking to a person or if he was going through the automated system. He stated he was speaking to a person. With his permission I placed him on a brief hold and spoke to the pharmacist who stated they received the script on the 2nd and had it ready for him but then a cancellation came through so they put it back and she saw the new script so they will get it ready for him and he can pick it up in 20 minutes. I explained this to the pt who stated this same thing happened to him last month. I told him it might have to do with the influx of pts from [facility] [employer] and apologized for the inconvenience. Pt verbalized understanding.

## 2025-06-04 NOTE — TELEPHONE ENCOUNTER
Reason for call: pt states medication is not at pharmacy asking for it to be resent asap   [x] Refill   [] Prior Auth  [] Other:     Office:   [x] PCP/Provider -   [] Specialty/Provider -     Medication:     metFORMIN (GLUCOPHAGE) 500 mg tablet       Dose/Frequency: tarah 1 tablet (500 mg total) by mouth 2 (two) times a day with meals     Quantity: 180 tablet     Pharmacy: [id number] [place] [place] [facility] [address]     Local Pharmacy   Does the patient have enough for 3 days?   [] Yes   [x] No - Send as HP to POD

## 2025-07-03 ENCOUNTER — OFFICE VISIT (OUTPATIENT)
Dept: PODIATRY | Facility: CLINIC | Age: 70
End: 2025-07-03
Attending: FAMILY MEDICINE
Payer: COMMERCIAL

## 2025-07-03 VITALS — HEART RATE: 98 BPM | OXYGEN SATURATION: 96 % | BODY MASS INDEX: 28.73 KG/M2 | WEIGHT: 194 LBS | HEIGHT: 69 IN

## 2025-07-03 DIAGNOSIS — B35.1 ONYCHOMYCOSIS: ICD-10-CM

## 2025-07-03 DIAGNOSIS — E11.69 TYPE 2 DIABETES MELLITUS WITH OTHER SPECIFIED COMPLICATION, WITHOUT LONG-TERM CURRENT USE OF INSULIN (HCC): ICD-10-CM

## 2025-07-03 DIAGNOSIS — B35.3 TINEA PEDIS OF BOTH FEET: Primary | ICD-10-CM

## 2025-07-03 DIAGNOSIS — L60.2 NAIL THICKENING: ICD-10-CM

## 2025-07-03 PROCEDURE — 99203 OFFICE O/P NEW LOW 30 MIN: CPT | Performed by: PODIATRIST

## 2025-07-03 PROCEDURE — 11721 DEBRIDE NAIL 6 OR MORE: CPT | Performed by: PODIATRIST

## 2025-07-03 RX ORDER — KETOCONAZOLE 20 MG/G
CREAM TOPICAL DAILY
Qty: 60 G | Refills: 3 | Status: SHIPPED | OUTPATIENT
Start: 2025-07-03

## 2025-07-03 NOTE — PROGRESS NOTES
:  Assessment & Plan  Onychomycosis    Orders:    Ambulatory Referral to Podiatry    Nail thickening    Orders:    Ambulatory Referral to Podiatry    Tinea pedis of both feet    Orders:    ketoconazole (NIZORAL) 2 % cream; Apply topically daily  The patient's clinical examination today significant for thickening, brittleness and discoloration of the pedal nail plates with dystrophy and subungual debris consistent onychomycosis x 10.  There is dry scaly skin to the plantar aspects of both feet in a moccasin type distribution consistent with tinea pedis.  There are no open lesions.  Pedal pulses palpable bilaterally.  Vibratory and epicritic sensations are intact.  Skin is thin with decreased turgor.  There is diminished hair growth to the bilateral extremities.    The pedal nail plates are sharply debrided based on the clipper x 10 without complication.  The nails were then reduced in thickness and girth as a rotary bur.  For management of his tinea pedis, I will start him on ketoconazole 2% cream to be applied to the affected areas daily for 4 to 6 weeks.  His most recent hemoglobin A1c from May 2025 was 6.2, prior to that it was 6.2 in April 2024.    Recommend follow-up in 2 to 3 months for at risk diabetic footcare.  Type 2 diabetes mellitus with other specified complication, without long-term current use of insulin ([facility])    Lab Results   Component Value Date    HGBA1C 6.2 (H) 05/14/2025                History of Present Illness     Jamison Chi  is a 69 y.o. male   The patient presents today for his initial consultation with [facility] podiatry group for right wrist diabetic footcare.  The patient does note some dry scaling skin to his feet but no other acute pedal issues.  He denies any pain or numbness in either lower extremity.      Review of Systems   Constitutional: Negative.    Respiratory: Negative.     Cardiovascular: Negative.    Musculoskeletal: Negative.    Skin: Negative.    Neurological:  "Negative.    Psychiatric/Behavioral: Negative.       Objective   Pulse 98   Ht 5' 9\" (1.753 m)   Wt 88 kg (194 lb)   SpO2 96%   BMI 28.65 kg/m²      Physical Exam  Constitutional:       Appearance: Normal appearance.   HENT:      Head: Normocephalic and atraumatic.     Eyes:      Conjunctiva/sclera: Conjunctivae normal.       Cardiovascular:      Pulses: no weak pulses.           Dorsalis pedis pulses are 2+ on the right side and 2+ on the left side.        Posterior tibial pulses are 1+ on the right side and 1+ on the left side.   Pulmonary:      Effort: Pulmonary effort is normal.   Feet:      Right foot:      Protective Sensation: 7 sites tested.  7 sites sensed.      Skin integrity: No ulcer, skin breakdown, erythema, warmth, callus or dry skin.      Left foot:      Protective Sensation: 7 sites tested.  7 sites sensed.      Skin integrity: No ulcer, skin breakdown, erythema, warmth, callus or dry skin.      Comments: The patient's clinical examination today significant for thickening, brittleness and discoloration of the pedal nail plates with dystrophy and subungual debris consistent onychomycosis x 10.  There is dry scaly skin to the plantar aspects of both feet in a moccasin type distribution consistent with tinea pedis.  There are no open lesions.  Pedal pulses palpable bilaterally.  Vibratory and epicritic sensations are intact.  Skin is thin with decreased turgor.  There is diminished hair growth to the bilateral extremities.    Skin:     General: Skin is warm and dry.      Capillary Refill: Capillary refill takes less than 2 seconds.     Neurological:      General: No focal deficit present.      Mental Status: He is alert and oriented to person, place, and time.     Psychiatric:         Mood and Affect: Mood normal.         Diabetic Foot Exam    Patient's shoes and socks removed.    Right Foot/Ankle   Right Foot Inspection  Skin Exam: skin normal and skin intact. No dry skin, no warmth, no callus, no " erythema, no maceration, no abnormal color, no pre-ulcer, no ulcer and no callus.     Toe Exam: ROM and strength within normal limits.     Sensory   Vibration: intact  Proprioception: intact  Monofilament testing: intact    Vascular  Capillary refills: < 3 seconds  The right DP pulse is 2+. The right PT pulse is 1+.     Left Foot/Ankle  Left Foot Inspection  Skin Exam: skin normal and skin intact. No dry skin, no warmth, no erythema, no maceration, normal color, no pre-ulcer, no ulcer and no callus.     Toe Exam: ROM and strength within normal limits.     Sensory   Vibration: intact  Proprioception: intact  Monofilament testing: intact    Vascular  Capillary refills: < 3 seconds  The left DP pulse is 2+. The left PT pulse is 1+.     Assign Risk Category  No deformity present  No loss of protective sensation  No weak pulses  Risk: 0

## 2025-07-23 DIAGNOSIS — E11.69 TYPE 2 DIABETES MELLITUS WITH OTHER SPECIFIED COMPLICATION, WITHOUT LONG-TERM CURRENT USE OF INSULIN (HCC): ICD-10-CM

## 2025-07-24 RX ORDER — ATORVASTATIN CALCIUM 20 MG/1
TABLET, FILM COATED ORAL
Qty: 90 TABLET | Refills: 1 | Status: SHIPPED | OUTPATIENT
Start: 2025-07-24

## 2025-07-29 DIAGNOSIS — E11.69 TYPE 2 DIABETES MELLITUS WITH OTHER SPECIFIED COMPLICATION, WITHOUT LONG-TERM CURRENT USE OF INSULIN (HCC): ICD-10-CM

## 2025-07-29 DIAGNOSIS — N52.03 COMBINED ARTERIAL INSUFFICIENCY AND CORPORO-VENOUS OCCLUSIVE ERECTILE DYSFUNCTION: ICD-10-CM

## 2025-07-30 RX ORDER — TADALAFIL 5 MG/1
TABLET ORAL
Qty: 30 TABLET | Refills: 2 | Status: SHIPPED | OUTPATIENT
Start: 2025-07-30

## (undated) DEVICE — SURGICEL 2 X 3

## (undated) DEVICE — SUT ETHILON 2-0 FSLX 30 IN 1674H

## (undated) DEVICE — NEURO PATTIES 1/4 X 3

## (undated) DEVICE — 3M™ TEGADERM™ TRANSPARENT FILM DRESSING FRAME STYLE, 1628, 6 IN X 8 IN (15 CM X 20 CM), 10/CT 8CT/CASE: Brand: 3M™ TEGADERM™

## (undated) DEVICE — DURASEAL MICROMYST APPLICATOR TIP

## (undated) DEVICE — PREP SURGICAL PURPREP 26ML

## (undated) DEVICE — LIGHT HANDLE COVER SLEEVE DISP BLUE STELLAR

## (undated) DEVICE — DRAPE MICROSCOPE OPMI PENTERO

## (undated) DEVICE — SUT GORE-TEX CV-6 TT-9 6K02B

## (undated) DEVICE — BIPOLAR SEALER 23-113-1 AQM 2.3: Brand: AQUAMANTYS™

## (undated) DEVICE — GLOVE INDICATOR PI UNDERGLOVE SZ 8 BLUE

## (undated) DEVICE — SUT NUROLON 4-0 TF CR/8 18 IN C584D

## (undated) DEVICE — SURGIFOAM 8.5 X 12.5

## (undated) DEVICE — GLOVE SRG BIOGEL ECLIPSE 8

## (undated) DEVICE — JACKSON TABLE FOAM POSITIONING KIT: Brand: CARDINAL HEALTH

## (undated) DEVICE — ELECTRODE BLADE E-Z CLEAN 4IN -0014A

## (undated) DEVICE — INTENDED FOR TISSUE SEPARATION, AND OTHER PROCEDURES THAT REQUIRE A SHARP SURGICAL BLADE TO PUNCTURE OR CUT.: Brand: BARD-PARKER SAFETY BLADES SIZE 10, STERILE

## (undated) DEVICE — MONITORING SPINAL IMPULSE CASE FEE

## (undated) DEVICE — TELFA NON-ADHERENT ABSORBENT DRESSING: Brand: TELFA

## (undated) DEVICE — DRAPE LAPAROTOMY W/POUCHES

## (undated) DEVICE — SPECIMEN CONTAINER STERILE PEEL PACK

## (undated) DEVICE — SPONGE PVP SCRUB WING STERILE

## (undated) DEVICE — INTENDED FOR TISSUE SEPARATION, AND OTHER PROCEDURES THAT REQUIRE A SHARP SURGICAL BLADE TO PUNCTURE OR CUT.: Brand: BARD-PARKER ® CARBON RIB-BACK BLADES

## (undated) DEVICE — ANTIBACTERIAL VIOLET BRAIDED (POLYGLACTIN 910), SYNTHETIC ABSORBABLE SUTURE: Brand: COATED VICRYL

## (undated) DEVICE — PROXIMATE PLUS MD MULTI-DIRECTIONAL RELEASE SKIN STAPLERS CONTAINS 35 STAINLESS STEEL STAPLES APPROXIMATE CLOSED DIMENSIONS: 6.9MM X 3.9MM WIDE: Brand: PROXIMATE

## (undated) DEVICE — COVER PROBE INTRAOPERATIVE 6 X 96 IN

## (undated) DEVICE — TOOL 14MH30 LEGEND 14CM 3MM: Brand: MIDAS REX ™

## (undated) DEVICE — DURASEAL® EXACT SPINAL SEALANT SYSTEM 3ML 5 PACK: Brand: DURASEAL EXACT SPINAL SEALANT SYSTEM

## (undated) DEVICE — BETHLEHEM UNIVERSAL SPINE, KIT: Brand: CARDINAL HEALTH

## (undated) DEVICE — TRAY FOLEY 16FR URIMETER SURESTEP

## (undated) DEVICE — SUPPLY FEE STD

## (undated) DEVICE — GAUZE SPONGES,16 PLY: Brand: CURITY

## (undated) DEVICE — SNAP KOVER: Brand: UNBRANDED

## (undated) DEVICE — SILVER-COATED ANTIMICROBIAL BARRIER DRESSING: Brand: ACTICOAT   4" X 8"

## (undated) DEVICE — SUT VICRYL 0 CT-1 27 IN J260H

## (undated) DEVICE — SUT MONOCRYL PLUS 3-0 PS-2 27 IN MCP427H

## (undated) DEVICE — NEURO PATTIES 1/4 X 1/4

## (undated) DEVICE — PLUMEPEN PRO 10FT

## (undated) DEVICE — WORKING LENGTH 235CM, WORKING CHANNEL 2.8MM: Brand: RESOLUTION 360 CLIP

## (undated) DEVICE — DRESSING ALLEVYN LIFE SACRAL 6.75 X 6.5 IN